# Patient Record
Sex: FEMALE | Race: WHITE | NOT HISPANIC OR LATINO | Employment: OTHER | URBAN - METROPOLITAN AREA
[De-identification: names, ages, dates, MRNs, and addresses within clinical notes are randomized per-mention and may not be internally consistent; named-entity substitution may affect disease eponyms.]

---

## 2017-01-13 ENCOUNTER — HOSPITAL ENCOUNTER (OUTPATIENT)
Dept: PHYSICAL THERAPY | Facility: HOSPITAL | Age: 82
Setting detail: THERAPIES SERIES
Discharge: HOME | End: 2017-01-13

## 2017-01-13 DIAGNOSIS — I87.2 VENOUS STASIS DERMATITIS OF BOTH LOWER EXTREMITIES: Primary | ICD-10-CM

## 2017-01-13 DIAGNOSIS — R60.0 EDEMA OF BOTH LEGS: ICD-10-CM

## 2017-01-13 PROCEDURE — G8991 OTHER PT/OT GOAL STATUS: HCPCS

## 2017-01-13 PROCEDURE — 29581 APPL MULTLAYER CMPRN SYS LEG: CPT

## 2017-01-13 PROCEDURE — 97161 PT EVAL LOW COMPLEX 20 MIN: CPT

## 2017-01-13 PROCEDURE — G8990 OTHER PT/OT CURRENT STATUS: HCPCS

## 2017-01-13 NOTE — MR AVS SNAPSHOT
Lorna Lo   2017  9:30 AM   INITIAL EVALUATION - WOUND CARE    Dept Phone:  [phone]   Encounter #:  [id number]    Provider:  Alis Johnson PT   Department:  [facility] OUTPATIENT PHYSICAL THERAPY                Your Full Care Plan              Your Updated Medication List      ASK your doctor about these medications     amLODIPine 5 MG tablet   Commonly known as:  NORVASC       aspirin 81 MG EC tablet       atenolol 100 MG tablet   Commonly known as:  TENORMIN       CALTRATE 600 PO       cholecalciferol 1000 UNITS tablet   Commonly known as:  VITAMIN D3       folic acid 1 MG tablet   Commonly known as:  FOLVITE       furosemide 20 MG tablet   Commonly known as:  LASIX       levothyroxine 88 MCG tablet   Commonly known as:  SYNTHROID, LEVOTHROID       pantoprazole 40 MG EC tablet   Commonly known as:  PROTONIX       potassium chloride 10 MEQ CR tablet   Commonly known as:  K-DUR       predniSONE 5 MG tablet   Commonly known as:  DELTASONE               Instructions     None    Patient Instructions History      Upcoming Appointments     Visit Type Date Time Department    INITIAL EVAL - WOUND CARE 2017  9:30 AM  [facility]    TREATMENT 2017 10:00 AM  [facility]      MyChart Signup     [facility] [employer] allows you to send messages to your doctor, view your test results, renew your prescriptions, schedule appointments, and more. To sign up, go to [employer] and click on the Sign Up Now link in the New User? box. Enter your [employer] Activation Code exactly as it appears below along with the last four digits of your Social Security Number and your Date of Birth () to complete the sign-up process. If you do not sign up before the expiration date, you must request a new code.    [employer] Activation Code: [email or web address]  Expires: 2017 10:02 AM    If you have questions, you can email [email or web address] or  call [phone] to talk to our MyChart staff. Remember, [employer] is NOT to be used for urgent needs. For medical emergencies, dial 911.               Other Info from Your Visit           Your Appointments     Jan 17, 2017 10:00 AM EST   Therapy Treatment with Alis Johnson, PT   [facility] OUTPATIENT PHYSICAL THERAPY ([place])    [address] [place] [id number] [phone]              Allergies     Amoxicillin      Contrast Dye      INTRAVENOUS CONTRAST DYE.     Fosamax [Alendronate]      Lisinopril      Lortab [Hydrocodone-acetaminophen]      Methotrexate Derivatives      Other      CHLOROTHALIDONE    Simvastatin        Vital Signs     Smoking Status                   Former Smoker

## 2017-01-13 NOTE — PROGRESS NOTES
"    Outpatient Rehabilitation - Wound/Debridement Initial Eval  [facility]     Patient Name: Lorna Lo  : 1934  MRN: [id number]  Today's Date: 2017            RLE:      LLE:    Admit Date: 2017    Visit Dx:    ICD-10-CM ICD-9-CM   1. Venous stasis dermatitis of both lower extremities I83.11 454.1    I83.12    2. Edema of both legs R60.0 782.3       Patient Active Problem List   Diagnosis   • Coronary artery disease   • Hypertension   • Dyslipidemia   • Polymyositis   • Dependent edema   • CKD (chronic kidney disease)   • Chronic anemia   • Osteopenia   • GERD (gastroesophageal reflux disease)        Past Medical History   Diagnosis Date   • Chronic anemia      Chronic anemia, secondary to (CKD).    • CKD (chronic kidney disease)    • Coronary artery disease    • Dependent edema    • DVT (deep venous thrombosis) 2009     History of DVT in .    • Dyslipidemia    • GERD (gastroesophageal reflux disease)    • Hypertension    • Osteopenia    • Polymyositis         No past surgical history on file.          Patient History       17 0930          History    Chief Complaint Ulcer, wound or other skin condition  -      Date Current Problem(s) Began 01/10/17  -      Brief Description of Current Complaint Pt returns to PT wound care for similar complaints as her last episode of care. She has continued to wear the size 4 compressogrip stockings with success. In the past week, she has noticed a slight increase in her swelling and saw that her skin was \"breaking open\" around B ankles.  -      Previous treatment for THIS PROBLEM Other (comment)   Compression wraps from PT wound care, z-guard  -      Patient/Caregiver Goals Know what to do to help the symptoms;Decrease swelling  -      Patient's Rating of General Health Good  -      Occupation/sports/leisure activities Retired.  -      Patient seeing anyone else for problem(s)? Yes   PCP  -      How has patient tried to help " current problem? Pt has continued to use compressogrip and z-guard since her last episode of care.   She wanted to order more, but was concerned with the cost.  -MC      Related/Recent Hospitalizations No  -MC      Are you or can you be pregnant No  -MC      Pain     Pain Location Leg   bilateral  -MC      Pain at Present 2  -MC      Pain at Best 0  -MC      Pain at Worst 5  -MC      Pain Description Burning  -      Difficulties with ADL's? self care  -      Fall Risk Assessment    Any falls in the past year: No  -MC      Does patient have a fear of falling No  -MC      Previous Functional Level --   independent with most activities, son assists with driving  -      Services    Are you currently receiving Home Health services No  -MC      Do you plan to receive Home Health services in the near future No  -MC      Daily Activities    Primary Language English  -MC      Are you able to read Yes  -MC      Are you able to write Yes  -MC      How does patient learn best? Listening;Demonstration  -      Teaching needs identified Management of Condition  -      Patient is concerned about/has problems with Climbing Stairs;Performing home management (household chores, shopping, care of dependents);Walking  -      Does patient have problems with the following? None  -MC      Barriers to learning None  -MC      Pt Participated in POC and Goals Yes  -MC      Safety    Are you being hurt, hit, or frightened by anyone at home or in your life? No  -MC      Are you being neglected by a caregiver No  -MC        User Key  (r) = Recorded By, (t) = Taken By, (c) = Cosigned By    Initials Name Provider Type     Alis Johnson, PT Physical Therapist          EVALUATION            LDA Wound       01/13/17 0930          Wound 01/13/17 0930 Bilateral lower leg excoriation    Wound - Properties Group Date first assessed: 01/13/17  - Time first assessed: 0930  - Side: Bilateral  -MC Orientation: lower  - Location: leg   "-MC Type: excoriation  -MC    Wound WDL ex   periwound redness  -MC      Dressing Appearance dry;intact   compressogrip  -MC      Base moist;reddened  -MC      Periwound Area intact;redness;swelling  -MC      Edges open;irregular  -MC      Length (cm) --   scattered open areas along B ankles  -MC      Drainage Characteristics/Odor serous  -MC      Drainage Amount small  -MC      Picture taken yes  -MC      Wound Cleaning cleansed with;other (see comments)   theraworx  -MC      Dressing Dressing applied;low-adherent;silver impregnated dressing   Mepilex Ag foam secured with 4\" conform and compressogrip  -MC      Periwound Care cleansed with pH balanced cleanser;barrier ointment applied;other (see comments)   eucerin to intact skin, z-guard to excoriation  -MC        User Key  (r) = Recorded By, (t) = Taken By, (c) = Cosigned By    Initials Name Provider Type     Alis Johnson, PT Physical Therapist              Lymphedema       01/13/17 0930          Lymphedema Edema Assessment    Ptting Edema Category By severity  -      Pitting Edema Mild  -MC      Skin Changes/Observations    Location/Assessment Lower Extremity  -      Lower Extremity Conditions bilateral:;clean;dry;hairless;inflamed  -      Lower Extremity Color/Pigment bilateral:;erythema  -MC      Lymphedema Pulses/Capillary Refill    Dorsalis Pedis Pulse right:;left:;+1 diminished  -MC      Posterior Tibialis Pulse right:;left:;+1 diminished  -MC      Capillary Refill lower extremity capillary refill  -      Lower Extremity Capillary Refill right:;left:;less than 3 seconds  -MC      LLE Quick Girth (cm)    Met-heads 21.3 cm  -MC      Mid foot 20.4 cm  -MC      Smallest ankle 19.5 cm  -MC      Largest calf 37.9 cm  -MC      Tib tuberosity 36.5 cm  -MC      RLE Quick Girth (cm)    Met-heads 21.8 cm  -MC      Mid foot 20.5 cm  -MC      Smallest ankle 20.9 cm  -MC      Largest calf 38.8 cm  -MC      Tib tuberosity 39.6 cm  -MC      Compression/Skin " Care    Compression/Skin Care skin care;wrapping location;bandaging  -      Skin Care washed/dried;lotion applied  -      Wrapping Location lower extremity  -      Wrapping Location LE bilateral:;foot to knee  -      Bandage Layers cotton elastic stocking- single layer (comment size);cotton elastic stocking- double layer (comment size)   size 4 MH to calf, doubled over foot/ankle  -        User Key  (r) = Recorded By, (t) = Taken By, (c) = Cosigned By    Initials Name Provider Type     Alis Johnson, PT Physical Therapist          WOUND DEBRIDEMENT                    Recommendation and Plan        PT Assessment/Plan       01/13/17 0930          PT Assessment    Functional Limitations Performance in self-care ADL  -      Impairments Integumentary integrity;Edema  -      Assessment Comments Pt presents to PT wound care with slightly worsened BLE edema and areas of excoriation and periwound erythema to B ankles. Added silver foam and z-guard to the excoriated areas to provide adequate moist wound healing with antibacterial effects. Pt will benefit from PT wound care for management of advanced dressings and light compression systems.  -      Rehab Potential Good  -      Patient/caregiver participated in establishment of treatment plan and goals Yes  -      Patient would benefit from skilled therapy intervention Yes  -      PT Plan    PT Frequency 2x/week  -      Predicted Duration of Therapy Intervention (days/wks) 4 weeks  -      Physical Therapy Interventions (Optional Details) patient/family education;wound care  -      PT Plan Comments Follow up Tuesday.  -        User Key  (r) = Recorded By, (t) = Taken By, (c) = Cosigned By    Initials Name Provider Type    PAWAN Johnson PT Physical Therapist            Goals      First Last   PT Goal Re-Cert Due Date: 02/12/17  PT Goal Re-Cert Due Date: 02/12/17   PT Short Term Goals  STG 1: Patient and/ or caregiver able to verbalize  signs and symptoms of infection.  STG 2: Decrease area of excoriation by 50% as evidence of wound healing. PT Short Term Goals  STG 1: Patient and/ or caregiver able to verbalize signs and symptoms of infection.  STG 2: Decrease area of excoriation by 50% as evidence of wound healing.   Long Term Goals  LTG 1: Patient independent and compliant with use and care of compression wraps or stockings, with assistance of family / caregiver as indicated to promote self-care independence.  LTG 2: Decrease area of excoriation by 75% as evidence of wound healing.  LTG 3: Decrease limb circumferences by 2 cm to promote improved skin integrity and circulation.  Long Term Goals  LTG 1: Patient independent and compliant with use and care of compression wraps or stockings, with assistance of family / caregiver as indicated to promote self-care independence.  LTG 2: Decrease area of excoriation by 75% as evidence of wound healing.  LTG 3: Decrease limb circumferences by 2 cm to promote improved skin integrity and circulation.                   PT OP Goals       01/13/17 0930          PT Short Term Goals    STG 1 Patient and/ or caregiver able to verbalize signs and symptoms of infection.  -      STG 2 Decrease area of excoriation by 50% as evidence of wound healing.  -      Long Term Goals    LTG 1 Patient independent and compliant with use and care of compression wraps or stockings, with assistance of family / caregiver as indicated to promote self-care independence.  -      LTG 2 Decrease area of excoriation by 75% as evidence of wound healing.  -      LTG 3 Decrease limb circumferences by 2 cm to promote improved skin integrity and circulation.  -      Time Calculation    PT Goal Re-Cert Due Date 02/12/17  -        User Key  (r) = Recorded By, (t) = Taken By, (c) = Cosigned By    Initials Name Provider Type    PAWAN Johnson, PT Physical Therapist          Time Calculation: Start Time: 0930    Therapy Charges  for Today     Code Description Service Date Service Provider Modifiers Qty    61994800946 HC PT OTHER PRIME FUNCT CURRENT 1/13/2017 Alis Johnson, PT GP, CK 1    24651136596 HC PT OTHER PRIME FUNCT PROJECTED 1/13/2017 Alis Johnson, PT GP, CJ 1    90373313268 HC PT EVAL LOW COMPLEXITY 4 1/13/2017 Alis Johnson, PT GP 1    36033521684 HC PT MULTI LAYER COMP SYS BELOW KNEE 1/13/2017 Alis Johnson, PT GP 1          PT G-Codes  PT Professional Judgement Used?: Yes  Outcome Measure Options: Lower Extremity Functional Scale (LEFS)  Score: 48  Functional Limitation: Other PT primary  Other PT Primary Current Status (): At least 40 percent but less than 60 percent impaired, limited or restricted  Other PT Primary Goal Status (): At least 20 percent but less than 40 percent impaired, limited or restricted     Alis Johnson, PT  1/13/2017

## 2017-01-16 ENCOUNTER — TRANSCRIBE ORDERS (OUTPATIENT)
Dept: PHYSICAL THERAPY | Facility: HOSPITAL | Age: 82
End: 2017-01-16

## 2017-01-16 DIAGNOSIS — I87.2 VENOUS STASIS DERMATITIS OF BOTH LOWER EXTREMITIES: Primary | ICD-10-CM

## 2017-01-17 ENCOUNTER — HOSPITAL ENCOUNTER (OUTPATIENT)
Dept: PHYSICAL THERAPY | Facility: HOSPITAL | Age: 82
Setting detail: THERAPIES SERIES
Discharge: HOME | End: 2017-01-17

## 2017-01-17 DIAGNOSIS — I87.2 VENOUS STASIS DERMATITIS OF BOTH LOWER EXTREMITIES: ICD-10-CM

## 2017-01-17 PROCEDURE — 29581 APPL MULTLAYER CMPRN SYS LEG: CPT

## 2017-01-17 NOTE — MR AVS SNAPSHOT
[facility] OUTPATIENT PHYSICAL THERAPY  [phone]                    Lorna Lo   2017 10:00 AM   Therapy Treatment    Dept Phone:  [phone]   Encounter #:  [id number]    Provider:  Alis Johnson PT   Department:  [facility] OUTPATIENT PHYSICAL THERAPY                Your Full Care Plan              Your Updated Medication List      ASK your doctor about these medications     amLODIPine 5 MG tablet   Commonly known as:  NORVASC       aspirin 81 MG EC tablet       atenolol 100 MG tablet   Commonly known as:  TENORMIN       CALTRATE 600 PO       cholecalciferol 1000 UNITS tablet   Commonly known as:  VITAMIN D3       folic acid 1 MG tablet   Commonly known as:  FOLVITE       furosemide 20 MG tablet   Commonly known as:  LASIX       levothyroxine 88 MCG tablet   Commonly known as:  SYNTHROID, LEVOTHROID       pantoprazole 40 MG EC tablet   Commonly known as:  PROTONIX       potassium chloride 10 MEQ CR tablet   Commonly known as:  K-DUR       predniSONE 5 MG tablet   Commonly known as:  DELTASONE               Instructions     None    Patient Instructions History      Upcoming Appointments     Visit Type Date Time Department    TREATMENT 2017 10:00 AM  [facility]    TREATMENT 2017  9:15 AM  [facility]      MyChart Signup     [facility] [employer] allows you to send messages to your doctor, view your test results, renew your prescriptions, schedule appointments, and more. To sign up, go to [employer] and click on the Sign Up Now link in the New User? box. Enter your [employer] Activation Code exactly as it appears below along with the last four digits of your Social Security Number and your Date of Birth () to complete the sign-up process. If you do not sign up before the expiration date, you must request a new code.    [employer] Activation Code: [email or web address]  Expires: 2017 10:02 AM    If you have questions, you can  email [email or web address] or call [phone] to talk to our [employer] staff. Remember, [employer] is NOT to be used for urgent needs. For medical emergencies, dial 911.               Other Info from Your Visit           Your Appointments     Jan 23, 2017  9:15 AM EST   Therapy Treatment with Dayami Baca, PT   [facility] OUTPATIENT PHYSICAL THERAPY ([place])    [address] [place] [address] [phone]              Allergies     Amoxicillin      Contrast Dye      INTRAVENOUS CONTRAST DYE.     Fosamax [Alendronate]      Lisinopril      Lortab [Hydrocodone-acetaminophen]      Methotrexate Derivatives      Other      CHLOROTHALIDONE    Simvastatin        Vital Signs     Smoking Status                   Former Smoker

## 2017-01-18 NOTE — PROGRESS NOTES
"    Outpatient Rehabilitation - Wound/Debridement Treatment Note  [facility]     Patient Name: Lorna Lo  : 1934  MRN: [id number]  Today's Date: 2017                Admit Date: 2017    Visit Dx:    ICD-10-CM ICD-9-CM   1. Venous stasis dermatitis of both lower extremities I83.11 454.1    I83.12        Patient Active Problem List   Diagnosis   • Coronary artery disease   • Hypertension   • Dyslipidemia   • Polymyositis   • Dependent edema   • CKD (chronic kidney disease)   • Chronic anemia   • Osteopenia   • GERD (gastroesophageal reflux disease)        Past Medical History   Diagnosis Date   • Chronic anemia      Chronic anemia, secondary to (CKD).    • CKD (chronic kidney disease)    • Coronary artery disease    • Dependent edema    • DVT (deep venous thrombosis)      History of DVT in .    • Dyslipidemia    • GERD (gastroesophageal reflux disease)    • Hypertension    • Osteopenia    • Polymyositis         No past surgical history on file.      EVALUATION        PT Ortho       17 1100    Subjective Comments    Subjective Comments No issues to report. Feels like her legs \"will always be a problem\"  -ES    Subjective Pain    Able to rate subjective pain? yes  -ES    Pre-Treatment Pain Level 0  -ES    Post-Treatment Pain Level 0  -ES      User Key  (r) = Recorded By, (t) = Taken By, (c) = Cosigned By    Initials Name Provider Type    ES Aylin Durand, PT Physical Therapist                    LDA Wound                  Wound 17 0930 Bilateral lower leg excoriation    Wound - Properties Group Date first assessed: 17  - Time first assessed: 930  - Side: Bilateral  - Orientation: lower  - Location: leg  -MC Type: excoriation  -MC      User Key  (r) = Recorded By, (t) = Taken By, (c) = Cosigned By    Initials Name Provider Type    MC Alis Johnson, PT Physical Therapist                          Recommendation and Plan        PT Assessment/Plan       " 01/17/17 1100 01/13/17 0930       PT Assessment    Functional Limitations Performance in self-care ADL  -ES Performance in self-care ADL  -     Impairments Integumentary integrity;Edema  -ES Integumentary integrity;Edema  -     Assessment Comments BLE edema and excoriation improved per patient from initial visit though excoriation still present with drainage present on dressings upon arrival. Will continue to benefit from skilled intervention for dressing management and wound care with anticipation of transistion to permanent compression stockings once wounds are improved.  -ES Pt presents to PT wound care with slightly worsened BLE edema and areas of excoriation and periwound erythema to B ankles. Added silver foam and z-guard to the excoriated areas to provide adequate moist wound healing with antibacterial effects. Pt will benefit from PT wound care for management of advanced dressings and light compression systems.  -     Rehab Potential Good  -ES Good  -     Patient/caregiver participated in establishment of treatment plan and goals Yes  -ES Yes  -MC     Patient would benefit from skilled therapy intervention Yes  -ES Yes  -MC     PT Plan    PT Frequency 1x/week  -ES 2x/week  -     Predicted Duration of Therapy Intervention (days/wks)  4 weeks  -     Physical Therapy Interventions (Optional Details) patient/family education;wound care  -ES patient/family education;wound care  -     PT Plan Comments Follow up 1/23  -ES Follow up Tuesday.  -       User Key  (r) = Recorded By, (t) = Taken By, (c) = Cosigned By    Initials Name Provider Type    ES Aylin Durand, PT Physical Therapist     Alis Johnson, PT Physical Therapist          Goals        PT OP Goals       01/13/17 0930          PT Short Term Goals    STG 1 Patient and/ or caregiver able to verbalize signs and symptoms of infection.  -      STG 2 Decrease area of excoriation by 50% as evidence of wound healing.  -      Long Term  Goals    LTG 1 Patient independent and compliant with use and care of compression wraps or stockings, with assistance of family / caregiver as indicated to promote self-care independence.  -      LTG 2 Decrease area of excoriation by 75% as evidence of wound healing.  -      LTG 3 Decrease limb circumferences by 2 cm to promote improved skin integrity and circulation.  -MC      Time Calculation    PT Goal Re-Cert Due Date 02/12/17  -        User Key  (r) = Recorded By, (t) = Taken By, (c) = Cosigned By    Initials Name Provider Type    PAWAN Johnson, PT Physical Therapist                       Time Calculation: Start Time: 1100    Therapy Charges for Today     Code Description Service Date Service Provider Modifiers Qty    48181262210 HC PT MULTI LAYER COMP SYS BELOW KNEE 1/17/2017 Aylin Durand, PT GP 1                Aylin Durand, PT  1/18/2017

## 2017-01-23 ENCOUNTER — HOSPITAL ENCOUNTER (OUTPATIENT)
Dept: PHYSICAL THERAPY | Facility: HOSPITAL | Age: 82
Setting detail: THERAPIES SERIES
Discharge: HOME | End: 2017-01-23

## 2017-01-23 DIAGNOSIS — R60.0 EDEMA OF BOTH LEGS: ICD-10-CM

## 2017-01-23 DIAGNOSIS — I87.2 VENOUS STASIS DERMATITIS OF BOTH LOWER EXTREMITIES: Primary | ICD-10-CM

## 2017-01-23 PROCEDURE — 97597 DBRDMT OPN WND 1ST 20 CM/<: CPT

## 2017-01-23 PROCEDURE — 29580 STRAPPING UNNA BOOT: CPT

## 2017-01-23 NOTE — PROGRESS NOTES
Outpatient Rehabilitation - Wound/Debridement Treatment Note  [facility]     Patient Name: Lorna Lo  : 1934  MRN: [id number]  Today's Date: 2017                    Admit Date: 2017    Visit Dx:    ICD-10-CM ICD-9-CM   1. Venous stasis dermatitis of both lower extremities I83.11 454.1    I83.12    2. Edema of both legs R60.0 782.3       Patient Active Problem List   Diagnosis   • Coronary artery disease   • Hypertension   • Dyslipidemia   • Polymyositis   • Dependent edema   • CKD (chronic kidney disease)   • Chronic anemia   • Osteopenia   • GERD (gastroesophageal reflux disease)        Past Medical History   Diagnosis Date   • Chronic anemia      Chronic anemia, secondary to (CKD).    • CKD (chronic kidney disease)    • Coronary artery disease    • Dependent edema    • DVT (deep venous thrombosis) 2009     History of DVT in .    • Dyslipidemia    • GERD (gastroesophageal reflux disease)    • Hypertension    • Osteopenia    • Polymyositis         No past surgical history on file.      EVALUATION        PT Ortho       17    Subjective Comments    Subjective Comments Pt states the bandage on the right leg soaked thru so she changed everything except for the silver foam.  -JM    Subjective Pain    Able to rate subjective pain? yes  -    Pre-Treatment Pain Level 0  -    Post-Treatment Pain Level 0  -    Transfers    Transfer, Comment seated in arjo chair for tx  -    Gait Assessment/Treatment    Gait, [place] Level independent  -    Gait, Assistive Device rollator  -      User Key  (r) = Recorded By, (t) = Taken By, (c) = Cosigned By    Initials Name Provider Type    ALENA Baca, PT Physical Therapist                    [facility] Wound       17          Wound 17 Bilateral lower leg excoriation    Wound - Properties Group Date first assessed: 17  - Time first assessed: 930  - Side: Bilateral  - Orientation: lower  -  Location: leg  - Type: excoriation  -    Wound WDL ex  -      Dressing Appearance intact;moist drainage  -      Base moist;reddened  -      Periwound Area intact;redness;swelling;moist  -      Edges open;irregular  -      Drainage Characteristics/Odor serous  -      Drainage Amount small  -      Picture taken yes  -      Wound Cleaning cleansed with;other (see comments)   theraworx wipes  -      Wound Interventions debrided  -      Dressing other (see comments)   unna boots, see lymphedema flowsheet  -        User Key  (r) = Recorded By, (t) = Taken By, (c) = Cosigned By    Initials Name Provider Type     Alis Johnson, PT Physical Therapist     Dayami Baca, PT Physical Therapist              Lymphedema       01/23/17 0929          Lymphedema Edema Assessment    Ptting Edema Category By severity  -      Pitting Edema Mild  -      Skin Changes/Observations    Location/Assessment Lower Extremity  -      Lower Extremity Conditions bilateral:;clean;dry;shiny;hairless;scaly;inflamed;fragile  -      Lower Extremity Color/Pigment bilateral:;erythema;red  -      Lower Extremity Skin Details 8.5cm x 3.5cm erythema on RLE, 4.5cm x 8.0cm on LLE  -      Lymphedema Pulses/Capillary Refill    Dorsalis Pedis Pulse right:;left:;+2 normal  -      Posterior Tibialis Pulse right:;left:;+1 diminished  -      Lower Extremity Capillary Refill right:;left:;less than 3 seconds  -      LLE Quick Girth (cm)    Met-heads 21.3 cm  -      Mid foot 20.5 cm  -JM      Smallest ankle 19.5 cm  -JM      Largest calf 33.4 cm  -JM      Tib tuberosity 34 cm  -JM      RLE Quick Girth (cm)    Met-heads 22 cm  -JM      Mid foot 21 cm  -JM      Smallest ankle 20.5 cm  -JM      Largest calf 36.3 cm  -JM      Tib tuberosity 36.3 cm  -JM      Compression/Skin Care    Compression/Skin Care skin care;wrapping location;bandaging  -      Skin Care washed/dried  -      Wrapping Location lower extremity  " -JM      Wrapping Location LE bilateral:;foot to knee  -      Wrapping Comments unna boot, 4\" coban, size 5 spandage  -        User Key  (r) = Recorded By, (t) = Taken By, (c) = Cosigned By    Initials Name Provider Type    ALENA Baca, PT Physical Therapist          WOUND DEBRIDEMENT  Debridement Site 1  Location- Site 1: Medial ankle wounds BLE  Selective Debridement- Site 1: Wound Surface <20cmsq  Instruments- Site 1: tweezers  Excised Tissue Description- Site 1: moderate, other (comment) (loose crust and skin debris)  Bleeding- Site 1: none             Recommendation and Plan        PT Assessment/Plan       01/23/17 0925 01/17/17 1100       PT Assessment    Functional Limitations Performance in self-care ADL  - Performance in self-care ADL  -ES     Impairments Integumentary integrity;Edema  - Integumentary integrity;Edema  -ES     Assessment Comments Pt still with drainage from BLE, requiring pt to change dressings over the weekend.  LLE edema improved from initial evaluation.  PT applied unna boots this tx in attempt to improve skin integrity and excoriation.  Will assess response next tx.  - BLE edema and excoriation improved per patient from initial visit though excoriation still present with drainage present on dressings upon arrival. Will continue to benefit from skilled intervention for dressing management and wound care with anticipation of transistion to permanent compression stockings once wounds are improved.  -ES     Rehab Potential Good  -JM Good  -ES     Patient/caregiver participated in establishment of treatment plan and goals Yes  -JM Yes  -ES     Patient would benefit from skilled therapy intervention Yes  -JM Yes  -ES     PT Plan    PT Frequency 2x/week  -JM 1x/week  -ES     Physical Therapy Interventions (Optional Details) wound care;patient/family education  - patient/family education;wound care  -ES     PT Plan Comments unna boot vs compressogrips next tx  - Follow up " 1/23  -ES       User Key  (r) = Recorded By, (t) = Taken By, (c) = Cosigned By    Initials Name Provider Type    ES Aylin Durand, PT Physical Therapist    JM Dayami Baca, PT Physical Therapist          Goals        PT OP Goals       01/23/17 0925 01/13/17 0930       PT Short Term Goals    STG 1  Patient and/ or caregiver able to verbalize signs and symptoms of infection.  -     STG 2  Decrease area of excoriation by 50% as evidence of wound healing.  -     Long Term Goals    LTG 1  Patient independent and compliant with use and care of compression wraps or stockings, with assistance of family / caregiver as indicated to promote self-care independence.  -     LTG 2  Decrease area of excoriation by 75% as evidence of wound healing.  -     LTG 3  Decrease limb circumferences by 2 cm to promote improved skin integrity and circulation.  -     Time Calculation    PT Goal Re-Cert Due Date 02/12/17  -ALENA 02/12/17  -       User Key  (r) = Recorded By, (t) = Taken By, (c) = Cosigned By    Initials Name Provider Type    PAWAN Johnson, PT Physical Therapist    ALENA Baca, PT Physical Therapist          PT Goal Re-Cert Due Date: 02/12/17            Time Calculation: Start Time: 0925    Therapy Charges for Today     Code Description Service Date Service Provider Modifiers Qty    22537425998 HC PT STAPPING UNNA BOOT 1/23/2017 Dayami Baca, PT GP 1    52634635275 HC JUAN DEBRIDE OPEN WOUND UP TO 20CM 1/23/2017 Dayami Baca, PT 59, GP 1    23197592990 HC PT THER SUPP EA 15 MIN 1/23/2017 Dayami Baca, PT GP 1                Dayami Baca, PT  1/23/2017

## 2017-01-27 ENCOUNTER — HOSPITAL ENCOUNTER (OUTPATIENT)
Dept: PHYSICAL THERAPY | Facility: HOSPITAL | Age: 82
Setting detail: THERAPIES SERIES
Discharge: HOME | End: 2017-01-27

## 2017-01-27 DIAGNOSIS — I87.2 VENOUS STASIS DERMATITIS OF BOTH LOWER EXTREMITIES: Primary | ICD-10-CM

## 2017-01-27 PROCEDURE — 29581 APPL MULTLAYER CMPRN SYS LEG: CPT

## 2017-01-27 NOTE — PROGRESS NOTES
Outpatient Rehabilitation - Wound/Debridement Treatment Note  [facility]     Patient Name: Lonra Lo  : 1934  MRN: [id number]  Today's Date: 2017                Admit Date: 2017    Visit Dx:    ICD-10-CM ICD-9-CM   1. Venous stasis dermatitis of both lower extremities I83.11 454.1    I83.12        Patient Active Problem List   Diagnosis   • Coronary artery disease   • Hypertension   • Dyslipidemia   • Polymyositis   • Dependent edema   • CKD (chronic kidney disease)   • Chronic anemia   • Osteopenia   • GERD (gastroesophageal reflux disease)        Past Medical History   Diagnosis Date   • Chronic anemia      Chronic anemia, secondary to (CKD).    • CKD (chronic kidney disease)    • Coronary artery disease    • Dependent edema    • DVT (deep venous thrombosis)      History of DVT in .    • Dyslipidemia    • GERD (gastroesophageal reflux disease)    • Hypertension    • Osteopenia    • Polymyositis         No past surgical history on file.      EVALUATION        PT Ortho       17    Subjective Comments    Subjective Comments Pt reports the unna boots are very uncomfortable and she does not like them.  -MC    Subjective Pain    Able to rate subjective pain? yes  -MC    Pre-Treatment Pain Level 0  -MC    Post-Treatment Pain Level 0  -MC    Transfers    Transfer, Comment seated on EOB for tx  -MC    Gait Assessment/Treatment    Gait, [place] Level independent  -MC    Gait, Assistive Device rollator  -      User Key  (r) = Recorded By, (t) = Taken By, (c) = Cosigned By    Initials Name Provider Type    PAWAN Johnson, PT Physical Therapist                    LDA Wound       17 1300 17 0930       Wound 17 Bilateral lower leg excoriation    Wound - Properties Group Date first assessed: 17  - Time first assessed: 930  - Side: Bilateral  -MC Orientation: lower  - Location: leg  -MC Type: excoriation  -MC    Wound WDL --  -MC ex   -     Dressing Appearance --  -MC dry;intact;moist drainage  -MC     Base --  -MC epithelialization;reddened;moist   small open areas remaining BLE  -MC     Periwound Area --  -MC intact;redness;dry  -MC     Edges --  -MC open;irregular  -MC     Drainage Characteristics/Odor --  -MC serous  -MC     Drainage Amount --  -MC small  -MC     Wound Cleaning --  -MC cleansed with;other (see comments)   theraworx  -     Wound Interventions --  -MC debrided  -MC     Dressing --  -MC low-adherent;silver impregnated dressing   Mepilex Ag foam secured with conform and MLW  -     Periwound Care --  -MC cleansed with pH balanced cleanser;dry periwound area maintained  -       User Key  (r) = Recorded By, (t) = Taken By, (c) = Cosigned By    Initials Name Provider Type    PAWAN Johnson, PT Physical Therapist              Lymphedema       01/27/17 0930          Lymphedema Edema Assessment    Ptting Edema Category By severity  -      Pitting Edema Mild  -      Skin Changes/Observations    Lower Extremity Conditions bilateral:;clean;dry;shiny;hairless;fragile  -      Lower Extremity Color/Pigment bilateral:;hyperpigmented;red  -      Lymphedema Pulses/Capillary Refill    Dorsalis Pedis Pulse right:;left:;+2 normal  -      Posterior Tibialis Pulse right:;left:;+1 diminished  -      Capillary Refill lower extremity capillary refill  -      Lower Extremity Capillary Refill right:;left:;less than 3 seconds  -      Compression/Skin Care    Compression/Skin Care skin care;wrapping location;bandaging  -      Skin Care washed/dried  -      Wrapping Location lower extremity  -      Wrapping Location LE bilateral:;foot to knee  -      Bandage Layers cotton elastic stocking- single layer (comment size)   size 4 MH to fib head, dbl over foot and ankle  -        User Key  (r) = Recorded By, (t) = Taken By, (c) = Cosigned By    Initials Name Provider Type    PAWAN Johnson, PT Physical Therapist           WOUND DEBRIDEMENT  Debridement Site 1  Location- Site 1: Medial ankle wounds BLE  Selective Debridement- Site 1: Wound Surface <20cmsq  Instruments- Site 1: tweezers  Excised Tissue Description- Site 1: minimum, other (comment) (loose, nonviable, dry skin)  Bleeding- Site 1: none             Recommendation and Plan        PT Assessment/Plan       01/27/17 0930 01/23/17 0925       PT Assessment    Functional Limitations Performance in self-care ADL  - Performance in self-care ADL  -     Impairments Integumentary integrity;Edema  - Integumentary integrity;Edema  -     Assessment Comments Pt did not tolerate the unna boots d/t c/o discomfort. Pt with small open areas to medial ankles with many areas of re-epithelialization that are still red and inflamed. Pt will continue to benefit from advanced dressings and MLW to continue progress.  - Pt still with drainage from BLE, requiring pt to change dressings over the weekend.  LLE edema improved from initial evaluation.  PT applied unna boots this tx in attempt to improve skin integrity and excoriation.  Will assess response next tx.  -     Rehab Potential Good  - Good  -     Patient/caregiver participated in establishment of treatment plan and goals Yes  - Yes  -     Patient would benefit from skilled therapy intervention Yes  - Yes  -     PT Plan    PT Frequency 1x/week;2x/week  - 2x/week  -     Predicted Duration of Therapy Intervention (days/wks) 4 weeks  -      Physical Therapy Interventions (Optional Details) patient/family education;wound care  - wound care;patient/family education  -     PT Plan Comments MLW/dressings 1-2x/week with patient changing dressings between sessions.  - unna boot vs compressogrips next tx  -       User Key  (r) = Recorded By, (t) = Taken By, (c) = Cosigned By    Initials Name Provider Type    PAWAN Johnson, PT Physical Therapist    ALENA Baca, PT Physical Therapist           Goals        PT OP Goals       01/27/17 0930 01/23/17 0925       Time Calculation    PT Goal Re-Cert Due Date 02/12/17  -PAWAN 02/12/17  -ALENA       User Key  (r) = Recorded By, (t) = Taken By, (c) = Cosigned By    Initials Name Provider Type     Alis Johnson, PT Physical Therapist    ALENA Baca, PT Physical Therapist          PT Goal Re-Cert Due Date: 02/12/17            Time Calculation: Start Time: 0930    Therapy Charges for Today     Code Description Service Date Service Provider Modifiers Qty    65793369446 HC PT MULTI LAYER COMP SYS BELOW KNEE 1/27/2017 Alis Johnson, PT GP 1                Alis Johnson, PT  1/27/2017

## 2017-01-27 NOTE — MR AVS SNAPSHOT
Lorna Lo   2017  9:30 AM   Therapy Treatment    Dept Phone:  [phone]   Encounter #:  [id number]    Provider:  Alis Johnson PT   Department:  [facility] OUTPATIENT PHYSICAL THERAPY                Your Full Care Plan              Your Updated Medication List      ASK your doctor about these medications     amLODIPine 5 MG tablet   Commonly known as:  NORVASC       aspirin 81 MG EC tablet       atenolol 100 MG tablet   Commonly known as:  TENORMIN       CALTRATE 600 PO       cholecalciferol 1000 UNITS tablet   Commonly known as:  VITAMIN D3       folic acid 1 MG tablet   Commonly known as:  FOLVITE       furosemide 20 MG tablet   Commonly known as:  LASIX       levothyroxine 88 MCG tablet   Commonly known as:  SYNTHROID, LEVOTHROID       pantoprazole 40 MG EC tablet   Commonly known as:  PROTONIX       potassium chloride 10 MEQ CR tablet   Commonly known as:  K-DUR       predniSONE 5 MG tablet   Commonly known as:  DELTASONE               Instructions     None    Patient Instructions History      Upcoming Appointments     Visit Type Date Time Department    TREATMENT 2017  9:30 AM  [facility]    TREATMENT 2/3/2017 11:00 AM  [facility]      MyChart Signup     [facility] [employer] allows you to send messages to your doctor, view your test results, renew your prescriptions, schedule appointments, and more. To sign up, go to [email or web address] and click on the Sign Up Now link in the New User? box. Enter your [employer] Activation Code exactly as it appears below along with the last four digits of your Social Security Number and your Date of Birth () to complete the sign-up process. If you do not sign up before the expiration date, you must request a new code.    [employer] Activation Code: [email or web address]  Expires: 2017 10:02 AM    If you have questions, you can email [email or web address] or call [phone] to talk to  our MyChart staff. Remember, MyChart is NOT to be used for urgent needs. For medical emergencies, dial 911.               Other Info from Your Visit           Your Appointments     Feb 03, 2017 11:00 AM EST   Therapy Treatment with Alis Johnson, PT   [facility] OUTPATIENT PHYSICAL THERAPY ([place])    [address] [address] [phone]              Allergies     Amoxicillin      Contrast Dye      INTRAVENOUS CONTRAST DYE.     Fosamax [Alendronate]      Lisinopril      Lortab [Hydrocodone-acetaminophen]      Methotrexate Derivatives      Other      CHLOROTHALIDONE    Simvastatin        Vital Signs     Smoking Status                   Former Smoker

## 2017-02-03 ENCOUNTER — HOSPITAL ENCOUNTER (OUTPATIENT)
Dept: PHYSICAL THERAPY | Facility: HOSPITAL | Age: 82
Setting detail: THERAPIES SERIES
Discharge: HOME | End: 2017-02-03

## 2017-02-03 DIAGNOSIS — I87.2 VENOUS STASIS DERMATITIS OF BOTH LOWER EXTREMITIES: Primary | ICD-10-CM

## 2017-02-03 DIAGNOSIS — R60.0 EDEMA OF BOTH LEGS: ICD-10-CM

## 2017-02-03 PROCEDURE — 29581 APPL MULTLAYER CMPRN SYS LEG: CPT

## 2017-02-03 PROCEDURE — 97597 DBRDMT OPN WND 1ST 20 CM/<: CPT

## 2017-02-09 ENCOUNTER — HOSPITAL ENCOUNTER (OUTPATIENT)
Dept: PHYSICAL THERAPY | Facility: HOSPITAL | Age: 82
Setting detail: THERAPIES SERIES
Discharge: HOME | End: 2017-02-09

## 2017-02-09 DIAGNOSIS — R60.0 EDEMA OF BOTH LEGS: ICD-10-CM

## 2017-02-09 DIAGNOSIS — I87.2 VENOUS STASIS DERMATITIS OF BOTH LOWER EXTREMITIES: Primary | ICD-10-CM

## 2017-02-09 PROCEDURE — G8991 OTHER PT/OT GOAL STATUS: HCPCS

## 2017-02-09 PROCEDURE — 29581 APPL MULTLAYER CMPRN SYS LEG: CPT

## 2017-02-09 PROCEDURE — G8990 OTHER PT/OT CURRENT STATUS: HCPCS

## 2017-02-09 PROCEDURE — 97597 DBRDMT OPN WND 1ST 20 CM/<: CPT

## 2017-02-09 NOTE — PROGRESS NOTES
"    Outpatient Rehabilitation - Wound/Debridement Progress Note  [facility]     Patient Name: Lorna Lo  : 1934  MRN: [id number]  Today's Date: 2017                Admit Date: 2017    Visit Dx:    ICD-10-CM ICD-9-CM   1. Venous stasis dermatitis of both lower extremities I83.11 454.1    I83.12    2. Edema of both legs R60.0 782.3       Patient Active Problem List   Diagnosis   • Coronary artery disease   • Hypertension   • Dyslipidemia   • Polymyositis   • Dependent edema   • CKD (chronic kidney disease)   • Chronic anemia   • Osteopenia   • GERD (gastroesophageal reflux disease)        Past Medical History   Diagnosis Date   • Chronic anemia      Chronic anemia, secondary to (CKD).    • CKD (chronic kidney disease)    • Coronary artery disease    • Dependent edema    • DVT (deep venous thrombosis) 2009     History of DVT in .    • Dyslipidemia    • GERD (gastroesophageal reflux disease)    • Hypertension    • Osteopenia    • Polymyositis         No past surgical history on file.      EVALUATION        PT Ortho       17 1000    Subjective Comments    Subjective Comments Pt reports \"catching herself\" subconsciously rubbing her foot against her legs because of the itching. No issues to report  -ES    Subjective Pain    Able to rate subjective pain? yes  -ES    Pre-Treatment Pain Level 0  -ES    Post-Treatment Pain Level 0  -ES      User Key  (r) = Recorded By, (t) = Taken By, (c) = Cosigned By    Initials Name Provider Type    ES Aylin Durand, PT Physical Therapist                    [facility] Wound       17 1000          Wound 17 0930 Bilateral lower leg excoriation    Wound - Properties Group Date first assessed: 17  [facility] assessed: 930  - Side: Bilateral  - Orientation: lower  - Location: leg  - Type: excoriation  -MC    Wound WDL ex  -ES      Dressing Appearance dry;intact;moist drainage  -ES      Base epithelialization;reddened;dry   continued " excoriation to R ankle, min to LLE d/t scratching  -ES      Periwound Area intact;redness;dry  -ES      Edges open;irregular  -ES      Length (cm) 3.5   LLE medial ankle wound measurements. others scattered/irregular  -ES      Width (cm) 2  -ES      Depth (cm) 0.1  -ES      Drainage Characteristics/Odor serous  -ES      Drainage Amount small  -ES      Picture taken yes  -ES      Wound Cleaning cleansed with;other (see comments)   phase one wound cleanser  -ES      Wound Interventions debrided  -ES      Periwound Care cleansed with pH balanced cleanser;barrier film applied   z-gaurd  -ES        User Key  (r) = Recorded By, (t) = Taken By, (c) = Cosigned By    Initials Name Provider Type    ES Aylin Durand, PT Physical Therapist     Alis Johnson, PT Physical Therapist              Lymphedema       02/09/17 1000          Lymphedema Edema Assessment    Ptting Edema Category By severity  -ES      Pitting Edema Mild  -ES      Skin Changes/Observations    Lower Extremity Conditions bilateral:;clean;inflamed;dry;shiny;hairless  -ES      Lower Extremity Color/Pigment bilateral:;erythema;hyperpigmented  -ES      Compression/Skin Care    Compression/Skin Care skin care;wrapping location;bandaging  -ES      Skin Care washed/dried;lotion applied  -ES      Wrapping Location lower extremity  -ES      Wrapping Location LE bilateral:;foot to knee  -ES      Bandage Layers cotton elastic stocking- single layer (comment size)   sz 4 dbled at foot  -ES        User Key  (r) = Recorded By, (t) = Taken By, (c) = Cosigned By    Initials Name Provider Type    ES Aylin Durand, PT Physical Therapist          WOUND DEBRIDEMENT  Debridement Site 1  Location- Site 1: Medial ankle wounds BLE  Selective Debridement- Site 1: Wound Surface <20cmsq  Instruments- Site 1: tweezers  Excised Tissue Description- Site 1: minimum, other (comment) (dried exudate, non-viable tissue/skin, removed approximately 10sqcm of tissue)  Bleeding- Site 1: none              Recommendation and Plan        PT Assessment/Plan       02/09/17 1000 02/03/17 1100       PT Assessment    Functional Limitations Performance in self-care ADL  - Performance in self-care ADL  -     Impairments Integumentary integrity;Edema  - Integumentary integrity;Edema  -     Assessment Comments zgaurd improved overall excoriation but errythema still present predominately on R meidal LE. Goals remain approrpriate as areas of excoriation improving but still requiring debridment for promotion of healing.  -ES Pt with improved B ankle excoriation, with very small area left on the R ankle, and resolved L medial ankle excoriation. Pt with scattered scabs and scant open areas due to scratching the LLE. PT changed to z-guard to be applied around these areas to decrease inflammation and promote skin integrity. Pt will continue to benefit from skilled PT wound care to continue progress.  -     Rehab Potential Good  -ES Good  -     Patient/caregiver participated in establishment of treatment plan and goals Yes  -ES Yes  -     Patient would benefit from skilled therapy intervention Yes  -ES Yes  -     PT Plan    PT Frequency  1x/week;2x/week  -     Predicted Duration of Therapy Intervention (days/wks)  4 weeks  -     Physical Therapy Interventions (Optional Details) patient/family education;wound care  - patient/family education;wound care  -     PT Plan Comments F/U next week for debridement and dressing management.  -ES MLW 1-2 x/week with pt applying z-guard with MLW changes at home.  -       User Key  (r) = Recorded By, (t) = Taken By, (c) = Cosigned By    Initials Name Provider Type    ES Aylin Durand, PT Physical Therapist     Alis Johnson, PT Physical Therapist          Goals        PT OP Goals       02/09/17 1000 02/03/17 1100 01/27/17 0930    PT Short Term Goals    STG 1 Patient and/ or caregiver able to verbalize signs and symptoms of infection.  -ES      STG 1  Progress Progressing  -ES      STG 2 Decrease area of excoriation by 50% as evidence of wound healing.  -ES      STG 2 Progress Progressing  -ES      Long Term Goals    LTG 1 Patient independent and compliant with use and care of compression wraps or stockings, with assistance of family / caregiver as indicated to promote self-care independence.  -ES      LTG 1 Progress Ongoing;Progressing  -ES      LTG 2 Decrease area of excoriation by 75% as evidence of wound healing.  -ES      LTG 2 Progress Progressing  -ES      LTG 3 Decrease limb circumferences by 2 cm to promote improved skin integrity and circulation.  -ES      LTG 3 Progress Ongoing  -ES      Time Calculation    PT Goal Re-Cert Due Date 03/11/17  - 02/12/17  - 02/12/17  -      User Key  (r) = Recorded By, (t) = Taken By, (c) = Cosigned By    Initials Name Provider Type    ES Aylin Durand, PT Physical Therapist    PAWAN Johnson, PT Physical Therapist          PT Goal Re-Cert Due Date: 03/11/17  PT Short Term Goals  STG 1: Patient and/ or caregiver able to verbalize signs and symptoms of infection.  STG 1 Progress: Progressing  STG 2: Decrease area of excoriation by 50% as evidence of wound healing.  STG 2 Progress: Progressing  Long Term Goals  LTG 1: Patient independent and compliant with use and care of compression wraps or stockings, with assistance of family / caregiver as indicated to promote self-care independence.  LTG 1 Progress: Ongoing, Progressing  LTG 2: Decrease area of excoriation by 75% as evidence of wound healing.  LTG 2 Progress: Progressing  LTG 3: Decrease limb circumferences by 2 cm to promote improved skin integrity and circulation.  LTG 3 Progress: Ongoing      Time Calculation: Start Time: 1000    Therapy Charges for Today     Code Description Service Date Service Provider Modifiers Qty    62096316853 HC PT OTHER PRIME FUNCT CURRENT 2/9/2017 Aylin Durand, PT GP, CK 1    30765232454 HC PT OTHER PRIME FUNCT PROJECTED  2/9/2017 Aylin Durand, PT GP, CJ 1    31250306076 HC JUAN DEBRIDE OPEN WOUND UP TO 20CM 2/9/2017 Aylin Durand, PT GP 1    76182631754 HC PT MULTI LAYER COMP SYS BELOW KNEE 2/9/2017 Aylin Durand, PT GP 1          PT G-Codes  PT Professional Judgement Used?: Yes  Functional Limitation: Other PT primary  Other PT Primary Current Status (): At least 40 percent but less than 60 percent impaired, limited or restricted  Other PT Primary Goal Status (): At least 20 percent but less than 40 percent impaired, limited or restricted     Aylin Durand, PT  2/9/2017

## 2017-02-14 ENCOUNTER — HOSPITAL ENCOUNTER (OUTPATIENT)
Dept: PHYSICAL THERAPY | Facility: HOSPITAL | Age: 82
Setting detail: THERAPIES SERIES
Discharge: HOME | End: 2017-02-14

## 2017-02-14 DIAGNOSIS — R60.0 EDEMA OF BOTH LEGS: ICD-10-CM

## 2017-02-14 DIAGNOSIS — I87.2 VENOUS STASIS DERMATITIS OF BOTH LOWER EXTREMITIES: Primary | ICD-10-CM

## 2017-02-14 PROCEDURE — 29581 APPL MULTLAYER CMPRN SYS LEG: CPT

## 2017-02-14 PROCEDURE — 97597 DBRDMT OPN WND 1ST 20 CM/<: CPT

## 2017-02-14 NOTE — PROGRESS NOTES
Outpatient Rehabilitation - Wound/Debridement Treatment Note  [facility]     Patient Name: Lorna Lo  : 1934  MRN: [id number]  Today's Date: 2017                Admit Date: 2017    Visit Dx:    ICD-10-CM ICD-9-CM   1. Venous stasis dermatitis of both lower extremities I83.11 454.1    I83.12    2. Edema of both legs R60.0 782.3       Patient Active Problem List   Diagnosis   • Coronary artery disease   • Hypertension   • Dyslipidemia   • Polymyositis   • Dependent edema   • CKD (chronic kidney disease)   • Chronic anemia   • Osteopenia   • GERD (gastroesophageal reflux disease)        Past Medical History   Diagnosis Date   • Chronic anemia      Chronic anemia, secondary to (CKD).    • CKD (chronic kidney disease)    • Coronary artery disease    • Dependent edema    • DVT (deep venous thrombosis) 2009     History of DVT in .    • Dyslipidemia    • GERD (gastroesophageal reflux disease)    • Hypertension    • Osteopenia    • Polymyositis         No past surgical history on file.      EVALUATION        PT Ortho       17 09    Subjective Comments    Subjective Comments Legs seem to feel better, but they are reddened  -ES    Subjective Pain    Able to rate subjective pain? yes  -ES    Pre-Treatment Pain Level 0  -ES    Post-Treatment Pain Level 0  -ES    Transfers    Transfer, Comment seated in chair for treatment  -ES    Gait Assessment/Treatment    Gait, [place] Level independent  -ES    Gait, Assistive Device rollator  -ES      User Key  (r) = Recorded By, (t) = Taken By, (c) = Cosigned By    Initials Name Provider Type    MELI Durand, PT Physical Therapist                    [facility] Wound       17 09          Wound 17 0930 Bilateral lower leg excoriation    Wound - Properties Group Date first assessed: 17  - Time first assessed: 930  - Side: Bilateral  - Orientation: lower  - Location: leg  - Type: excoriation  -MC    Wound WDL ex  -ES       Dressing Appearance dry;intact;moist drainage  -ES      Base epithelialization;reddened;dry   worsening excoriation to R ankle, min to LLE d/t scratching  -ES      Periwound Area intact;redness;dry  -ES      Edges open;irregular  -ES      Length (cm) 3.8  -ES      Width (cm) 2.4  -ES      Depth (cm) 0.1  -ES      Drainage Characteristics/Odor serous  -ES      Drainage Amount small  -ES      Picture taken --  -ES      Wound Cleaning cleansed with   phase one wound cleanser  -ES      Wound Interventions debrided  -ES      Dressing Dressing applied;silver impregnated dressing  -ES      Periwound Care cleansed with pH balanced cleanser;moisturizer applied   z-gaurd  -ES        User Key  (r) = Recorded By, (t) = Taken By, (c) = Cosigned By    Initials Name Provider Type    ES Aylin Durand, PT Physical Therapist    PAWAN Johnson, PT Physical Therapist              Lymphedema       02/14/17 0900          Lymphedema Edema Assessment    Ptting Edema Category By severity  -ES      Pitting Edema Moderate  -ES      Skin Changes/Observations    Lower Extremity Conditions hairless;scaly;inflamed;bilateral:;clean  -ES      Lower Extremity Color/Pigment bilateral:;erythema;hyperpigmented  -ES      Lymphedema Pulses/Capillary Refill    Capillary Refill lower extremity capillary refill  -ES      Lower Extremity Capillary Refill right:;left:;less than 3 seconds  -ES      Compression/Skin Care    Compression/Skin Care skin care;wrapping location  -ES      Skin Care washed/dried;moisturizing lotion applied  -ES      Wrapping Location lower extremity  -ES      Wrapping Location LE bilateral:;foot to knee  -ES      Bandage Layers cotton elastic stocking- double layer (comment size)   sz four compressogrip doubled over foot/[place]  -ES        User Key  (r) = Recorded By, (t) = Taken By, (c) = Cosigned By    Initials Name Provider Type    MELI Durand, PT Physical Therapist          WOUND DEBRIDEMENT  Debridement Site  1  Location- Site 1: Medial ankle wounds BLE  Selective Debridement- Site 1: Wound Surface <20cmsq  Instruments- Site 1: tweezers (debridement enitre wound base)  Excised Tissue Description- Site 1: minimum, slough, other (comment) (dride exudate, non-viable biofilm)  Bleeding- Site 1: none             Recommendation and Plan        PT Assessment/Plan       02/14/17 0900 02/09/17 1000       PT Assessment    Functional Limitations Performance in self-care ADL  -ES Performance in self-care ADL  -ES     Impairments Integumentary integrity;Edema  -ES Integumentary integrity;Edema  -ES     Assessment Comments Patient with erythema, increased size of wound bed on R medial ankle due most likely to rubbing. Returned to using foam to protect denuded area and promote healing of tissue. Will continue to monitor redness and refer back to Dr. Houston if protection does not improve overall erythema.  -ES zgaurd improved overall excoriation but errythema still present predominately on R meidal LE. Goals remain approrpriate as areas of excoriation improving but still requiring debridment for promotion of healing.  -ES     Rehab Potential Good  -ES Good  -ES     Patient/caregiver participated in establishment of treatment plan and goals Yes  -ES Yes  -ES     Patient would benefit from skilled therapy intervention Yes  -ES Yes  -ES     PT Plan    PT Frequency 2x/week  -ES      Physical Therapy Interventions (Optional Details) patient/family education;wound care  -ES patient/family education;wound care  -ES     PT Plan Comments debridement and dressing management with assessment of wound size and overall LE erythema  -ES F/U next week for debridement and dressing management.  -ES       User Key  (r) = Recorded By, (t) = Taken By, (c) = Cosigned By    Initials Name Provider Type    MELI Durand, PT Physical Therapist          Goals        PT OP Goals       02/14/17 0900 02/09/17 1000 02/03/17 1100    PT Short Term Goals    STG 1   Patient and/ or caregiver able to verbalize signs and symptoms of infection.  -ES     STG 1 Progress  Progressing  -ES     STG 2  Decrease area of excoriation by 50% as evidence of wound healing.  -ES     STG 2 Progress  Progressing  -ES     Long Term Goals    LTG 1  Patient independent and compliant with use and care of compression wraps or stockings, with assistance of family / caregiver as indicated to promote self-care independence.  -ES     LTG 1 Progress  Ongoing;Progressing  -ES     LTG 2  Decrease area of excoriation by 75% as evidence of wound healing.  -ES     LTG 2 Progress  Progressing  -ES     LTG 3  Decrease limb circumferences by 2 cm to promote improved skin integrity and circulation.  -ES     LTG 3 Progress  Ongoing  -     Time Calculation    PT Goal Re-Cert Due Date 03/11/17  -ES 03/11/17  -ES 02/12/17  -      User Key  (r) = Recorded By, (t) = Taken By, (c) = Cosigned By    Initials Name Provider Type    ES Aylin Durand, PT Physical Therapist     Alis Johnson, PT Physical Therapist          PT Goal Re-Cert Due Date: 03/11/17            Time Calculation: Start Time: 0900    Therapy Charges for Today     Code Description Service Date Service Provider Modifiers Qty    97624807987 HC JUAN DEBRIDE OPEN WOUND UP TO 20CM 2/14/2017 Aylin Durand, PT GP 1    14860464544 HC PT MULTI LAYER COMP SYS BELOW KNEE 2/14/2017 Aylin Durand, PT GP 1                Aylin Durand, PT  2/14/2017

## 2017-02-21 ENCOUNTER — HOSPITAL ENCOUNTER (OUTPATIENT)
Dept: PHYSICAL THERAPY | Facility: HOSPITAL | Age: 82
Setting detail: THERAPIES SERIES
Discharge: HOME | End: 2017-02-21

## 2017-02-21 DIAGNOSIS — R60.0 EDEMA OF BOTH LEGS: ICD-10-CM

## 2017-02-21 DIAGNOSIS — I87.2 VENOUS STASIS DERMATITIS OF BOTH LOWER EXTREMITIES: Primary | ICD-10-CM

## 2017-02-21 PROCEDURE — 97602 WOUND(S) CARE NON-SELECTIVE: CPT

## 2017-02-21 NOTE — PROGRESS NOTES
Outpatient Rehabilitation - Wound/Debridement Treatment Note   [place]     Patient Name: Lorna Lo  : 1934  MRN: [id number]  Today's Date: 2017                Admit Date: 2017    Visit Dx:    ICD-10-CM ICD-9-CM   1. Venous stasis dermatitis of both lower extremities I83.11 454.1    I83.12    2. Edema of both legs R60.0 782.3       Patient Active Problem List   Diagnosis   • Coronary artery disease   • Hypertension   • Dyslipidemia   • Polymyositis   • Dependent edema   • CKD (chronic kidney disease)   • Chronic anemia   • Osteopenia   • GERD (gastroesophageal reflux disease)        Past Medical History   Diagnosis Date   • Chronic anemia      Chronic anemia, secondary to (CKD).    • CKD (chronic kidney disease)    • Coronary artery disease    • Dependent edema    • DVT (deep venous thrombosis) 2009     History of DVT in .    • Dyslipidemia    • GERD (gastroesophageal reflux disease)    • Hypertension    • Osteopenia    • Polymyositis         No past surgical history on file.      EVALUATION        PT Ortho       17 09    Subjective Comments    Subjective Comments Went to MD office and they gave her an antibiotic  -ES    Subjective Pain    Able to rate subjective pain? yes  -ES    Pre-Treatment Pain Level 0  -ES    Post-Treatment Pain Level 0  -ES    Transfers    Transfer, Comment seated in chair for treatment  -ES    Gait Assessment/Treatment    Gait, [place] Level independent  -ES    Gait, Assistive Device rollator  -ES      User Key  (r) = Recorded By, (t) = Taken By, (c) = Cosigned By    Initials Name Provider Type    MELI Durand, PT Physical Therapist                    [facility] Wound       17 09          Wound 17 Bilateral lower leg excoriation    Wound - Properties Group Date first assessed: 17  - Time first assessed: 930  - Side: Bilateral  - Orientation: lower  - Location: leg  - Type: excoriation  -MC    Wound WDL ex  -ES       Dressing Appearance dry;intact;moist drainage  -ES      Base epithelialization;reddened;dry   excoriation to R ankle less reddened, LLE reddened  -ES      Periwound Area intact;redness;dry  -ES      Edges open;irregular  -ES      Drainage Characteristics/Odor serous  -ES      Drainage Amount small  -ES      Picture taken yes  -ES      Wound Cleaning cleansed with;other (see comments)   phase one cleanser  -ES      Wound Interventions debrided  -ES      Dressing Dressing applied;silver impregnated dressing   mepilex Ag to excoriated areas  -ES      Periwound Care cleansed with pH balanced cleanser;moisturizer applied   z-gaurd  -ES        User Key  (r) = Recorded By, (t) = Taken By, (c) = Cosigned By    Initials Name Provider Type    MELI Durand, PT Physical Therapist    PAWAN Johnson, PT Physical Therapist            WOUND DEBRIDEMENT                Recommendation and Plan        PT Assessment/Plan       02/21/17 0900          PT Assessment    Functional Limitations Performance in self-care ADL  -ES      Impairments Integumentary integrity;Edema  -ES      Assessment Comments Patient now on 10 day antibiotic per Dr. Houston Office with noted lessening in erythema. BLE edema at baseline and intervention today was removing dry flaky skin revealing scant irregular denuded areas bilaterally. No select debridement performed.  -ES      Rehab Potential Good  -ES      Patient/caregiver participated in establishment of treatment plan and goals Yes  -ES      Patient would benefit from skilled therapy intervention Yes  -ES      PT Plan    PT Frequency 1x/week  -ES      Physical Therapy Interventions (Optional Details) patient/family education;wound care  -ES      PT Plan Comments reduce 1x/wk with assessment of denuded skin and edema. May be appropriate for D/C next visit.  -ES        User Key  (r) = Recorded By, (t) = Taken By, (c) = Cosigned By    Initials Name Provider Type    MELI Durand, PT Physical  Therapist          Goals        PT OP Goals       02/21/17 0900 02/14/17 0900 02/09/17 1000    PT Short Term Goals    STG 1   Patient and/ or caregiver able to verbalize signs and symptoms of infection.  -ES    STG 1 Progress   Progressing  -ES    STG 2   Decrease area of excoriation by 50% as evidence of wound healing.  -ES    STG 2 Progress   Progressing  -ES    Long Term Goals    LTG 1   Patient independent and compliant with use and care of compression wraps or stockings, with assistance of family / caregiver as indicated to promote self-care independence.  -ES    LTG 1 Progress   Ongoing;Progressing  -ES    LTG 2   Decrease area of excoriation by 75% as evidence of wound healing.  -ES    LTG 2 Progress   Progressing  -ES    LTG 3   Decrease limb circumferences by 2 cm to promote improved skin integrity and circulation.  -ES    LTG 3 Progress   Ongoing  -ES    Time Calculation    PT Goal Re-Cert Due Date 03/11/17  -ES 03/11/17  -ES 03/11/17  -ES      User Key  (r) = Recorded By, (t) = Taken By, (c) = Cosigned By    Initials Name Provider Type    ES Aylin Durand, PT Physical Therapist          PT Goal Re-Cert Due Date: 03/11/17            Time Calculation: Start Time: 0900    Therapy Charges for Today     Code Description Service Date Service Provider Modifiers Qty    51206204235 HC NONSELECTIVE DEBRIDEMENT 2/21/2017 Aylin Durand, PT GP 1                Aylin Durand, PT  2/21/2017

## 2017-02-24 ENCOUNTER — TRANSCRIBE ORDERS (OUTPATIENT)
Dept: ADMINISTRATIVE | Facility: HOSPITAL | Age: 82
End: 2017-02-24

## 2017-02-24 DIAGNOSIS — Z12.31 VISIT FOR SCREENING MAMMOGRAM: Primary | ICD-10-CM

## 2017-03-02 ENCOUNTER — HOSPITAL ENCOUNTER (OUTPATIENT)
Dept: PHYSICAL THERAPY | Facility: HOSPITAL | Age: 82
Setting detail: THERAPIES SERIES
Discharge: HOME | End: 2017-03-02

## 2017-03-02 DIAGNOSIS — I87.2 VENOUS STASIS DERMATITIS OF BOTH LOWER EXTREMITIES: Primary | ICD-10-CM

## 2017-03-02 PROCEDURE — 97602 WOUND(S) CARE NON-SELECTIVE: CPT

## 2017-03-02 NOTE — PROGRESS NOTES
Outpatient Rehabilitation - Wound/Debridement Treatment Note  [facility]     Patient Name: Lorna Lo  : 1934  MRN: [id number]  Today's Date: 3/2/2017            LLE:       RLE:      Admit Date: 3/2/2017    Visit Dx:    ICD-10-CM ICD-9-CM   1. Venous stasis dermatitis of both lower extremities I83.11 454.1    I83.12        Patient Active Problem List   Diagnosis   • Coronary artery disease   • Hypertension   • Dyslipidemia   • Polymyositis   • Dependent edema   • CKD (chronic kidney disease)   • Chronic anemia   • Osteopenia   • GERD (gastroesophageal reflux disease)        Past Medical History   Diagnosis Date   • Chronic anemia      Chronic anemia, secondary to (CKD).    • CKD (chronic kidney disease)    • Coronary artery disease    • Dependent edema    • DVT (deep venous thrombosis)      History of DVT in .    • Dyslipidemia    • GERD (gastroesophageal reflux disease)    • Hypertension    • Osteopenia    • Polymyositis         No past surgical history on file.      EVALUATION        PT Ortho       17 0900    Subjective Comments    Subjective Comments Pt finished her round of antibiotics. No other complaints or changes.  -MC    Subjective Pain    Able to rate subjective pain? yes  -MC    Pre-Treatment Pain Level 0  -MC    Post-Treatment Pain Level 0  -MC    Transfers    Transfer, Comment seated in chair for tx  -MC    Gait Assessment/Treatment    Gait, [place] Level independent  -MC    Gait, Assistive Device rollator  -      User Key  (r) = Recorded By, (t) = Taken By, (c) = Cosigned By    Initials Name Provider Type    PAWAN Johnson, PT Physical Therapist                    LEENA Wound       17 09          Wound 17 09 Bilateral lower leg excoriation    Wound - Properties Group Date first assessed: 17  - Time first assessed: 930  - Side: Bilateral  - Orientation: lower  - Location: leg  - Type: excoriation  -MC    Wound WDL ex  -MC       Dressing Appearance intact;moist drainage   scant moist drainage, in place since Sunday  -      Base epithelialization;reddened;dry   two areas of moist but intact skin to LLE  -      Periwound Area intact;redness;dry  -      Edges --   no discernable open areas  -      Drainage Amount none  -      Picture taken yes  -      Wound Cleaning cleansed with;other (see comments)   theraworx wipes  -      Wound Interventions debrided   nonselective  -      Dressing Dressing applied;silver impregnated dressing   optifoam Ag for antibacterial environment and cushion  -      Periwound Care cleansed with pH balanced cleanser;barrier ointment applied   z guard to inflamed areas  -        User Key  (r) = Recorded By, (t) = Taken By, (c) = Cosigned By    Initials Name Provider Type    PAWAN Johnson, PT Physical Therapist              Lymphedema       03/02/17 0900          Lymphedema Edema Assessment    Ptting Edema Category By severity  -      Pitting Edema Other (comment)   trace in dorsum of feet only  -      Skin Changes/Observations    Lower Extremity Conditions bilateral:;hairless;crust;inflamed;scab(s)  -      Lower Extremity Color/Pigment bilateral:;red;hyperpigmented  -      Lymphedema Pulses/Capillary Refill    Capillary Refill lower extremity capillary refill  -      Lower Extremity Capillary Refill right:;left:;less than 3 seconds  -      Compression/Skin Care    Bandaging Comments replaced size 4 compressogrip to maintain limb girth reduction  -        User Key  (r) = Recorded By, (t) = Taken By, (c) = Cosigned By    Initials Name Provider Type    PAWAN Johnson, PT Physical Therapist          WOUND DEBRIDEMENT  Debridement Site 1  Location- Site 1: Medial ankles  Selective Debridement- Site 1: Wound Surface <20cmsq  Instruments- Site 1: tweezers, other (comment) (4x4s)  Excised Tissue Description- Site 1: moderate, other (comment) (nonviable macerated skin, dried  skin flakes)  Bleeding- Site 1: none             Recommendation and Plan        PT Assessment/Plan       03/02/17 0900       PT Assessment    Functional Limitations Performance in self-care ADL  -     Impairments Integumentary integrity;Edema  -     Assessment Comments Pt with improved BLE ankle epithelium. She continues to have redness typical of venous stasis dermatitis, but has no open areas at this time. PT able to nonselectively remove dry skin flakes and scant nonviable macerated skin. Pt will be tentatively discharged today, but was encouraged to call for follow up in case of new open areas or worsening of her current symptoms.  -     Rehab Potential Good  -     Patient/caregiver participated in establishment of treatment plan and goals Yes  -     Patient would benefit from skilled therapy intervention Yes  -     PT Plan    PT Frequency 1x/week  -     Predicted Duration of Therapy Intervention (days/wks) 4 weeks  -     Physical Therapy Interventions (Optional Details) patient/family education;wound care  -     PT Plan Comments Tentative d/c today. Issued extra supplies.  -       User Key  (r) = Recorded By, (t) = Taken By, (c) = Cosigned By    Initials Name Provider Type     Alis Johnson, PT Physical Therapist          Goals        PT OP Goals       03/02/17 0900       Time Calculation    PT Goal Re-Cert Due Date 03/11/17  -       User Key  (r) = Recorded By, (t) = Taken By, (c) = Cosigned By    Initials Name Provider Type     Alis Johnson, PT Physical Therapist          PT Goal Re-Cert Due Date: 03/11/17            Time Calculation: Start Time: 0900    Therapy Charges for Today     Code Description Service Date Service Provider Modifiers Qty    19890801921 HC NONSELECTIVE DEBRIDEMENT 3/2/2017 Alis Johnson, PT GP 1                Alis Johnson, PT  3/2/2017

## 2017-03-06 ENCOUNTER — HOSPITAL ENCOUNTER (OUTPATIENT)
Dept: GENERAL RADIOLOGY | Facility: HOSPITAL | Age: 82
Discharge: HOME | End: 2017-03-06
Admitting: PHYSICIAN ASSISTANT

## 2017-03-06 ENCOUNTER — TRANSCRIBE ORDERS (OUTPATIENT)
Dept: ADMINISTRATIVE | Facility: HOSPITAL | Age: 82
End: 2017-03-06

## 2017-03-06 DIAGNOSIS — M21.941 HAND DEFORMITY, RIGHT: Primary | ICD-10-CM

## 2017-03-06 PROCEDURE — 73130 X-RAY EXAM OF HAND: CPT

## 2017-03-10 ENCOUNTER — HOSPITAL ENCOUNTER (OUTPATIENT)
Dept: MAMMOGRAPHY | Facility: HOSPITAL | Age: 82
Discharge: HOME | End: 2017-03-10
Attending: INTERNAL MEDICINE | Admitting: INTERNAL MEDICINE

## 2017-03-10 DIAGNOSIS — Z12.31 VISIT FOR SCREENING MAMMOGRAM: ICD-10-CM

## 2017-03-10 PROCEDURE — 77063 BREAST TOMOSYNTHESIS BI: CPT

## 2017-03-10 PROCEDURE — G0202 SCR MAMMO BI INCL CAD: HCPCS | Performed by: RADIOLOGY

## 2017-03-10 PROCEDURE — 77063 BREAST TOMOSYNTHESIS BI: CPT | Performed by: RADIOLOGY

## 2017-03-10 PROCEDURE — G0202 SCR MAMMO BI INCL CAD: HCPCS

## 2017-03-29 ENCOUNTER — DOCUMENTATION (OUTPATIENT)
Dept: PHYSICAL THERAPY | Facility: HOSPITAL | Age: 82
End: 2017-03-29

## 2017-03-29 PROCEDURE — G8992 OTHER PT/OT  D/C STATUS: HCPCS

## 2017-03-29 PROCEDURE — G8991 OTHER PT/OT GOAL STATUS: HCPCS

## 2017-03-29 NOTE — THERAPY DISCHARGE NOTE
Outpatient Rehabilitation - Wound/Debridement Discharge Summary        Patient Name: Lorna Lo  : 1934  MRN: [id number]  Today's Date: 3/29/2017                Admit Date: (Not on file)    Visit Dx:  No diagnosis found.    Patient Active Problem List   Diagnosis   • Coronary artery disease   • Hypertension   • Dyslipidemia   • Polymyositis   • Dependent edema   • CKD (chronic kidney disease)   • Chronic anemia   • Osteopenia   • GERD (gastroesophageal reflux disease)        Past Medical History:   Diagnosis Date   • Chronic anemia     Chronic anemia, secondary to (CKD).    • CKD (chronic kidney disease)    • Coronary artery disease    • Dependent edema    • DVT (deep venous thrombosis) 2009    History of DVT in .    • Dyslipidemia    • GERD (gastroesophageal reflux disease)    • Hypertension    • Osteopenia    • Polymyositis         No past surgical history on file.      EVALUATION                LDA Wound                  Wound 17 0930 Bilateral lower leg excoriation    Wound - Properties Group Date first assessed: 17  - Time first assessed: 930  - Side: Bilateral  - Orientation: lower  - Location: leg  - Type: excoriation  -      User Key  (r) = Recorded By, (t) = Taken By, (c) = Cosigned By    Initials Name Provider Type    PAWAN Johnson, PT Physical Therapist            WOUND DEBRIDEMENT                     Recommendation and Plan      Goals        PT OP Goals       17 1130       PT Short Term Goals    STG 1 Patient and/ or caregiver able to verbalize signs and symptoms of infection.  -     STG 1 Progress Met  -     STG 2 Decrease area of excoriation by 50% as evidence of wound healing.  -     STG 2 Progress Met  -     Long Term Goals    LTG 1 Patient independent and compliant with use and care of compression wraps or stockings, with assistance of family / caregiver as indicated to promote self-care independence.  -     LTG 1 Progress Met   -MC     LTG 2 Decrease area of excoriation by 75% as evidence of wound healing.  -MC     LTG 2 Progress Met  -     LTG 3 Decrease limb circumferences by 2 cm to promote improved skin integrity and circulation.  -MC     LTG 3 Progress Not Met  -       User Key  (r) = Recorded By, (t) = Taken By, (c) = Cosigned By    Initials Name Provider Type    PAWAN Johnson, PT Physical Therapist          Time Calculation:          PT G-Codes  PT Professional Judgement Used?: Yes (Based on last treatment.)  Functional Limitation: Other PT primary  Other PT Primary Goal Status (): At least 20 percent but less than 40 percent impaired, limited or restricted  Other PT Primary Discharge Status (): At least 20 percent but less than 40 percent impaired, limited or restricted           OP Discharge Summary       03/29/17 1131          OP PT Discharge Summary    Date of Discharge 03/03/17  -      Reason for Discharge other (comment)   Pt had progressed past need for skilled PT therapy. Pt encouraged to call within 28-30 days for additional appointments, which pt has not.  -      Outcomes Achieved Patient able to partially acheive established goals  -      Discharge Instructions Pt was encouraged to continue with use of compressogrip and Z-guard to fragile skin prn. Pt to follow up with PCP for any concerns.  -MC        User Key  (r) = Recorded By, (t) = Taken By, (c) = Cosigned By    Initials Name Provider Type    PAWAN Johnson, PT Physical Therapist          Alis Johnson, PT  3/29/2017

## 2017-05-04 ENCOUNTER — TRANSCRIBE ORDERS (OUTPATIENT)
Dept: PHYSICAL THERAPY | Facility: HOSPITAL | Age: 82
End: 2017-05-04

## 2017-05-04 DIAGNOSIS — I87.2 VENOUS STASIS DERMATITIS OF RIGHT LOWER EXTREMITY: Primary | ICD-10-CM

## 2017-05-15 ENCOUNTER — HOSPITAL ENCOUNTER (OUTPATIENT)
Dept: PHYSICAL THERAPY | Facility: HOSPITAL | Age: 82
Setting detail: THERAPIES SERIES
Discharge: HOME | End: 2017-05-15

## 2017-05-15 DIAGNOSIS — S81.809D MULTIPLE OPEN WOUNDS OF LOWER EXTREMITY, UNSPECIFIED LATERALITY, SUBSEQUENT ENCOUNTER: ICD-10-CM

## 2017-05-15 DIAGNOSIS — I87.2 VENOUS STASIS DERMATITIS OF BOTH LOWER EXTREMITIES: Primary | ICD-10-CM

## 2017-05-15 PROCEDURE — G8990 OTHER PT/OT CURRENT STATUS: HCPCS

## 2017-05-15 PROCEDURE — G8991 OTHER PT/OT GOAL STATUS: HCPCS

## 2017-05-15 PROCEDURE — 29581 APPL MULTLAYER CMPRN SYS LEG: CPT

## 2017-05-15 PROCEDURE — 97161 PT EVAL LOW COMPLEX 20 MIN: CPT

## 2017-05-30 ENCOUNTER — HOSPITAL ENCOUNTER (OUTPATIENT)
Dept: PHYSICAL THERAPY | Facility: HOSPITAL | Age: 82
Setting detail: THERAPIES SERIES
Discharge: HOME | End: 2017-05-30

## 2017-05-30 DIAGNOSIS — S81.809D MULTIPLE OPEN WOUNDS OF LOWER EXTREMITY, UNSPECIFIED LATERALITY, SUBSEQUENT ENCOUNTER: ICD-10-CM

## 2017-05-30 DIAGNOSIS — I87.2 VENOUS STASIS DERMATITIS OF BOTH LOWER EXTREMITIES: Primary | ICD-10-CM

## 2017-05-30 PROCEDURE — 29581 APPL MULTLAYER CMPRN SYS LEG: CPT

## 2017-06-13 ENCOUNTER — HOSPITAL ENCOUNTER (OUTPATIENT)
Dept: PHYSICAL THERAPY | Facility: HOSPITAL | Age: 82
Setting detail: THERAPIES SERIES
Discharge: HOME | End: 2017-06-13

## 2017-06-13 DIAGNOSIS — I87.2 VENOUS STASIS DERMATITIS OF BOTH LOWER EXTREMITIES: Primary | ICD-10-CM

## 2017-06-13 DIAGNOSIS — S81.809D MULTIPLE OPEN WOUNDS OF LOWER EXTREMITY, UNSPECIFIED LATERALITY, SUBSEQUENT ENCOUNTER: ICD-10-CM

## 2017-06-13 PROCEDURE — G8991 OTHER PT/OT GOAL STATUS: HCPCS

## 2017-06-13 PROCEDURE — 97597 DBRDMT OPN WND 1ST 20 CM/<: CPT

## 2017-06-13 PROCEDURE — 29581 APPL MULTLAYER CMPRN SYS LEG: CPT

## 2017-06-13 PROCEDURE — G8990 OTHER PT/OT CURRENT STATUS: HCPCS

## 2017-06-13 NOTE — THERAPY WOUND CARE TREATMENT
Outpatient Rehabilitation - Wound/Debridement Progress Note  [facility]     Patient Name: Lorna Lo  : 1934  MRN: [id number]  Today's Date: 2017                   Admit Date: 2017    Visit Dx:    ICD-10-CM ICD-9-CM   1. Venous stasis dermatitis of both lower extremities I83.11 454.1    I83.12    2. Multiple open wounds of lower extremity, unspecified laterality, subsequent encounter S81.809D V58.89     894.0       Patient Active Problem List   Diagnosis   • Coronary artery disease   • Hypertension   • Dyslipidemia   • Polymyositis   • Dependent edema   • CKD (chronic kidney disease)   • Chronic anemia   • Osteopenia   • GERD (gastroesophageal reflux disease)        Past Medical History:   Diagnosis Date   • Chronic anemia     Chronic anemia, secondary to (CKD).    • CKD (chronic kidney disease)    • Coronary artery disease    • Dependent edema    • DVT (deep venous thrombosis) 2009    History of DVT in .    • Dyslipidemia    • GERD (gastroesophageal reflux disease)    • Hypertension    • Osteopenia    • Polymyositis         No past surgical history on file.      EVALUATION        PT Ortho       17 0815    Subjective Comments    Subjective Comments Pt reports she was seen by a hand doctor for nodules and pain in her fingers, and has been told she has psoriatic arthritis.  She has a follow up with her arthritis specialist in a couple weeks.  Pt reports unna boots seem to have helped with her legs, reports less itching and improvement in open areas.  -JM    Subjective Pain    Able to rate subjective pain? yes  -JM    Pre-Treatment Pain Level 0  -JM    Post-Treatment Pain Level 0  -JM    Subjective Pain Comment No wound-related pain  -JM    Transfers    Transfers, Sit-Stand [place] independent  -    Transfers, Stand-Sit [place] independent  -    Transfer, Comment seated for tx  -    Gait Assessment/Treatment    Gait, [place] Level independent  -    Gait,  Assistive Device rollator  -      User Key  (r) = Recorded By, (t) = Taken By, (c) = Cosigned By    Initials Name Provider Type    ALENA Baca, PT Physical Therapist                    LDA Wound       06/13/17 0815          Wound 01/13/17 0930 Bilateral lower leg excoriation    Wound - Properties Group Date first assessed: 01/13/17  - Time first assessed: 0930  - Side: Bilateral  - Orientation: lower  - Location: leg  - Type: excoriation  -MC    Wound WDL ex   min erythema, min drainage  -      Dressing Appearance intact;moist drainage   min drainge saturated at superior wraps  -      Base reddened;moist;pink   scattered open areas on anterior prox legs  -      Periwound Area redness;dry;swelling;warm;blanchable;intact;pink  -      Edges open  -      Drainage Characteristics/Odor serosanguineous  -      Drainage Amount small  -      Picture taken yes  -      Wound Cleaning cleansed with;other (see comments)   theraworx foam/wipes  -      Wound Interventions debrided;wound cleanser (specify)   phase one  -      Dressing Dressing applied;silver impregnated dressing;foam   ag foam to blister medial L ankle, unna boots BLE  -      Periwound Care cleansed with pH balanced cleanser;dry periwound area maintained;moisturizer applied  -        User Key  (r) = Recorded By, (t) = Taken By, (c) = Cosigned By    Initials Name Provider Type    PAWAN Johnson, PT Physical Therapist    ALENA Baca, PT Physical Therapist              Lymphedema       06/13/17 0815          Lymphedema Edema Assessment    Ptting Edema Category By severity  -      Pitting Edema Mild  -      Skin Changes/Observations    Lower Extremity Conditions bilateral:;clean;hairless;scaly;crust;scab(s);inflamed;fragile  -      Lower Extremity Color/Pigment bilateral:;erythema;red;blanchable  -      Lymphedema Pulses/Capillary Refill    Lower Extremity Capillary Refill right:;left:;less than 3  seconds  -JM      LLE Quick Girth (cm)    Met-heads 21.5 cm  -JM      Mid foot 21.4 cm  -JM      Smallest ankle 21 cm  -JM      Largest calf 34.5 cm  -JM      Tib tuberosity 35 cm  -JM      RLE Quick Girth (cm)    Met-heads 22.3 cm  -JM      Mid foot 20.4 cm  -JM      Smallest ankle 21 cm  -JM      Largest calf 36.3 cm  -JM      Tib tuberosity 36 cm  -JM      Compression/Skin Care    Compression/Skin Care skin care;wrapping location;bandaging  -      Skin Care washed/dried;lotion applied  -JM      Wrapping Location lower extremity  -JM      Wrapping Location LE bilateral:;foot to knee  -      Wrapping Comments unna boot to BLE  -      Bandage Layers padding/fluff layer;cotton elastic stocking- single layer (comment size)   size 4 compressogrip  -        User Key  (r) = Recorded By, (t) = Taken By, (c) = Cosigned By    Initials Name Provider Type     Dayami Baca, PT Physical Therapist          WOUND DEBRIDEMENT  Debridement Site 1  Location- Site 1: BLE  Selective Debridement- Site 1: Wound Surface <20cmsq  Instruments- Site 1: tweezers  Excised Tissue Description- Site 1: minimum, slough  Bleeding- Site 1: seeping, held pressure, 1 minute             Recommendation and Plan        PT Assessment/Plan       06/13/17 0815       PT Assessment    Functional Limitations Performance in self-care ADL  -     Impairments Integumentary integrity;Edema  -     Assessment Comments BLE with min improvement in edema and excoriation.  Skin still very fragile with previous open areas resolved but new open lesions.  Pt mildly improved with use of unna boot to excoriation and compressogrips for light compression.  Pt to continue with changing dressings every 4 days or PRN for saturation.  Pt will continue to benefit from skilled PT for leg wraps and dressing management, but frequency limited by transportation needs.  Pt only able to come every 2 weeks for tx.  -     Rehab Potential Good  -     Patient/caregiver  participated in establishment of treatment plan and goals Yes  -     Patient would benefit from skilled therapy intervention Yes  -     PT Plan    PT Frequency Other (comment)   every 2 weeks  -     Predicted Duration of Therapy Intervention (days/wks) 4 weeks  -     Planned CPT's? PT MULTI LAYER COMP SYS LE;PT UNNA BOOT: 10885;PT JUAN DEBRIDE OPEN WOUND UP TO 20 CM: 99494  -     Physical Therapy Interventions (Optional Details) patient/family education;wound care;bandaging  -       User Key  (r) = Recorded By, (t) = Taken By, (c) = Cosigned By    Initials Name Provider Type    ALENA Baca, PT Physical Therapist          Goals        PT OP Goals       17 0815       PT Short Term Goals    STG 1 Decrease wound dimensions by 50% as evidence of wound healing.  -     STG 1 Progress Ongoing  -     STG 2 Decrease area of excoriation by 50% as evidence of wound healing.  -     STG 2 Progress Ongoing  [facility]     Long Term Goals    LTG 1 Patient independent and compliant with use and care of compression wraps or stockings, with assistance of family / caregiver as indicated to promote self-care independence.  -     LTG 1 Progress Ongoing  -     LTG 2 Decrease area of excoriation by 75% as evidence of wound healing.  -     LTG 2 Progress Ongoing  [facility]     LTG 3 Decrease limb circumferences by 2 cm to promote improved skin integrity and circulation.  -     LTG 3 Progress Ongoing  -     Time Calculation    PT Goal Re-Cert Due Date 17  -       User Key  (r) = Recorded By, (t) = Taken By, (c) = Cosigned By    Initials Name Provider Type    ALENA Baca PT Physical Therapist          Time Calculation: Start Time: 0815    Therapy Charges for Today     Code Description Service Date Service Provider Modifiers Qty    32319479189 HC PT OTHER PRIME FUNCT CURRENT 2017 Dayami Baca, PT GP, CK 1    81680924085 HC PT OTHER PRIME FUNCT PROJECTED 2017 Dayami BURGOS  Keven, PT GP, CI 1    91411521991 HC PT MULTI LAYER COMP SYS BELOW KNEE 6/13/2017 Dayami Baca, PT GP 1    29660691901 HC JUAN DEBRIDE OPEN WOUND UP TO 20CM 6/13/2017 Dayami Baca, PT 59, GP 1    44493391828 HC PT THER SUPP EA 15 MIN 6/13/2017 Dayami Baca, PT GP 1          PT G-Codes  PT Professional Judgement Used?: Yes  Outcome Measure Options: BWAT (Cummings-Manzo Wound Assess Tool)  Score: 30  Functional Limitation: Other PT primary  Other PT Primary Current Status (): At least 40 percent but less than 60 percent impaired, limited or restricted  Other PT Primary Goal Status (): At least 1 percent but less than 20 percent impaired, limited or restricted     Dayami Baca, PT  6/13/2017

## 2017-06-27 ENCOUNTER — HOSPITAL ENCOUNTER (OUTPATIENT)
Dept: PHYSICAL THERAPY | Facility: HOSPITAL | Age: 82
Setting detail: THERAPIES SERIES
Discharge: HOME | End: 2017-06-27

## 2017-06-27 DIAGNOSIS — S81.809D MULTIPLE OPEN WOUNDS OF LOWER EXTREMITY, UNSPECIFIED LATERALITY, SUBSEQUENT ENCOUNTER: ICD-10-CM

## 2017-06-27 DIAGNOSIS — I87.2 VENOUS STASIS DERMATITIS OF BOTH LOWER EXTREMITIES: Primary | ICD-10-CM

## 2017-06-27 PROCEDURE — 29581 APPL MULTLAYER CMPRN SYS LEG: CPT

## 2017-06-27 NOTE — THERAPY WOUND CARE TREATMENT
Outpatient Rehabilitation - Wound/Debridement Treatment Note  [facility]     Patient Name: Lorna Lo  : 1934  MRN: [id number]  Today's Date: 2017                Admit Date: 2017    Visit Dx:    ICD-10-CM ICD-9-CM   1. Venous stasis dermatitis of both lower extremities I83.11 454.1    I83.12    2. Multiple open wounds of lower extremity, unspecified laterality, subsequent encounter S81.809D V58.89     894.0       Patient Active Problem List   Diagnosis   • Coronary artery disease   • Hypertension   • Dyslipidemia   • Polymyositis   • Dependent edema   • CKD (chronic kidney disease)   • Chronic anemia   • Osteopenia   • GERD (gastroesophageal reflux disease)        Past Medical History:   Diagnosis Date   • Chronic anemia     Chronic anemia, secondary to (CKD).    • CKD (chronic kidney disease)    • Coronary artery disease    • Dependent edema    • DVT (deep venous thrombosis) 2009    History of DVT in .    • Dyslipidemia    • GERD (gastroesophageal reflux disease)    • Hypertension    • Osteopenia    • Polymyositis         No past surgical history on file.      EVALUATION        PT Ortho       17 0815    Subjective Comments    Subjective Comments Pt reports she changed the unna boots last Thursday with no issues. No new complaints.  -MC    Subjective Pain    Able to rate subjective pain? yes  -MC    Pre-Treatment Pain Level 0  -MC    Post-Treatment Pain Level 0  -MC    RLE Quick Girth (cm)    Met-heads 22 cm  -MC    Mid foot 20.4 cm  -MC    Smallest ankle 20.8 cm  -MC    Largest calf 36.4 cm  -MC    Tib tuberosity 36 cm  -MC    LLE Quick Girth (cm)    Met-heads 21.8 cm  -MC    Mid foot 20.4 cm  -MC    Smallest ankle 20.3 cm  -MC    Largest calf 34.8 cm  -MC    Tib tuberosity 34.2 cm  -MC    Transfers    Transfers, Sit-Stand [place] independent  -    Transfers, Stand-Sit [place] independent  -    Transfer, Comment seated for tx  -    Gait Assessment/Treatment     Gait, [place] Level independent  -    Gait, Assistive Device rollator  -      User Key  (r) = Recorded By, (t) = Taken By, (c) = Cosigned By    Initials Name Provider Type    PAWAN Johnson, VANESA Physical Therapist                    LDA Wound       06/27/17 0815          Wound 01/13/17 0930 Bilateral lower leg excoriation    Wound - Properties Group Date first assessed: 01/13/17  - Time first assessed: 0930  - Side: Bilateral  - Orientation: lower  - Location: leg  - Type: excoriation  -    Wound WDL ex   min erythema, min drainage  -      Dressing Appearance other (see comments)   only unna boot to wound, no foam  -      Base reddened;moist;pink   scant open areas to RLE, 1 small area superior LLE  -      Periwound Area redness;dry;swelling;warm;blanchable;intact;pink  -MC      Edges open  -      Drainage Characteristics/Odor serosanguineous  -      Drainage Amount small  -MC      Picture taken yes  -      Wound Cleaning cleansed with;other (see comments)   theraworx  -      Dressing Dressing applied;foam;silver impregnated dressing   optifoam Ag to superior LLE wound only  -      Periwound Care cleansed with pH balanced cleanser;dry periwound area maintained  -        User Key  (r) = Recorded By, (t) = Taken By, (c) = Cosigned By    Initials Name Provider Type    PAWAN Johnson PT Physical Therapist              Lymphedema       06/27/17 0815          Lymphedema Edema Assessment    Ptting Edema Category By severity  -      Pitting Edema Mild  -      Skin Changes/Observations    Lower Extremity Conditions bilateral:;clean;shiny;hairless;scaly;inflamed;fragile  -      Lower Extremity Color/Pigment bilateral:;red;erythema  -      Lymphedema Pulses/Capillary Refill    Lower Extremity Capillary Refill right:;left:;less than 3 seconds  -      Compression/Skin Care    Compression/Skin Care skin care;wrapping location;bandaging  -      Skin Care  washed/dried  -      Wrapping Location lower extremity  -      Wrapping Location LE bilateral:;foot to knee  -      Wrapping Comments unna boot to BLE  -      Bandage Layers cotton elastic stocking- single layer (comment size)   kerlix, size 4 compressogrip  -        User Key  (r) = Recorded By, (t) = Taken By, (c) = Cosigned By    Initials Name Provider Type     Alis Johnson, PT Physical Therapist          WOUND DEBRIDEMENT                Recommendation and Plan        PT Assessment/Plan       06/27/17 0815       PT Assessment    Functional Limitations Performance in self-care ADL  -     Impairments Integumentary integrity;Edema  -     Assessment Comments Excoriation much improved since last session, with one small open area to the superior LLE and scant small open areas to the RLE. Skin remains fragile and inflamed. Pt will continue to benefit from current POC with PT wound care to continue progress.  -     Rehab Potential Good  -     Patient/caregiver participated in establishment of treatment plan and goals Yes  -     Patient would benefit from skilled therapy intervention Yes  -     PT Plan    PT Frequency Other (comment)   every other week as able with transportation issues  -     Physical Therapy Interventions (Optional Details) patient/family education;wound care  -     PT Plan Comments BLE unna boot, compressogrip  -       User Key  (r) = Recorded By, (t) = Taken By, (c) = Cosigned By    Initials Name Provider Type    PAWAN Johnson, PT Physical Therapist          Goals        PT OP Goals       06/27/17 0815       Time Calculation    PT Goal Re-Cert Due Date 07/13/17  -       User Key  (r) = Recorded By, (t) = Taken By, (c) = Cosigned By    Initials Name Provider Type    PAWAN Johnson, PT Physical Therapist          PT Goal Re-Cert Due Date: 07/13/17            Time Calculation: Start Time: 0815    Therapy Charges for Today     Code Description Service Date  Service Provider Modifiers Qty    79522594373 HC PT MULTI LAYER COMP SYS BELOW KNEE 6/27/2017 Alis Johnson, PT GP 1                Alis Johnson, PT  6/27/2017

## 2017-06-28 ENCOUNTER — APPOINTMENT (OUTPATIENT)
Dept: PHYSICAL THERAPY | Facility: HOSPITAL | Age: 82
End: 2017-06-28

## 2017-07-10 ENCOUNTER — HOSPITAL ENCOUNTER (OUTPATIENT)
Dept: PHYSICAL THERAPY | Facility: HOSPITAL | Age: 82
Setting detail: THERAPIES SERIES
Discharge: HOME | End: 2017-07-10

## 2017-07-10 DIAGNOSIS — I87.2 VENOUS STASIS DERMATITIS OF BOTH LOWER EXTREMITIES: Primary | ICD-10-CM

## 2017-07-10 DIAGNOSIS — S81.809D MULTIPLE OPEN WOUNDS OF LOWER EXTREMITY, UNSPECIFIED LATERALITY, SUBSEQUENT ENCOUNTER: ICD-10-CM

## 2017-07-10 PROCEDURE — G8990 OTHER PT/OT CURRENT STATUS: HCPCS

## 2017-07-10 PROCEDURE — G8991 OTHER PT/OT GOAL STATUS: HCPCS

## 2017-07-10 PROCEDURE — 29580 STRAPPING UNNA BOOT: CPT

## 2017-07-10 NOTE — THERAPY WOUND CARE TREATMENT
Outpatient Rehabilitation - Wound/Debridement Progress Note  [facility]     Patient Name: Lorna Lo  : 1934  MRN: [id number]  Today's Date: 7/10/2017                Admit Date: 7/10/2017    Visit Dx:    ICD-10-CM ICD-9-CM   1. Venous stasis dermatitis of both lower extremities I83.11 454.1    I83.12    2. Multiple open wounds of lower extremity, unspecified laterality, subsequent encounter S81.809D V58.89     894.0       Patient Active Problem List   Diagnosis   • Coronary artery disease   • Hypertension   • Dyslipidemia   • Polymyositis   • Dependent edema   • CKD (chronic kidney disease)   • Chronic anemia   • Osteopenia   • GERD (gastroesophageal reflux disease)        Past Medical History:   Diagnosis Date   • Chronic anemia     Chronic anemia, secondary to (CKD).    • CKD (chronic kidney disease)    • Coronary artery disease    • Dependent edema    • DVT (deep venous thrombosis) 2009    History of DVT in .    • Dyslipidemia    • GERD (gastroesophageal reflux disease)    • Hypertension    • Osteopenia    • Polymyositis         No past surgical history on file.      EVALUATION        PT Ortho       07/10/17 1315    Subjective Comments    Subjective Comments Pt states she took off the unna boots and showered, and scrubbed her legs with a washcloth to get all the dry skin off, but noticed her legs were bleeding, had scrubbed open new areas on BLE.  -    Subjective Pain    Able to rate subjective pain? yes  -    Pre-Treatment Pain Level 0  -    Post-Treatment Pain Level 0  -    Transfers    Transfers, Sit-Stand [place] independent  -    Transfers, Stand-Sit [place] independent  -    Transfer, Comment seated in arjo chair for tx.  -    Gait Assessment/Treatment    Gait, [place] Level independent  -    Gait, Assistive Device rollator  -      User Key  (r) = Recorded By, (t) = Taken By, (c) = Cosigned By    Initials Name Provider Type    ALENA Baca,  PT Physical Therapist                    LDA Wound       07/10/17 1315          Wound 01/13/17 0930 Bilateral lower leg excoriation    Wound - Properties Group Date first assessed: 01/13/17  - Time first assessed: 0930  - Side: Bilateral  - Orientation: lower  - Location: leg  - Type: excoriation  -    Wound WDL ex   min erythema, min drainage  -      Dressing Appearance other (see comments);moist drainage;dried drainage   part of unna boot and compressogrips placed by pt  -      Base reddened;moist;pink   small open areas along length and circumference of BLE  -JM      Periwound Area redness;dry;swelling;warm;blanchable;intact;pink;excoriated  -      Edges open  -      Length (cm) 30   dorsum foot to proximal knee  -      Width (cm) --   circumference of lower leg  -      Drainage Characteristics/Odor serosanguineous;sanguineous  -      Drainage Amount small  -JM      Picture taken yes  -      Wound Cleaning cleansed with;other (see comments)   theraworx foam/wipes  -      Dressing Dressing applied   unna boot, kerlix  -      Periwound Care cleansed with pH balanced cleanser;dry periwound area maintained  -        User Key  (r) = Recorded By, (t) = Taken By, (c) = Cosigned By    Initials Name Provider Type     Alis Johnson, PT Physical Therapist    ALENA Baca, PT Physical Therapist              Lymphedema       07/10/17 1315          Lymphedema Edema Assessment    Ptting Edema Category By severity  -      Pitting Edema Mild  -      Skin Changes/Observations    Lower Extremity Conditions bilateral:;clean;dry;shiny;hairless;scaly;scab(s);inflamed;weeping;fragile  -      Lower Extremity Color/Pigment bilateral:;erythema;red;blanchable  -      Skin Observations Comment small open areas along entire length and circumference of BLE  -      Lymphedema Pulses/Capillary Refill    Dorsalis Pedis Pulse right:;left:;+2 normal  -      Posterior Tibialis Pulse  right:;left:;+1 diminished  -      Lower Extremity Capillary Refill right:;left:;less than 3 seconds  -      Compression/Skin Care    Compression/Skin Care skin care;wrapping location;bandaging  -      Skin Care washed/dried  -      Wrapping Location lower extremity  -      Wrapping Location LE bilateral:;foot to knee  -      Wrapping Comments unna boot, kerlix, size 4 compressogrip  -        User Key  (r) = Recorded By, (t) = Taken By, (c) = Cosigned By    Initials Name Provider Type    ALENA Baca, PT Physical Therapist          Recommendation and Plan        PT Assessment/Plan       07/10/17 1315       PT Assessment    Functional Limitations Performance in self-care ADL  -     Impairments Integumentary integrity;Edema  -     Assessment Comments Pt now with multiple open areas along length and circumference of BLE s/p scrubbing at her legs with washcloth in shower this AM.  Pt had previously made good progress with unna boot/MLW placement.  Pt will continue to require skilled PT for unna boots/MLW and dressing management to promote closure of new open wounds on BLE.  -     Rehab Potential Good  -     Patient/caregiver participated in establishment of treatment plan and goals Yes  -     Patient would benefit from skilled therapy intervention Yes  -     PT Plan    PT Frequency 1x/week  -     Predicted Duration of Therapy Intervention (days/wks) 4-6 weeks  -     Planned CPT's? PT UNNA BOOT: 57847;PT MULTI LAYER COMP SYS LE;PT NONSELECT DEBRIDE 15 MIN: 95078;PT THER PROC EA 15 MIN: 47204  -     Physical Therapy Interventions (Optional Details) patient/family education;wound care  -     PT Plan Comments BLE unna boots  -       User Key  (r) = Recorded By, (t) = Taken By, (c) = Cosigned By    Initials Name Provider Type    ALENA Baca, PT Physical Therapist          Goals        PT OP Goals       07/10/17 1315       PT Short Term Goals    STG 1 Decrease wound  dimensions by 50% as evidence of wound healing.  -     STG 1 Progress Ongoing  -     STG 2 Decrease area of excoriation by 50% as evidence of wound healing.  -     STG 2 Progress Ongoing  -     Long Term Goals    LTG 1 Patient independent and compliant with use and care of compression wraps or stockings, with assistance of family / caregiver as indicated to promote self-care independence.  -     LTG 1 Progress Ongoing  -     LTG 2 Decrease area of excoriation by 75% as evidence of wound healing.  -     LTG 2 Progress Ongoing  -     LTG 3 Decrease limb circumferences by 2 cm to promote improved skin integrity and circulation.  -     LTG 3 Progress Ongoing  -     Time Calculation    PT Goal Re-Cert Due Date 08/10/17  -       User Key  (r) = Recorded By, (t) = Taken By, (c) = Cosigned By    Initials Name Provider Type    ALENA Baca, PT Physical Therapist            Time Calculation: Start Time: 1315    Therapy Charges for Today     Code Description Service Date Service Provider Modifiers Qty    18891743918 HC PT OTHER PRIME FUNCT CURRENT 7/10/2017 Dayami Baca, PT GP, CK 1    51479051994 HC PT OTHER PRIME FUNCT PROJECTED 7/10/2017 Dayami Baca, PT GP, CI 1    18844230822 HC PT STAPPING UNNA BOOT 7/10/2017 Dayami Baca, PT GP 1          PT G-Codes  PT Professional Judgement Used?: Yes  Outcome Measure Options: BWAT (Cummings-Manzo Wound Assess Tool)  Score: 28  Functional Limitation: Other PT primary  Other PT Primary Current Status (): At least 40 percent but less than 60 percent impaired, limited or restricted  Other PT Primary Goal Status (): At least 1 percent but less than 20 percent impaired, limited or restricted     Dayami Baca PT  7/10/2017

## 2017-07-17 ENCOUNTER — HOSPITAL ENCOUNTER (OUTPATIENT)
Dept: PHYSICAL THERAPY | Facility: HOSPITAL | Age: 82
Setting detail: THERAPIES SERIES
Discharge: HOME | End: 2017-07-17

## 2017-07-17 DIAGNOSIS — S81.809D MULTIPLE OPEN WOUNDS OF LOWER EXTREMITY, UNSPECIFIED LATERALITY, SUBSEQUENT ENCOUNTER: ICD-10-CM

## 2017-07-17 DIAGNOSIS — I87.2 VENOUS STASIS DERMATITIS OF BOTH LOWER EXTREMITIES: Primary | ICD-10-CM

## 2017-07-17 PROCEDURE — 29580 STRAPPING UNNA BOOT: CPT

## 2017-07-17 PROCEDURE — 97597 DBRDMT OPN WND 1ST 20 CM/<: CPT

## 2017-07-17 PROCEDURE — 97598 DBRDMT OPN WND ADDL 20CM/<: CPT

## 2017-07-17 NOTE — THERAPY WOUND CARE TREATMENT
"    Outpatient Rehabilitation - Wound/Debridement Treatment Note  [facility]     Patient Name: Lorna Lo  : 1934  MRN: [id number]  Today's Date: 2017                Admit Date: 2017    Visit Dx:    ICD-10-CM ICD-9-CM   1. Venous stasis dermatitis of both lower extremities I83.11 454.1    I83.12    2. Multiple open wounds of lower extremity, unspecified laterality, subsequent encounter S81.809D V58.89     894.0       Patient Active Problem List   Diagnosis   • Coronary artery disease   • Hypertension   • Dyslipidemia   • Polymyositis   • Dependent edema   • CKD (chronic kidney disease)   • Chronic anemia   • Osteopenia   • GERD (gastroesophageal reflux disease)        Past Medical History:   Diagnosis Date   • Chronic anemia     Chronic anemia, secondary to (CKD).    • CKD (chronic kidney disease)    • Coronary artery disease    • Dependent edema    • DVT (deep venous thrombosis) 2009    History of DVT in .    • Dyslipidemia    • GERD (gastroesophageal reflux disease)    • Hypertension    • Osteopenia    • Polymyositis         No past surgical history on file.      EVALUATION        PT Ortho       170    Subjective Comments    Subjective Comments Pt reports she has \"been good\" this week and hasn't scratched or removed her wraps. No other complaints or changes.  -MC    Subjective Pain    Able to rate subjective pain? yes  -MC    Pre-Treatment Pain Level 0  -MC    Post-Treatment Pain Level 0  -MC    Transfers    Transfers, Sit-Stand [place] independent  -    Transfers, Stand-Sit [place] independent  -    Transfer, Comment seated in ARJO for tx  -MC    Gait Assessment/Treatment    Gait, [place] Level independent  -    Gait, Assistive Device rollator  -      User Key  (r) = Recorded By, (t) = Taken By, (c) = Cosigned By    Initials Name Provider Type    PAWAN Johnson, PT Physical Therapist                    LDA Wound       17 1420          " Wound 01/13/17 0930 Bilateral lower leg excoriation    Wound - Properties Group Date first assessed: 01/13/17  - Time first assessed: 0930  - Side: Bilateral  - Orientation: lower  - Location: leg  - Type: excoriation  -    Wound WDL ex   min erythema, dried drainage throughout  -MC      Dressing Appearance dried drainage;intact  -MC      Base reddened;pink;dry;moist   LLE with no open areas, RLE with 3 small open moist areas  -      Periwound Area redness;dry;swelling;warm;blanchable;intact;pink;excoriated   min excoriation today, mostly dry, scaly skin  -      Edges open  -      Drainage Characteristics/Odor serosanguineous;sanguineous  -      Drainage Amount small  -      Picture taken yes  -      Wound Cleaning cleansed with;other (see comments)   theraworx  -      Wound Interventions debrided  -      Dressing Dressing applied;other (see comments)   unna boot  -      Periwound Care cleansed with pH balanced cleanser;barrier ointment applied   z-guard to new skin and some excoriated areas  -        User Key  (r) = Recorded By, (t) = Taken By, (c) = Cosigned By    Initials Name Provider Type     Alis Johnson, PT Physical Therapist              Lymphedema       07/17/17 1420          Lymphedema Edema Assessment    Ptting Edema Category By severity  -      Pitting Edema Mild  -      Skin Changes/Observations    Lower Extremity Conditions bilateral:;clean;dry;shiny;hairless;scaly;crust;scab(s);inflamed;fragile  -      Lower Extremity Color/Pigment bilateral:;erythema;red  -      Skin Observations Comment small open areas R anterior ankle and R lateral/superior leg.  -      Lymphedema Pulses/Capillary Refill    Lower Extremity Capillary Refill right:;left:;less than 3 seconds  -      Compression/Skin Care    Compression/Skin Care skin care;wrapping location;bandaging  -      Skin Care washed/dried  -      Wrapping Location lower extremity  -      Wrapping  Location LE bilateral:;foot to knee  -      Wrapping Comments unna boot, kerlix, size 4 compressogrip  -        User Key  (r) = Recorded By, (t) = Taken By, (c) = Cosigned By    Initials Name Provider Type    PAWAN Johnson, PT Physical Therapist          WOUND DEBRIDEMENT  Debridement Site 1  Location- Site 1: BLE  Selective Debridement- Site 1: Wound Surface >20cmsq (about 70 cm2 total)  Instruments- Site 1: tweezers  Excised Tissue Description- Site 1: maximum, other (comment) (scabs, nonviable/adherent dry skin over new epithelialization)  Bleeding- Site 1: none             Recommendation and Plan        PT Assessment/Plan       07/17/17 1420       PT Assessment    Functional Limitations Performance in self-care ADL  -     Impairments Integumentary integrity;Edema  -     Assessment Comments Pt with three small open areas remaining to the RLE. Remaining open areas have closed, but some excoriation remains to BLE. PT able to debride maximal amount of nonviable skin from BLE, however, much of the leg is still covered in adherent dry skin. Pt will continue to benefit from skilled PT wound care to continue progress.  -     Rehab Potential Good  -     Patient/caregiver participated in establishment of treatment plan and goals Yes  -     Patient would benefit from skilled therapy intervention Yes  -     PT Plan    PT Frequency 1x/week  -     Physical Therapy Interventions (Optional Details) patient/family education;wound care  -     PT Plan Comments BLE unna boots  -       User Key  (r) = Recorded By, (t) = Taken By, (c) = Cosigned By    Initials Name Provider Type    PAWAN Johnson, PT Physical Therapist          Goals        PT OP Goals       07/17/17 1420       Time Calculation    PT Goal Re-Cert Due Date 08/10/17  -       User Key  (r) = Recorded By, (t) = Taken By, (c) = Cosigned By    Initials Name Provider Type    PAWAN Johnson, PT Physical Therapist          PT Goal  Re-Cert Due Date: 08/10/17            Time Calculation: Start Time: 1420    Therapy Charges for Today     Code Description Service Date Service Provider Modifiers Qty    41248239587 HC JUAN DEBRIDE OPEN WOUND UP TO 20CM 7/17/2017 Alis Johnson, PT GP 1    18340272295 HC PT STAPPING UNNA BOOT 7/17/2017 Alis Johnson, PT GP 1    27970076211 HC PT JUAN DEBRIDE OPEN WOUND EA ADD 20CM 7/17/2017 Alis Johnson, PT GP 3                Alis Johnson, PT  7/17/2017

## 2017-07-24 ENCOUNTER — HOSPITAL ENCOUNTER (OUTPATIENT)
Dept: PHYSICAL THERAPY | Facility: HOSPITAL | Age: 82
Setting detail: THERAPIES SERIES
Discharge: HOME | End: 2017-07-24

## 2017-07-24 DIAGNOSIS — I87.2 VENOUS STASIS DERMATITIS OF BOTH LOWER EXTREMITIES: Primary | ICD-10-CM

## 2017-07-24 DIAGNOSIS — S81.809D MULTIPLE OPEN WOUNDS OF LOWER EXTREMITY, UNSPECIFIED LATERALITY, SUBSEQUENT ENCOUNTER: ICD-10-CM

## 2017-07-24 DIAGNOSIS — R60.0 EDEMA OF BOTH LEGS: ICD-10-CM

## 2017-07-24 PROCEDURE — 97597 DBRDMT OPN WND 1ST 20 CM/<: CPT | Performed by: PHYSICAL THERAPIST

## 2017-07-24 PROCEDURE — 29580 STRAPPING UNNA BOOT: CPT | Performed by: PHYSICAL THERAPIST

## 2017-07-24 NOTE — THERAPY TREATMENT NOTE
Outpatient Rehabilitation - Wound/Debridement Treatment Note  [facility]     Patient Name: Lorna Lo  : 1934  MRN: [id number]  Today's Date: 2017                Admit Date: 2017    Visit Dx:    ICD-10-CM ICD-9-CM   1. Venous stasis dermatitis of both lower extremities I83.11 454.1    I83.12    2. Multiple open wounds of lower extremity, unspecified laterality, subsequent encounter S81.809D V58.89     894.0   3. Edema of both legs R60.0 782.3       Patient Active Problem List   Diagnosis   • Coronary artery disease   • Hypertension   • Dyslipidemia   • Polymyositis   • Dependent edema   • CKD (chronic kidney disease)   • Chronic anemia   • Osteopenia   • GERD (gastroesophageal reflux disease)        Past Medical History:   Diagnosis Date   • Chronic anemia     Chronic anemia, secondary to (CKD).    • CKD (chronic kidney disease)    • Coronary artery disease    • Dependent edema    • DVT (deep venous thrombosis)     History of DVT in .    • Dyslipidemia    • GERD (gastroesophageal reflux disease)    • Hypertension    • Osteopenia    • Polymyositis         No past surgical history on file.            PT Ortho       17 0930    Subjective Comments    Subjective Comments Pt states RLE was bleeding through dressing so she changed it; had some extra wraps from another week. States itching is really bad. 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   -MW    Subjective Pain    Able to rate subjective pain? yes  -MW    Pre-Treatment Pain Level 2  -MW    Post-Treatment Pain Level 2  -MW    Subjective Pain Comment legs aching   -MW    Transfers    Transfers, Sit-Stand [place] independent  -MW    Transfers, Stand-Sit [place] independent  -MW    Transfer, Comment seated for tx  -MW    Gait Assessment/Treatment    Gait, [place] Level independent  -MW    Gait, Assistive  Device rollator  -      User Key  (r) = Recorded By, (t) = Taken By, (c) = Cosigned By    Initials Name Provider Type    VJ Silva, PT Physical Therapist          RLE anterior          RLE posterior         LLE anterior          LLE posterior           LDA Wound       07/24/17 0930          Wound 01/13/17 0930 Bilateral lower leg excoriation    Wound - Properties Group Date first assessed: 01/13/17  - Time first assessed: 0930  - Side: Bilateral  - Orientation: lower  - Location: leg  - Type: excoriation  -    Wound WDL ex   drainage and erythema bilat   -MW      Dressing Appearance dried drainage;intact;moist drainage   mixed drainage through layers; moist on skin   -MW      Base reddened;pink;moist   BLE with small open areas including dorsum of feet  -MW      Periwound Area redness;dry;warm;blanchable;intact;pink;excoriated   dry crusts with red/ excoriate tissue beneath   -MW      Edges open  -MW      Drainage Characteristics/Odor serosanguineous;sanguineous  -MW      Drainage Amount small  -MW      Picture taken yes  -MW      Wound Cleaning cleansed with;soap and water  -MW      Wound Interventions debrided  -MW      Dressing Dressing changed;other (see comments)   topical antifungal ointment, Unnaboot   -MW      Periwound Care cleansed with pH balanced cleanser;dry periwound area maintained;topical treatment applied   antifungal ointment to BLE base of toes to top of calf   -MW        User Key  (r) = Recorded By, (t) = Taken By, (c) = Cosigned By    Initials Name Provider Type    VJ Silva, PT Physical Therapist    MC Alis Johnson, VANESA Physical Therapist              Lymphedema       07/24/17 0930          Lymphedema Edema Assessment    Ptting Edema Category By severity  -MW      Pitting Edema Mild  -MW      Skin Changes/Observations    Lower Extremity Conditions bilateral:;clean;dry;shiny;hairless;scaly;crust;scab(s);inflamed;fragile  -MW      Lower Extremity  Color/Pigment bilateral:;erythema;red  -MW      Skin Observations Comment multiple small open areas Bilat   -MW      Lymphedema Pulses/Capillary Refill    Lower Extremity Capillary Refill right:;left:;less than 3 seconds  -MW      Compression/Skin Care    Compression/Skin Care skin care;wrapping location;bandaging  -MW      Skin Care washed/dried;topical anti-fungal applied;other (comment)   Antifungal ointment and Unnaboots   -MW      Wrapping Location lower extremity  -MW      Wrapping Location LE bilateral:;foot to knee  -MW      Wrapping Comments unna boot, kerlix, size 4 compressogrip  -MW        User Key  (r) = Recorded By, (t) = Taken By, (c) = Cosigned By    Initials Name Provider Type    MW Meenakshi Silva, PT Physical Therapist          WOUND DEBRIDEMENT  Debridement Site 1  Location- Site 1: BLE  Selective Debridement- Site 1: Wound Surface <20cmsq (approx 20 total across both legs )  Instruments- Site 1: tweezers  Excised Tissue Description- Site 1: moderate, slough, other (comment) (crusts )  Bleeding- Site 1: seeping, scant             Recommendation and Plan        PT Assessment/Plan       07/24/17 0930       PT Assessment    Functional Limitations Performance in self-care ADL  -MW     Impairments Integumentary integrity;Edema  -MW     Assessment Comments Pt returns with LLE unnaboot in place, but RLE with partial unnaboot due to pt changed it when bleeding yesterday. BLE with open, red areas, with mild weeping; but tissues appear inflammed. Pt c/o itching; added antifungal topical to attempt to decrease inflammation and itching. May benefit from dermatology assessment of BLE as she also has areas of scale/ crust redness on her chest that are being assessed/ treated by derm. Overall, BLE with mild to trace edema but significant erythema / inflammation, pain and skin disruptions.   -MW     Rehab Potential Fair  -MW     Patient/caregiver participated in establishment of treatment plan and goals Yes   -MW     Patient would benefit from skilled therapy intervention Yes  -MW     PT Plan    PT Frequency 1x/week  -MW     Physical Therapy Interventions (Optional Details) wound care;patient/family education  -     PT Plan Comments skin care/ wound care; BLE unnaboots   -       User Key  (r) = Recorded By, (t) = Taken By, (c) = Cosigned By    Initials Name Provider Type    VJ Silva, PT Physical Therapist          Goals        PT OP Goals       07/24/17 1733       Time Calculation    PT Goal Re-Cert Due Date 08/10/17  -       User Key  (r) = Recorded By, (t) = Taken By, (c) = Cosigned By    Initials Name Provider Type    VJ Silva, PT Physical Therapist          PT Goal Re-Cert Due Date: 08/10/17            Time Calculation: Start Time: 0930    Therapy Charges for Today     Code Description Service Date Service Provider Modifiers Qty    69788553949 HC JUAN DEBRIDE OPEN WOUND UP TO 20CM 7/24/2017 Meenakshi Silva, PT GP 1    01230217178 HC PT STAPPING UNNA BOOT 7/24/2017 Meenakshi Silva, PT GP 1                Meenakshi Silva, PT  7/24/2017

## 2017-08-01 ENCOUNTER — HOSPITAL ENCOUNTER (OUTPATIENT)
Dept: PHYSICAL THERAPY | Facility: HOSPITAL | Age: 82
Setting detail: THERAPIES SERIES
Discharge: HOME | End: 2017-08-01

## 2017-08-01 DIAGNOSIS — I87.2 VENOUS STASIS DERMATITIS OF BOTH LOWER EXTREMITIES: Primary | ICD-10-CM

## 2017-08-01 DIAGNOSIS — S81.809D MULTIPLE OPEN WOUNDS OF LOWER EXTREMITY, UNSPECIFIED LATERALITY, SUBSEQUENT ENCOUNTER: ICD-10-CM

## 2017-08-01 PROCEDURE — 29580 STRAPPING UNNA BOOT: CPT

## 2017-08-01 PROCEDURE — 97598 DBRDMT OPN WND ADDL 20CM/<: CPT

## 2017-08-01 PROCEDURE — 97597 DBRDMT OPN WND 1ST 20 CM/<: CPT

## 2017-08-01 NOTE — THERAPY WOUND CARE TREATMENT
Outpatient Rehabilitation - Wound/Debridement Treatment Note  [facility]     Patient Name: Lorna Lo  : 1934  MRN: [id number]  Today's Date: 2017            BLE (L, R, respectively):      Admit Date: 2017    Visit Dx:    ICD-10-CM ICD-9-CM   1. Venous stasis dermatitis of both lower extremities I83.11 454.1    I83.12    2. Multiple open wounds of lower extremity, unspecified laterality, subsequent encounter S81.809D V58.89     894.0       Patient Active Problem List   Diagnosis   • Coronary artery disease   • Hypertension   • Dyslipidemia   • Polymyositis   • Dependent edema   • CKD (chronic kidney disease)   • Chronic anemia   • Osteopenia   • GERD (gastroesophageal reflux disease)        Past Medical History:   Diagnosis Date   • Chronic anemia     Chronic anemia, secondary to (CKD).    • CKD (chronic kidney disease)    • Coronary artery disease    • Dependent edema    • DVT (deep venous thrombosis)     History of DVT in .    • Dyslipidemia    • GERD (gastroesophageal reflux disease)    • Hypertension    • Osteopenia    • Polymyositis         No past surgical history on file.      EVALUATION        PT Ortho       17 0930    Subjective Comments    Subjective Comments Pt reports lots of itching under the wraps, thinks she might scratch some at night without realizing it. She likes the antifungal cream more than z-guard. She sees the dermatologist again tomorrow but he wants to see her legs. She reports the office said they could re-wrap her legs if we send extra supplies.  -MC    Subjective Pain    Able to rate subjective pain? yes  -MC    Pre-Treatment Pain Level 0  -MC    Post-Treatment Pain Level 0  -MC    Transfers    Transfers, Sit-Stand [place] independent  -MC    Transfers, Stand-Sit [place] independent  -MC    Transfer, Comment seated for tx  -MC    Gait Assessment/Treatment    Gait, [place] Level independent  -MC    Gait, Assistive Device rollator   -      User Key  (r) = Recorded By, (t) = Taken By, (c) = Cosigned By    Initials Name Provider Type    PAWAN Johnson, PT Physical Therapist                    LDA Wound       08/01/17 0930          Wound 01/13/17 0930 Bilateral lower leg excoriation    Wound - Properties Group Date first assessed: 01/13/17  - Time first assessed: 0930  - Side: Bilateral  - Orientation: lower  - Location: leg  - Type: excoriation  -MC    Wound WDL ex   min drainage and mod/severe erythema bilat   -      Dressing Appearance dried drainage;intact   dried drainage on wraps, R > L  -MC      Base reddened;pink;dry   No open areas RLE today, several small scratches LLE  -      Periwound Area redness;dry;warm;blanchable;intact;pink;excoriated   dry crusts with red/ excoriated tissue beneath   -      Edges open  -      Drainage Characteristics/Odor serosanguineous;sanguineous  -      Drainage Amount small  -      Picture taken yes  -      Wound Cleaning cleansed with;other (see comments)   theraworx  -      Wound Interventions debrided  -      Dressing Dressing applied;other (see comments)   unna boot  -      Periwound Care cleansed with pH balanced cleanser;topical treatment applied   antifungal cream applied BLE/dorsums of feet  -        User Key  (r) = Recorded By, (t) = Taken By, (c) = Cosigned By    Initials Name Provider Type    PAWAN Johnson PT Physical Therapist              Lymphedema       08/01/17 0930          Lymphedema Edema Assessment    Ptting Edema Category By severity  -      Pitting Edema Mild;Other (comment)   trace  -      Skin Changes/Observations    Lower Extremity Conditions right:;intact;bilateral:;dry;shiny;hairless;scaly;crust;inflamed;fragile  -      Lower Extremity Color/Pigment bilateral:;erythema;red  -      Skin Observations Comment small areas along LLE that appear to be from scratching  -      Lymphedema Pulses/Capillary Refill    Lower Extremity  Capillary Refill right:;left:;less than 3 seconds  -      Compression/Skin Care    Compression/Skin Care skin care;wrapping location;bandaging  -      Skin Care washed/dried;topical anti-fungal applied  -      Wrapping Location lower extremity  -      Wrapping Location LE bilateral:;foot to knee  -      Wrapping Comments unna boot, kerlix, size 4 compressogrip. Issued extra supplies for dermatologist visit tomorrow.  -        User Key  (r) = Recorded By, (t) = Taken By, (c) = Cosigned By    Initials Name Provider Type     Alis Johnson, PT Physical Therapist          WOUND DEBRIDEMENT  Debridement Site 1  Location- Site 1: BLE  Selective Debridement- Site 1: Wound Surface >20cmsq (approximately 45 cm2)  Instruments- Site 1: tweezers  Excised Tissue Description- Site 1: maximum, other (comment) (adherent and loose nonviable skin)  Bleeding- Site 1: none             Recommendation and Plan        PT Assessment/Plan       08/01/17 0930       PT Assessment    Functional Limitations Performance in self-care ADL  -     Impairments Integumentary integrity;Edema  -     Assessment Comments RLE improved since last visit, with no discernable open areas today. LLE with several open areas proximally that appear to be from scratching. BLE with extensive adherent and loose nonviable skin debrided today. No weeping noted to BLE today. Pt appears somewhat improved since her last visit, and will continue to benefit from skilled PT wound care to continue progress.  -     Rehab Potential Fair  -     Patient/caregiver participated in establishment of treatment plan and goals Yes  -     Patient would benefit from skilled therapy intervention Yes  -     PT Plan    PT Frequency 1x/week  -     Physical Therapy Interventions (Optional Details) patient/family education;wound care  -     PT Plan Comments skin/wound care, BLE unna boots  -       User Key  (r) = Recorded By, (t) = Taken By, (c) = Cosigned By     Initials Name Provider Type     Alis Johnson, PT Physical Therapist          Goals        PT OP Goals       08/01/17 0930       Time Calculation    PT Goal Re-Cert Due Date 08/10/17  -       User Key  (r) = Recorded By, (t) = Taken By, (c) = Cosigned By    Initials Name Provider Type     Alis Johnson, PT Physical Therapist          PT Goal Re-Cert Due Date: 08/10/17            Time Calculation: Start Time: 0930    Therapy Charges for Today     Code Description Service Date Service Provider Modifiers Qty    38172990544 HC JUAN DEBRIDE OPEN WOUND UP TO 20CM 8/1/2017 Alis Johnson, PT GP 1    74973139952 HC PT JUAN DEBRIDE OPEN WOUND EA ADD 20CM 8/1/2017 Alis Johnson, PT GP 2    98086229307 HC PT STAPPING UNNA BOOT 8/1/2017 Alis Johnson, PT GP 1                Alis Johnson, PT  8/1/2017

## 2017-08-07 ENCOUNTER — HOSPITAL ENCOUNTER (OUTPATIENT)
Dept: PHYSICAL THERAPY | Facility: HOSPITAL | Age: 82
Setting detail: THERAPIES SERIES
Discharge: HOME | End: 2017-08-07

## 2017-08-07 DIAGNOSIS — S81.809D MULTIPLE OPEN WOUNDS OF LOWER EXTREMITY, UNSPECIFIED LATERALITY, SUBSEQUENT ENCOUNTER: ICD-10-CM

## 2017-08-07 DIAGNOSIS — I87.2 VENOUS STASIS DERMATITIS OF BOTH LOWER EXTREMITIES: Primary | ICD-10-CM

## 2017-08-07 DIAGNOSIS — R60.0 EDEMA OF BOTH LEGS: ICD-10-CM

## 2017-08-07 PROCEDURE — G8990 OTHER PT/OT CURRENT STATUS: HCPCS | Performed by: PHYSICAL THERAPIST

## 2017-08-07 PROCEDURE — 29580 STRAPPING UNNA BOOT: CPT | Performed by: PHYSICAL THERAPIST

## 2017-08-07 PROCEDURE — 97602 WOUND(S) CARE NON-SELECTIVE: CPT | Performed by: PHYSICAL THERAPIST

## 2017-08-07 PROCEDURE — G8991 OTHER PT/OT GOAL STATUS: HCPCS | Performed by: PHYSICAL THERAPIST

## 2017-08-07 NOTE — THERAPY PROGRESS REPORT/RE-CERT
"    Outpatient Rehabilitation - Wound/Debridement Progress Note  [facility]     Patient Name: Lorna Lo  : 1934  MRN: [id number]  Today's Date: 2017                Admit Date: 2017    Visit Dx:    ICD-10-CM ICD-9-CM   1. Venous stasis dermatitis of both lower extremities I83.11 454.1    I83.12    2. Multiple open wounds of lower extremity, unspecified laterality, subsequent encounter S81.809D V58.89     894.0   3. Edema of both legs R60.0 782.3       Patient Active Problem List   Diagnosis   • Coronary artery disease   • Hypertension   • Dyslipidemia   • Polymyositis   • Dependent edema   • CKD (chronic kidney disease)   • Chronic anemia   • Osteopenia   • GERD (gastroesophageal reflux disease)        Past Medical History:   Diagnosis Date   • Chronic anemia     Chronic anemia, secondary to (CKD).    • CKD (chronic kidney disease)    • Coronary artery disease    • Dependent edema    • DVT (deep venous thrombosis)     History of DVT in .    • Dyslipidemia    • GERD (gastroesophageal reflux disease)    • Hypertension    • Osteopenia    • Polymyositis         No past surgical history on file.            PT Ortho       17 0930    Subjective Comments    Subjective Comments Pt reports she isn't sure how she will keep coming as her son won't have as much time to bring her when school starts. Considering driving herself and using .  -MW    Subjective Pain    Able to rate subjective pain? yes  -MW    Pre-Treatment Pain Level 0  -MW    Post-Treatment Pain Level 0  -MW    Subjective Pain Comment reports had \"bad itching spell\" legs were bleeding but then ok.   -MW    RLE Quick Girth (cm)    Met-heads 22 cm  -MW    Mid foot 22 cm  -MW    Smallest ankle 20 cm  -MW    Largest calf 34.5 cm  -MW    Tib tuberosity 35 cm  -MW    LLE Quick Girth (cm)    Met-heads 21.3 cm  -MW    Mid foot 22 cm  -MW    Smallest ankle 19 cm  -MW    Largest calf 31.9 cm  -MW    Tib tuberosity 33 cm  -MW    " Transfers    Transfers, Sit-Stand [place] independent  -MW    Transfers, Stand-Sit [place] independent  -MW    Transfer, Comment seated for tx  -MW    Gait Assessment/Treatment    Gait, [place] Level independent  -MW    Gait, Assistive Device rollator  -MW      User Key  (r) = Recorded By, (t) = Taken By, (c) = Cosigned By    Initials Name Provider Type    VJ Silva, PT Physical Therapist                    [facility] Wound       08/07/17 0930          Wound 01/13/17 0930 Bilateral lower leg excoriation    Wound - Properties Group Date first assessed: 01/13/17  - Time first assessed: 0930  - Side: Bilateral  - Orientation: lower  - Location: leg  - Type: excoriation  -    Wound WDL ex   min drainage and min  to mod erythema bilat   -MW      Dressing Appearance dried drainage;intact;moist drainage   dried drainage on wraps/ kerlix and compressogrips   -MW      Base pink;clean;moist  -MW      Periwound Area redness;dry;warm;intact;pink;excoriated   dry crusts with red/ excoriated tissue beneath   -MW      Edges open  -MW      Length (cm) 3   several excoriated areas LT > Rt see photos  -MW      Width (cm) 3  -MW      Depth (cm) 0.1  -MW      Drainage Characteristics/Odor serosanguineous;sanguineous  -MW      Drainage Amount small  -MW      Picture taken yes  -MW      Wound Cleaning cleansed with;other (see comments)   THereaworx foam & wipes   -MW      Wound Interventions debrided   non selective   -MW      Dressing Dressing applied;other (see comments)   Unnaboot, kerlix, compressogrip   -MW      Periwound Care cleansed with pH balanced cleanser;dry periwound area maintained;topical treatment applied   antifungal cream applied to feet and lower legs   -MW        User Key  (r) = Recorded By, (t) = Taken By, (c) = Cosigned By    Initials Name Provider Type    VJ Silva PT Physical Therapist    PAWAN Johnson, PT Physical Therapist           JOSE HOLLAND  posterior        LLE anterior            LLE posterior             Lymphedema       08/07/17 0930          Lymphedema Edema Assessment    Ptting Edema Category By severity  -MW      Pitting Edema Mild  -MW      Skin Changes/Observations    Lower Extremity Conditions bilateral:;dry;shiny;hairless;scaly;crust;inflamed;fragile  -MW      Lower Extremity Color/Pigment bilateral:;erythema;red  -MW      Skin Observations Comment multiple excoriated areas bilat  -MW      Lymphedema Pulses/Capillary Refill    Lower Extremity Capillary Refill right:;left:;less than 3 seconds  -MW      Compression/Skin Care    Compression/Skin Care skin care;wrapping location;bandaging  -MW      Skin Care washed/dried;topical anti-fungal applied  -MW      Wrapping Location lower extremity  -MW      Wrapping Location LE bilateral:;foot to knee  -MW      Wrapping Comments unna boot, kerlix, size 4 compressogrip. Issued extra supplies for rewrapping prn   -MW        User Key  (r) = Recorded By, (t) = Taken By, (c) = Cosigned By    Initials Name Provider Type    VJ Silva, PT Physical Therapist          WOUND DEBRIDEMENT  Debridement Site 1  Location- Site 1: BLE  Selective Debridement- Site 1: Wound Surface <20cmsq (non selective only )  Instruments- Site 1: other (comment) (gauze / wash clothes )  Excised Tissue Description- Site 1: moderate, other (comment) (dry skin flakes/ crusts )  Bleeding- Site 1: none             Recommendation and Plan        PT Assessment/Plan       08/07/17 0930       PT Assessment    Functional Limitations Performance in self-care ADL  -MW     Impairments Integumentary integrity;Edema  -MW     Assessment Comments BLE continue to improve with less open weeping areas, decrease in erythema and excoriation compared to photos from last week. Edema has also decreased 1-2cm in ankle and calf areas. Per pt the dermatologist recommended pt follow up with rheumatologist as he felt her skin condition is more autoimmune  than dermatological. Pt continues to benefit from skilled PT interventions for skin/ wound / edema care; modify plan if rheumatologist requests changes, otherwise cont Unnaboots, topical antifungal changes weekly. Consider decreasing frequency if legs continue to improve.   -MW     Rehab Potential Fair  -MW     Patient/caregiver participated in establishment of treatment plan and goals Yes  -MW     Patient would benefit from skilled therapy intervention Yes  -MW     PT Plan    PT Frequency 1x/week  -MW     Predicted Duration of Therapy Intervention (days/wks) 4 weeks   -MW     Planned CPT's? PT UNNA BOOT: 67902;PT MULTI LAYER COMP SYS LE;PT JUAN DEBRIDE OPEN WOUND UP TO 20 CM: 68206;PT NONSELECT DEBRIDE 15 MIN: 73957  -MW     Physical Therapy Interventions (Optional Details) wound care;patient/family education  -MW     PT Plan Comments skin/wound care, BLE unna boots  -MW       User Key  (r) = Recorded By, (t) = Taken By, (c) = Cosigned By    Initials Name Provider Type    MW Meenakshi Silva, PT Physical Therapist          Goals        PT OP Goals       17 0930    PT Short Term Goals    STG 1 Decrease wound dimensions by 50% as evidence of wound healing.  -MW    STG 1 Progress Met  -MW    STG 2 Decrease area of excoriation by 50% as evidence of wound healing.  -MW    STG 2 Progress Ongoing;Partially Met  -MW    Long Term Goals    LTG 1 Patient independent and compliant with use and care of compression wraps or stockings, with assistance of family / caregiver as indicated to promote self-care independence.  -MW    LTG 1 Progress Ongoing;Partially Met  -MW    LTG 2 Decrease area of excoriation by 75% as evidence of wound healing.  -MW    LTG 2 Progress Ongoing  -MW    LTG 3 Decrease limb circumferences by 2 cm to promote improved skin integrity and circulation.  -MW    LTG 3 Progress Ongoing;Partially Met  -MW    Time Calculation    PT Goal Re-Cert Due Date 17  -MW      User Key  (r) = Recorded By,  (t) = Taken By, (c) = Cosigned By    Initials Name Provider Type    MW Meenakshi Silva, PT Physical Therapist          Time Calculation: Start Time: 0930    Therapy Charges for Today     Code Description Service Date Service Provider Modifiers Qty    10127012976 HC PT OTHER PRIME FUNCT CURRENT 8/7/2017 Meenakshi Silva, PT GP, CJ 1    52394092432 HC PT OTHER PRIME FUNCT PROJECTED 8/7/2017 Meenakshi Silva, PT GP, CI 1    16208739944 HC PT STAPPING UNNA BOOT 8/7/2017 Meenakshi Silva, PT GP 1    85955475075 HC NONSELECTIVE DEBRIDEMENT 8/7/2017 Meenakshi Silva, PT GP 1          PT G-Codes  PT Professional Judgement Used?: Yes  Outcome Measure Options: BWAT (Cummings-Manzo Wound Assess Tool), Lower Extremity Functional Scale (LEFS)  Functional Limitation: Other PT primary  Other PT Primary Current Status (): At least 20 percent but less than 40 percent impaired, limited or restricted  Other PT Primary Goal Status (): At least 1 percent but less than 20 percent impaired, limited or restricted     Meenakshi Silva PT  8/7/2017

## 2017-08-14 ENCOUNTER — HOSPITAL ENCOUNTER (OUTPATIENT)
Dept: PHYSICAL THERAPY | Facility: HOSPITAL | Age: 82
Setting detail: THERAPIES SERIES
Discharge: HOME | End: 2017-08-14

## 2017-08-14 DIAGNOSIS — S81.809D MULTIPLE OPEN WOUNDS OF LOWER EXTREMITY, UNSPECIFIED LATERALITY, SUBSEQUENT ENCOUNTER: ICD-10-CM

## 2017-08-14 DIAGNOSIS — I87.2 VENOUS STASIS DERMATITIS OF BOTH LOWER EXTREMITIES: Primary | ICD-10-CM

## 2017-08-14 PROCEDURE — 29580 STRAPPING UNNA BOOT: CPT

## 2017-08-14 PROCEDURE — 97597 DBRDMT OPN WND 1ST 20 CM/<: CPT

## 2017-08-14 NOTE — THERAPY WOUND CARE TREATMENT
Outpatient Rehabilitation - Wound/Debridement Treatment Note  [facility]     Patient Name: Lorna Lo  : 1934  MRN: [id number]  Today's Date: 2017                Admit Date: 2017    Visit Dx:    ICD-10-CM ICD-9-CM   1. Venous stasis dermatitis of both lower extremities I83.11 454.1    I83.12    2. Multiple open wounds of lower extremity, unspecified laterality, subsequent encounter S81.809D V58.89     894.0       Patient Active Problem List   Diagnosis   • Coronary artery disease   • Hypertension   • Dyslipidemia   • Polymyositis   • Dependent edema   • CKD (chronic kidney disease)   • Chronic anemia   • Osteopenia   • GERD (gastroesophageal reflux disease)        Past Medical History:   Diagnosis Date   • Chronic anemia     Chronic anemia, secondary to (CKD).    • CKD (chronic kidney disease)    • Coronary artery disease    • Dependent edema    • DVT (deep venous thrombosis) 2009    History of DVT in .    • Dyslipidemia    • GERD (gastroesophageal reflux disease)    • Hypertension    • Osteopenia    • Polymyositis         No past surgical history on file.      EVALUATION        PT Ortho       17 0930    Subjective Comments    Subjective Comments Pt reports her legs are feeling a little better, aren't itching as much.  Pt wanting to try to go 2 weeks between txs and she will re-wrap her legs between txs.  -    Subjective Pain    Able to rate subjective pain? yes  -    Pre-Treatment Pain Level 0  -    Post-Treatment Pain Level 0  -    Transfers    Transfers, Sit-Stand [place] independent  -    Transfers, Stand-Sit [place] independent  -    Transfer, Comment seated for tx.  -    Gait Assessment/Treatment    Gait, [place] Level independent  -    Gait, Assistive Device rollator  -      User Key  (r) = Recorded By, (t) = Taken By, (c) = Cosigned By    Initials Name Provider Type    ALENA Baca, PT Physical Therapist                    LDA  Wound       08/14/17 0930          Wound 01/13/17 0930 Bilateral lower leg excoriation    Wound - Properties Group Date first assessed: 01/13/17  - Time first assessed: 0930  - Side: Bilateral  - Orientation: lower  - Location: leg  - Type: excoriation  -MC    Wound WDL ex   min drainage and min  to mod erythema bilat   -      Dressing Appearance dried drainage;intact;moist drainage   dried drainage on wraps/ kerlix and compressogrips   -      Base pink;clean;moist  -      Periwound Area redness;dry;warm;intact;pink;excoriated   dry crusts with red/ excoriated tissue beneath   -      Edges open  -      Drainage Characteristics/Odor serosanguineous  -      Drainage Amount small  -      Picture taken yes  -      Wound Cleaning cleansed with;other (see comments)   theraworx  -      Wound Interventions debrided  -      Dressing Dressing applied;other (see comments)   unna boot  -      Periwound Care cleansed with pH balanced cleanser;dry periwound area maintained;topical treatment applied   antifungal lotion  -        User Key  (r) = Recorded By, (t) = Taken By, (c) = Cosigned By    Initials Name Provider Type     Alis Johnson, PT Physical Therapist     Dayami Baca, PT Physical Therapist              Lymphedema       08/14/17 0930          Lymphedema Edema Assessment    Ptting Edema Category By severity  -      Pitting Edema Mild  -      Skin Changes/Observations    Lower Extremity Conditions bilateral:;dry;shiny;scaly;crust;inflamed;fragile  -      Lower Extremity Color/Pigment bilateral:;erythema;red  -JM      Lymphedema Pulses/Capillary Refill    Lower Extremity Capillary Refill right:;left:;less than 3 seconds  -JM      LLE Quick Girth (cm)    Met-heads 21.4 cm  -JM      Mid foot 19.9 cm  -JM      Smallest ankle 19.5 cm  -JM      Largest calf 31.5 cm  -JM      Tib tuberosity 31 cm  -JM      RLE Quick Girth (cm)    Met-heads 22 cm  -JM      Mid foot 20 cm  -JM       Smallest ankle 19.5 cm  -      Largest calf 35.8 cm  -      Tib tuberosity 34.5 cm  -      Compression/Skin Care    Compression/Skin Care skin care;wrapping location;bandaging  -      Skin Care washed/dried;topical anti-fungal applied  -      Wrapping Location lower extremity  -      Wrapping Location LE bilateral:;foot to knee  -      Wrapping Comments unnaboot, cast padding, size 4 compressogrip MH to knee  -        User Key  (r) = Recorded By, (t) = Taken By, (c) = Cosigned By    Initials Name Provider Type    ALENA Baca, PT Physical Therapist          WOUND DEBRIDEMENT  Debridement Site 1  Location- Site 1: BLE  Selective Debridement- Site 1: Wound Surface <20cmsq (about 6 cm sq)  Instruments- Site 1: tweezers  Excised Tissue Description- Site 1: moderate, other (comment) (hypertrophic crusts)  Bleeding- Site 1: none             Recommendation and Plan        PT Assessment/Plan       08/14/17 0930       PT Assessment    Functional Limitations Performance in self-care ADL  -     Impairments Integumentary integrity;Edema  -     Assessment Comments BLE with less excoriation, and less drainage.  Pt will continue to benefit from skilled PT for MLW/unna boot and dressing management.  Pt to attempt home dressing change/wraps to assess response to independent home care.  -     Rehab Potential Fair  -     Patient/caregiver participated in establishment of treatment plan and goals Yes  -     Patient would benefit from skilled therapy intervention Yes  -     PT Plan    PT Frequency Other (comment)   every 2 weeks  -     Physical Therapy Interventions (Optional Details) patient/family education;wound care  -     PT Plan Comments BLE unna boot/MLW  -       User Key  (r) = Recorded By, (t) = Taken By, (c) = Cosigned By    Initials Name Provider Type    ALENA Baca, PT Physical Therapist          Goals        PT OP Goals       08/14/17 0930       Time Calculation    PT  Goal Re-Cert Due Date 09/04/17  -ALENA       User Key  (r) = Recorded By, (t) = Taken By, (c) = Cosigned By    Initials Name Provider Type    ALENA Baca, PT Physical Therapist          PT Goal Re-Cert Due Date: 09/04/17            Time Calculation: Start Time: 0930    Therapy Charges for Today     Code Description Service Date Service Provider Modifiers Qty    71644961258  PT STAPPING UNNA BOOT 8/14/2017 Dayami Baca, PT GP 1    [id number]  JUAN DEBRIDE OPEN WOUND UP TO 20CM 8/14/2017 Dayami Baca, PT 59, GP 1                Dayami Baca, PT  8/14/2017

## 2017-08-29 ENCOUNTER — APPOINTMENT (OUTPATIENT)
Dept: PHYSICAL THERAPY | Facility: HOSPITAL | Age: 82
End: 2017-08-29

## 2017-08-30 ENCOUNTER — HOSPITAL ENCOUNTER (OUTPATIENT)
Dept: PHYSICAL THERAPY | Facility: HOSPITAL | Age: 82
Setting detail: THERAPIES SERIES
Discharge: HOME | End: 2017-08-30

## 2017-08-30 DIAGNOSIS — R60.0 EDEMA OF BOTH LEGS: ICD-10-CM

## 2017-08-30 DIAGNOSIS — I87.2 VENOUS STASIS DERMATITIS OF BOTH LOWER EXTREMITIES: Primary | ICD-10-CM

## 2017-08-30 DIAGNOSIS — S81.809D MULTIPLE OPEN WOUNDS OF LOWER EXTREMITY, UNSPECIFIED LATERALITY, SUBSEQUENT ENCOUNTER: ICD-10-CM

## 2017-08-30 PROCEDURE — 97602 WOUND(S) CARE NON-SELECTIVE: CPT

## 2017-08-30 PROCEDURE — 29581 APPL MULTLAYER CMPRN SYS LEG: CPT

## 2017-08-30 PROCEDURE — G8990 OTHER PT/OT CURRENT STATUS: HCPCS

## 2017-08-30 PROCEDURE — G8991 OTHER PT/OT GOAL STATUS: HCPCS

## 2017-09-11 ENCOUNTER — HOSPITAL ENCOUNTER (OUTPATIENT)
Dept: PHYSICAL THERAPY | Facility: HOSPITAL | Age: 82
Setting detail: THERAPIES SERIES
Discharge: HOME | End: 2017-09-11

## 2017-09-11 DIAGNOSIS — S81.809D MULTIPLE OPEN WOUNDS OF LOWER EXTREMITY, UNSPECIFIED LATERALITY, SUBSEQUENT ENCOUNTER: ICD-10-CM

## 2017-09-11 DIAGNOSIS — I87.2 VENOUS STASIS DERMATITIS OF BOTH LOWER EXTREMITIES: Primary | ICD-10-CM

## 2017-09-11 PROCEDURE — 97602 WOUND(S) CARE NON-SELECTIVE: CPT

## 2017-09-11 PROCEDURE — 29581 APPL MULTLAYER CMPRN SYS LEG: CPT

## 2017-09-11 NOTE — THERAPY WOUND CARE TREATMENT
Outpatient Rehabilitation - Wound/Debridement Treatment Note  [facility]     Patient Name: Lorna Lo  : 1934  MRN: [id number]  Today's Date: 2017                LLE (lateral, medial, respectively)      Admit Date: 2017    Visit Dx:    ICD-10-CM ICD-9-CM   1. Venous stasis dermatitis of both lower extremities I83.11 454.1    I83.12    2. Multiple open wounds of lower extremity, unspecified laterality, subsequent encounter S81.809D V58.89     894.0       Patient Active Problem List   Diagnosis   • Coronary artery disease   • Hypertension   • Dyslipidemia   • Polymyositis   • Dependent edema   • CKD (chronic kidney disease)   • Chronic anemia   • Osteopenia   • GERD (gastroesophageal reflux disease)        Past Medical History:   Diagnosis Date   • Chronic anemia     Chronic anemia, secondary to (CKD).    • CKD (chronic kidney disease)    • Coronary artery disease    • Dependent edema    • DVT (deep venous thrombosis)     History of DVT in .    • Dyslipidemia    • GERD (gastroesophageal reflux disease)    • Hypertension    • Osteopenia    • Polymyositis         No past surgical history on file.      EVALUATION        PT Ortho       17 0940    Subjective Comments    Subjective Comments Pt reports she finds herself subconsciously scratching her ankles with her other foot. She stops when she realizes it's happening. Pt is still having trouble finding transportation, and does not want to use the "[employer] service because it takes too long.   -MC    Subjective Pain    Able to rate subjective pain? yes  -MC    Pre-Treatment Pain Level 0  -MC    Post-Treatment Pain Level 0  -MC    RLE Quick Girth (cm)    Met-heads 22 cm  -MC    Mid foot 19.9 cm  -MC    Smallest ankle 19.6 cm  -MC    Largest calf 35.6 cm  -MC    Tib tuberosity 34.8 cm  -MC    LLE Quick Girth (cm)    Met-heads 21.5 cm  -MC    Mid foot 20 cm  -MC    Smallest ankle 19.5 cm  -MC    Largest calf 32 cm  -MC    Tib tuberosity  31.6 cm  -MC    Transfers    Transfers, Sit-Stand [place] independent  -    Transfers, Stand-Sit [place] independent  -MC    Transfer, Comment seated for tx  -MC    Gait Assessment/Treatment    Gait, [place] Level independent  -MC    Gait, Assistive Device rollator  -      User Key  (r) = Recorded By, (t) = Taken By, (c) = Cosigned By    Initials Name Provider Type     Alis Johnson, PT Physical Therapist                    LDA Wound       09/11/17 0940          Wound 01/13/17 0930 Bilateral lower leg excoriation    Wound - Properties Group Date first assessed: 01/13/17  - Time first assessed: 0930  - Side: Bilateral  - Orientation: lower  - Location: leg  - Type: excoriation  -MC    Wound WDL ex   scant drainage, mod erythema  -MC      Dressing Appearance dried drainage;intact  -MC      Base pink;clean;moist   no open areas, but several inflamed, shiny areas  -      Periwound Area redness;dry;warm;intact;pink  -MC      Edges open  -      Drainage Characteristics/Odor serosanguineous  -      Drainage Amount small  -MC      Picture taken yes  -      Wound Cleaning cleansed with;other (see comments)   theraworx  -      Wound Interventions debrided  -      Dressing Dressing applied;other (see comments)   unna boot, MLW  -MC      Periwound Care cleansed with pH balanced cleanser;topical treatment applied   antifungal cream  -        User Key  (r) = Recorded By, (t) = Taken By, (c) = Cosigned By    Initials Name Provider Type    PAWAN Johnson, PT Physical Therapist              Lymphedema       09/11/17 0940          Lymphedema Edema Assessment    Ptting Edema Category By severity  -      Pitting Edema Mild  -MC      Skin Changes/Observations    Lower Extremity Conditions bilateral:;shiny;hairless;scaly;crust;scab(s);fragile  -MC      Lower Extremity Color/Pigment bilateral:;erythema;red  -MC      Lymphedema Pulses/Capillary Refill    Lower Extremity Capillary  Refill right:;left:;less than 3 seconds  -MC      Compression/Skin Care    Compression/Skin Care skin care;wrapping location;bandaging  -MC      Skin Care washed/dried;topical anti-fungal applied  -MC      Wrapping Location lower extremity  -MC      Wrapping Location LE bilateral:;foot to knee  -MC      Wrapping Comments unna boot  -MC      Bandage Layers padding/fluff layer;cotton elastic stocking- double layer (comment size)   size 4, doubled distal half  -MC        User Key  (r) = Recorded By, (t) = Taken By, (c) = Cosigned By    Initials Name Provider Type    PAWAN Johnson, PT Physical Therapist          WOUND DEBRIDEMENT  Debridement Site 1  Location- Site 1: BLE  Selective Debridement- Site 1: Wound Surface >20cmsq (non-selective only)  Instruments- Site 1: other (comment) (4x4s)  Excised Tissue Description- Site 1: moderate, other (comment) (dry, scaly crusts)  Bleeding- Site 1: none                   Therapy Education       09/11/17 0940          Therapy Education    Education Details Pt to re-wrap legs once/week PRN between txs. Pt to return sooner for any worsening or concerns. Reviewed proper application technique for unna boots. Discussed importance of leg elevation.  -MC      Given Symptoms/condition management;Bandaging/dressing change  -MC      Program Reinforced  -MC      How Provided Verbal;Demonstration  -MC      Provided to Patient  -MC      Level of Understanding Verbalized  -        User Key  (r) = Recorded By, (t) = Taken By, (c) = Cosigned By    Initials Name Provider Type    PAWAN Johnson, PT Physical Therapist          Recommendation and Plan        PT Assessment/Plan       09/11/17 1046       PT Assessment    Functional Limitations Performance in self-care ADL  -MC     Impairments Integumentary integrity;Edema  -MC     Assessment Comments Pt with no open areas noted today, but the skin around her ankles is inflamed and fragile after light, non-selective debridement of thick  crusts. Pt still with mild edema, improved since her last visit. Pt still requires cues for unna boot application at home, but is unable to come more frequently d/t transportation issues. Pt will continue to benefit from skilled PT wound care to continue progress.  -     Rehab Potential Fair  -     Patient/caregiver participated in establishment of treatment plan and goals Yes  -     Patient would benefit from skilled therapy intervention Yes  -     PT Plan    PT Frequency Other (comment)   every other week  -     Physical Therapy Interventions (Optional Details) patient/family education;wound care  -     PT Plan Comments BLE unna boot, MLW  -       User Key  (r) = Recorded By, (t) = Taken By, (c) = Cosigned By    Initials Name Provider Type     Alis Johnson, PT Physical Therapist          Goals        PT OP Goals       09/11/17 0940       Time Calculation    PT Goal Re-Cert Due Date 11/29/17  -       User Key  (r) = Recorded By, (t) = Taken By, (c) = Cosigned By    Initials Name Provider Type     Alis Johnson, PT Physical Therapist          PT Goal Re-Cert Due Date: 11/29/17            Time Calculation: Start Time: 0940    Therapy Charges for Today     Code Description Service Date Service Provider Modifiers Qty    91548152432 HC NONSELECTIVE DEBRIDEMENT 9/11/2017 Alis Johnson, PT GP 1    73264941451 HC PT MULTI LAYER COMP SYS BELOW KNEE 9/11/2017 Alis Johnson, PT GP 1                Alis Johnson, PT  9/11/2017

## 2017-09-25 ENCOUNTER — APPOINTMENT (OUTPATIENT)
Dept: PHYSICAL THERAPY | Facility: HOSPITAL | Age: 82
End: 2017-09-25

## 2017-09-26 ENCOUNTER — HOSPITAL ENCOUNTER (OUTPATIENT)
Dept: PHYSICAL THERAPY | Facility: HOSPITAL | Age: 82
Setting detail: THERAPIES SERIES
Discharge: HOME | End: 2017-09-26

## 2017-09-26 DIAGNOSIS — R60.0 EDEMA OF BOTH LEGS: ICD-10-CM

## 2017-09-26 DIAGNOSIS — I87.2 VENOUS STASIS DERMATITIS OF BOTH LOWER EXTREMITIES: Primary | ICD-10-CM

## 2017-09-26 DIAGNOSIS — S81.809D MULTIPLE OPEN WOUNDS OF LOWER EXTREMITY, UNSPECIFIED LATERALITY, SUBSEQUENT ENCOUNTER: ICD-10-CM

## 2017-09-26 PROCEDURE — 29581 APPL MULTLAYER CMPRN SYS LEG: CPT

## 2017-09-26 PROCEDURE — G8990 OTHER PT/OT CURRENT STATUS: HCPCS

## 2017-09-26 PROCEDURE — G8991 OTHER PT/OT GOAL STATUS: HCPCS

## 2017-09-26 PROCEDURE — 97602 WOUND(S) CARE NON-SELECTIVE: CPT

## 2017-09-26 NOTE — THERAPY PROGRESS REPORT/RE-CERT
Outpatient Rehabilitation - Wound/Debridement Progress Note  [facility]     Patient Name: Lorna Lo  : 1934  MRN: [id number]  Today's Date: 2017                Admit Date: 2017    Visit Dx:    ICD-10-CM ICD-9-CM   1. Venous stasis dermatitis of both lower extremities I83.11 454.1    I83.12    2. Multiple open wounds of lower extremity, unspecified laterality, subsequent encounter S81.809D V58.89     894.0   3. Edema of both legs R60.0 782.3       Patient Active Problem List   Diagnosis   • Coronary artery disease   • Hypertension   • Dyslipidemia   • Polymyositis   • Dependent edema   • CKD (chronic kidney disease)   • Chronic anemia   • Osteopenia   • GERD (gastroesophageal reflux disease)        Past Medical History:   Diagnosis Date   • Chronic anemia     Chronic anemia, secondary to (CKD).    • CKD (chronic kidney disease)    • Coronary artery disease    • Dependent edema    • DVT (deep venous thrombosis)     History of DVT in .    • Dyslipidemia    • GERD (gastroesophageal reflux disease)    • Hypertension    • Osteopenia    • Polymyositis         No past surgical history on file.      EVALUATION        PT Ortho       17 0800    Subjective Comments    Subjective Comments Pt states she is getting better at wrapping her legs.  States she thinks they are looking better.  Is only able to come to tx every 2 weeks due to transportation needs.  -    Subjective Pain    Able to rate subjective pain? yes  -    Pre-Treatment Pain Level 0  -    Post-Treatment Pain Level 0  -    Transfers    Transfers, Sit-Stand [place] independent  -    Transfers, Stand-Sit [place] independent  -    Transfer, Comment seated in arjo chair for tx  -    Gait Assessment/Treatment    Gait, [place] Level independent  -    Gait, Assistive Device rollator  -      User Key  (r) = Recorded By, (t) = Taken By, (c) = Cosigned By    Initials Name Provider Type    ALENA Stock  AUBREY Baca, PT Physical Therapist                    LDA Wound       09/26/17 0800          Wound 01/13/17 0930 Bilateral lower leg excoriation    Wound - Properties Group Date first assessed: 01/13/17  - Time first assessed: 0930  - Side: Bilateral  - Orientation: lower  - Location: leg  - Type: excoriation  -MC    Wound WDL ex   scant drainage, min erythema  -JM      Dressing Appearance dried drainage;intact  -JM      Base pink;clean;moist   no open areas, but several inflamed, shiny areas  -      Periwound Area redness;dry;warm;intact;pink  -      Drainage Characteristics/Odor serosanguineous;serous  -JM      Drainage Amount scant  -      Picture taken yes  -      Wound Cleaning cleansed with;other (see comments)   theraworx  -      Wound Interventions debrided  -      Dressing Dressing applied;other (see comments)   MLW  -        User Key  (r) = Recorded By, (t) = Taken By, (c) = Cosigned By    Initials Name Provider Type     Alis Johnson, PT Physical Therapist     Dayami Baca, PT Physical Therapist              Lymphedema       09/26/17 0800          Lymphedema Edema Assessment    Ptting Edema Category By severity  -      Pitting Edema Mild;Other (comment)   trace LLE  -      Skin Changes/Observations    Lower Extremity Conditions bilateral:;clean;dry;shiny;crust;fragile  -      Lower Extremity Color/Pigment bilateral:;erythema;red;blanchable  -      Lymphedema Pulses/Capillary Refill    Lower Extremity Capillary Refill right:;left:;less than 3 seconds  -      LLE Quick Girth (cm)    Met-heads 21.9 cm  -JM      Mid foot 20.3 cm  -JM      Smallest ankle 19.5 cm  -JM      Largest calf 32.3 cm  -JM      Tib tuberosity 30.8 cm  -JM      RLE Quick Girth (cm)    Met-heads 22.1 cm  -JM      Mid foot 20.3 cm  -JM      Smallest ankle 19.1 cm  -JM      Largest calf 35.9 cm  -JM      Tib tuberosity 34.9 cm  -JM      Compression/Skin Care    Compression/Skin Care skin  care;wrapping location;bandaging  -      Skin Care washed/dried;lotion applied   theraworx, antifungal lotion  -      Wrapping Location lower extremity  -      Wrapping Location LE bilateral:;foot to knee  -      Wrapping Comments unna boot  -      Bandage Layers padding/fluff layer;cotton elastic stocking- double layer (comment size)   size 4 doubled distal 1/3  -        User Key  (r) = Recorded By, (t) = Taken By, (c) = Cosigned By    Initials Name Provider Type    ALENA Baca, PT Physical Therapist          WOUND DEBRIDEMENT  Debridement Site 1  Location- Site 1: BLE  Selective Debridement- Site 1: Wound Surface >20cmsq (non-selective only)  Instruments- Site 1: other (comment) (theraworx wipes)  Excised Tissue Description- Site 1: moderate, other (comment) (scaly debris, loose crusts)  Bleeding- Site 1: none                   Therapy Education       09/26/17 0800          Therapy Education    Education Details Pt to continue with wrapping legs between txs at least once/week or PRN  -      Given Symptoms/condition management;Bandaging/dressing change  -      Program Reinforced  -      How Provided Verbal;Demonstration  -      Provided to Patient  -      Level of Understanding Verbalized  -        User Key  (r) = Recorded By, (t) = Taken By, (c) = Cosigned By    Initials Name Provider Type    ALENA Baca, PT Physical Therapist          Recommendation and Plan        PT Assessment/Plan       09/26/17 0800       PT Assessment    Functional Limitations Performance in self-care ADL  -     Impairments Integumentary integrity;Edema  -     Assessment Comments BLE with less excoriation and erythema, now mostly located to med/lat ankles.  Pt improving with self-application of wraps.  Overall skin integrity improved and edema stable.  Pt will continue to benefit from skilled PT every 2 weeks for debridement, skin care, MLW, and ongoing pt education.  -     Rehab Potential Fair   -     Patient/caregiver participated in establishment of treatment plan and goals Yes  -     Patient would benefit from skilled therapy intervention Yes  -     PT Plan    PT Frequency Other (comment)   every 2 weeks  -     Predicted Duration of Therapy Intervention (days/wks) 4 visits  -     Planned CPT's? PT NONSELECT DEBRIDE 15 MIN: 86903;PT UNNA BOOT: 43719;PT MULTI LAYER COMP SYS LE;PT JUAN DEBRIDE OPEN WOUND UP TO 20 CM: 23677  -     Physical Therapy Interventions (Optional Details) patient/family education;wound care  -       User Key  (r) = Recorded By, (t) = Taken By, (c) = Cosigned By    Initials Name Provider Type    ALENA Baca, PT Physical Therapist          Goals        PT OP Goals       17 0800       PT Short Term Goals    STG 1 Decrease wound dimensions by 50% as evidence of wound healing.  -     STG 1 Progress Met  -     STG 2 Decrease area of excoriation by 50% as evidence of wound healing.  -     STG 2 Progress Met  -     Long Term Goals    LTG 1 Patient independent and compliant with use and care of compression wraps or stockings, with assistance of family / caregiver as indicated to promote self-care independence.  -     LTG 1 Progress Ongoing;Partially Met  -     LTG 2 Decrease area of excoriation by 75% as evidence of wound healing.  -     LTG 2 Progress Ongoing  -     LTG 3 Decrease limb circumferences by 2 cm to promote improved skin integrity and circulation.  -     LTG 3 Progress Ongoing;Partially Met  -     Time Calculation    PT Goal Re-Cert Due Date 17  -       User Key  (r) = Recorded By, (t) = Taken By, (c) = Cosigned By    Initials Name Provider Type    ALENA Baca PT Physical Therapist            Time Calculation: Start Time: 0800    Therapy Charges for Today     Code Description Service Date Service Provider Modifiers Qty    85791708463 HC PT OTHER PRIME FUNCT CURRENT 2017 Dayami Baca, PT GP, CJ 1     12717228537 HC PT OTHER PRIME FUNCT PROJECTED 9/26/2017 Dayami Baca, PT GP, CI 1    06332021922 HC NONSELECTIVE DEBRIDEMENT 9/26/2017 Dayami Baca, PT GP 1    34310810059 HC PT MULTI LAYER COMP SYS BELOW KNEE 9/26/2017 Dayami Baca, PT 59, GP 1          PT G-Codes  Outcome Measure Options: BWAT (Cummings-Manzo Wound Assess Tool)  Score: 19, 25% impairment in home wound management/edema management  Functional Limitation: Other PT primary  Other PT Primary Current Status (): At least 20 percent but less than 40 percent impaired, limited or restricted  Other PT Primary Goal Status (): At least 1 percent but less than 20 percent impaired, limited or restricted     Dayami Baca, PT  9/26/2017

## 2017-10-10 ENCOUNTER — HOSPITAL ENCOUNTER (OUTPATIENT)
Dept: PHYSICAL THERAPY | Facility: HOSPITAL | Age: 82
Setting detail: THERAPIES SERIES
Discharge: HOME | End: 2017-10-10

## 2017-10-10 DIAGNOSIS — I87.2 VENOUS STASIS DERMATITIS OF BOTH LOWER EXTREMITIES: Primary | ICD-10-CM

## 2017-10-10 DIAGNOSIS — S81.809D MULTIPLE OPEN WOUNDS OF LOWER EXTREMITY, UNSPECIFIED LATERALITY, SUBSEQUENT ENCOUNTER: ICD-10-CM

## 2017-10-10 PROCEDURE — 97597 DBRDMT OPN WND 1ST 20 CM/<: CPT

## 2017-10-10 PROCEDURE — 29581 APPL MULTLAYER CMPRN SYS LEG: CPT

## 2017-10-10 NOTE — THERAPY WOUND CARE TREATMENT
Outpatient Rehabilitation - Wound/Debridement Treatment Note  [facility]     Patient Name: Lorna Lo  : 1934  MRN: [id number]  Today's Date: 10/10/2017                Admit Date: 10/10/2017    Visit Dx:    ICD-10-CM ICD-9-CM   1. Venous stasis dermatitis of both lower extremities I87.2 454.1   2. Multiple open wounds of lower extremity, unspecified laterality, subsequent encounter S81.809D V58.89     894.0       Patient Active Problem List   Diagnosis   • Coronary artery disease   • Hypertension   • Dyslipidemia   • Polymyositis   • Dependent edema   • CKD (chronic kidney disease)   • Chronic anemia   • Osteopenia   • GERD (gastroesophageal reflux disease)        Past Medical History:   Diagnosis Date   • Chronic anemia     Chronic anemia, secondary to (CKD).    • CKD (chronic kidney disease)    • Coronary artery disease    • Dependent edema    • DVT (deep venous thrombosis) 2009    History of DVT in .    • Dyslipidemia    • GERD (gastroesophageal reflux disease)    • Hypertension    • Osteopenia    • Polymyositis         No past surgical history on file.      EVALUATION        PT Ortho       10/10/17 2169    Subjective Comments    Subjective Comments Pt states she is getting better at wrapping her legs, wants to continue with tx every other week due to difficulty with transportation to/from tx.  Reports wraps feel good but then her legs start to itch after unna boot dries out after 3-4 days.  -JM    Subjective Pain    Able to rate subjective pain? yes  -JM    Pre-Treatment Pain Level 0  -JM    Post-Treatment Pain Level 0  -JM    Transfers    Transfers, Sit-Stand [place] independent  -    Transfers, Stand-Sit [place] independent  -JM    Transfer, Comment seated for tx  -JM    Gait Assessment/Treatment    Gait, [place] Level independent  -    Gait, Assistive Device rollator  -      User Key  (r) = Recorded By, (t) = Taken By, (c) = Cosigned By    Initials Name Provider  Type    JM Dayami Baca, PT Physical Therapist                    LDA Wound       10/10/17 0930          Wound 01/13/17 0930 Bilateral lower leg excoriation    Wound - Properties Group Date first assessed: 01/13/17  - Time first assessed: 0930  - Side: Bilateral  - Orientation: lower  - Location: leg  - Type: excoriation  -    Wound WDL ex   scant drainage, min erythema  -      Dressing Appearance dried drainage;intact   scattered areas of drainage on unna boots  -      Base pink;clean;moist   moist reddened areas on med ankles  -      Periwound Area redness;dry;warm;intact;pink  -      Edges other (see comments)   indistinct  -      Length (cm) 5   area of excoriation R med ankle; [id number] L med ankle  -JM      Width (cm) 5  -      Drainage Characteristics/Odor serous;no odor  -      Drainage Amount scant  -      Picture taken yes  -      Wound Cleaning cleansed with;other (see comments)   theraworx  -      Wound Interventions debrided  -      Dressing Dressing applied;other (see comments)   unna boot/MLW  -        User Key  (r) = Recorded By, (t) = Taken By, (c) = Cosigned By    Initials Name Provider Type     Alis Johnson, PT Physical Therapist    ALENA Baca, PT Physical Therapist              Lymphedema       10/10/17 0930          Lymphedema Edema Assessment    Ptting Edema Category By severity  -      Pitting Edema Other (comment)   trace  -      Skin Changes/Observations    Lower Extremity Conditions bilateral:;clean;dry;shiny;scaly;crust;inflamed;fragile  -      Lower Extremity Color/Pigment bilateral:;erythema;red;blanchable   scant erythema of med ankles only  -      Lymphedema Pulses/Capillary Refill    Lower Extremity Capillary Refill right:;left:;less than 3 seconds  -      Compression/Skin Care    Compression/Skin Care skin care;wrapping location;bandaging  -      Skin Care washed/dried;lotion applied;other (comment)   theraworx,  Z-guard to excoriation, antifungal lotion to BLE  -JM      Wrapping Location lower extremity  -JM      Wrapping Location LE bilateral:;foot to knee  -JM      Wrapping Comments unna boot  -JM      Bandage Layers padding/fluff layer;cotton elastic stocking- single layer (comment size)   size 4 compressogrips  -        User Key  (r) = Recorded By, (t) = Taken By, (c) = Cosigned By    Initials Name Provider Type    ALENA Baca, PT Physical Therapist          WOUND DEBRIDEMENT  Debridement Site 1  Location- Site 1: BLE  Selective Debridement- Site 1: Wound Surface <20cmsq  Instruments- Site 1: tweezers, other (comment) (theraworx wipes)  Excised Tissue Description- Site 1: moderate, maximum, other (comment) (loose crusts med ankles, scaly skin flakes BLE)  Bleeding- Site 1: none                   Therapy Education       10/10/17 0930          Therapy Education    Education Details Continue with re-wrapping of legs between txs, may change more frequently to help prevent drying/itching of BLE.  -      Given Symptoms/condition management;Bandaging/dressing change  -      Program Reinforced  -      How Provided Verbal;Demonstration  -      Provided to Patient  -      Level of Understanding Verbalized  -        User Key  (r) = Recorded By, (t) = Taken By, (c) = Cosigned By    Initials Name Provider Type    ALENA Baca, PT Physical Therapist          Recommendation and Plan        PT Assessment/Plan       10/10/17 0930       PT Assessment    Functional Limitations Performance in self-care ADL  -     Impairments Integumentary integrity;Edema  -     Assessment Comments Slow improvement in skin integrity of BLE with scattered areas of drainage, and excoriation now localized to medial ankles.  Pt able to reapply unna boot/MLW at home between txs, will continue to require skilled PT for debridement and ongoing dressing management.  Will continue with frequency every 2 weeks.   -     Rehab  Potential Fair  -     Patient/caregiver participated in establishment of treatment plan and goals Yes  -     Patient would benefit from skilled therapy intervention Yes  -     PT Plan    PT Frequency Other (comment)   every 2 weeks  -     Physical Therapy Interventions (Optional Details) patient/family education;wound care  -     PT Plan Comments BLE unna boot/MLW  -       User Key  (r) = Recorded By, (t) = Taken By, (c) = Cosigned By    Initials Name Provider Type    ALENA Baca, PT Physical Therapist          Goals        PT OP Goals       10/10/17 0930       Time Calculation    PT Goal Re-Cert Due Date 11/29/17  -       User Key  (r) = Recorded By, (t) = Taken By, (c) = Cosigned By    Initials Name Provider Type    ALENA Baca, PT Physical Therapist          PT Goal Re-Cert Due Date: 11/29/17            Time Calculation: Start Time: 0930    Therapy Charges for Today     Code Description Service Date Service Provider Modifiers Qty    41823667585 HC JUAN DEBRIDE OPEN WOUND UP TO 20CM 10/10/2017 Dayami Baca, PT 59, GP 1    10989143499 HC PT MULTI LAYER COMP SYS BELOW KNEE 10/10/2017 Dayami Baca, PT GP 1                Dayami Baca, PT  10/10/2017

## 2017-10-24 ENCOUNTER — HOSPITAL ENCOUNTER (OUTPATIENT)
Dept: PHYSICAL THERAPY | Facility: HOSPITAL | Age: 82
Setting detail: THERAPIES SERIES
Discharge: HOME | End: 2017-10-24

## 2017-10-24 DIAGNOSIS — I87.2 VENOUS STASIS DERMATITIS OF BOTH LOWER EXTREMITIES: Primary | ICD-10-CM

## 2017-10-24 DIAGNOSIS — S81.809D MULTIPLE OPEN WOUNDS OF LOWER EXTREMITY, UNSPECIFIED LATERALITY, SUBSEQUENT ENCOUNTER: ICD-10-CM

## 2017-10-24 PROCEDURE — 29580 STRAPPING UNNA BOOT: CPT

## 2017-10-24 PROCEDURE — 97597 DBRDMT OPN WND 1ST 20 CM/<: CPT

## 2017-10-24 NOTE — THERAPY WOUND CARE TREATMENT
Outpatient Rehabilitation - Wound/Debridement Treatment Note  [facility]     Patient Name: Lorna Lo  : 1934  MRN: [id number]  Today's Date: 10/24/2017                Admit Date: 10/24/2017    Visit Dx:    ICD-10-CM ICD-9-CM   1. Venous stasis dermatitis of both lower extremities I87.2 454.1   2. Multiple open wounds of lower extremity, unspecified laterality, subsequent encounter S81.809D V58.89     894.0     Medial L ankle:    Medial R ankle:      Patient Active Problem List   Diagnosis   • Coronary artery disease   • Hypertension   • Dyslipidemia   • Polymyositis   • Dependent edema   • CKD (chronic kidney disease)   • Chronic anemia   • Osteopenia   • GERD (gastroesophageal reflux disease)        Past Medical History:   Diagnosis Date   • Chronic anemia     Chronic anemia, secondary to (CKD).    • CKD (chronic kidney disease)    • Coronary artery disease    • Dependent edema    • DVT (deep venous thrombosis)     History of DVT in .    • Dyslipidemia    • GERD (gastroesophageal reflux disease)    • Hypertension    • Osteopenia    • Polymyositis         No past surgical history on file.      EVALUATION        PT Ortho       10/24/17 5181    Subjective Comments    Subjective Comments Pt states she wore a new pair of shoes that rubbed on the left ankle, so she had to re-wrap the left foot and ankle.  -    Subjective Pain    Able to rate subjective pain? yes  -    Pre-Treatment Pain Level 0  -JM    Post-Treatment Pain Level 0  -JM    Transfers    Transfers, Sit-Stand [place] independent  -    Transfers, Stand-Sit [place] independent  -    Transfer, Comment seated in arjo chair for tx  -JM    Gait Assessment/Treatment    Gait, [place] Level independent  -    Gait, Assistive Device rollator  -      User Key  (r) = Recorded By, (t) = Taken By, (c) = Cosigned By    Initials Name Provider Type    ALENA Baca, PT Physical Therapist                    LDA  Wound       10/24/17 0930          Wound 01/13/17 0930 Bilateral lower leg excoriation    Wound - Properties Group Date first assessed: 01/13/17  - Time first assessed: 0930  - Side: Bilateral  - Orientation: lower  - Location: leg  - Type: excoriation  -    Wound WDL ex   scant drainage, min erythema  -      Dressing Appearance dried drainage;intact   scattered areas of drainage on unna boots  -      Base pink;clean;moist   moist reddened areas on med ankles  -      Periwound Area redness;dry;warm;intact;pink   dry/crusted  -      Edges other (see comments)   indistinct  -      Length (cm) 11   medial L ankle; 12cm x 5cm excoriation R med ankle  -      Width (cm) 8  -      Drainage Characteristics/Odor serous;no odor  -      Drainage Amount scant  -      Picture taken yes  -      Wound Cleaning cleansed with;other (see comments)   theraworx foam/wipes  -      Wound Interventions debrided;barrier applied   z-guard to excoriated areas  -      Dressing Dressing applied;other (see comments)   unna boots  -        User Key  (r) = Recorded By, (t) = Taken By, (c) = Cosigned By    Initials Name Provider Type     Alsi Johnson, PT Physical Therapist     Dayami Baca, PT Physical Therapist              Lymphedema       10/24/17 0930          Lymphedema Edema Assessment    Ptting Edema Category By severity  -      Pitting Edema Other (comment)   trace  -      Skin Changes/Observations    Lower Extremity Conditions bilateral:;clean;dry;scaly;crust;scab(s);inflamed;fragile  -      Lower Extremity Color/Pigment bilateral:;erythema;red;blanchable  -      Lymphedema Pulses/Capillary Refill    Lower Extremity Capillary Refill right:;left:;less than 3 seconds  -      Compression/Skin Care    Compression/Skin Care skin care;wrapping location;bandaging  -      Skin Care washed/dried;lotion applied  -      Wrapping Location lower extremity  -      Wrapping Location LE  bilateral:;foot to knee  -      Wrapping Comments unna boot  -      Bandage Layers padding/fluff layer;cotton elastic stocking- single layer (comment size)   size 4 compressogrip  -        User Key  (r) = Recorded By, (t) = Taken By, (c) = Cosigned By    Initials Name Provider Type    ALENA Baca, PT Physical Therapist          WOUND DEBRIDEMENT  Debridement Site 1  Location- Site 1: BLE  Selective Debridement- Site 1: Wound Surface <20cmsq  Instruments- Site 1: tweezers, other (comment) (theraworx wipes)  Excised Tissue Description- Site 1: maximum, other (comment) (hypertrophic crusts)  Bleeding- Site 1: none                   Therapy Education       10/24/17 0930          Therapy Education    Education Details Continue to change unna boots PRN, may benefit from steroid cream to excoriated areas, contact her Dermatologist for recommendations.  -      Given Symptoms/condition management;Bandaging/dressing change  -      Program Reinforced  -      How Provided Verbal;Demonstration  -      Provided to Patient  -      Level of Understanding Verbalized  -        User Key  (r) = Recorded By, (t) = Taken By, (c) = Cosigned By    Initials Name Provider Type    ALENA Baca, PT Physical Therapist          Recommendation and Plan        PT Assessment/Plan       10/24/17 0930       PT Assessment    Functional Limitations Performance in self-care ADL  -     Impairments Integumentary integrity;Edema  -     Assessment Comments Medial L ankle with increase in crusting and excoriation.  RLE improved, but still with excoriation of medial ankle.  Pt to continue with changing wraps, leg elevation, avoid scratching at legs.  Pt will continue to benefit from PT tx every 2 weeks for debridement and dressing management.  -     Rehab Potential Fair  -     Patient/caregiver participated in establishment of treatment plan and goals Yes  -     Patient would benefit from skilled therapy  intervention Yes  -     PT Plan    PT Frequency Other (comment)   every 2 weeks  -     Physical Therapy Interventions (Optional Details) patient/family education;wound care  -     PT Plan Comments BLE unna boot/MLW  -       User Key  (r) = Recorded By, (t) = Taken By, (c) = Cosigned By    Initials Name Provider Type    ALENA Baca, PT Physical Therapist          Goals        PT OP Goals       10/24/17 0930       Time Calculation    PT Goal Re-Cert Due Date 11/29/17  -       User Key  (r) = Recorded By, (t) = Taken By, (c) = Cosigned By    Initials Name Provider Type    ALENA Baca, PT Physical Therapist          PT Goal Re-Cert Due Date: 11/29/17            Time Calculation: Start Time: 0930    Therapy Charges for Today     Code Description Service Date Service Provider Modifiers Qty    82122247667 HC JUAN DEBRIDE OPEN WOUND UP TO 20CM 10/24/2017 Dayami Baca, PT 59, GP 1    15910948519 HC PT STAPPING UNNA BOOT 10/24/2017 Dayami Baca, PT GP 1                Dayami Baca, PT  10/24/2017

## 2017-11-07 ENCOUNTER — HOSPITAL ENCOUNTER (OUTPATIENT)
Dept: PHYSICAL THERAPY | Facility: HOSPITAL | Age: 82
Setting detail: THERAPIES SERIES
Discharge: HOME | End: 2017-11-07

## 2017-11-07 DIAGNOSIS — I87.2 VENOUS STASIS DERMATITIS OF BOTH LOWER EXTREMITIES: Primary | ICD-10-CM

## 2017-11-07 PROCEDURE — 29581 APPL MULTLAYER CMPRN SYS LEG: CPT

## 2017-11-07 PROCEDURE — G8990 OTHER PT/OT CURRENT STATUS: HCPCS

## 2017-11-07 PROCEDURE — 97597 DBRDMT OPN WND 1ST 20 CM/<: CPT

## 2017-11-07 PROCEDURE — G8991 OTHER PT/OT GOAL STATUS: HCPCS

## 2017-11-07 NOTE — THERAPY PROGRESS REPORT/RE-CERT
Outpatient Rehabilitation - Wound/Debridement Progress Note  [facility]     Patient Name: Lorna Lo  : 1934  MRN: [id number]  Today's Date: 2017                 L ankle, medial and lateral, respectively      Admit Date: 2017    Visit Dx:    ICD-10-CM ICD-9-CM   1. Venous stasis dermatitis of both lower extremities I87.2 454.1       Patient Active Problem List   Diagnosis   • Coronary artery disease   • Hypertension   • Dyslipidemia   • Polymyositis   • Dependent edema   • CKD (chronic kidney disease)   • Chronic anemia   • Osteopenia   • GERD (gastroesophageal reflux disease)        Past Medical History:   Diagnosis Date   • Chronic anemia     Chronic anemia, secondary to (CKD).    • CKD (chronic kidney disease)    • Coronary artery disease    • Dependent edema    • DVT (deep venous thrombosis)     History of DVT in .    • Dyslipidemia    • GERD (gastroesophageal reflux disease)    • Hypertension    • Osteopenia    • Polymyositis         No past surgical history on file.      EVALUATION        PT Ortho       17 0940    Subjective Comments    Subjective Comments Pt reports she thinks the legs itch worse when we use the cast padding as opposed to a gauze she gets from the drug store. Overall she says her legs feel better.  -    Subjective Pain    Able to rate subjective pain? yes  -    Pre-Treatment Pain Level 0  -MC    Post-Treatment Pain Level 0  -    Transfers    Transfers, Sit-Stand [place] independent  -    Transfers, Stand-Sit [place] independent  -    Transfer, Comment seated for tx  -    Gait Assessment/Treatment    Gait, [place] Level independent  -    Gait, Assistive Device rollator  -      User Key  (r) = Recorded By, (t) = Taken By, (c) = Cosigned By    Initials Name Provider Type    PAWAN Johnson, PT Physical Therapist                    [facility] Wound       17          Wound 17 0930 Bilateral lower leg  excoriation    Wound - Properties Group Date first assessed: 01/13/17  - Time first assessed: 0930  - Side: Bilateral  - Orientation: lower  - Location: leg  - Type: excoriation  -    Wound WDL ex   scant drainage, min erythema  -      Dressing Appearance dried drainage;intact   scattered areas of drainage on unna boots  -      Base pink;clean;dry   no open areas, no moist areas at this time  -      Periwound Area redness;dry;warm;intact;pink   dry/crusted  -      Edges other (see comments)   indistinct  -      Drainage Characteristics/Odor serous;no odor  -      Drainage Amount scant  -      Picture taken yes  -      Wound Cleaning cleansed with;other (see comments)   theraworx  -      Wound Interventions debrided;barrier applied   z-guard to L medial and lateral ankle  -      Dressing Dressing applied;other (see comments)   unna boots, MLW  -MC        User Key  (r) = Recorded By, (t) = Taken By, (c) = Cosigned By    Initials Name Provider Type     Alis Johnson, PT Physical Therapist              Lymphedema       11/07/17 0940          Lymphedema Edema Assessment    Ptting Edema Category By severity  -      Pitting Edema Other (comment)   trace  -      Skin Changes/Observations    Lower Extremity Conditions bilateral:;clean;dry;fragile;left:;scab(s);inflamed  -      Lower Extremity Color/Pigment bilateral:;erythema;blanchable  -      Lymphedema Pulses/Capillary Refill    Lower Extremity Capillary Refill right:;left:;less than 3 seconds  -      Compression/Skin Care    Compression/Skin Care skin care;wrapping location;bandaging  -      Skin Care washed/dried  -      Wrapping Location lower extremity  -      Wrapping Location LE bilateral:;foot to knee  -      Wrapping Comments unna boot  -      Bandage Layers padding/fluff layer;cotton elastic stocking- single layer (comment size)   kerlix, size 4 compressogrip  -        User Key  (r) = Recorded By, (t) =  Taken By, (c) = Cosigned By    Initials Name Provider Type    PAWAN Johnson, PT Physical Therapist          WOUND DEBRIDEMENT  Debridement Site 1  Location- Site 1: LLE  Selective Debridement- Site 1: Wound Surface <20cmsq  Instruments- Site 1: tweezers  Excised Tissue Description- Site 1: maximum, other (comment) (thick crusts over intact skin)  Bleeding- Site 1: none                   Therapy Education       11/07/17 0940          Therapy Education    Education Details Continue with unna boots change 1x/week or prn.   -      Given Symptoms/condition management;Bandaging/dressing change  -      Program Reinforced  -      How Provided Verbal;Demonstration  -MC      Provided to Patient  -      Level of Understanding Verbalized  -        User Key  (r) = Recorded By, (t) = Taken By, (c) = Cosigned By    Initials Name Provider Type    PAWAN Johnson, PT Physical Therapist          Recommendation and Plan        PT Assessment/Plan       11/07/17 0940       PT Assessment    Functional Limitations Performance in self-care ADL  -     Impairments Integumentary integrity;Edema  -     Assessment Comments Medial L ankle continues to develop thick crusts, but the skin underneath is closed and dry today. No moist or open areas noted to BLE. D/t crusts and minimal erythema remaining, pt will continue to benefit from PT wound care to manage wound/skin care and edema management. If pt continues to progress in this way, may be appropriate to d/c in 1-2 visits.  -     Rehab Potential Fair  -     Patient/caregiver participated in establishment of treatment plan and goals Yes  -     Patient would benefit from skilled therapy intervention Yes  -     PT Plan    PT Frequency Other (comment)   every 2 weeks  -     Predicted Duration of Therapy Intervention (days/wks) 2 visits  -     Planned CPT's? PT NONSELECT DEBRIDE 15 MIN: 21396;PT JUAN DEBRIDE OPEN WOUND UP TO 20 CM: 66558;PT UNNA BOOT: 10828;PT  MULTI LAYER COMP SYS LE  -     Physical Therapy Interventions (Optional Details) patient/family education;wound care  -     PT Plan Comments BLE unna boot, MLW, debridement prn  -       User Key  (r) = Recorded By, (t) = Taken By, (c) = Cosigned By    Initials Name Provider Type     Alis Johnson, PT Physical Therapist          Goals        PT OP Goals       17 0940       PT Short Term Goals    STG 1 Decrease wound dimensions by 50% as evidence of wound healing.  -     STG 1 Progress Met  -     STG 2 Decrease area of excoriation by 50% as evidence of wound healing.  -     STG 2 Progress Met  -     Long Term Goals    LTG 1 Patient independent and compliant with use and care of compression wraps or stockings, with assistance of family / caregiver as indicated to promote self-care independence.  -     LTG 1 Progress Ongoing;Partially Met  -     LTG 2 Decrease area of excoriation by 75% as evidence of wound healing.  -     LTG 2 Progress Met  -     LTG 3 Decrease limb circumferences by 2 cm to promote improved skin integrity and circulation.  -     LTG 3 Progress Ongoing;Partially Met  -     Time Calculation    PT Goal Re-Cert Due Date 17  -       User Key  (r) = Recorded By, (t) = Taken By, (c) = Cosigned By    Initials Name Provider Type    PAWAN Johnson, PT Physical Therapist          PT Goal Re-Cert Due Date: 17  PT Short Term Goals  STG 1: Decrease wound dimensions by 50% as evidence of wound healing.  STG 1 Progress: Met  STG 2: Decrease area of excoriation by 50% as evidence of wound healing.  STG 2 Progress: Met  Long Term Goals  LTG 1: Patient independent and compliant with use and care of compression wraps or stockings, with assistance of family / caregiver as indicated to promote self-care independence.  LTG 1 Progress: Ongoing, Partially Met  LTG 2: Decrease area of excoriation by 75% as evidence of wound healing.  LTG 2 Progress: Met  LTG 3:  Decrease limb circumferences by 2 cm to promote improved skin integrity and circulation.  LTG 3 Progress: Ongoing, Partially Met      Time Calculation: Start Time: 0940    Therapy Charges for Today     Code Description Service Date Service Provider Modifiers Qty    54316613217 HC PT OTHER PRIME FUNCT CURRENT 11/7/2017 Alis Johnson, PT GP, CJ 1    23616874458 HC PT OTHER PRIME FUNCT PROJECTED 11/7/2017 Alis Johnson, PT GP, CI 1    77054474844 HC JUAN DEBRIDE OPEN WOUND UP TO 20CM 11/7/2017 Alis Johnson, PT GP 1    27118343984 HC PT MULTI LAYER COMP SYS BELOW KNEE 11/7/2017 Alis Johnson, PT GP 1          PT G-Codes  PT Professional Judgement Used?: Yes  Outcome Measure Options: BWAT (Cummings-Manzo Wound Assess Tool)  Score: BWAT 14, 20% limit in home wound/edema management  Functional Limitation: Other PT primary  Other PT Primary Current Status (): At least 20 percent but less than 40 percent impaired, limited or restricted  Other PT Primary Goal Status (): At least 1 percent but less than 20 percent impaired, limited or restricted     Alis Johnson, PT  11/7/2017

## 2017-11-21 ENCOUNTER — HOSPITAL ENCOUNTER (OUTPATIENT)
Dept: PHYSICAL THERAPY | Facility: HOSPITAL | Age: 82
Setting detail: THERAPIES SERIES
Discharge: HOME | End: 2017-11-21

## 2017-11-21 DIAGNOSIS — I87.2 VENOUS STASIS DERMATITIS OF BOTH LOWER EXTREMITIES: Primary | ICD-10-CM

## 2017-11-21 DIAGNOSIS — S81.809D MULTIPLE OPEN WOUNDS OF LOWER EXTREMITY, UNSPECIFIED LATERALITY, SUBSEQUENT ENCOUNTER: ICD-10-CM

## 2017-11-21 DIAGNOSIS — R60.0 EDEMA OF BOTH LEGS: ICD-10-CM

## 2017-11-21 PROCEDURE — 29581 APPL MULTLAYER CMPRN SYS LEG: CPT

## 2017-11-21 PROCEDURE — 97597 DBRDMT OPN WND 1ST 20 CM/<: CPT

## 2017-11-21 NOTE — THERAPY WOUND CARE TREATMENT
Outpatient Rehabilitation - Wound/Debridement Treatment Note  [facility]     Patient Name: Lorna Lo  : 1934  MRN: [id number]  Today's Date: 2017                Admit Date: 2017    Visit Dx:    ICD-10-CM ICD-9-CM   1. Venous stasis dermatitis of both lower extremities I87.2 454.1   2. Multiple open wounds of lower extremity, unspecified laterality, subsequent encounter S81.809D V58.89     894.0   3. Edema of both legs R60.0 782.3       Patient Active Problem List   Diagnosis   • Coronary artery disease   • Hypertension   • Dyslipidemia   • Polymyositis   • Dependent edema   • CKD (chronic kidney disease)   • Chronic anemia   • Osteopenia   • GERD (gastroesophageal reflux disease)        Past Medical History:   Diagnosis Date   • Chronic anemia     Chronic anemia, secondary to (CKD).    • CKD (chronic kidney disease)    • Coronary artery disease    • Dependent edema    • DVT (deep venous thrombosis)     History of DVT in .    • Dyslipidemia    • GERD (gastroesophageal reflux disease)    • Hypertension    • Osteopenia    • Polymyositis         No past surgical history on file.      EVALUATION        PT Ortho       17 1439    Subjective Comments    Subjective Comments Pt states the inside of her right foot has been hurting for the last couple days.  States she walked a lot at [employer] and her foot hurt the next day.  PT recommended rest and elevation and to contact her rheumatologist or PCP if needed.  -JM    Subjective Pain    Able to rate subjective pain? yes  -JM    Pre-Treatment Pain Level 2  -JM    Post-Treatment Pain Level 2  -JM    Subjective Pain Comment medial R foot  -JM    Transfers    Transfers, Sit-Stand [place] independent  -    Transfers, Stand-Sit [place] independent  -    Transfer, Comment seated in ar chair for tx  -JM    Gait Assessment/Treatment    Gait, [place] Level independent  -    Gait, Assistive Device rollator  -       User Key  (r) = Recorded By, (t) = Taken By, (c) = Cosigned By    Initials Name Provider Type    ALENA Baca, PT Physical Therapist                    LDA Wound       11/21/17 0930          Wound 01/13/17 0930 Bilateral lower leg excoriation    Wound - Properties Group Date first assessed: 01/13/17  - Time first assessed: 0930  - Side: Bilateral  - Orientation: lower  - Location: leg  - Type: excoriation  -    Wound WDL ex   scant drainage, min erythema  -      Dressing Appearance dried drainage;intact   scattered areas of drainage on unna boots  -      Base pink;clean;dry   no open areas, no moist areas at this time  -      Periwound Area redness;dry;warm;intact;pink   dry/crusted  -      Edges other (see comments)   indistinct  -      Drainage Characteristics/Odor serous;no odor  -      Drainage Amount scant  -      Wound Cleaning cleansed with;other (see comments)   theraworx foam/wipes  -      Wound Interventions debrided;barrier applied   z-guard to med/lat ankles  -      Dressing Dressing applied;other (see comments)   unna boot/MLW  -        User Key  (r) = Recorded By, (t) = Taken By, (c) = Cosigned By    Initials Name Provider Type    PAWAN Johnson, PT Physical Therapist    ALENA Baca, PT Physical Therapist              Lymphedema       11/21/17 0930          Skin Changes/Observations    Lower Extremity Conditions bilateral:;intact;clean;dry;shiny;scaly;crust;fragile  -      Lower Extremity Color/Pigment bilateral:;erythema;red;blanchable  -      Lymphedema Pulses/Capillary Refill    Lower Extremity Capillary Refill right:;left:;less than 3 seconds  -      Compression/Skin Care    Compression/Skin Care skin care;wrapping location;bandaging  -      Skin Care washed/dried;lotion applied  -      Wrapping Location lower extremity  -      Wrapping Location LE bilateral:;foot to knee  -      Wrapping Comments unna boot  -      Bandage  Layers padding/fluff layer;cotton elastic stocking- double layer (comment size)   size 4 compressogrip  -        User Key  (r) = Recorded By, (t) = Taken By, (c) = Cosigned By    Initials Name Provider Type    ALENA Baca, PT Physical Therapist          WOUND DEBRIDEMENT  Debridement Site 1  Location- Site 1: LLE  Selective Debridement- Site 1: Wound Surface <20cmsq  Instruments- Site 1: tweezers  Excised Tissue Description- Site 1: maximum, other (comment) (hypertrophic crusts)  Bleeding- Site 1: none                   Therapy Education       17 09          Therapy Education    Education Details Continuation of home wrap changes, contact MD for R foot pain symptoms.  -      Given Symptoms/condition management;Bandaging/dressing change  -      Program Reinforced  -      How Provided Verbal;Demonstration  -      Provided to Patient  -      Level of Understanding Verbalized  -        User Key  (r) = Recorded By, (t) = Taken By, (c) = Cosigned By    Initials Name Provider Type    ALENA Baca, PT Physical Therapist          Recommendation and Plan        PT Assessment/Plan       17 09       PT Assessment    Functional Limitations Performance in self-care ADL  -     Impairments Integumentary integrity;Edema  -     Assessment Comments L med/lat ankle still with moderate crusting but no distinguishable open areas and only scant drainage.  BLE remain slightly erythematous and fragile.  Pt will continue to benefit from unna boots/MLW.  -     Rehab Potential Fair  -     Patient/caregiver participated in establishment of treatment plan and goals Yes  -     Patient would benefit from skilled therapy intervention Yes  -     PT Plan    PT Frequency Other (comment)   everu 2 weeks  -     Planned CPT's? PT JUAN DEBRIDE OPEN WOUND UP TO 20 CM: 37889;PT NONSELECT DEBRIDE 15 MIN: 29122;PT UNNA BOOT: 80175;PT MULTI LAYER COMP SYS LE  -     Physical Therapy  Interventions (Optional Details) patient/family education;wound care  -     PT Plan Comments BLW unna boot/MLW  -ALENA       User Key  (r) = Recorded By, (t) = Taken By, (c) = Cosigned By    Initials Name Provider Type    ALENA Baca, PT Physical Therapist          Goals        PT OP Goals       11/21/17 0930       Time Calculation    PT Goal Re-Cert Due Date 11/29/17  -ALENA       User Key  (r) = Recorded By, (t) = Taken By, (c) = Cosigned By    Initials Name Provider Type    ALENA Baca, PT Physical Therapist          PT Goal Re-Cert Due Date: 11/29/17            Time Calculation: Start Time: 0930    Therapy Charges for Today     Code Description Service Date Service Provider Modifiers Qty    74855580747 HC PT MULTI LAYER COMP SYS BELOW KNEE 11/21/2017 Dayami Baca, PT GP 1    12705419429 HC JUAN DEBRIDE OPEN WOUND UP TO 20CM 11/21/2017 Dayami Baca, PT 59, GP 1                Dayami Baca, PT  11/21/2017

## 2017-12-05 ENCOUNTER — HOSPITAL ENCOUNTER (OUTPATIENT)
Dept: PHYSICAL THERAPY | Facility: HOSPITAL | Age: 82
Setting detail: THERAPIES SERIES
Discharge: HOME | End: 2017-12-05

## 2017-12-05 DIAGNOSIS — S81.809D MULTIPLE OPEN WOUNDS OF LOWER EXTREMITY, UNSPECIFIED LATERALITY, SUBSEQUENT ENCOUNTER: ICD-10-CM

## 2017-12-05 DIAGNOSIS — R60.0 EDEMA OF BOTH LEGS: ICD-10-CM

## 2017-12-05 DIAGNOSIS — I87.2 VENOUS STASIS DERMATITIS OF BOTH LOWER EXTREMITIES: Primary | ICD-10-CM

## 2017-12-05 PROCEDURE — G8991 OTHER PT/OT GOAL STATUS: HCPCS

## 2017-12-05 PROCEDURE — 29581 APPL MULTLAYER CMPRN SYS LEG: CPT

## 2017-12-05 PROCEDURE — G8990 OTHER PT/OT CURRENT STATUS: HCPCS

## 2017-12-05 PROCEDURE — 97602 WOUND(S) CARE NON-SELECTIVE: CPT

## 2017-12-05 NOTE — THERAPY PROGRESS REPORT/RE-CERT
Outpatient Rehabilitation - Wound/Debridement Progress Note  [facility]     Patient Name: Lorna Lo  : 1934  MRN: [id number]  Today's Date: 2017            AMALIA GARCIA      Admit Date: 2017    Visit Dx:    ICD-10-CM ICD-9-CM   1. Venous stasis dermatitis of both lower extremities I87.2 454.1   2. Multiple open wounds of lower extremity, unspecified laterality, subsequent encounter S81.809D V58.89     894.0   3. Edema of both legs R60.0 782.3       Patient Active Problem List   Diagnosis   • Coronary artery disease   • Hypertension   • Dyslipidemia   • Polymyositis   • Dependent edema   • CKD (chronic kidney disease)   • Chronic anemia   • Osteopenia   • GERD (gastroesophageal reflux disease)        Past Medical History:   Diagnosis Date   • Chronic anemia     Chronic anemia, secondary to (CKD).    • CKD (chronic kidney disease)    • Coronary artery disease    • Dependent edema    • DVT (deep venous thrombosis)     History of DVT in .    • Dyslipidemia    • GERD (gastroesophageal reflux disease)    • Hypertension    • Osteopenia    • Polymyositis         No past surgical history on file.      EVALUATION        PT Ortho       17 0930    Subjective Comments    Subjective Comments Pt reports the pain in her R foot has resolved. She feels her legs are doing better. She is worried about transportation for the rest of the month d/t the holidays.  -MC    Subjective Pain    Able to rate subjective pain? yes  -MC    Pre-Treatment Pain Level 0  -MC    Post-Treatment Pain Level 0  -MC    Transfers    Transfers, Sit-Stand [place] independent  -    Transfers, Stand-Sit [place] independent  -    Transfer, Comment seated on EOB for tx  -    Gait Assessment/Treatment    Gait, [place] Level independent  -    Gait, Assistive Device rollator  -      User Key  (r) = Recorded By, (t) = Taken By, (c) = Cosigned By    Initials Name Provider Type    PAWAN BURGOS  Alex, PT Physical Therapist                    LDA Wound       12/05/17 0930          Wound 01/13/17 0930 Bilateral lower leg excoriation    Wound - Properties Group Date first assessed: 01/13/17  - Time first assessed: 0930  - Side: Bilateral  - Orientation: lower  -MC Location: leg  - Type: excoriation  -MC    Wound WDL ex   scant drainage, min erythema LLE only  -MC      Dressing Appearance dried drainage;intact   scattered areas of drainage on LLE unna boot  -MC      Base pink;clean;dry   no open areas, no moist areas at this time  -      Periwound Area redness;dry;warm;intact;pink   dry/crusted L ankle only  -MC      Edges other (see comments)   indistinct  -MC      Drainage Characteristics/Odor serous;no odor  -MC      Drainage Amount scant  -MC      Picture taken yes  -MC      Wound Cleaning cleansed with;other (see comments)   theraworx  -      Wound Interventions debrided;barrier applied   nonselective debridement, z-guard to ankles  -MC      Dressing Dressing applied;other (see comments)   unna boot/MLW  -MC        User Key  (r) = Recorded By, (t) = Taken By, (c) = Cosigned By    Initials Name Provider Type    PAWAN Johnson, VANESA Physical Therapist              Lymphedema       12/05/17 0930          Lymphedema Edema Assessment    Ptting Edema Category By severity  -      Pitting Edema Other (comment)   trace  -MC      Skin Changes/Observations    Lower Extremity Conditions bilateral:;intact;clean;dry;left:;scaly;crust;fragile  -      Lower Extremity Color/Pigment bilateral:;hyperpigmented;red  -      Lymphedema Pulses/Capillary Refill    Lower Extremity Capillary Refill right:;left:;less than 3 seconds  -MC      Compression/Skin Care    Compression/Skin Care skin care;wrapping location;bandaging  -      Skin Care washed/dried;lotion applied   z-guard to B ankles  -MC      Wrapping Location lower extremity  -      Wrapping Location LE bilateral:;foot to knee  -      Wrapping  Comments unna boot  -      Bandage Layers padding/fluff layer;cotton elastic stocking- single layer (comment size)   size 4  -MC        User Key  (r) = Recorded By, (t) = Taken By, (c) = Cosigned By    Initials Name Provider Type    PAWAN Johnson, PT Physical Therapist          WOUND DEBRIDEMENT  Debridement Site 1  Location- Site 1: LLE  Instruments- Site 1: other (comment) (theraworx wipes)  Excised Tissue Description- Site 1: moderate, other (comment) (crust over intact, dry skin)  Bleeding- Site 1: none                   Therapy Education       12/05/17 0930          Therapy Education    Education Details Continue with home wrap changes about once/week, return within 30 days for next appt.  -      Given Symptoms/condition management;Bandaging/dressing change  -      Program Reinforced  -MC      How Provided Verbal;Demonstration  -      Provided to Patient  -      Level of Understanding Verbalized  -        User Key  (r) = Recorded By, (t) = Taken By, (c) = Cosigned By    Initials Name Provider Type    PAWAN Johnson, PT Physical Therapist          Recommendation and Plan        PT Assessment/Plan       12/05/17 0930       PT Assessment    Functional Limitations Performance in self-care ADL  -     Impairments Integumentary integrity;Edema  -     Assessment Comments Pt continues to improve. RLE with no open areas, only trace edema, and minimal hyperpigmentation. LLE with minimal erythema, trace edema, and moderate crusting to medial/lateral ankle with no open areas at this time. Pt has met or partially met all her PT wound care goals, so PT updated with a new goal for decreased exudate, as that continues to be of concern for ongoing pt progress. Pt has progressed very well to this point and will continue to benefit from skilled PT wound care to continue progress.  -     Rehab Potential Fair  -     Patient/caregiver participated in establishment of treatment plan and goals Yes  -      Patient would benefit from skilled therapy intervention Yes  -     PT Plan    PT Frequency Other (comment)   in approximately 30 days  -     Predicted Duration of Therapy Intervention (days/wks) 1-2 additional visits  -     Planned CPT's? PT NONSELECT DEBRIDE 15 MIN: 28708;PT JUAN DEBRIDE OPEN WOUND UP TO 20 CM: 78288;PT UNNA BOOT: 36210;PT MULTI LAYER COMP SYS LE  -     Physical Therapy Interventions (Optional Details) patient/family education;wound care  -     PT Plan Comments BLE unna boot, MLW  -       User Key  (r) = Recorded By, (t) = Taken By, (c) = Cosigned By    Initials Name Provider Type     Alis Johnson, PT Physical Therapist          Goals        PT OP Goals       17 0930       PT Short Term Goals    STG 1 Decrease wound dimensions by 50% as evidence of wound healing.  -     STG 1 Progress Met  -     STG 2 Decrease area of excoriation by 50% as evidence of wound healing.  -     STG 2 Progress Met  -     Long Term Goals    LTG 1 Patient independent and compliant with use and care of compression wraps or stockings, with assistance of family / caregiver as indicated to promote self-care independence.  -     LTG 1 Progress Partially Met  -     LTG 2 Decrease area of excoriation by 75% as evidence of wound healing.  -     LTG 2 Progress Met  -     LTG 3 Decrease limb circumferences by 2 cm to promote improved skin integrity and circulation.  -     LTG 3 Progress Met  -     LTG 4 Demonstrate no drainage from BLE to indicate healing progress.  -     LTG 4 Progress New  -     Time Calculation    PT Goal Re-Cert Due Date 18  -       User Key  (r) = Recorded By, (t) = Taken By, (c) = Cosigned By    Initials Name Provider Type     Alis Johnson, PT Physical Therapist          PT Goal Re-Cert Due Date: 18  PT Short Term Goals  STG 1: Decrease wound dimensions by 50% as evidence of wound healing.  STG 1 Progress: Met  STG 2: Decrease area  of excoriation by 50% as evidence of wound healing.  STG 2 Progress: Met  Long Term Goals  LTG 1: Patient independent and compliant with use and care of compression wraps or stockings, with assistance of family / caregiver as indicated to promote self-care independence.  LTG 1 Progress: Partially Met  LTG 2: Decrease area of excoriation by 75% as evidence of wound healing.  LTG 2 Progress: Met  LTG 3: Decrease limb circumferences by 2 cm to promote improved skin integrity and circulation.  LTG 3 Progress: Met  LTG 4: Demonstrate no drainage from BLE to indicate healing progress.  LTG 4 Progress: New      Time Calculation: Start Time: 0930    Therapy Charges for Today     Code Description Service Date Service Provider Modifiers Qty    53139607445 HC PT OTHER PRIME FUNCT CURRENT 12/5/2017 Alis Johnson, PT GP, CI 1    77008934069 HC PT OTHER PRIME FUNCT PROJECTED 12/5/2017 Alis Johnson, PT GP, CI 1    84293299555 HC NONSELECTIVE DEBRIDEMENT 12/5/2017 Alis Johnson, PT GP 1    05516984748 HC PT MULTI LAYER COMP SYS BELOW KNEE 12/5/2017 Alis Johnson, PT GP 1          PT G-Codes  PT Professional Judgement Used?: Yes  Outcome Measure Options: BWAT (Cummings-Manzo Wound Assess Tool)  Score: BWAT 14, 20% limited in home wound/edema management.  Other PT Primary Current Status (): At least 1 percent but less than 20 percent impaired, limited or restricted  Other PT Primary Goal Status (): At least 1 percent but less than 20 percent impaired, limited or restricted     Alis Johnson, PT  12/5/2017

## 2017-12-18 ENCOUNTER — TRANSCRIBE ORDERS (OUTPATIENT)
Dept: ADMINISTRATIVE | Facility: HOSPITAL | Age: 82
End: 2017-12-18

## 2017-12-18 DIAGNOSIS — M33.90 DERMATOMYOSITIS (HCC): Primary | ICD-10-CM

## 2017-12-20 ENCOUNTER — TELEPHONE (OUTPATIENT)
Dept: CARDIOLOGY | Facility: CLINIC | Age: 82
End: 2017-12-20

## 2017-12-20 NOTE — TELEPHONE ENCOUNTER
"Received a call from patient's daughter stating she was diagnosed with AFib 2 days ago at Dr. Houston' office.  States she was there for a different reason & this was an incidental finding.  She was started on Eliquis, and says her HR is controlled in the 90s.  She is asymptomatic other than she \"gets tired\" with shopping, etc, but is fine after sitting to rest for a few minutes.  Dr. Houston' office sent over a referral.  She is being scheduled with Dr. Cade 1/30/18 at 1045.    "

## 2017-12-28 ENCOUNTER — HOSPITAL ENCOUNTER (OUTPATIENT)
Dept: CT IMAGING | Facility: HOSPITAL | Age: 82
Discharge: HOME | End: 2017-12-28

## 2017-12-29 ENCOUNTER — HOSPITAL ENCOUNTER (OUTPATIENT)
Dept: CT IMAGING | Facility: HOSPITAL | Age: 82
Discharge: HOME | End: 2017-12-29
Admitting: NURSE PRACTITIONER

## 2017-12-29 DIAGNOSIS — M33.90 DERMATOMYOSITIS (HCC): ICD-10-CM

## 2017-12-29 PROCEDURE — 71250 CT THORAX DX C-: CPT

## 2018-01-02 ENCOUNTER — APPOINTMENT (OUTPATIENT)
Dept: PHYSICAL THERAPY | Facility: HOSPITAL | Age: 83
End: 2018-01-02

## 2018-01-09 ENCOUNTER — HOSPITAL ENCOUNTER (OUTPATIENT)
Dept: PHYSICAL THERAPY | Facility: HOSPITAL | Age: 83
Setting detail: THERAPIES SERIES
Discharge: HOME | End: 2018-01-09

## 2018-01-09 DIAGNOSIS — I87.2 VENOUS STASIS DERMATITIS OF BOTH LOWER EXTREMITIES: Primary | ICD-10-CM

## 2018-01-09 DIAGNOSIS — R60.0 EDEMA OF BOTH LEGS: ICD-10-CM

## 2018-01-09 DIAGNOSIS — S81.809D MULTIPLE OPEN WOUNDS OF LOWER EXTREMITY, UNSPECIFIED LATERALITY, SUBSEQUENT ENCOUNTER: ICD-10-CM

## 2018-01-09 PROCEDURE — G8990 OTHER PT/OT CURRENT STATUS: HCPCS

## 2018-01-09 PROCEDURE — 97602 WOUND(S) CARE NON-SELECTIVE: CPT

## 2018-01-09 PROCEDURE — G8991 OTHER PT/OT GOAL STATUS: HCPCS

## 2018-01-09 PROCEDURE — 29581 APPL MULTLAYER CMPRN SYS LEG: CPT

## 2018-01-09 NOTE — THERAPY PROGRESS REPORT/RE-CERT
Outpatient Rehabilitation - Wound/Debridement Progress Note  [facility]     Patient Name: Lorna Lo  : 1934  MRN: [id number]  Today's Date: 2018                L medial ankle      L lateral ankle      Admit Date: 2018    Visit Dx:    ICD-10-CM ICD-9-CM   1. Venous stasis dermatitis of both lower extremities I87.2 454.1   2. Multiple open wounds of lower extremity, unspecified laterality, subsequent encounter S81.809D V58.89     894.0   3. Edema of both legs R60.0 782.3       Patient Active Problem List   Diagnosis   • Coronary artery disease   • Hypertension   • Dyslipidemia   • Polymyositis   • Dependent edema   • CKD (chronic kidney disease)   • Chronic anemia   • Osteopenia   • GERD (gastroesophageal reflux disease)        Past Medical History:   Diagnosis Date   • Chronic anemia     Chronic anemia, secondary to (CKD).    • CKD (chronic kidney disease)    • Coronary artery disease    • Dependent edema    • DVT (deep venous thrombosis)     History of DVT in .    • Dyslipidemia    • GERD (gastroesophageal reflux disease)    • Hypertension    • Osteopenia    • Polymyositis         No past surgical history on file.      EVALUATION        PT Ortho       18 0950    Subjective Comments    Subjective Comments Pt reports her legs have been doing really good except for some scaly areas on the L ankle  -MC    Subjective Pain    Able to rate subjective pain? yes  -MC    Pre-Treatment Pain Level 0  -MC    Post-Treatment Pain Level 0  -MC    RLE Quick Girth (cm)    Met-heads 21.7 cm  -MC    Mid foot 20.5 cm  -MC    Smallest ankle 19.8 cm  -MC    Largest calf 36.8 cm  -MC    LLE Quick Girth (cm)    Met-heads 21.8 cm  -MC    Mid foot 20.3 cm  -MC    Smallest ankle 19.5 cm  -MC    Largest calf 32.8 cm  -MC    Transfers    Transfers, Sit-Stand [place] independent  -MC    Transfers, Stand-Sit [place] independent  -MC    Transfer, Comment seated in chair for tx  -MC    Gait  Assessment/Treatment    Gait, [place] Level independent  -    Gait, Assistive Device rollator  -      User Key  (r) = Recorded By, (t) = Taken By, (c) = Cosigned By    Initials Name Provider Type    PAWAN Johnson, VANESA Physical Therapist                    LDA Wound       01/09/18 0950          Wound 01/13/17 0930 Bilateral lower leg excoriation    Wound - Properties Group Date first assessed: 01/13/17  - Time first assessed: 0930  - Side: Bilateral  - Orientation: lower  -MC Location: leg  -MC Type: excoriation  -MC    Wound WDL ex   periwound redness LLE  -      Dressing Appearance dried drainage;intact  -MC      Base reddened;dry;pink;clean   LLE only  -MC      Periwound Area redness;intact;dry   LLE only  -MC      Edges --   no clear edges  -MC      Drainage Characteristics/Odor no odor  -      Drainage Amount none  -      Picture taken yes  -      Wound Cleaning cleansed with;other (see comments)   theraworx  -      Wound Interventions debrided;barrier applied   z-guard L ankle only  -      Dressing Dressing applied;other (see comments)   MLW/unna boot  -        User Key  (r) = Recorded By, (t) = Taken By, (c) = Cosigned By    Initials Name Provider Type    PAWAN Johnson PT Physical Therapist              Lymphedema       01/09/18 0950          Lymphedema Edema Assessment    Ptting Edema Category By severity  -      Pitting Edema Other (comment)   trace/mild  -      Skin Changes/Observations    Lower Extremity Conditions bilateral:;intact;clean;dry;left:;scaly;fragile  -      Lower Extremity Color/Pigment left:;red;hyperpigmented  -      Lymphedema Pulses/Capillary Refill    Lower Extremity Capillary Refill right:;left:;less than 3 seconds  -      Compression/Skin Care    Compression/Skin Care skin care;wrapping location;bandaging  -      Skin Care washed/dried;lotion applied   eucerin to intact skin, z-guard L ankle  -      Wrapping Location lower  extremity  -      Wrapping Location LE bilateral:;foot to knee  -      Wrapping Comments unna boot and cast padding to LLE only  -      Bandage Layers cotton elastic stocking- single layer (comment size)   size 4 BLE  -        User Key  (r) = Recorded By, (t) = Taken By, (c) = Cosigned By    Initials Name Provider Type    PAWAN Johnson, PT Physical Therapist          WOUND DEBRIDEMENT  Debridement Site 1  Location- Site 1: LLE  Instruments- Site 1: other (comment) (gauze)  Excised Tissue Description- Site 1: moderate, other (comment) (scaly skin overlying intact skin)  Bleeding- Site 1: none                   Therapy Education       01/09/18 0950          Therapy Education    Education Details Continue with home wrap changes about once/week, with unna boot/cast padding applied to LLE only. Keep using compressogrip bilaterally.  -      Given Symptoms/condition management;Bandaging/dressing change  -      Program Reinforced  -      How Provided Verbal;Demonstration  -MC      Provided to Patient  -      Level of Understanding Verbalized  -        User Key  (r) = Recorded By, (t) = Taken By, (c) = Cosigned By    Initials Name Provider Type    PAWAN Johnson, PT Physical Therapist          Recommendation and Plan        PT Assessment/Plan       01/09/18 0950       PT Assessment    Functional Limitations Performance in self-care ADL  -     Impairments Integumentary integrity;Edema  -     Assessment Comments Pt improved since her last visit. Pt with only trace edema to the RLE and no erythema or integrity concerns around the ankle. The LLE still has trace/mild edema and scaly red patches around the medial and lateral L ankle that will still benefit from use of unna boot within the multilayer wrapping system. Pt will continue to benefit from skilled PT wound care to continue progress.  -     Rehab Potential Fair  -     Patient/caregiver participated in establishment of treatment plan  and goals Yes  -     Patient would benefit from skilled therapy intervention Yes  -     PT Plan    PT Frequency Other (comment)   every 2-3 weeks as pt's schedule/transportation allows  -     Predicted Duration of Therapy Intervention (days/wks) 3-4 visits  -     Planned CPT's? PT THER PROC EA 15 MIN: 83841;PT NONSELECT DEBRIDE 15 MIN: 24580;PT JUAN DEBRIDE OPEN WOUND UP TO 20 CM: 28554;PT JUAN DEBRIDE OPEN WOUND EA ADD 20 CM: 63397;PT UNNA BOOT: 51608;PT MULTI LAYER COMP SYS LE  -     Physical Therapy Interventions (Optional Details) patient/family education;wound care  -     PT Plan Comments MLW with unna boot to LLE  -       User Key  (r) = Recorded By, (t) = Taken By, (c) = Cosigned By    Initials Name Provider Type     Alis Johnson, PT Physical Therapist          Goals        PT OP Goals       18 0950       PT Short Term Goals    STG 1 Decrease wound dimensions by 50% as evidence of wound healing.  -     STG 1 Progress Met  -     STG 2 Decrease area of excoriation by 50% as evidence of wound healing.  -     STG 2 Progress Met  -     Long Term Goals    LTG 1 Patient independent and compliant with use and care of compression wraps or stockings, with assistance of family / caregiver as indicated to promote self-care independence.  -     LTG 1 Progress Partially Met  -     LTG 2 Decrease area of excoriation by 75% as evidence of wound healing.  -     LTG 2 Progress Met  -     LTG 3 Decrease limb circumferences by 2 cm to promote improved skin integrity and circulation.  -     LTG 3 Progress Met  -     LTG 4 Demonstrate no drainage from BLE to indicate healing progress.  -     LTG 4 Progress Ongoing  -     Time Calculation    PT Goal Re-Cert Due Date 18  -       User Key  (r) = Recorded By, (t) = Taken By, (c) = Cosigned By    Initials Name Provider Type    PAWAN Johnson, PT Physical Therapist          PT Goal Re-Cert Due Date: 18  PT  Short Term Goals  STG 1: Decrease wound dimensions by 50% as evidence of wound healing.  STG 1 Progress: Met  STG 2: Decrease area of excoriation by 50% as evidence of wound healing.  STG 2 Progress: Met  Long Term Goals  LTG 1: Patient independent and compliant with use and care of compression wraps or stockings, with assistance of family / caregiver as indicated to promote self-care independence.  LTG 1 Progress: Partially Met  LTG 2: Decrease area of excoriation by 75% as evidence of wound healing.  LTG 2 Progress: Met  LTG 3: Decrease limb circumferences by 2 cm to promote improved skin integrity and circulation.  LTG 3 Progress: Met  LTG 4: Demonstrate no drainage from BLE to indicate healing progress.  LTG 4 Progress: Ongoing      Time Calculation: Start Time: 0950    Therapy Charges for Today     Code Description Service Date Service Provider Modifiers Qty    68908288818 HC PT OTHER PRIME FUNCT CURRENT 1/9/2018 Alis Johnson, PT GP, CJ 1    16511758745 HC PT OTHER PRIME FUNCT PROJECTED 1/9/2018 Alis Johnson, PT GP, CI 1    17899746347 HC NONSELECTIVE DEBRIDEMENT 1/9/2018 Alis Johnson, PT GP 1    16349467492 HC PT MULTI LAYER COMP SYS BELOW KNEE 1/9/2018 Alis Johnson, PT GP 1          PT G-Codes  PT Professional Judgement Used?: Yes  Outcome Measure Options: BWAT (Cummings-Manzo Wound Assess Tool)  Score: BWAT 14, 20% limited in home wound/edema management  Functional Limitation: Other PT primary  Other PT Primary Current Status (): At least 20 percent but less than 40 percent impaired, limited or restricted  Other PT Primary Goal Status (): At least 1 percent but less than 20 percent impaired, limited or restricted     Alis Johnson, PT  1/9/2018

## 2018-01-29 ENCOUNTER — HOSPITAL ENCOUNTER (OUTPATIENT)
Dept: PHYSICAL THERAPY | Facility: HOSPITAL | Age: 83
Setting detail: THERAPIES SERIES
Discharge: HOME | End: 2018-01-29

## 2018-01-29 DIAGNOSIS — I87.2 VENOUS STASIS DERMATITIS OF BOTH LOWER EXTREMITIES: Primary | ICD-10-CM

## 2018-01-29 DIAGNOSIS — S81.809D MULTIPLE OPEN WOUNDS OF LOWER EXTREMITY, UNSPECIFIED LATERALITY, SUBSEQUENT ENCOUNTER: ICD-10-CM

## 2018-01-29 PROCEDURE — G8991 OTHER PT/OT GOAL STATUS: HCPCS

## 2018-01-29 PROCEDURE — G8990 OTHER PT/OT CURRENT STATUS: HCPCS

## 2018-01-29 PROCEDURE — 97602 WOUND(S) CARE NON-SELECTIVE: CPT

## 2018-01-29 PROCEDURE — 29581 APPL MULTLAYER CMPRN SYS LEG: CPT

## 2018-01-29 NOTE — THERAPY WOUND CARE TREATMENT
Outpatient Rehabilitation - Wound/Debridement Progress Note  [facility]     Patient Name: Lorna Lo  : 1934  MRN: [id number]  Today's Date: 2018                  Admit Date: 2018    Visit Dx:    ICD-10-CM ICD-9-CM   1. Venous stasis dermatitis of both lower extremities I87.2 454.1   2. Multiple open wounds of lower extremity, unspecified laterality, subsequent encounter S81.809D V58.89     894.0       Patient Active Problem List   Diagnosis   • Coronary artery disease   • Hypertension   • Dyslipidemia   • Polymyositis   • Dependent edema   • CKD (chronic kidney disease)   • Chronic anemia   • Osteopenia   • GERD (gastroesophageal reflux disease)        Past Medical History:   Diagnosis Date   • Chronic anemia     Chronic anemia, secondary to (CKD).    • CKD (chronic kidney disease)    • Coronary artery disease    • Dependent edema    • DVT (deep venous thrombosis) 2009    History of DVT in .    • Dyslipidemia    • GERD (gastroesophageal reflux disease)    • Hypertension    • Osteopenia    • Polymyositis         No past surgical history on file.      EVALUATION        PT Ortho       18 [id number]    Subjective Comments    Subjective Comments Pt reports she put the unna boot back on both sides last time because of a little irritation around both ankles  -MC    Subjective Pain    Able to rate subjective pain? yes  -MC    Pre-Treatment Pain Level 0  -MC    Post-Treatment Pain Level 0  -MC    RLE Quick Girth (cm)    Met-heads 21.7 cm  -MC    Mid foot 20.4 cm  -MC    Smallest ankle 19.9 cm  -MC    Largest calf 36.6 cm  -MC    LLE Quick Girth (cm)    Met-heads 21.8 cm  -MC    Mid foot 20.1 cm  -MC    Smallest ankle 19.6 cm  -MC    Largest calf 33 cm  -MC    Transfers    Transfers, Sit-Stand [place] independent  -MC    Transfers, Stand-Sit [place] independent  -MC    Transfer, Comment seated in ARJO for tx  -MC    Gait Assessment/Treatment    Gait, [place] Level independent   -MC    Gait, Assistive Device rollator  -      User Key  (r) = Recorded By, (t) = Taken By, (c) = Cosigned By    Initials Name Provider Type    PAWAN Johnson PT Physical Therapist                    LDA Wound       01/29/18 0930          Wound 01/13/17 0930 Bilateral lower leg excoriation    Wound - Properties Group Date first assessed: 01/13/17  - Time first assessed: 0930  - Side: Bilateral  - Orientation: lower  -MC Location: leg  -MC Type: excoriation  -MC    Wound WDL ex   periwound redness LLE  -      Dressing Appearance dried drainage;intact  -MC      Base reddened;dry;pink;clean   LLE medial ankle  -MC      Periwound Area redness;intact;dry   scant redness to R medial ankle as well as L medial ankle  -MC      Edges --   no clear edges  -MC      Length (cm) 7.5  -MC      Width (cm) 5.3  -MC      Depth (cm) 0   excoriation/redness, does not appear to have depth  -      Drainage Characteristics/Odor no odor  -      Drainage Amount none  -      Picture taken yes  -      Wound Cleaning cleansed with;other (see comments)   theraworx  -      Wound Interventions debrided;barrier applied   z-guard  -      Dressing antimicrobial textile   cutimed sorbact  -      Periwound Care cleansed with pH balanced cleanser;dry periwound area maintained  -        User Key  (r) = Recorded By, (t) = Taken By, (c) = Cosigned By    Initials Name Provider Type    PAWAN Johnson PT Physical Therapist              Lymphedema       01/29/18 0930          Lymphedema Edema Assessment    Ptting Edema Category By severity  -      Pitting Edema Other (comment)   trace  -      Skin Changes/Observations    Lower Extremity Conditions right:;intact;bilateral:;clean;dry;shiny;left:;inflamed;fragile  -      Lower Extremity Color/Pigment left:;red;bilateral:;hyperpigmented  -      Lymphedema Pulses/Capillary Refill    Lower Extremity Capillary Refill right:;left:;less than 3 seconds  -       Compression/Skin Care    Compression/Skin Care skin care;wrapping location;bandaging  -MC      Skin Care washed/dried;lotion applied  -MC      Wrapping Location lower extremity  -MC      Wrapping Location LE bilateral:;foot to knee  -MC      Wrapping Comments unna boot, cast padding to LLE to address inflammation/excoriation  -MC      Bandage Layers cotton elastic stocking- double layer (comment size)   size 4 MH to calf, doubled over foot and ankle  -      Bandaging Technique light compression  -MC      Compression/Skin Care Comments Doubled distally to create an appropriate pressure gradient  -MC        User Key  (r) = Recorded By, (t) = Taken By, (c) = Cosigned By    Initials Name Provider Type    PAWAN Johnson, PT Physical Therapist          WOUND DEBRIDEMENT  Debridement Site 1  Location- Site 1: LLE  Selective Debridement- Site 1: Wound Surface <20cmsq  Instruments- Site 1: other (comment) (gauze pads, theraworx wipes)  Excised Tissue Description- Site 1: moderate, other (comment) (scaly, hypertrophic skin over inflamed, fragile area)  Bleeding- Site 1: none                   Therapy Education       01/29/18 0930          Therapy Education    Education Details Continue with home dressing and wraps changes about once/week, with unna boot/cast padding to LLE only. Pt to maintain compressogrip bilaterally with compressogrip doubled distally to maintain a gradient. Pt to call with any issues.  -MC      Given Symptoms/condition management;Bandaging/dressing change  -MC      Program Reinforced  -MC      How Provided Verbal;Demonstration  -MC      Provided to Patient  -MC      Level of Understanding Verbalized  -MC        User Key  (r) = Recorded By, (t) = Taken By, (c) = Cosigned By    Initials Name Provider Type    PAWAN Johnson, PT Physical Therapist          Recommendation and Plan        PT Assessment/Plan       01/29/18 0930       PT Assessment    Functional Limitations Performance in self-care  ADL  -     Impairments Integumentary integrity;Edema  -     Assessment Comments Pt with improved area of excoriation/inflammation to the L medial ankle vs. last photos and measurements. The area continues to develop hypertrophic crust that requires debridement. Pt with trace BLE edema that appears to be stable at this time. Pt continues to be appropriate for MLW with a light compression system doubled at the foot and ankle to create an appropriate gradient system to maintain limb girth reduction and promote venous return. Pt may soon be appropriate for alternate compression system, as PT does not expect pt will be independent with traditional compression stockings.  -     Rehab Potential Fair  -     Patient/caregiver participated in establishment of treatment plan and goals Yes  -     Patient would benefit from skilled therapy intervention Yes  -     PT Plan    PT Frequency Other (comment)   every 2-3 weeks as transportation allows  -     Predicted Duration of Therapy Intervention (days/wks) 2-3 visits  -     Physical Therapy Interventions (Optional Details) patient/family education;wound care  -     PT Plan Comments MLW with unna boot to LLE  -       User Key  (r) = Recorded By, (t) = Taken By, (c) = Cosigned By    Initials Name Provider Type     Alis Johnson, PT Physical Therapist          Goals        PT OP Goals       01/29/18 0930       PT Short Term Goals    STG 1 Decrease wound dimensions by 50% as evidence of wound healing.  -     STG 1 Progress Met  -     STG 2 Decrease area of excoriation by 50% as evidence of wound healing.  -     STG 2 Progress Met  [facility]     Long Term Goals    LTG 1 Patient independent and compliant with use and care of compression wraps or stockings, with assistance of family / caregiver as indicated to promote self-care independence.  -     LTG 1 Progress Partially Met  -     LTG 2 Decrease area of excoriation by 75% as evidence of wound healing.   -     LTG 2 Progress Met  -     LTG 3 Decrease limb circumferences by 2 cm to promote improved skin integrity and circulation.  -     LTG 3 Progress Met  -     LTG 4 Demonstrate no drainage from BLE to indicate healing progress.  -     LTG 4 Progress Ongoing  -     Time Calculation    PT Goal Re-Cert Due Date 03/05/18  -       User Key  (r) = Recorded By, (t) = Taken By, (c) = Cosigned By    Initials Name Provider Type     Alis Johnson, PT Physical Therapist          PT Goal Re-Cert Due Date: 03/05/18  PT Short Term Goals  STG 1: Decrease wound dimensions by 50% as evidence of wound healing.  STG 1 Progress: Met  STG 2: Decrease area of excoriation by 50% as evidence of wound healing.  STG 2 Progress: Met  Long Term Goals  LTG 1: Patient independent and compliant with use and care of compression wraps or stockings, with assistance of family / caregiver as indicated to promote self-care independence.  LTG 1 Progress: Partially Met  LTG 2: Decrease area of excoriation by 75% as evidence of wound healing.  LTG 2 Progress: Met  LTG 3: Decrease limb circumferences by 2 cm to promote improved skin integrity and circulation.  LTG 3 Progress: Met  LTG 4: Demonstrate no drainage from BLE to indicate healing progress.  LTG 4 Progress: Ongoing      Time Calculation: Start Time: 0930    Therapy Charges for Today     Code Description Service Date Service Provider Modifiers Qty    16596446677 HC PT OTHER PRIME FUNCT CURRENT 1/29/2018 Alis Johnson, PT GP, CJ 1    30151750950 HC PT OTHER PRIME FUNCT PROJECTED 1/29/2018 Alis Johnson, PT GP, CI 1    03763023935 HC NONSELECTIVE DEBRIDEMENT 1/29/2018 Alis Johnson, PT GP 1    92187742478 HC PT MULTI LAYER COMP SYS BELOW KNEE 1/29/2018 Alis Johnson, PT GP 1          PT G-Codes  PT Professional Judgement Used?: Yes  Outcome Measure Options: BWAT (Cummings-Manzo Wound Assess Tool)  Score: BWAT 14, 20% limited in home wound/edema management  Functional  Limitation: Other PT primary  Other PT Primary Current Status (): At least 20 percent but less than 40 percent impaired, limited or restricted  Other PT Primary Goal Status (): At least 1 percent but less than 20 percent impaired, limited or restricted     Alis Johnson, PT  1/29/2018

## 2018-02-20 ENCOUNTER — HOSPITAL ENCOUNTER (OUTPATIENT)
Dept: PHYSICAL THERAPY | Facility: HOSPITAL | Age: 83
Setting detail: THERAPIES SERIES
Discharge: HOME | End: 2018-02-20

## 2018-02-20 DIAGNOSIS — I87.2 VENOUS STASIS DERMATITIS OF BOTH LOWER EXTREMITIES: Primary | ICD-10-CM

## 2018-02-20 PROCEDURE — 97602 WOUND(S) CARE NON-SELECTIVE: CPT

## 2018-02-20 PROCEDURE — G8991 OTHER PT/OT GOAL STATUS: HCPCS

## 2018-02-20 PROCEDURE — G8990 OTHER PT/OT CURRENT STATUS: HCPCS

## 2018-02-20 NOTE — THERAPY PROGRESS REPORT/RE-CERT
Outpatient Rehabilitation - Wound/Debridement Progress Note  [facility]     Patient Name: Lorna Lo  : 1934  MRN: [id number]  Today's Date: 2018                  Admit Date: 2018    Visit Dx:    ICD-10-CM ICD-9-CM   1. Venous stasis dermatitis of both lower extremities I87.2 454.1       Patient Active Problem List   Diagnosis   • Coronary artery disease   • Hypertension   • Dyslipidemia   • Polymyositis   • Dependent edema   • CKD (chronic kidney disease)   • Chronic anemia   • Osteopenia   • GERD (gastroesophageal reflux disease)        Past Medical History:   Diagnosis Date   • Chronic anemia     Chronic anemia, secondary to (CKD).    • CKD (chronic kidney disease)    • Coronary artery disease    • Dependent edema    • DVT (deep venous thrombosis)     History of DVT in .    • Dyslipidemia    • GERD (gastroesophageal reflux disease)    • Hypertension    • Osteopenia    • Polymyositis         No past surgical history on file.      EVALUATION        PT Ortho       18 0930    Subjective Comments    Subjective Comments Pt reports more itching along the L ankle, both sides, and finds herself scratching the areas with her R heel without realizing it.  -MC    Subjective Pain    Able to rate subjective pain? yes  -MC    Pre-Treatment Pain Level 0  -MC    Post-Treatment Pain Level 0  -MC    RLE Quick Girth (cm)    Met-heads 21.6 cm  -MC    Mid foot 20.5 cm  -MC    Smallest ankle 19.8 cm  -MC    Largest calf 36.6 cm  -MC    LLE Quick Girth (cm)    Met-heads 21.7 cm  -MC    Mid foot 20.3 cm  -MC    Smallest ankle 19.7 cm  -MC    Largest calf 33.2 cm  -MC    Transfers    Transfers, Sit-Stand [place] independent  -    Transfers, Stand-Sit [place] independent  -MC    Transfer, Comment seated in ARJO for tx  -MC    Gait Assessment/Treatment    Gait, [place] Level independent  -MC    Gait, Assistive Device rollator  -MC      User Key  (r) = Recorded By, (t) = Taken By,  (c) = Cosigned By    Initials Name Provider Type     Alis Johnson, PT Physical Therapist                    LDA Wound       02/20/18 0930          Wound 01/13/17 0930 Bilateral lower leg excoriation    Wound - Properties Group Date first assessed: 01/13/17  - Time first assessed: 0930  - Side: Bilateral  - Orientation: lower  - Location: leg  - Type: excoriation  -    Wound WDL ex   periwound redness LLE  -      Dressing Appearance dried drainage;intact  -MC      Base reddened;dry;pink;clean   LLE medial and lateral ankle  -      Periwound Area redness;intact;dry   scant redness to R medial ankle  -MC      Edges --   no clear edges  -      Drainage Characteristics/Odor no odor;serous  -      Drainage Amount scant   based on drainage on L wrap  -      Picture taken yes  -      Wound Cleaning cleansed with;other (see comments)   theraworx  -      Wound Interventions debrided;antifungal applied;other (see comments)   antifungal ointment, eucerin skin calming lotion  -      Periwound Care cleansed with pH balanced cleanser;moisturizer applied  -        User Key  (r) = Recorded By, (t) = Taken By, (c) = Cosigned By    Initials Name Provider Type     Alis Johnson PT Physical Therapist              Lymphedema       02/20/18 0930          Lymphedema Edema Assessment    Ptting Edema Category By severity  -      Pitting Edema Other (comment)   none  -      Skin Changes/Observations    Lower Extremity Conditions right:;intact;bilateral:;dry;shiny;hairless;scaly;crust;left:;inflamed;fragile  -      Lower Extremity Color/Pigment left:;red;bilateral:;blanchable  -      Lymphedema Pulses/Capillary Refill    Lower Extremity Capillary Refill right:;left:;less than 3 seconds  -      Compression/Skin Care    Bandaging Comments PT held compression today to allow for more consistent washing of BLE and application of topical treatments to attempt to alleviate dermatitis symptoms.   -        User Key  (r) = Recorded By, (t) = Taken By, (c) = Cosigned By    Initials Name Provider Type    PAWAN Johnson, PT Physical Therapist          WOUND DEBRIDEMENT  Debridement Site 1  Location- Site 1: LLE  Selective Debridement- Site 1: Wound Surface <20cmsq  Instruments- Site 1: other (comment) (gauze pads, theraworx wipes)  Excised Tissue Description- Site 1: moderate, other (comment) (nonviable skin crusts L medial and lateral ankle)  Bleeding- Site 1: none                   Therapy Education       02/20/18 0930          Therapy Education    Education Details Pt to wash her legs every day (gently, if using washcloths), then apply antifungal cream. Once antifungal cream is dry, can apply regular lotion to remainder of legs. Keep elevating legs whenever possible. Issued extra compressogrip for use if any edema appears between now and the next appt.  -MC      Given Symptoms/condition management;Bandaging/dressing change  -MC      Program Reinforced  -MC      How Provided Verbal;Demonstration  -MC      Provided to Patient  -MC      Level of Understanding Verbalized  -        User Key  (r) = Recorded By, (t) = Taken By, (c) = Cosigned By    Initials Name Provider Type    PAWAN Johnson PT Physical Therapist          Recommendation and Plan        PT Assessment/Plan       02/20/18 0930       PT Assessment    Functional Limitations Performance in self-care ADL  -     Impairments Integumentary integrity;Edema  -MC     Assessment Comments Pt with no BLE edema, but continues to have dermatitis-like symptoms to the medial and lateral L ankle, with some scant drainage to the previous unna boot and scaly hypertrophic crust that required debridement today. PT held compression today in order to allow pt to wash the BLE daily and apply antifungal cream and thick lotion to attempt to promote increased skin integrity to the area. Pt understands to replace compressogrip between now and her next treatment  if any s/sx of edema appear. Pt will continue to benefit from skilled PT wound care for skin treatment and edema management as appropriate. If pt independent with compression and skin care by next treatment, will be appropriate for d/c.  -     Rehab Potential Fair  -     Patient/caregiver participated in establishment of treatment plan and goals Yes  -     Patient would benefit from skilled therapy intervention Yes  -     PT Plan    PT Frequency Other (comment)   every 2-3 weeks based on transportation  -     Predicted Duration of Therapy Intervention (days/wks) 2 visits  -     Physical Therapy Interventions (Optional Details) patient/family education;wound care  -     PT Plan Comments MLW?  -       User Key  (r) = Recorded By, (t) = Taken By, (c) = Cosigned By    Initials Name Provider Type     Alis Johnson, PT Physical Therapist          Goals        PT OP Goals       02/20/18 0930       Time Calculation    PT Goal Re-Cert Due Date 03/05/18  -       User Key  (r) = Recorded By, (t) = Taken By, (c) = Cosigned By    Initials Name Provider Type     Alis Johnson PT Physical Therapist          PT Goal Re-Cert Due Date: 03/05/18            Time Calculation: Start Time: 0930    Therapy Charges for Today     Code Description Service Date Service Provider Modifiers Qty    75410226274 HC PT OTHER PRIME FUNCT CURRENT 2/20/2018 Alis Johnson, PT GP, CJ 1    16452650707 HC PT OTHER PRIME FUNCT PROJECTED 2/20/2018 Alis Johnson, PT GP, CI 1    53473398999 HC NONSELECTIVE DEBRIDEMENT 2/20/2018 Alis Johnson, PT GP 1          PT G-Codes  PT Professional Judgement Used?: Yes  Outcome Measure Options: BWAT (Cummings-Manzo Wound Assess Tool)  Score: BWAT 14, 20% limited in home wound/edema management  Functional Limitation: Other PT primary  Other PT Primary Current Status (): At least 20 percent but less than 40 percent impaired, limited or restricted  Other PT Primary Goal Status  (): At least 1 percent but less than 20 percent impaired, limited or restricted     Alis Johnson, PT  2/20/2018

## 2018-02-27 ENCOUNTER — OFFICE VISIT (OUTPATIENT)
Dept: CARDIOLOGY | Facility: CLINIC | Age: 83
End: 2018-02-27

## 2018-02-27 VITALS
BODY MASS INDEX: 33.8 KG/M2 | HEART RATE: 59 BPM | HEIGHT: 64 IN | WEIGHT: 198 LBS | DIASTOLIC BLOOD PRESSURE: 79 MMHG | SYSTOLIC BLOOD PRESSURE: 189 MMHG

## 2018-02-27 DIAGNOSIS — I25.810 CORONARY ARTERY DISEASE INVOLVING CORONARY BYPASS GRAFT OF NATIVE HEART WITHOUT ANGINA PECTORIS: Primary | ICD-10-CM

## 2018-02-27 DIAGNOSIS — E78.5 DYSLIPIDEMIA: ICD-10-CM

## 2018-02-27 DIAGNOSIS — I48.0 PAF (PAROXYSMAL ATRIAL FIBRILLATION) (HCC): ICD-10-CM

## 2018-02-27 DIAGNOSIS — I10 ESSENTIAL HYPERTENSION: ICD-10-CM

## 2018-02-27 PROCEDURE — 99213 OFFICE O/P EST LOW 20 MIN: CPT | Performed by: INTERNAL MEDICINE

## 2018-02-27 RX ORDER — MULTIVITAMIN WITH IRON
250 TABLET ORAL DAILY
COMMUNITY
End: 2018-07-24

## 2018-02-27 RX ORDER — RANITIDINE 150 MG/1
150 TABLET ORAL 2 TIMES DAILY
COMMUNITY
End: 2018-08-20 | Stop reason: SDUPTHER

## 2018-02-27 NOTE — PROGRESS NOTES
OFFICE FOLLOW UP     Date of Encounter:2018     Name: Lorna Lo  : 1934  Address: [address] [facility] [address]  Phone: [phone]    PCP: Leonel Houston MD  [address] [facility] [address] [address]  [place], [place] [facility] [address]    Lorna Lo is a 83 y.o. female.    Chief Complaint: Follow up per Dr. Houston for new onset AFIB    Problem List:   1.  Coronary artery disease:   a.  Coronary artery bypass grafting, .  b. Left heart catheterization , at [facility], IDB.  Nonobstructive.    c. Increased  shortness of air, summer 2015.   d. LHC 2015- patent LIMA to LAD and SVG to RCA. No myocardium in jeopardy. Normal LVEF   2. Hypertension.   3. Paroxysmal atrial fibrillation, Winter 2017.  a. Chads-vasc=5, NOAC instituted by PCP.  4. Dyslipidemia.   5. Polymyositis.   6. Dependent edema.    7. CKD.   8. Chronic anemia, secondary to (CKD).   9. Osteopenia.    10. GERD.   11. History of DVT in .     Allergies   Allergen Reactions   • Lortab [Hydrocodone-Acetaminophen] Shortness Of Breath   • Methotrexate Derivatives      Multisystem toxicity, reported per PCP office notes   • Amoxicillin      Pt does not recall reaction, reported by daughter   • Azathioprine      Pt does not recall reaction, allergy reported per PCP office notes   • Cellcept [Mycophenolate]      Pt does not recall reaction, allergy reported per PCP office notes   • Contrast Dye Hives     INTRAVENOUS CONTRAST DYE.    • Fosamax [Alendronate]      Pt does not recall reaction, allergy reported per PCP office notes   • Hydrochlorothiazide      Unknown reaction, allergy reported per PCP office notes   • Lisinopril Cough   • Simvastatin Other (See Comments)     Sleep disturbance, reported per PCP office notes   • Chlorthalidone Rash       Current Medications:  •  amlodipine 5 mg by mouth daily  •  aspirin 81 MG EC by mouth daily.  •  atenolol 100 mg by mouth daily.  •  cholecalciferol 1,000 Units by mouth 2 (two)  "times a day  •  folic acid 1 MG by mouth daily.  •  levothyroxine 88 mcg by mouth daily.  •  Magnesium 250 mg by mouth Daily  •  prednisone 5 mg by mouth 2 (two) times a day.  •  ranitidine 150 mg by mouth 2 (Two) Times a Day., Disp: , Rfl:   •  rivaroxaban 20 mg by mouth Daily    History of Present Illness:   The patient comes in for the evaluation of recent asymptomatic atrial fibrillation noted in the offices of Leonel Houston M.D.  I do not have records or details of the patient's visit were AF was demonstrated.  At the time of dictation we are working on gathering this data.  She has had no angina or symptoms of heart failure.  She has no history of stroke or previously recognized embolic phenomenon.  The patient insists that at home and at other physician's offices her systolic blood pressures rarely over 140 but tells me that she has \"nervous\" about seeing me today.           The following portions of the patient's history were reviewed and updated as appropriate: allergies, current medications and problem list.    HPI: Pertinent positives as listed in the HPI.  All other systems reviewed and negative.    Objective:    Vitals:    02/27/18 1610 02/27/18 1614 02/27/18 1617   BP: (!) 193/80 (!) 185/83 (!) 189/79   BP Location: Right arm Right arm Right arm   Patient Position: Sitting Standing Sitting   Pulse: 62 67 59   Weight: 89.8 kg (198 lb)     Height: 162.6 cm (64\")     SBP Reportedly 140's at home, 144/76 in Dr. Scruggs's office 12/2017.    Physical Exam:  GENERAL: Alert, cooperative, in no acute distress.   HEENT: Fundoscopic deferred, otherwise unremarkable.  NECK: No Jugular venous distention, adenopathy, or thyromegaly noted.   HEART: Regular rhythm, normal rate, and no murmurs, gallops, or rubs.   LUNGS: Clear to auscultation bilaterally. No wheezing, rales or ronchi.  ABDOMEN: Flat without evidence of organomegaly, masses, or tenderness.  NEUROLOGIC: No focal abnormalities involving strength or " sensation are noted.   EXTREMITIES: No clubbing, cyanosis, or edema noted.     Diagnostic Data:    1/22/2018  CBC: Normal  CMP: Glu 134, BUN 16, Cr 1.07, Na 141, K 4.7, AST 57, ALT 52  TSH: 0.505  CRP: 4.9    Procedures    Assessment and Plan: #1: We will obtain data [facility] office in reference to her atrial fibrillation.  We agree with the institution of Xarelto.  The patient is having trouble affording this drug; therefore, we are giving her samples today and will help her apply for  an industry sponsored savings plan for people with lower incomes.  Should this be unsuccessful, warfarin will be the only viable alternative.  I discussed this with the patient in detail.                                         #2: Patient has some random glucoses that have been elevated; however, records demonstrated a normal hemoglobin A1c.  This is being followed by Dr. Houston.                                         #3: Because of a recent blood pressure of 144/76 in [facility] office and home blood pressure readings that are no higher than this, I will not change her antihypertensive regimen at the current time.  I would have a low threshold for the use                                             of ambulatory blood pressure monitoring should further very high pressures being noted in physicians' offices.    I, aPblito Cade MD, personally performed the services described as documented by the above named individual. I have made any necessary edits and it is both accurate and complete 2/27/2018  6:29 PM          Scribed for Pablito Cade MD by Mary Benosn RN. 02/27/2018 4:08 PM.        EMR Dragon/Transcription Disclaimer:  Much of this encounter note is an electronic transcription/translation of spoken language to printed text.  The electronic translation of spoken language may permit erroneous, or at times, nonsensical words or phrases to be inadvertently transcribed.  Although I have reviewed the note  for such errors, some may still exist.

## 2018-03-06 ENCOUNTER — TRANSCRIBE ORDERS (OUTPATIENT)
Dept: ADMINISTRATIVE | Facility: HOSPITAL | Age: 83
End: 2018-03-06

## 2018-03-06 DIAGNOSIS — N28.89 RENAL MASS: Primary | ICD-10-CM

## 2018-03-13 ENCOUNTER — APPOINTMENT (OUTPATIENT)
Dept: PHYSICAL THERAPY | Facility: HOSPITAL | Age: 83
End: 2018-03-13

## 2018-03-19 ENCOUNTER — HOSPITAL ENCOUNTER (OUTPATIENT)
Dept: PHYSICAL THERAPY | Facility: HOSPITAL | Age: 83
Setting detail: THERAPIES SERIES
Discharge: HOME | End: 2018-03-19

## 2018-03-19 DIAGNOSIS — S81.809D MULTIPLE OPEN WOUNDS OF LOWER EXTREMITY, UNSPECIFIED LATERALITY, SUBSEQUENT ENCOUNTER: ICD-10-CM

## 2018-03-19 DIAGNOSIS — I87.2 VENOUS STASIS DERMATITIS OF BOTH LOWER EXTREMITIES: Primary | ICD-10-CM

## 2018-03-19 DIAGNOSIS — R60.0 EDEMA OF BOTH LEGS: ICD-10-CM

## 2018-03-19 PROCEDURE — G8990 OTHER PT/OT CURRENT STATUS: HCPCS

## 2018-03-19 PROCEDURE — G8991 OTHER PT/OT GOAL STATUS: HCPCS

## 2018-03-19 PROCEDURE — 97597 DBRDMT OPN WND 1ST 20 CM/<: CPT

## 2018-03-19 PROCEDURE — 29580 STRAPPING UNNA BOOT: CPT

## 2018-03-19 NOTE — THERAPY PROGRESS REPORT/RE-CERT
Outpatient Rehabilitation - Wound/Debridement Progress Note  [facility]     Patient Name: Lorna Lo  : 1934  MRN: [id number]  Today's Date: 3/19/2018                 Admit Date: 3/19/2018    Visit Dx:    ICD-10-CM ICD-9-CM   1. Venous stasis dermatitis of both lower extremities I87.2 454.1   2. Multiple open wounds of lower extremity, unspecified laterality, subsequent encounter S81.809D V58.89     894.0   3. Edema of both legs R60.0 782.3        Patient Active Problem List   Diagnosis   • Coronary artery disease   • Hypertension   • Dyslipidemia   • Polymyositis   • Dependent edema   • CKD (chronic kidney disease)   • Chronic anemia   • Osteopenia   • GERD (gastroesophageal reflux disease)   • PAF (paroxysmal atrial fibrillation)        Past Medical History:   Diagnosis Date   • Chronic anemia     Chronic anemia, secondary to (CKD).    • CKD (chronic kidney disease)    • Coronary artery disease    • Dependent edema    • DVT (deep venous thrombosis)     History of DVT in .    • Dyslipidemia    • GERD (gastroesophageal reflux disease)    • Hypertension    • Osteopenia    • Polymyositis         No past surgical history on file.      EVALUATION        PT Ortho     Row Name 18 7883       Subjective Comments    Subjective Comments Pt states she has been using the unna boots on reddened areas, then cast padding and compressogrips, changing once/week or if her legs start to feel uncomfortable.  States she scrubbed at her L ankle with a washcloth and had more skin irritation.  -JM       Subjective Pain    Able to rate subjective pain? yes  -JM    Pre-Treatment Pain Level 0  -JM    Post-Treatment Pain Level 0  -JM       RLE Quick Girth (cm)    Met-heads 21.5 cm  -JM    Mid foot 21 cm  -JM    Smallest ankle 19.2 cm  -JM    Largest calf 32.5 cm  -JM    Tib tuberosity 33.7 cm  -JM       LLE Quick Girth (cm)    Met-heads 22 cm  -JM    Mid foot 20.3 cm  -JM    Smallest ankle 19.7 cm  -JM     Largest calf 37.5 cm  -JM    Tib tuberosity 35.5 cm  -JM       Transfers    Sit-Stand [place] (Transfers) independent  -JM    Stand-Sit [place] (Transfers) independent  -JM    Comment (Transfers) seated in arjo chair for tx  -JM       Gait/Stairs Assessment/Training    Assistive Device (Gait) walker, 4-wheeled  -JM      User Key  (r) = Recorded By, (t) = Taken By, (c) = Cosigned By    Initials Name Provider Type    ALENA Baca, PT Physical Therapist                    LDA Wound     Row Name 03/19/18 1345             Wound 01/13/17 0930 Bilateral lower leg excoriation    Wound - Properties Group Date first assessed: 01/13/17  - Time first assessed: 0930  - Side: Bilateral  - Orientation: lower  -MC Location: leg  -MC Type: excoriation  -MC    Wound WDL ex   redness, drainage  -JM      Dressing Appearance intact;moist drainage  -JM      Base reddened;moist   medial RLE and medial L ankle  -JM      Periwound Area redness;intact;dry;warm   hypertrophic crusting of LLE  -JM      Edges other (see comments)   no clear edges  -JM      Length (cm) 13   LLE;  R medial 3cm x 2cm  -JM      Width (cm) 15  -JM      Drainage Characteristics/Odor serous;no odor  -JM      Drainage Amount small  -JM      Picture taken yes  -JM      Wound Cleaning cleansed with;other (see comments)   phase one, theraworx  -JM      Wound Interventions debrided  -JM      Dressing other (see comments)   unna boot  -JM      Periwound Care cleansed with pH balanced cleanser;dry periwound area maintained;barrier ointment applied  -        User Key  (r) = Recorded By, (t) = Taken By, (c) = Cosigned By    Initials Name Provider Type     Alis Johnson, PT Physical Therapist     Dayami Baca, PT Physical Therapist              Lymphedema     Row Name 03/19/18 1345             Lymphedema Edema Assessment    Ptting Edema Category By severity  -      Pitting Edema Other (comment)   trace  -         Skin  Changes/Observations    Lower Extremity Conditions bilateral:;shiny;hairless;scaly;crust;inflamed;fragile  -JM      Lower Extremity Color/Pigment bilateral:;red  -JM         Lymphedema Pulses/Capillary Refill    Lower Extremity Capillary Refill right:;left:;less than 3 seconds  -JM         Compression/Skin Care    Compression/Skin Care skin care;wrapping location;bandaging  -JM      Skin Care washed/dried;lotion applied   theraworx, eucerin/z-guard mix  -JM      Wrapping Location lower extremity  -JM      Wrapping Location LE bilateral:;foot to knee  -JM      Wrapping Comments unna boot, cast padding, compressogrips  -JM      Bandage Layers cotton elastic stocking- double layer (comment size)   size 3 foot/ankle, size 4 on calf  -        User Key  (r) = Recorded By, (t) = Taken By, (c) = Cosigned By    Initials Name Provider Type    ALENA Baca, PT Physical Therapist          WOUND DEBRIDEMENT  Debridement Site 1  Location- Site 1: LLE  Selective Debridement- Site 1: Wound Surface <20cmsq  Instruments- Site 1: tweezers  Excised Tissue Description- Site 1: moderate, other (comment) (hypertrophic crusting)  Bleeding- Site 1: none                   Therapy Education     Row Name 03/19/18 5106             Therapy Education    Education Details Pt to avoid any scrubbing of BLE when washing legs.  Continue with use of z-guard and unna boot to red inflamed areas.  -JM      Given Symptoms/condition management;Bandaging/dressing change  -JM      Program Reinforced  -ALENA      How Provided Verbal;Demonstration  -JM      Provided to Patient  -JM      Level of Understanding Verbalized  -ALENA        User Key  (r) = Recorded By, (t) = Taken By, (c) = Cosigned By    Initials Name Provider Type    ALENA Baca, PT Physical Therapist          Recommendation and Plan        PT Assessment/Plan     Row Name 03/19/18 8387          PT Assessment    Functional Limitations Performance in self-care ADL  -JM     Impairments  Integumentary integrity;Edema  -     Assessment Comments Pt with increase in excoriation to bilateral medial ankles, likely from pt scrubbing at skin when washing legs.  LLE with signs/symptoms of dermatitis, may benefit from use of steriod cream to reduce inflammation short-term.  PT will contact MD office for recommendations.  Pt will continue to require skilled PT for debridement and unna boot or multilayer wrapping to promote improvement in skin integrity.  -     Rehab Potential Fair  -     Patient/caregiver participated in establishment of treatment plan and goals Yes  -     Patient would benefit from skilled therapy intervention Yes  -        PT Plan    PT Frequency Other (comment)   every 2-3 weeks, limited by transportation needs  -     Predicted Duration of Therapy Intervention (OT Eval) 4 visits  -     Planned CPT's? PT UNNA BOOT: 85275;PT MULTI LAYER COMP SYS LE;PT JUAN DEBRIDE OPEN WOUND UP TO 20 CM: 67266;PT JUAN DEBRIDE OPEN WOUND EA ADD 20 CM: 77977;PT NONSELECT DEBRIDE 15 MIN: 50944  -     Physical Therapy Interventions (Optional Details) patient/family education;wound care  -     PT Plan Comments unna boot/MLW  -        Clinical Impression    Predicted Duration of Therapy Intervention (days/wks) 4 visits  -       User Key  (r) = Recorded By, (t) = Taken By, (c) = Cosigned By    Initials Name Provider Type    ALENA Baca, PT Physical Therapist          Goals        PT OP Goals     Row Name 18 1345          PT Short Term Goals    STG 1 Decrease wound dimensions by 50% as evidence of wound healing.  -     STG 1 Progress Met  -     STG 2 Decrease area of excoriation by 50% as evidence of wound healing.  -     STG 2 Progress Ongoing  -        Long Term Goals    LTG 1 Patient independent and compliant with use and care of compression wraps or stockings, with assistance of family / caregiver as indicated to promote self-care independence.  -     LTG 1  Progress Partially Met  -     LTG 2 Decrease area of excoriation by 75% as evidence of wound healing.  -     LTG 2 Progress Ongoing  -     LTG 3 Decrease limb circumferences by 2 cm to promote improved skin integrity and circulation.  -     LTG 3 Progress Met  -     LTG 4 Demonstrate no drainage from BLE to indicate healing progress.  -     LTG 4 Progress Ongoing  -        Time Calculation    PT Goal Re-Cert Due Date 06/17/18  -       User Key  (r) = Recorded By, (t) = Taken By, (c) = Cosigned By    Initials Name Provider Type    ALENA Baca, PT Physical Therapist          Time Calculation: Start Time: 1345    Therapy Charges for Today     Code Description Service Date Service Provider Modifiers Qty    90804890884 HC PT OTHER PRIME FUNCT CURRENT 3/19/2018 Dayami Baca, PT GP, CJ 1    38043135506 HC PT OTHER PRIME FUNCT PROJECTED 3/19/2018 Dayami Baca, PT GP, CI 1    29640087098 HC JUAN DEBRIDE OPEN WOUND UP TO 20CM 3/19/2018 Dayami Baca, PT 59, GP 1    36751244248 HC PT STAPPING UNNA BOOT 3/19/2018 Dayami Baca, PT GP 1          PT G-Codes  PT Professional Judgement Used?: Yes  Outcome Measure Options: BWAT (Cummings-Manzo Wound Assess Tool)  Score: 25% impairment in wound/edema management  Functional Limitation: Other PT primary  Other PT Primary Current Status (): At least 20 percent but less than 40 percent impaired, limited or restricted  Other PT Primary Goal Status (): At least 1 percent but less than 20 percent impaired, limited or restricted     Dayami Baca, PT  3/19/2018

## 2018-03-23 ENCOUNTER — HOSPITAL ENCOUNTER (OUTPATIENT)
Dept: CT IMAGING | Facility: HOSPITAL | Age: 83
End: 2018-03-23
Attending: INTERNAL MEDICINE

## 2018-04-10 ENCOUNTER — APPOINTMENT (OUTPATIENT)
Dept: PHYSICAL THERAPY | Facility: HOSPITAL | Age: 83
End: 2018-04-10

## 2018-04-11 ENCOUNTER — HOSPITAL ENCOUNTER (OUTPATIENT)
Dept: PHYSICAL THERAPY | Facility: HOSPITAL | Age: 83
Setting detail: THERAPIES SERIES
Discharge: HOME | End: 2018-04-11

## 2018-04-11 DIAGNOSIS — R60.0 EDEMA OF BOTH LEGS: ICD-10-CM

## 2018-04-11 DIAGNOSIS — I87.2 VENOUS STASIS DERMATITIS OF BOTH LOWER EXTREMITIES: Primary | ICD-10-CM

## 2018-04-11 PROCEDURE — G8990 OTHER PT/OT CURRENT STATUS: HCPCS | Performed by: PHYSICAL THERAPIST

## 2018-04-11 PROCEDURE — 29580 STRAPPING UNNA BOOT: CPT | Performed by: PHYSICAL THERAPIST

## 2018-04-11 PROCEDURE — G8991 OTHER PT/OT GOAL STATUS: HCPCS | Performed by: PHYSICAL THERAPIST

## 2018-04-11 PROCEDURE — 97597 DBRDMT OPN WND 1ST 20 CM/<: CPT | Performed by: PHYSICAL THERAPIST

## 2018-04-11 NOTE — THERAPY TREATMENT NOTE
Outpatient Rehabilitation - Wound/Debridement Treatment Note   [facility]     Patient Name: Lorna Lo  : 1934  MRN: [id number]  Today's Date: 2018                 Admit Date: 2018    Visit Dx:    ICD-10-CM ICD-9-CM   1. Venous stasis dermatitis of both lower extremities I87.2 454.1   2. Edema of both legs R60.0 782.3       Patient Active Problem List   Diagnosis   • Coronary artery disease   • Hypertension   • Dyslipidemia   • Polymyositis   • Dependent edema   • CKD (chronic kidney disease)   • Chronic anemia   • Osteopenia   • GERD (gastroesophageal reflux disease)   • PAF (paroxysmal atrial fibrillation)        Past Medical History:   Diagnosis Date   • Chronic anemia     Chronic anemia, secondary to (CKD).    • CKD (chronic kidney disease)    • Coronary artery disease    • Dependent edema    • DVT (deep venous thrombosis) 2009    History of DVT in .    • Dyslipidemia    • GERD (gastroesophageal reflux disease)    • Hypertension    • Osteopenia    • Polymyositis         No past surgical history on file.                LDA Wound     Row Name 18 1455             Wound 17 0930 Bilateral lower leg excoriation    Wound - Properties Group Date first assessed: 17  - Time first assessed: 930  - Side: Bilateral  - Orientation: lower  - Location: leg  - Type: excoriation  -MC    Wound WDL ex   redness, drainage  -MW      Dressing Appearance intact;dried drainage  -MW      Base reddened;moist   scant areas with open wounds   -MW      Periwound Area redness;intact;dry   hypertrophic skin crusts L>R  -MW      Edges open   several very small areas (0.2cm)   -MW      Length (cm) 3   Lt lateral area of several open wounds; wounds 0.2 x0.2;  -MW      Width (cm) 3  -MW      Depth (cm) 0.1  -MW      Drainage Characteristics/Odor serous;no odor  -MW      Drainage Amount small  -MW      Picture taken yes  -MW      Wound Cleaning cleansed with;other (see comments)  "  Theraworx foam & wipes   -MW      Wound Interventions debrided   topical: antifungal mixed with z guard to red areas   -MW      Dressing Dressing applied;other (see comments);gauze   Unnaboot, 4\" conform roll; compressogrip MLW   -MW      Periwound Care cleansed with pH balanced cleanser;dry periwound area maintained;topical treatment applied   antifungal mixed with z guard   -MW        User Key  (r) = Recorded By, (t) = Taken By, (c) = Cosigned By    Initials Name Provider Type    MW Meenakshi Silva, PT Physical Therapist     Alis Johnson, PT Physical Therapist              Lymphedema     Row Name 04/11/18 3367          Able to rate subjective pain? yes  -MW    Pre-Treatment Pain Level 0  -MW    Post-Treatment Pain Level 0  -MW    Subjective Pain Comment itches  -MW          Subjective Comments Reports continues to rewrap legs about weekly unless feels like it needs to be changed sooner. Wraps are wrinkled from itching during the night   -MW          Ptting Edema Category By severity  -MW    Pitting Edema Other (comment)   trace  -MW    Edema Assessment Comment Rt> Lt   -MW          Lower Extremity Conditions bilateral:;hairless;scaly;crust;inflamed;fragile  -MW    Lower Extremity Color/Pigment bilateral:;red;blanchable;hyperpigmented  -MW          Lower Extremity Capillary Refill right:;left:;less than 3 seconds  -MW          Measurement Type(s) Quick Girth  -MW    Quick Girth Areas Lower extremities  -MW          Met-heads 22.2 cm  -MW    Mid foot 22 cm  -MW    Smallest ankle 19.6 cm  -MW    Largest calf 33.3 cm  -MW    Tib tuberosity 34.4 cm  -MW          Met-heads 21.5 cm  -MW    Mid foot 21.5 cm  -MW    Smallest ankle 19.5 cm  -MW    Largest calf 37 cm  -MW    Tib tuberosity 35.5 cm  -MW    RLE Quick Girth Total 135  -MW          Compression/Skin Care skin care;wrapping location;bandaging  -MW    Skin Care washed/dried;lotion applied   theraworx: z-guard & antifungal to red; Eucerin to prox calf  -MW " "   Wrapping Location lower extremity  -MW    Wrapping Location LE bilateral:;foot to knee  -MW    Wrapping Comments unnaboot; 4\" conform roll; size 4 compressogrip   -MW    Bandage Layers cotton elastic stocking- single layer (comment size);cotton elastic stocking- double layer (comment size)   sz 4 doubled on foot/ ankle   -MW      User Key  (r) = Recorded By, (t) = Taken By, (c) = Cosigned By    Initials Name Provider Type    VJ Silva PT Physical Therapist          WOUND DEBRIDEMENT  Debridement Site 1  Location- Site 1: LLE  Selective Debridement- Site 1: Wound Surface <20cmsq  Instruments- Site 1: tweezers  Excised Tissue Description- Site 1: moderate, other (comment) (dry skin crusts/ hypertrophic skin debris )  Bleeding- Site 1: none                   Therapy Education     Row Name 04/11/18 1452             Therapy Education    Education Details Cont gentle skin care; change weekly or prn drainage   -MW      Given Symptoms/condition management;Bandaging/dressing change  -MW      Program Reinforced  -MW      How Provided Verbal;Demonstration  -MW      Provided to Patient  -MW      Level of Understanding Verbalized  -MW        User Key  (r) = Recorded By, (t) = Taken By, (c) = Cosigned By    Initials Name Provider Type    VJ Silva PT Physical Therapist          Recommendation and Plan        PT Assessment/Plan     Row Name 04/11/18 8668          PT Assessment    Functional Limitations Performance in self-care ADL  -MW     Impairments Integumentary integrity;Edema  -MW     Assessment Comments BLE display improved skin color and quality; less erythema and s/s of dermatitis. LLE with several scattered open areas but each area only 0.2 x 0.2 cm over approx 3 cm2 area. Pt reports intermittent itching; unsure of what brings on itching, but feels good right after new unnaboots in place. Pt has met or partially met several goals with decrease in open areas.   -MW     Rehab Potential Fair  -MW     " Patient/caregiver participated in establishment of treatment plan and goals Yes  -MW     Patient would benefit from skilled therapy intervention Yes  -MW        PT Plan    PT Frequency Other (comment)   q 2-3 weeks; 1-2 x month   -MW     Predicted Duration of Therapy Intervention (OT Eval) 4 visits   -MW     Planned CPT's? PT UNNA BOOT: 65505;PT MULTI LAYER COMP SYS LE;PT JUAN DEBRIDE OPEN WOUND UP TO 20 CM: 72005;PT JUAN DEBRIDE OPEN WOUND EA ADD 20 CM: 53574;PT NONSELECT DEBRIDE 15 MIN: 73466  -MW     Physical Therapy Interventions (Optional Details) wound care;patient/family education  -MW     PT Plan Comments select debridement prn; unna boot/MLW  -MW       User Key  (r) = Recorded By, (t) = Taken By, (c) = Cosigned By    Initials Name Provider Type    MW Meenakshi Silva, PT Physical Therapist          Goals        PT OP Goals     Row Name 18 1455       PT Short Term Goals    STG 1 Decrease wound dimensions by 50% as evidence of wound healing.  -MW    STG 1 Progress Met  -MW    STG 2 Decrease area of excoriation by 50% as evidence of wound healing.  -MW    STG 2 Progress Met  -MW       Long Term Goals    LTG 1 Patient independent and compliant with use and care of compression wraps or stockings, with assistance of family / caregiver as indicated to promote self-care independence.  -MW    LTG 1 Progress Partially Met  -MW    LTG 2 Decrease area of excoriation by 75% as evidence of wound healing.  -MW    LTG 2 Progress Partially Met;Progressing  -MW    LTG 3 Decrease limb circumferences by 2 cm to promote improved skin integrity and circulation.  -MW    LTG 3 Progress Met  -MW    LTG 4 Demonstrate no drainage from BLE to indicate healing progress.  -MW    LTG 4 Progress Ongoing;Partially Met  -MW    LTG 4 Progress Comments RLE without drainage; LLE with min drainage   -MW       Time Calculation    PT Goal Re-Cert Due Date  --      User Key  (r) = Recorded By, (t) = Taken By, (c) = Cosigned By     Initials Name Provider Type    MW Meenakshi Silva PT Physical Therapist            Time Calculation: Start Time: 1455    Therapy Charges for Today     Code Description Service Date Service Provider Modifiers Qty    88577841627 HC PT OTHER PRIME FUNCT CURRENT 4/11/2018 Meenakshi Silva, PT GP, CJ 1    84817497029 HC PT OTHER PRIME FUNCT PROJECTED 4/11/2018 Meenakshi Silva, PT GP, CI 1    79797893277 HC JUAN DEBRIDE OPEN WOUND UP TO 20CM 4/11/2018 Meenakshi Silva, PT GP 1    25746186352 HC PT STAPPING UNNA BOOT 4/11/2018 Meenakshi Silva, PT GP 1          PT G-Codes  PT Professional Judgement Used?: Yes  Outcome Measure Options: BWAT (Cummings-Manzo Wound Assess Tool)  Score: 20% impairement in wound/ edema management   Functional Limitation: Other PT primary  Other PT Primary Current Status (): At least 20 percent but less than 40 percent impaired, limited or restricted  Other PT Primary Goal Status (): At least 1 percent but less than 20 percent impaired, limited or restricted     Meenakshi Silva PT  4/11/2018

## 2018-04-12 ENCOUNTER — HOSPITAL ENCOUNTER (OUTPATIENT)
Dept: CT IMAGING | Facility: HOSPITAL | Age: 83
Discharge: HOME | End: 2018-04-12
Attending: INTERNAL MEDICINE | Admitting: INTERNAL MEDICINE

## 2018-04-12 DIAGNOSIS — N28.89 RENAL MASS: ICD-10-CM

## 2018-04-12 PROCEDURE — 74176 CT ABD & PELVIS W/O CONTRAST: CPT

## 2018-04-12 PROCEDURE — 82565 ASSAY OF CREATININE: CPT

## 2018-04-13 LAB — CREAT BLDA-MCNC: 1.4 MG/DL (ref 0.6–1.3)

## 2018-04-20 ENCOUNTER — OFFICE VISIT (OUTPATIENT)
Dept: INTERNAL MEDICINE | Facility: CLINIC | Age: 83
End: 2018-04-20

## 2018-04-20 VITALS
DIASTOLIC BLOOD PRESSURE: 72 MMHG | BODY MASS INDEX: 33.12 KG/M2 | HEART RATE: 61 BPM | SYSTOLIC BLOOD PRESSURE: 158 MMHG | HEIGHT: 64 IN | WEIGHT: 194 LBS | OXYGEN SATURATION: 99 %

## 2018-04-20 DIAGNOSIS — I48.0 PAF (PAROXYSMAL ATRIAL FIBRILLATION) (HCC): ICD-10-CM

## 2018-04-20 DIAGNOSIS — R60.9 DEPENDENT EDEMA: ICD-10-CM

## 2018-04-20 DIAGNOSIS — I10 ESSENTIAL HYPERTENSION: Primary | ICD-10-CM

## 2018-04-20 DIAGNOSIS — N18.9 CHRONIC KIDNEY DISEASE, UNSPECIFIED CKD STAGE: ICD-10-CM

## 2018-04-20 DIAGNOSIS — M33.20 POLYMYOSITIS (HCC): ICD-10-CM

## 2018-04-20 PROCEDURE — 99214 OFFICE O/P EST MOD 30 MIN: CPT | Performed by: PHYSICIAN ASSISTANT

## 2018-04-20 RX ORDER — AMLODIPINE BESYLATE 10 MG/1
10 TABLET ORAL DAILY
Start: 2018-04-20 | End: 2018-07-27 | Stop reason: SDUPTHER

## 2018-04-20 NOTE — PROGRESS NOTES
Chief Complaint   Patient presents with   • Establish Care     Dr. Scruggs's pt, immunization and DEXA report requested       Subjective   Lorna Lo is a 83 y.o. female.       History of Present Illness     Pt is here to establish care. She had previously been a patient of Dr Houston. Most recently had eval of kidney cyst that was determined to be stable and unchanged from 2016.    In December she was diagnosed with paroxysmal atrial fibrillation. She is currently on xarelto and takes it daily.    She is currently seeing wound care for management of her LE edema and venous stasis dermatitis. She is changing the bandages herself now and going to PT every 3 weeks.    Her blood pressure has been 140s/70s everyday at home, never has any elevated readings except when she is in a MD office.     On long term prednisone and has history of osteoporosis. Never had treatment.     Sees Dr Galindo for polymyositis. Sees [facility] for monitoring of her kidneys.      Current Outpatient Prescriptions:   •  amLODIPine (NORVASC) 10 MG tablet, Take 1 tablet by mouth Daily., Disp: , Rfl:   •  aspirin 81 MG EC tablet, Take 81 mg by mouth daily., Disp: , Rfl:   •  atenolol (TENORMIN) 100 MG tablet, Take 100 mg by mouth daily., Disp: , Rfl:   •  cholecalciferol (VITAMIN D3) 1000 UNITS tablet, Take 1,000 Units by mouth 2 (two) times a day., Disp: , Rfl:   •  folic acid (FOLVITE) 1 MG tablet, Take 1 mg by mouth daily., Disp: , Rfl:   •  levothyroxine (SYNTHROID, LEVOTHROID) 88 MCG tablet, Take 88 mcg by mouth daily., Disp: , Rfl:   •  Magnesium 250 MG tablet, Take 250 mg by mouth Daily., Disp: , Rfl:   •  predniSONE (DELTASONE) 5 MG tablet, Take 5 mg by mouth 2 (two) times a day., Disp: , Rfl:   •  raNITIdine (ZANTAC) 150 MG tablet, Take 150 mg by mouth 2 (Two) Times a Day., Disp: , Rfl:   •  rivaroxaban (XARELTO) 20 MG tablet, Take 1 tablet by mouth Daily., Disp: 30 tablet, Rfl: 1     PMFSH  The following portions of the  "patient's history were reviewed and updated as appropriate: allergies, current medications, past family history, past medical history, past social history, past surgical history and problem list.    Review of Systems   Constitutional: Negative for activity change, appetite change and fatigue.   HENT: Negative for congestion and rhinorrhea.    Respiratory: Negative for chest tightness and shortness of breath.    Cardiovascular: Negative for chest pain and palpitations.   Gastrointestinal: Negative for abdominal pain.   Genitourinary: Negative for dysuria.   Musculoskeletal: Negative for arthralgias and myalgias.   Neurological: Negative for dizziness, weakness, light-headedness and headaches.   Psychiatric/Behavioral: Negative for dysphoric mood. The patient is not nervous/anxious.        Objective   /72   Pulse 61   Ht 162.6 cm (64\")   Wt 88 kg (194 lb)   LMP  (LMP Unknown)   SpO2 99%   BMI 33.30 kg/m²     Physical Exam   Constitutional: She appears well-developed and well-nourished.   HENT:   Head: Normocephalic.   Right Ear: Hearing, tympanic membrane, external ear and ear canal normal.   Left Ear: Hearing, tympanic membrane, external ear and ear canal normal.   Nose: Nose normal.   Mouth/Throat: Oropharynx is clear and moist.   Eyes: Conjunctivae are normal. Pupils are equal, round, and reactive to light.   Neck: Normal range of motion.   Cardiovascular: Normal rate, regular rhythm and normal heart sounds.    Pulmonary/Chest: Effort normal and breath sounds normal. She has no decreased breath sounds. She has no wheezes. She has no rhonchi. She has no rales.   Musculoskeletal: Normal range of motion.   Neurological: She is alert.   Skin: Skin is warm and dry.   Psychiatric: She has a normal mood and affect. Her behavior is normal.   Nursing note and vitals reviewed.      Results for orders placed or performed during the hospital encounter of 04/12/18   POC Creatinine   Result Value Ref Range    " Creatinine 1.40 (H) 0.60 - 1.30 mg/dL        ASSESSMENT/PLAN    Problem List Items Addressed This Visit        Cardiovascular and Mediastinum    Hypertension - Primary     Hypertension is unchanged.  Continue current treatment regimen.  Dietary sodium restriction.  Weight loss.  Regular aerobic exercise.  Blood pressure will be reassessed at the next regular appointment.         Relevant Medications    amLODIPine (NORVASC) 10 MG tablet    PAF (paroxysmal atrial fibrillation)     Rate controlled. Stable on daily xarelto.         Relevant Medications    amLODIPine (NORVASC) 10 MG tablet       Musculoskeletal and Integument    Polymyositis     Stable with daily dose of prednisone per Dr Galindo.            Other    Dependent edema     Stable with wound care management.         CKD (chronic kidney disease)     Followed by [facility].           Other Visit Diagnoses    None.              Return in about 3 months (around 7/20/2018) for Medicare Wellness with fasting labs.

## 2018-04-24 NOTE — ASSESSMENT & PLAN NOTE
Hypertension is unchanged.  Continue current treatment regimen.  Dietary sodium restriction.  Weight loss.  Regular aerobic exercise.  Blood pressure will be reassessed at the next regular appointment.

## 2018-04-30 ENCOUNTER — HOSPITAL ENCOUNTER (OUTPATIENT)
Dept: PHYSICAL THERAPY | Facility: HOSPITAL | Age: 83
Setting detail: THERAPIES SERIES
Discharge: HOME | End: 2018-04-30

## 2018-04-30 DIAGNOSIS — R60.0 EDEMA OF BOTH LEGS: ICD-10-CM

## 2018-04-30 DIAGNOSIS — I87.2 VENOUS STASIS DERMATITIS OF BOTH LOWER EXTREMITIES: Primary | ICD-10-CM

## 2018-04-30 DIAGNOSIS — S81.809D MULTIPLE OPEN WOUNDS OF LOWER EXTREMITY, UNSPECIFIED LATERALITY, SUBSEQUENT ENCOUNTER: ICD-10-CM

## 2018-04-30 PROCEDURE — G8991 OTHER PT/OT GOAL STATUS: HCPCS

## 2018-04-30 PROCEDURE — 29581 APPL MULTLAYER CMPRN SYS LEG: CPT

## 2018-04-30 PROCEDURE — 97597 DBRDMT OPN WND 1ST 20 CM/<: CPT

## 2018-04-30 PROCEDURE — G8990 OTHER PT/OT CURRENT STATUS: HCPCS

## 2018-05-20 ENCOUNTER — HOSPITAL ENCOUNTER (EMERGENCY)
Facility: HOSPITAL | Age: 83
Discharge: HOME | End: 2018-05-20
Attending: EMERGENCY MEDICINE | Admitting: EMERGENCY MEDICINE

## 2018-05-20 ENCOUNTER — APPOINTMENT (OUTPATIENT)
Dept: CT IMAGING | Facility: HOSPITAL | Age: 83
End: 2018-05-20

## 2018-05-20 ENCOUNTER — APPOINTMENT (OUTPATIENT)
Dept: GENERAL RADIOLOGY | Facility: HOSPITAL | Age: 83
End: 2018-05-20

## 2018-05-20 VITALS
RESPIRATION RATE: 20 BRPM | OXYGEN SATURATION: 95 % | HEART RATE: 50 BPM | TEMPERATURE: 97.6 F | WEIGHT: 196 LBS | DIASTOLIC BLOOD PRESSURE: 83 MMHG | BODY MASS INDEX: 32.65 KG/M2 | SYSTOLIC BLOOD PRESSURE: 186 MMHG | HEIGHT: 65 IN

## 2018-05-20 DIAGNOSIS — I10 ESSENTIAL HYPERTENSION: ICD-10-CM

## 2018-05-20 DIAGNOSIS — R06.01 ORTHOPNEA: Primary | ICD-10-CM

## 2018-05-20 LAB
ALBUMIN SERPL-MCNC: 4.3 G/DL (ref 3.2–4.8)
ALBUMIN/GLOB SERPL: 1.3 G/DL (ref 1.5–2.5)
ALP SERPL-CCNC: 68 U/L (ref 25–100)
ALT SERPL W P-5'-P-CCNC: 42 U/L (ref 7–40)
ANION GAP SERPL CALCULATED.3IONS-SCNC: 9 MMOL/L (ref 3–11)
AST SERPL-CCNC: 49 U/L (ref 0–33)
BASOPHILS # BLD AUTO: 0.05 10*3/MM3 (ref 0–0.2)
BASOPHILS NFR BLD AUTO: 0.6 % (ref 0–1)
BILIRUB SERPL-MCNC: 0.5 MG/DL (ref 0.3–1.2)
BNP SERPL-MCNC: 183 PG/ML (ref 0–100)
BUN BLD-MCNC: 15 MG/DL (ref 9–23)
BUN/CREAT SERPL: 15 (ref 7–25)
CALCIUM SPEC-SCNC: 9.5 MG/DL (ref 8.7–10.4)
CHLORIDE SERPL-SCNC: 109 MMOL/L (ref 99–109)
CO2 SERPL-SCNC: 25 MMOL/L (ref 20–31)
CREAT BLD-MCNC: 1 MG/DL (ref 0.6–1.3)
DEPRECATED RDW RBC AUTO: 46.2 FL (ref 37–54)
EOSINOPHIL # BLD AUTO: 0.23 10*3/MM3 (ref 0–0.3)
EOSINOPHIL NFR BLD AUTO: 2.9 % (ref 0–3)
ERYTHROCYTE [DISTWIDTH] IN BLOOD BY AUTOMATED COUNT: 13.4 % (ref 11.3–14.5)
ERYTHROCYTE [SEDIMENTATION RATE] IN BLOOD: 32 MM/HR (ref 0–30)
GFR SERPL CREATININE-BSD FRML MDRD: 53 ML/MIN/1.73
GLOBULIN UR ELPH-MCNC: 3.2 GM/DL
GLUCOSE BLD-MCNC: 96 MG/DL (ref 70–100)
HCT VFR BLD AUTO: 41.3 % (ref 34.5–44)
HGB BLD-MCNC: 13.4 G/DL (ref 11.5–15.5)
HOLD SPECIMEN: NORMAL
HOLD SPECIMEN: NORMAL
IMM GRANULOCYTES # BLD: 0.02 10*3/MM3 (ref 0–0.03)
IMM GRANULOCYTES NFR BLD: 0.3 % (ref 0–0.6)
LYMPHOCYTES # BLD AUTO: 1.91 10*3/MM3 (ref 0.6–4.8)
LYMPHOCYTES NFR BLD AUTO: 24 % (ref 24–44)
MCH RBC QN AUTO: 30.5 PG (ref 27–31)
MCHC RBC AUTO-ENTMCNC: 32.4 G/DL (ref 32–36)
MCV RBC AUTO: 94.1 FL (ref 80–99)
MONOCYTES # BLD AUTO: 1.09 10*3/MM3 (ref 0–1)
MONOCYTES NFR BLD AUTO: 13.7 % (ref 0–12)
NEUTROPHILS # BLD AUTO: 4.69 10*3/MM3 (ref 1.5–8.3)
NEUTROPHILS NFR BLD AUTO: 58.8 % (ref 41–71)
PLATELET # BLD AUTO: 161 10*3/MM3 (ref 150–450)
PMV BLD AUTO: 10.8 FL (ref 6–12)
POTASSIUM BLD-SCNC: 4 MMOL/L (ref 3.5–5.5)
PROT SERPL-MCNC: 7.5 G/DL (ref 5.7–8.2)
RBC # BLD AUTO: 4.39 10*6/MM3 (ref 3.89–5.14)
SODIUM BLD-SCNC: 143 MMOL/L (ref 132–146)
TROPONIN I SERPL-MCNC: 0.02 NG/ML (ref 0–0.07)
TROPONIN I SERPL-MCNC: 0.02 NG/ML (ref 0–0.07)
WBC NRBC COR # BLD: 7.97 10*3/MM3 (ref 3.5–10.8)
WHOLE BLOOD HOLD SPECIMEN: NORMAL
WHOLE BLOOD HOLD SPECIMEN: NORMAL

## 2018-05-20 PROCEDURE — 71045 X-RAY EXAM CHEST 1 VIEW: CPT

## 2018-05-20 PROCEDURE — 99284 EMERGENCY DEPT VISIT MOD MDM: CPT

## 2018-05-20 PROCEDURE — 85025 COMPLETE CBC W/AUTO DIFF WBC: CPT | Performed by: EMERGENCY MEDICINE

## 2018-05-20 PROCEDURE — 80053 COMPREHEN METABOLIC PANEL: CPT | Performed by: EMERGENCY MEDICINE

## 2018-05-20 PROCEDURE — 93005 ELECTROCARDIOGRAM TRACING: CPT

## 2018-05-20 PROCEDURE — 83880 ASSAY OF NATRIURETIC PEPTIDE: CPT | Performed by: EMERGENCY MEDICINE

## 2018-05-20 PROCEDURE — 85652 RBC SED RATE AUTOMATED: CPT | Performed by: EMERGENCY MEDICINE

## 2018-05-20 PROCEDURE — 93005 ELECTROCARDIOGRAM TRACING: CPT | Performed by: EMERGENCY MEDICINE

## 2018-05-20 PROCEDURE — 84484 ASSAY OF TROPONIN QUANT: CPT

## 2018-05-20 PROCEDURE — 70450 CT HEAD/BRAIN W/O DYE: CPT

## 2018-05-20 RX ORDER — CLONIDINE HYDROCHLORIDE 0.1 MG/1
0.1 TABLET ORAL ONCE
Status: COMPLETED | OUTPATIENT
Start: 2018-05-20 | End: 2018-05-20

## 2018-05-20 RX ORDER — SODIUM CHLORIDE 0.9 % (FLUSH) 0.9 %
10 SYRINGE (ML) INJECTION AS NEEDED
Status: DISCONTINUED | OUTPATIENT
Start: 2018-05-20 | End: 2018-05-20 | Stop reason: HOSPADM

## 2018-05-20 RX ADMIN — CLONIDINE HYDROCHLORIDE 0.1 MG: 0.1 TABLET ORAL at 04:49

## 2018-05-20 NOTE — DISCHARGE INSTRUCTIONS
Return with any problems.  Keep a log of your blood pressures with a persistently elevated you may require adjustment of her medical regimen and you should follow up with your physician regarding this.

## 2018-05-20 NOTE — ED PROVIDER NOTES
Subjective   History of Present Illness  This 83-year-old female presents the emergency department with complaints of it being difficult to breathe whenever she lies flat.  This sensation started just this evening and is also been associated with a bit of a headache area patient checked her blood pressure on several occasions noted to be quite elevated and although he presents the emergency department.  Patient denies fever or chills she denies cough cold or runny nose she states she is actually feeling better by the time of arrival to the emergency department.  The patient denies chest pain.  Patient denies abdominal pain nausea vomiting or diarrhea.    Past medical history is significant for history of polymyositis, hypertension, hyperlipidemia, hypothyroidism, gastroesophageal reflux disease and coronary vascular disease.  Additionally there is a history of chronic kidney disease.  There is also a history of chronic lymphedema of lower extremities    Current medications are as noted on the chart    Social history she does not smoke drink or utilize drugs she lives independently  Review of Systems   Constitutional: Negative for chills and fever.   HENT: Negative for congestion.    Respiratory: Positive for shortness of breath. Negative for cough.    Cardiovascular: Negative for chest pain and palpitations.   Gastrointestinal: Negative for abdominal pain, diarrhea, nausea and vomiting.   Genitourinary: Negative for dysuria, frequency and urgency.   Neurological: Positive for headaches.   All other systems reviewed and are negative.      Past Medical History:   Diagnosis Date   • Chronic anemia     Chronic anemia, secondary to (CKD).    • CKD (chronic kidney disease)    • Coronary artery disease    • Dependent edema    • DVT (deep venous thrombosis) 2009    History of DVT in 2009.    • Dyslipidemia    • GERD (gastroesophageal reflux disease)    • Hyperlipidemia    • Hypertension    • Osteopenia    • Polymyositis     • Renal disorder    • Thyroid disease        Allergies   Allergen Reactions   • Lortab [Hydrocodone-Acetaminophen] Shortness Of Breath   • Methotrexate Derivatives      Multisystem toxicity, reported per PCP office notes   • Amoxicillin      Pt does not recall reaction, reported by daughter   • Azathioprine      Pt does not recall reaction, allergy reported per PCP office notes   • Cellcept [Mycophenolate]      Pt does not recall reaction, allergy reported per PCP office notes   • Contrast Dye Hives     INTRAVENOUS CONTRAST DYE.    • Fosamax [Alendronate]      Pt does not recall reaction, allergy reported per PCP office notes   • Hydrochlorothiazide      Unknown reaction, allergy reported per PCP office notes   • Lisinopril Cough   • Simvastatin Other (See Comments)     Sleep disturbance, reported per PCP office notes   • Chlorthalidone Rash       Past Surgical History:   Procedure Laterality Date   • CARDIAC SURGERY      Cardiac Stents    • GASTRIC BYPASS         Family History   Problem Relation Age of Onset   • Heart attack Father        Social History     Social History   • Marital status:      Social History Main Topics   • Smoking status: Former Smoker     Types: Cigarettes     Quit date: 1960   • Smokeless tobacco: Never Used   • Alcohol use No   • Drug use: No   • Sexual activity: Defer     Other Topics Concern   • Not on file           Objective   Physical Exam   Constitutional: She is oriented to person, place, and time. She appears well-developed and well-nourished. No distress.   HENT:   Head: Normocephalic and atraumatic.   Mouth/Throat: Oropharynx is clear and moist.   Eyes: Pupils are equal, round, and reactive to light. No scleral icterus.   Neck: Normal range of motion. Neck supple. No JVD present.   Cardiovascular: Normal rate, regular rhythm and normal heart sounds.    Pulmonary/Chest: Effort normal and breath sounds normal. No respiratory distress. She has no wheezes. She has no rales.    Abdominal: Soft. Bowel sounds are normal. She exhibits no distension. There is no tenderness. There is no rebound and no guarding.   Musculoskeletal: She exhibits no tenderness.   Lymphadenopathy:     She has no cervical adenopathy.   Neurological: She is alert and oriented to person, place, and time. No cranial nerve deficit. Coordination normal.   Skin: Skin is warm and dry. She is not diaphoretic.   Psychiatric: She has a normal mood and affect. Her behavior is normal.   Nursing note and vitals reviewed.    The patient's BNP is only 183.  A chest film is read as showing no acute intrathoracic findings.  White count and H&H are normal.  Conference and metabolic panel shows mild elevation of a LT and AST of 42 and 49.  A troponin is 0.02.  A sedimentation rate was only mildly elevated at 32.  CT of the brain shows no acute intracranial changes.    Spoken with the patient about her findings she tells me she is feeling much better and is ready to go home.  We will give her a clonidine as her blood pressures still moderately elevated.  Her most recent pressure is 184/79.  Procedures           ED Course                  MDM      Final diagnoses:   Orthopnea   Essential hypertension            Shahid Rao MD  05/20/18 6078

## 2018-05-21 ENCOUNTER — HOSPITAL ENCOUNTER (OUTPATIENT)
Dept: PHYSICAL THERAPY | Facility: HOSPITAL | Age: 83
Setting detail: THERAPIES SERIES
Discharge: HOME | End: 2018-05-21

## 2018-05-21 DIAGNOSIS — R60.0 EDEMA OF BOTH LEGS: ICD-10-CM

## 2018-05-21 DIAGNOSIS — S81.809D MULTIPLE OPEN WOUNDS OF LOWER EXTREMITY, UNSPECIFIED LATERALITY, SUBSEQUENT ENCOUNTER: ICD-10-CM

## 2018-05-21 DIAGNOSIS — I87.2 VENOUS STASIS DERMATITIS OF BOTH LOWER EXTREMITIES: Primary | ICD-10-CM

## 2018-05-21 PROCEDURE — 29581 APPL MULTLAYER CMPRN SYS LEG: CPT

## 2018-05-21 PROCEDURE — 97597 DBRDMT OPN WND 1ST 20 CM/<: CPT

## 2018-05-21 PROCEDURE — G8991 OTHER PT/OT GOAL STATUS: HCPCS

## 2018-05-21 PROCEDURE — G8990 OTHER PT/OT CURRENT STATUS: HCPCS

## 2018-05-21 NOTE — THERAPY PROGRESS REPORT/RE-CERT
Outpatient Rehabilitation - Wound/Debridement Progress Note  [facility]     Patient Name: Lorna Lo  : 1934  MRN: [id number]  Today's Date: 2018                 Admit Date: 2018    Visit Dx:    ICD-10-CM ICD-9-CM   1. Venous stasis dermatitis of both lower extremities I87.2 454.1   2. Edema of both legs R60.0 782.3   3. Multiple open wounds of lower extremity, unspecified laterality, subsequent encounter S81.809D V58.89     894.0       Patient Active Problem List   Diagnosis   • Coronary artery disease   • Hypertension   • Dyslipidemia   • Polymyositis   • Dependent edema   • CKD (chronic kidney disease)   • Chronic anemia   • Osteopenia   • GERD (gastroesophageal reflux disease)   • PAF (paroxysmal atrial fibrillation)        Past Medical History:   Diagnosis Date   • Chronic anemia     Chronic anemia, secondary to (CKD).    • CKD (chronic kidney disease)    • Coronary artery disease    • Dependent edema    • DVT (deep venous thrombosis)     History of DVT in .    • Dyslipidemia    • GERD (gastroesophageal reflux disease)    • Hyperlipidemia    • Hypertension    • Osteopenia    • Polymyositis    • Renal disorder    • Thyroid disease         Past Surgical History:   Procedure Laterality Date   • CARDIAC SURGERY      Cardiac Stents    • GASTRIC BYPASS           EVALUATION        PT Ortho     Row Name 18 7230       Subjective Comments    Subjective Comments Pt reports she had to go to the ER this weekend due to high BP, SOA, and headache.  Reports all tests in ED were negative, and she is supposed to follow up with her cardiologist.  Reports her legs are a little better, sometimes still forgets to use topical steroid, and occasionally scratches at her legs.  No issues with home dressing changes.  -JM       Subjective Pain    Able to rate subjective pain? yes  -JM    Pre-Treatment Pain Level 0  -JM    Post-Treatment Pain Level 0  -JM       RLE Quick Girth (cm)    Met-heads  21.8 cm  -JM    Mid foot 20.3 cm  -JM    Smallest ankle 20.2 cm  -JM    Largest calf 36.6 cm  -JM    Tib tuberosity 35.7 cm  -JM       LLE Quick Girth (cm)    Met-heads 21.5 cm  -JM    Mid foot 21.7 cm  -JM    Smallest ankle 19.7 cm  -JM    Largest calf 32.3 cm  -JM    Tib tuberosity 32.3 cm  -JM       Transfers    Sit-Stand [place] (Transfers) independent  -JM    Stand-Sit [place] (Transfers) independent  -JM    Comment (Transfers) seated in ar chair for tx  -JM       Gait/Stairs Assessment/Training    [place] Level (Gait) independent  -JM    Assistive Device (Gait) walker, 4-wheeled  -      User Key  (r) = Recorded By, (t) = Taken By, (c) = Cosigned By    Initials Name Provider Type     Dayami Baca, PT Physical Therapist                      Lymphedema     Row Name 05/21/18 3190             Lymphedema Edema Assessment    Ptting Edema Category By severity  -      Pitting Edema Other (comment)   trace, R>L  -         Skin Changes/Observations    Lower Extremity Conditions bilateral:;clean;dry;shiny;hairless;scaly;crust;scab(s);inflamed;fragile  -      Lower Extremity Color/Pigment bilateral:;red;blanchable;hyperpigmented  -         Lymphedema Pulses/Capillary Refill    Lower Extremity Capillary Refill right:;left:;less than 3 seconds  -         Compression/Skin Care    Compression/Skin Care skin care;wrapping location;bandaging  -      Skin Care washed/dried;lotion applied   z-guard, antifungal ointment mixed  -      Wrapping Location lower extremity  -      Wrapping Location LE bilateral:;foot to knee  -      Wrapping Comments unna boots, cast padding, compressogrips  -      Bandage Layers padding/fluff layer;cotton elastic stocking- double layer (comment size)   size 3 foot/ankle, size 4 calf  -      Bandaging Technique light compression   gradient compression using multiple layers/sizes  -        User Key  (r) = Recorded By, (t) = Taken By, (c) = Cosigned By     Initials Name Provider Type    ALENA Baca, PT Physical Therapist          WOUND DEBRIDEMENT  Debridement Site 1  Location- Site 1: LLE  Selective Debridement- Site 1: Wound Surface <20cmsq  Instruments- Site 1: tweezers  Excised Tissue Description- Site 1: moderate, other (comment) (hypertrophic crusts, loose scabbed debris)  Bleeding- Site 1: none                   Therapy Education     Row Name 05/21/18 3159             Therapy Education    Education Details Continue with gentle skin cleaning, avoid scrubbing or scratching, continue with home dressing changes.  -      Given Symptoms/condition management;Bandaging/dressing change  -      Program Reinforced  -      How Provided Verbal;Demonstration  -      Provided to Patient  -      Level of Understanding Verbalized  -        User Key  (r) = Recorded By, (t) = Taken By, (c) = Cosigned By    Initials Name Provider Type    ALENA Baca, PT Physical Therapist          Recommendation and Plan        PT Assessment/Plan     Row Name 05/21/18 8273          PT Assessment    Functional Limitations Performance in self-care ADL  -     Impairments Integumentary integrity;Edema  -     Assessment Comments BLE improving, pt still with hypertrophic crusting and scant drainage from LLE.  RLE intact without open areas or drainage.  Pt will continue to benefit from skilled PT once/month for ongoing dressing management and MLW, with tx PRN for flare-ups due to chronic nature of dermatitis and skin breakdown.  -     Rehab Potential Fair  -     Patient/caregiver participated in establishment of treatment plan and goals Yes  -     Patient would benefit from skilled therapy intervention Yes  -        PT Plan    PT Frequency Other (comment)   once/month  -     Planned CPT's? PT JUAN DEBRIDE OPEN WOUND UP TO 20 CM: 65916;PT JUAN DEBRIDE OPEN WOUND EA ADD 20 CM: 85066;PT NONSELECT DEBRIDE 15 MIN: 29902;PT UNNA BOOT: 97434;PT MULTI LAYER COMP SYS LEG:  77437  -     Physical Therapy Interventions (Optional Details) wound care;patient/family education  -     PT Plan Comments unna boot/MLW, debridement  -       User Key  (r) = Recorded By, (t) = Taken By, (c) = Cosigned By    Initials Name Provider Type    ALENA Baca PT Physical Therapist          Goals        PT OP Goals     Row Name 05/21/18 0930          PT Short Term Goals    STG 1 Decrease wound dimensions by 50% as evidence of wound healing.  -     STG 1 Progress Met  -     STG 2 Decrease area of excoriation by 50% as evidence of wound healing.  -     STG 2 Progress Met  -        Long Term Goals    LTG 1 Patient independent and compliant with use and care of compression wraps or stockings, with assistance of family / caregiver as indicated to promote self-care independence.  -     LTG 1 Progress Partially Met  -     LTG 2 Decrease area of excoriation by 75% as evidence of wound healing.  -     LTG 2 Progress Partially Met;Progressing  -     LTG 3 Decrease limb circumferences by 2 cm to promote improved skin integrity and circulation.  -     LTG 3 Progress Met  -     LTG 4 Demonstrate no drainage from BLE to indicate healing progress.  -     LTG 4 Progress Ongoing;Partially Met  -        Time Calculation    PT Goal Re-Cert Due Date 06/17/18  -       User Key  (r) = Recorded By, (t) = Taken By, (c) = Cosigned By    Initials Name Provider Type    ALENA Baca PT Physical Therapist              Time Calculation: Start Time: 0930    Therapy Charges for Today     Code Description Service Date Service Provider Modifiers Qty    29558931212 HC PT OTHER PRIME FUNCT CURRENT 5/21/2018 Dayami Baca, PT GP, CJ 1    99899470686 HC PT OTHER PRIME FUNCT PROJECTED 5/21/2018 Dayami Baca, PT GP, CI 1    50480901792 HC JUAN DEBRIDE OPEN WOUND UP TO 20CM 5/21/2018 Dayami Baca, PT 59, GP 1    34656205019 HC PT MULTI LAYER COMP SYS BELOW KNEE 5/21/2018 Dayami BURGOS  Keven, PT GP 1          PT G-Codes  PT Professional Judgement Used?: Yes  Outcome Measure Options: BWAT (Cummings-Manzo Wound Assess Tool)  Score: 20% impairment in wound/edema management  Functional Limitation: Other PT primary  Other PT Primary Current Status (): At least 20 percent but less than 40 percent impaired, limited or restricted  Other PT Primary Goal Status (): At least 1 percent but less than 20 percent impaired, limited or restricted     Dayami Baca, PT  5/21/2018

## 2018-05-23 ENCOUNTER — OFFICE VISIT (OUTPATIENT)
Dept: INTERNAL MEDICINE | Facility: CLINIC | Age: 83
End: 2018-05-23

## 2018-05-23 VITALS
OXYGEN SATURATION: 98 % | DIASTOLIC BLOOD PRESSURE: 80 MMHG | WEIGHT: 197.2 LBS | SYSTOLIC BLOOD PRESSURE: 150 MMHG | BODY MASS INDEX: 32.82 KG/M2 | HEART RATE: 64 BPM

## 2018-05-23 DIAGNOSIS — R51.9 ACUTE NONINTRACTABLE HEADACHE, UNSPECIFIED HEADACHE TYPE: Primary | ICD-10-CM

## 2018-05-23 DIAGNOSIS — I10 ESSENTIAL HYPERTENSION: ICD-10-CM

## 2018-05-23 PROCEDURE — 99214 OFFICE O/P EST MOD 30 MIN: CPT | Performed by: INTERNAL MEDICINE

## 2018-05-23 NOTE — PROGRESS NOTES
Subjective   Lorna Lo is a 83 y.o. female.   Chief Complaint   Patient presents with   • ER f/u       History of Present Illness   New patient to me but has been seen by a PA in our practice x 1. Went for headache and face feeling hot. CXR negative. CT head no acute abnormalities. Troponin and cbc okiay. Mild increase in liver functions and decrease in kidney function. Previous PCP was DR Jose Houston. Sees rheum for dx dermatomyositis, Sees Rheum. Had CT chest ordered by Dr. Houston.  Saw upper margin of a possible left renal mass. CT abdomen was done for f/u and showed lobular change in right upper kideny but no discrete mass.   The following portions of the patient's history were reviewed and updated as appropriate: allergies, current medications, past family history, past medical history, past social history, past surgical history and problem list.    Review of Systems   Constitutional: Negative for activity change, appetite change, chills, diaphoresis, fatigue, fever and unexpected weight change.   HENT: Negative for congestion, ear discharge, ear pain, mouth sores, nosebleeds, sinus pressure, sneezing and sore throat.    Eyes: Negative for pain, discharge and itching.   Respiratory: Negative for cough, chest tightness, shortness of breath and wheezing.    Cardiovascular: Negative for chest pain, palpitations and leg swelling.   Gastrointestinal: Negative for abdominal pain, constipation, diarrhea, nausea and vomiting.   Endocrine: Negative for cold intolerance, heat intolerance, polydipsia and polyphagia.   Genitourinary: Negative for dysuria, flank pain, frequency, hematuria and urgency.   Musculoskeletal: Negative for arthralgias, back pain, gait problem, myalgias, neck pain and neck stiffness.   Skin: Negative for color change, pallor and rash.   Neurological: Negative for seizures, speech difficulty, numbness and headaches.   Psychiatric/Behavioral: Negative for agitation, confusion, decreased  concentration and sleep disturbance. The patient is not nervous/anxious.    /80   Pulse 64   Wt 89.4 kg (197 lb 3.2 oz)   LMP  (LMP Unknown)   SpO2 98%   BMI 32.82 kg/m²       Objective   Physical Exam   Constitutional: She is oriented to person, place, and time. She appears well-developed.   HENT:   Head: Normocephalic.   Right Ear: External ear normal.   Left Ear: External ear normal.   Nose: Nose normal.   Mouth/Throat: Oropharynx is clear and moist.   Eyes: Conjunctivae are normal. Pupils are equal, round, and reactive to light.   Neck: No JVD present. No thyromegaly present.   Cardiovascular: Normal rate, regular rhythm and normal heart sounds.  Exam reveals no friction rub.    No murmur heard.  Pulmonary/Chest: Effort normal and breath sounds normal. No respiratory distress. She has no wheezes. She has no rales.   Abdominal: Soft. Bowel sounds are normal. She exhibits no distension. There is no tenderness. There is no guarding.   Musculoskeletal: She exhibits no edema or tenderness.   Lymphadenopathy:     She has no cervical adenopathy.   Neurological: She is oriented to person, place, and time. She displays normal reflexes. No cranial nerve deficit.   Skin: No rash noted.   Psychiatric: Her behavior is normal.   Nursing note and vitals reviewed.      Assessment/Plan   Lorna was seen today for er f/u.    Diagnoses and all orders for this visit:    Acute nonintractable headache, unspecified headache type  Resolved.   Essential hypertension    Needs to take her norvasc. 3 week re check.  50% of visit spent counseling

## 2018-06-12 ENCOUNTER — HOSPITAL ENCOUNTER (OUTPATIENT)
Dept: PHYSICAL THERAPY | Facility: HOSPITAL | Age: 83
Setting detail: THERAPIES SERIES
Discharge: HOME | End: 2018-06-12

## 2018-06-12 DIAGNOSIS — R60.0 EDEMA OF BOTH LEGS: ICD-10-CM

## 2018-06-12 DIAGNOSIS — S81.809D MULTIPLE OPEN WOUNDS OF LOWER EXTREMITY, UNSPECIFIED LATERALITY, SUBSEQUENT ENCOUNTER: ICD-10-CM

## 2018-06-12 DIAGNOSIS — I87.2 VENOUS STASIS DERMATITIS OF BOTH LOWER EXTREMITIES: Primary | ICD-10-CM

## 2018-06-12 PROCEDURE — G8991 OTHER PT/OT GOAL STATUS: HCPCS

## 2018-06-12 PROCEDURE — 29581 APPL MULTLAYER CMPRN SYS LEG: CPT

## 2018-06-12 PROCEDURE — G8990 OTHER PT/OT CURRENT STATUS: HCPCS

## 2018-06-12 PROCEDURE — 97597 DBRDMT OPN WND 1ST 20 CM/<: CPT

## 2018-06-12 NOTE — THERAPY PROGRESS REPORT/RE-CERT
Outpatient Rehabilitation - Wound/Debridement Progress Note   [facility]     Patient Name: Lorna Lo  : 1934  MRN: [id number]  Today's Date: 2018                 Admit Date: 2018    Visit Dx:    ICD-10-CM ICD-9-CM   1. Venous stasis dermatitis of both lower extremities I87.2 454.1   2. Edema of both legs R60.0 782.3   3. Multiple open wounds of lower extremity, unspecified laterality, subsequent encounter S81.809D V58.89     894.0       Patient Active Problem List   Diagnosis   • Coronary artery disease   • Essential hypertension   • Dyslipidemia   • Polymyositis   • Dependent edema   • CKD (chronic kidney disease)   • Chronic anemia   • Osteopenia   • GERD (gastroesophageal reflux disease)   • PAF (paroxysmal atrial fibrillation)        Past Medical History:   Diagnosis Date   • Chronic anemia     Chronic anemia, secondary to (CKD).    • CKD (chronic kidney disease)    • Coronary artery disease    • Dependent edema    • DVT (deep venous thrombosis) 2009    History of DVT in .    • Dyslipidemia    • GERD (gastroesophageal reflux disease)    • Hyperlipidemia    • Hypertension    • Osteopenia    • Polymyositis    • Renal disorder    • Thyroid disease         Past Surgical History:   Procedure Laterality Date   • CARDIAC SURGERY      Cardiac Stents    • GASTRIC BYPASS           EVALUATION        PT Ortho     Row Name 18 2594       Subjective Comments    Subjective Comments Pt states no change in her legs, during tx reports she forgets to use her steroid cream on her legs.  -ALENA       Subjective Pain    Able to rate subjective pain? yes  -ALENA    Pre-Treatment Pain Level 0  -    Post-Treatment Pain Level 0  -       Transfers    Sit-Stand [place] (Transfers) independent  -ALENA    Stand-Sit [place] (Transfers) independent  -ALENA    Comment (Transfers) seated in arjo chair for tx.  -ALENA       Gait/Stairs Assessment/Training    [place] Level (Gait) independent  -ALENA     Assistive Device (Gait) walker, 4-wheeled  -      User Key  (r) = Recorded By, (t) = Taken By, (c) = Cosigned By    Initials Name Provider Type    ALENA Baca, PT Physical Therapist                    LDA Wound     Row Name 06/12/18 0930             Wound 01/13/17 0930 Bilateral lower leg other (see comments)    Wound - Properties Group Date first assessed: 01/13/17  - Time first assessed: 0930  - Present On Admission : yes;picture taken  - Side: Bilateral  - Orientation: lower  -JM Location: leg  -JM Type: other (see comments)  - Additional Comments: BLE excoriation, dermatitis  -    Wound Image Images linked: 2  -JM      Dressing Appearance intact;moist drainage;dried drainage  -JM      Base moist;pink;red/granulating  -JM      Periwound excoriated;moist;pink;redness  -JM      Periwound Temperature warm  -      Edges other (see comments)   indistinct, circumferential at ankles  -      Wound Length (cm) 22 cm   22cm LLE, 14cm RLE  -      Wound Width (cm) --   circumferential BLE  -      Drainage Characteristics/Odor serous  -JM      Drainage Amount small  -      Care, Wound cleansed with;wound cleanser;debrided;ren boot  -        User Key  (r) = Recorded By, (t) = Taken By, (c) = Cosigned By    Initials Name Provider Type    ALENA Baca, PT Physical Therapist              Lymphedema     Row Name 06/12/18 0930             Lymphedema Edema Assessment    Ptting Edema Category By severity  -      Pitting Edema Mild  -         Skin Changes/Observations    Lower Extremity Conditions bilateral:;shiny;hairless;scaly;crust;scab(s);inflamed;fragile  -      Lower Extremity Color/Pigment bilateral:;red;blanchable  -         Lymphedema Pulses/Capillary Refill    Lower Extremity Capillary Refill right:;left:;less than 3 seconds  -         Lymphedema Measurements    Measurement Type(s) Quick Girth  -      Quick Girth Areas Lower extremities  -         LLE Quick Girth (cm)     Met-heads 21.2 cm  -JM      Mid foot 20.2 cm  -JM      Smallest ankle 20 cm  -JM      Largest calf 32 cm  -JM      Tib tuberosity 32.6 cm  -JM         RLE Quick Girth (cm)    Met-heads 22.5 cm  -JM      Mid foot 20 cm  -JM      Smallest ankle 20 cm  -JM      Largest calf 35.7 cm  -JM      Tib tuberosity 35.2 cm  -JM      RLE Quick Girth Total 133.4  -JM         Compression/Skin Care    Compression/Skin Care skin care;wrapping location;bandaging  -JM      Skin Care washed/dried;lotion applied;topical anti-inflammatory applied  -JM      Wrapping Location lower extremity  -JM      Wrapping Location LE bilateral:;foot to knee  -JM      Wrapping Comments unna boot  -      Bandage Layers padding/fluff layer;cotton elastic stocking- double layer (comment size)   size 3 foot/ankle, size 4 calf  -      Bandaging Technique light compression   gradient compression using multiple layers/sizes  -        User Key  (r) = Recorded By, (t) = Taken By, (c) = Cosigned By    Initials Name Provider Type    ALENA Baca, PT Physical Therapist          WOUND DEBRIDEMENT  Debridement Site 1  Location- Site 1: BLE  Selective Debridement- Site 1: Wound Surface <20cmsq  Instruments- Site 1: tweezers  Excised Tissue Description- Site 1: moderate, other (comment) (hypertrophic crusts, scaly debris)  Bleeding- Site 1: none                   Therapy Education     Row Name 06/12/18 0456             Therapy Education    Education Details Continue with gentle skin care and use of topical steroid as instructed by MD for inflamed areas.  -JM      Given Symptoms/condition management;Bandaging/dressing change  -      Program Reinforced  -ALENA      How Provided Verbal;Demonstration  -ALENA      Provided to Patient  -JM      Level of Understanding Verbalized  -        User Key  (r) = Recorded By, (t) = Taken By, (c) = Cosigned By    Initials Name Provider Type    ALENA Baca, PT Physical Therapist          Recommendation and  Plan        PT Assessment/Plan     Row Name 18 0930          PT Assessment    Functional Limitations Performance in self-care ADL  -     Impairments Integumentary integrity;Edema  -     Assessment Comments BLE edema stable, but excoriation and redness slightly increased in BLE.  May be due to pt forgetting to use topical steroid cream for inflammation.  Pt will continue to benefit from skilled PT for debridement, skin care, and multilayer wraps.  Treatment frequency only at every 4 weeks due to transportation issues.  -     Rehab Potential Fair  -     Patient/caregiver participated in establishment of treatment plan and goals Yes  -     Patient would benefit from skilled therapy intervention Yes  -        PT Plan    PT Frequency Other (comment)   once/month  -     Planned CPT's? PT JUAN DEBRIDE OPEN WOUND UP TO 20 CM: 18721;PT JUAN DEBRIDE OPEN WOUND EA ADD 20 CM: 87569;PT NONSELECT DEBRIDE 15 MIN: 38587;PT UNNA BOOT: 23744;PT MULTI LAYER COMP SYS LE  -     Physical Therapy Interventions (Optional Details) wound care;patient/family education  -     PT Plan Comments unnaboot/MLW, debridement  -       User Key  (r) = Recorded By, (t) = Taken By, (c) = Cosigned By    Initials Name Provider Type    ALENA Baca, PT Physical Therapist          Goals        PT OP Goals     Row Name 18 09          PT Short Term Goals    STG 1 Decrease wound dimensions by 50% as evidence of wound healing.  -     STG 1 Progress Met  -     STG 2 Decrease area of excoriation by 50% as evidence of wound healing.  -     STG 2 Progress Met  -        Long Term Goals    LTG 1 Patient independent and compliant with use and care of compression wraps or stockings, with assistance of family / caregiver as indicated to promote self-care independence.  -     LTG 1 Progress Partially Met  -     LTG 2 Decrease area of excoriation by 75% as evidence of wound healing.  -     LTG 2 Progress  Partially Met;Progressing  -     LTG 3 Decrease limb circumferences by 2 cm to promote improved skin integrity and circulation.  -     LTG 3 Progress Met  -     LTG 4 Demonstrate no drainage from BLE to indicate healing progress.  -     LTG 4 Progress Ongoing;Partially Met  -        Time Calculation    PT Goal Re-Cert Due Date 09/10/18  -       User Key  (r) = Recorded By, (t) = Taken By, (c) = Cosigned By    Initials Name Provider Type    ALENA Baca, PT Physical Therapist          PT Goal Re-Cert Due Date: 09/10/18  PT Short Term Goals  STG 1: Decrease wound dimensions by 50% as evidence of wound healing.  STG 1 Progress: Met  STG 2: Decrease area of excoriation by 50% as evidence of wound healing.  STG 2 Progress: Met  Long Term Goals  LTG 1: Patient independent and compliant with use and care of compression wraps or stockings, with assistance of family / caregiver as indicated to promote self-care independence.  LTG 1 Progress: Partially Met  LTG 2: Decrease area of excoriation by 75% as evidence of wound healing.  LTG 2 Progress: Partially Met, Progressing  LTG 3: Decrease limb circumferences by 2 cm to promote improved skin integrity and circulation.  LTG 3 Progress: Met  LTG 4: Demonstrate no drainage from BLE to indicate healing progress.  LTG 4 Progress: Ongoing, Partially Met      Time Calculation: Start Time: 0930    Therapy Charges for Today     Code Description Service Date Service Provider Modifiers Qty    48952952370 HC PT OTHER PRIME FUNCT CURRENT 6/12/2018 Dayami Baca, PT GP, CJ 1    29101803837 HC PT OTHER PRIME FUNCT PROJECTED 6/12/2018 Dayami Baca, PT GP, CI 1    85710349277 HC PT MULTI LAYER COMP SYS BELOW KNEE 6/12/2018 Dayami Baca, PT GP 1    44208691471 HC JUAN DEBRIDE OPEN WOUND UP TO 20CM 6/12/2018 Dayami Baca, PT 59, GP 1        Therapy Suggested Charges     Code   Minutes Charges    None             PT G-Codes  PT Professional Judgement Used?:  Yes  Outcome Measure Options: BWAT (Cummings-Manzo Wound Assess Tool)  Score: 20% impairment in wound/edema management  Functional Limitation: Other PT primary  Other PT Primary Current Status (): At least 20 percent but less than 40 percent impaired, limited or restricted  Other PT Primary Goal Status (): At least 1 percent but less than 20 percent impaired, limited or restricted     Dayami Baca, PT  6/12/2018

## 2018-06-13 ENCOUNTER — OFFICE VISIT (OUTPATIENT)
Dept: INTERNAL MEDICINE | Facility: CLINIC | Age: 83
End: 2018-06-13

## 2018-06-13 VITALS
WEIGHT: 196 LBS | DIASTOLIC BLOOD PRESSURE: 60 MMHG | OXYGEN SATURATION: 98 % | SYSTOLIC BLOOD PRESSURE: 152 MMHG | HEART RATE: 64 BPM | BODY MASS INDEX: 32.62 KG/M2

## 2018-06-13 DIAGNOSIS — I10 ESSENTIAL HYPERTENSION: ICD-10-CM

## 2018-06-13 DIAGNOSIS — K21.9 GASTROESOPHAGEAL REFLUX DISEASE, ESOPHAGITIS PRESENCE NOT SPECIFIED: ICD-10-CM

## 2018-06-13 DIAGNOSIS — E78.5 DYSLIPIDEMIA: ICD-10-CM

## 2018-06-13 DIAGNOSIS — I25.10 CORONARY ARTERY DISEASE INVOLVING NATIVE CORONARY ARTERY OF NATIVE HEART WITHOUT ANGINA PECTORIS: Primary | ICD-10-CM

## 2018-06-13 PROCEDURE — 99214 OFFICE O/P EST MOD 30 MIN: CPT | Performed by: INTERNAL MEDICINE

## 2018-06-13 NOTE — PROGRESS NOTES
Subjective   Lorna Lo is a 83 y.o. female.     History of Present Illness   Here for blood pressure recheck and f/u on hlp, gerd, and CAD. HLP and gerd have been stable x last year.   The following portions of the patient's history were reviewed and updated as appropriate: allergies, current medications, past family history, past medical history, past social history, past surgical history and problem list.    Review of Systems   Constitutional: Negative for activity change, appetite change, chills, diaphoresis, fatigue, fever and unexpected weight change.   HENT: Negative for congestion, ear discharge, ear pain, mouth sores, nosebleeds, sinus pressure, sneezing and sore throat.    Eyes: Negative for pain, discharge and itching.   Respiratory: Negative for cough, chest tightness, shortness of breath and wheezing.    Cardiovascular: Negative for chest pain, palpitations and leg swelling.   Gastrointestinal: Negative for abdominal pain, constipation, diarrhea, nausea and vomiting.   Endocrine: Negative for cold intolerance, heat intolerance, polydipsia and polyphagia.   Genitourinary: Negative for dysuria, flank pain, frequency, hematuria and urgency.   Musculoskeletal: Negative for arthralgias, back pain, gait problem, myalgias, neck pain and neck stiffness.   Skin: Negative for color change, pallor and rash.   Neurological: Negative for seizures, speech difficulty, numbness and headaches.   Psychiatric/Behavioral: Negative for agitation, confusion, decreased concentration and sleep disturbance. The patient is not nervous/anxious.        Objective   Physical Exam   Constitutional: She is oriented to person, place, and time. She appears well-developed.   HENT:   Head: Normocephalic.   Right Ear: External ear normal.   Left Ear: External ear normal.   Nose: Nose normal.   Mouth/Throat: Oropharynx is clear and moist.   Eyes: Conjunctivae are normal. Pupils are equal, round, and reactive to light.   Neck: No JVD  present. No thyromegaly present.   Cardiovascular: Normal rate, regular rhythm and normal heart sounds.  Exam reveals no friction rub.    No murmur heard.  Pulmonary/Chest: Effort normal and breath sounds normal. No respiratory distress. She has no wheezes. She has no rales.   Abdominal: Soft. Bowel sounds are normal. She exhibits no distension. There is no tenderness. There is no guarding.   Musculoskeletal: She exhibits no edema or tenderness.   Lymphadenopathy:     She has no cervical adenopathy.   Neurological: She is oriented to person, place, and time. She displays normal reflexes. No cranial nerve deficit.   Skin: No rash noted.   Psychiatric: Her behavior is normal.   Nursing note and vitals reviewed.      Assessment/Plan   Lorna was seen today for hypertension.    Diagnoses and all orders for this visit:    Coronary artery disease involving native coronary artery of native heart without angina pectoris  stable  Essential hypertension  Home readings are 130's/60-71  Gastroesophageal reflux disease, esophagitis presence not specified  stable  Dyslipidemia  stable

## 2018-06-19 RX ORDER — FOLIC ACID 1 MG/1
1 TABLET ORAL DAILY
Qty: 30 TABLET | Refills: 2 | Status: SHIPPED | OUTPATIENT
Start: 2018-06-19 | End: 2018-07-23 | Stop reason: SDUPTHER

## 2018-06-19 NOTE — TELEPHONE ENCOUNTER
----- Message from April ANNA Gil sent at 6/19/2018  9:48 AM EDT -----  Contact: PT   CALL FROM PT REQUESTING A REFILL ON folic acid (FOLVITE) 1 MG tablet   USES [address] - [phone]  - [phone] (Phone)  [phone] (Fax)    CALL BACK NUMBER FOR [phone].

## 2018-06-25 ENCOUNTER — TRANSCRIBE ORDERS (OUTPATIENT)
Dept: PHYSICAL THERAPY | Facility: HOSPITAL | Age: 83
End: 2018-06-25

## 2018-06-25 DIAGNOSIS — S81.801A OPEN WOUND OF BOTH LOWER EXTREMITIES WITH COMPLICATION: Primary | ICD-10-CM

## 2018-06-25 DIAGNOSIS — S81.802A OPEN WOUND OF BOTH LOWER EXTREMITIES WITH COMPLICATION: Primary | ICD-10-CM

## 2018-07-09 ENCOUNTER — HOSPITAL ENCOUNTER (OUTPATIENT)
Dept: PHYSICAL THERAPY | Facility: HOSPITAL | Age: 83
Setting detail: THERAPIES SERIES
Discharge: HOME | End: 2018-07-09

## 2018-07-09 DIAGNOSIS — R60.0 EDEMA OF BOTH LEGS: ICD-10-CM

## 2018-07-09 DIAGNOSIS — I87.2 VENOUS STASIS DERMATITIS OF BOTH LOWER EXTREMITIES: Primary | ICD-10-CM

## 2018-07-09 DIAGNOSIS — S81.809D MULTIPLE OPEN WOUNDS OF LOWER EXTREMITY, UNSPECIFIED LATERALITY, SUBSEQUENT ENCOUNTER: ICD-10-CM

## 2018-07-09 PROCEDURE — G8991 OTHER PT/OT GOAL STATUS: HCPCS

## 2018-07-09 PROCEDURE — 29581 APPL MULTLAYER CMPRN SYS LEG: CPT

## 2018-07-09 PROCEDURE — 97602 WOUND(S) CARE NON-SELECTIVE: CPT

## 2018-07-09 PROCEDURE — G8990 OTHER PT/OT CURRENT STATUS: HCPCS

## 2018-07-09 NOTE — THERAPY PROGRESS REPORT/RE-CERT
Outpatient Rehabilitation - Wound/Debridement Progress Note   [place]     Patient Name: Delilah Lo  : 1934  MRN: [id number]  Today's Date: 2018                 Admit Date: 2018    Visit Dx:    ICD-10-CM ICD-9-CM   1. Venous stasis dermatitis of both lower extremities I87.2 454.1   2. Edema of both legs R60.0 782.3   3. Multiple open wounds of lower extremity, unspecified laterality, subsequent encounter S81.809D V58.89     894.0       Patient Active Problem List   Diagnosis   • Coronary artery disease   • Essential hypertension   • Dyslipidemia   • Polymyositis (CMS/HCC)   • Dependent edema   • CKD (chronic kidney disease)   • Chronic anemia   • Osteopenia   • GERD (gastroesophageal reflux disease)   • PAF (paroxysmal atrial fibrillation) (CMS/HCC)        Past Medical History:   Diagnosis Date   • Chronic anemia     Chronic anemia, secondary to (CKD).    • CKD (chronic kidney disease)    • Coronary artery disease    • Dependent edema    • DVT (deep venous thrombosis) (CMS/[facility])     History of DVT in .    • Dyslipidemia    • GERD (gastroesophageal reflux disease)    • Hyperlipidemia    • Hypertension    • Osteopenia    • Polymyositis (CMS/HCC)    • Renal disorder    • Thyroid disease         Past Surgical History:   Procedure Laterality Date   • CARDIAC SURGERY      Cardiac Stents    • GASTRIC BYPASS           EVALUATION        PT Ortho     Row Name 18 9964       Subjective Comments    Subjective Comments Pt reports she has been good since last tx, has been using her steroid cream and not scratching/scrubbing at her legs.  -JM       Subjective Pain    Able to rate subjective pain? yes  -JM    Pre-Treatment Pain Level 0  -JM    Post-Treatment Pain Level 0  -JM       Transfers    Sit-Stand [place] (Transfers) independent  -JM    Stand-Sit [place] (Transfers) independent  -JM    Comment (Transfers) seated for tx  -       Gait/Stairs Assessment/Training    [place]  Level (Gait) independent  -    Assistive Device (Gait) walker, 4-wheeled  -      User Key  (r) = Recorded By, (t) = Taken By, (c) = Cosigned By    Initials Name Provider Type    ALENA Baca, PT Physical Therapist                    LDA Wound     Row Name 07/09/18 0930             Wound 01/13/17 0930 Bilateral lower leg other (see comments)    Wound - Properties Group Date first assessed: 01/13/17  - Time first assessed: 0930  - Present On Admission : yes;picture taken  -JM Side: Bilateral  - Orientation: lower  -JM Location: leg  -JM Type: other (see comments)  -JM Additional Comments: BLE excoriation, dermatitis  -    Wound Image Images linked: 3  -JM      Dressing Appearance intact;dry;no drainage  -JM      Base moist;pink;red/granulating  -JM      Periwound excoriated;moist;pink;redness   minimal redness/excoriation, much improved  -JM      Periwound Temperature warm  -JM      Edges other (see comments)   indistinct  -JM      Wound Length (cm) 10 cm   medial L ankle  -JM      Wound Width (cm) 7 cm  -JM      Drainage Characteristics/Odor serous  -JM      Drainage Amount scant  -JM      Care, Wound cleansed with;neutral pH skin disinfectant applied;ren boot  -JM        User Key  (r) = Recorded By, (t) = Taken By, (c) = Cosigned By    Initials Name Provider Type    ALENA Baca, VANESA Physical Therapist              Lymphedema     Row Name 07/09/18 0930             Lymphedema Edema Assessment    Ptting Edema Category By severity  -      Pitting Edema Other (comment)   trace  -JM         Skin Changes/Observations    Lower Extremity Conditions bilateral:;clean;dry;shiny;scaly;crust;inflamed;fragile  -      Lower Extremity Color/Pigment bilateral:;red;blanchable  -         Lymphedema Pulses/Capillary Refill    Lower Extremity Capillary Refill right:;left:;less than 3 seconds  -         LLE Quick Girth (cm)    Met-heads 21.8 cm  -JM      Mid foot 19.7 cm  -JM      Smallest ankle 20.4 cm   -JM      Largest calf 32.4 cm  -JM      Tib tuberosity 31.7 cm  -JM         RLE Quick Girth (cm)    Met-heads 22.2 cm  -JM      Mid foot 19.8 cm  -JM      Smallest ankle 20 cm  -JM      Largest calf 36.4 cm  -JM      Tib tuberosity 35 cm  -JM         Compression/Skin Care    Compression/Skin Care skin care;wrapping location;bandaging  -JM      Skin Care washed/dried;lotion applied;topical anti-inflammatory applied  -JM      Wrapping Location lower extremity  -JM      Wrapping Location LE bilateral:;foot to knee  -JM      Wrapping Comments unna boot contact layer  -      Bandage Layers padding/fluff layer;cotton elastic stocking- double layer (comment size)   size 3 double foot/ankle, size 4 calf  -      Bandaging Technique light compression   gradient compression using multiple layers/sizes  -        User Key  (r) = Recorded By, (t) = Taken By, (c) = Cosigned By    Initials Name Provider Type    ALENA Baca, PT Physical Therapist          WOUND DEBRIDEMENT  Debridement Site 1  Location- Site 1: BLE  Selective Debridement- Site 1: Wound Surface <20cmsq  Instruments- Site 1: other (comment) (ready cleanse wipes)  Excised Tissue Description- Site 1: minimum, other (comment) (scaly crusts, skin debris)  Bleeding- Site 1: none                   Therapy Education     Row Name 07/09/18 9909             Therapy Education    Education Details Continue with gentle skin care and use of steroid cream as instructed by MD, unna boot to red fragile skin, compressogrips to promote venous return.  -ALENA      Given Symptoms/condition management;Bandaging/dressing change  -ALENA      Program Reinforced  -ALENA      How Provided Verbal;Demonstration  -ALENA      Provided to Patient  -ALENA      Level of Understanding Verbalized  -        User Key  (r) = Recorded By, (t) = Taken By, (c) = Cosigned By    Initials Name Provider Type    ALENA Baca, PT Physical Therapist          Recommendation and Plan        PT  Assessment/Plan     Row Name 18 09          PT Assessment    Functional Limitations Performance in self-care ADL  -     Impairments Integumentary integrity;Edema  -     Assessment Comments Pt with improved skin integrity since last tx, no visible drainage from RLE, scant dried drainage LLE.  Pt with mild excoriation at medial and lateral ankles but no longer circumferential.  Pt will continue to benefit from multilayer wrapping to promote venous return and unna boot for improving skin integrity.  -     Rehab Potential Fair  -     Patient/caregiver participated in establishment of treatment plan and goals Yes  -     Patient would benefit from skilled therapy intervention Yes  -        PT Plan    PT Frequency Other (comment)   once/month due to transportation issues  -     Planned CPT's? PT JUAN DEBRIDE OPEN WOUND UP TO 20 CM: 67672;PT NONSELECT DEBRIDE 15 MIN: 59293;PT UNNA BOOT: 77049;PT MULTI LAYER COMP SYS LE;PT SELF CARE/MGMT/TRAIN 15 MIN: 47064  -     Physical Therapy Interventions (Optional Details) wound care;patient/family education  -     PT Plan Comments unna boot/MLW, PRN debridement  -       User Key  (r) = Recorded By, (t) = Taken By, (c) = Cosigned By    Initials Name Provider Type    ALENA Baca, PT Physical Therapist          Goals        PT OP Goals     Row Name 18          PT Short Term Goals    STG 1 Decrease wound dimensions by 50% as evidence of wound healing.  -     STG 1 Progress Met  -     STG 2 Decrease area of excoriation by 50% as evidence of wound healing.  -     STG 2 Progress Met  -        Long Term Goals    LTG 1 Patient independent and compliant with use and care of compression wraps or stockings, with assistance of family / caregiver as indicated to promote self-care independence.  -     LTG 1 Progress Partially Met  -     LTG 2 Decrease area of excoriation by 75% as evidence of wound healing.  -     LTG 2 Progress  Partially Met;Progressing  -     LTG 3 Decrease limb circumferences by 2 cm to promote improved skin integrity and circulation.  -     LTG 3 Progress Met  -     LTG 4 Demonstrate no drainage from BLE to indicate healing progress.  -     LTG 4 Progress Ongoing;Partially Met  -        Time Calculation    PT Goal Re-Cert Due Date 09/10/18  -       User Key  (r) = Recorded By, (t) = Taken By, (c) = Cosigned By    Initials Name Provider Type    ALENA Baca, PT Physical Therapist              Time Calculation: Start Time: 0930    Therapy Charges for Today     Code Description Service Date Service Provider Modifiers Qty    75324823468 HC PT OTHER PRIME FUNCT CURRENT 7/9/2018 Dayami Baca, PT GP, CJ 1    77872202062 HC PT OTHER PRIME FUNCT PROJECTED 7/9/2018 Dayami Baca, PT GP, CI 1    12128603788 HC NONSELECTIVE DEBRIDEMENT 7/9/2018 Dayami Baca, PT GP 1    15692169775 HC PT MULTI LAYER COMP SYS BELOW KNEE 7/9/2018 Dayami Baca, PT 59, GP 1        Therapy Suggested Charges     Code   Minutes Charges    None             PT G-Codes  PT Professional Judgement Used?: Yes  Outcome Measure Options: BWAT (Cummings-Manzo Wound Assess Tool)  Score: 20% impairment in wound/edema management  Functional Limitation: Other PT primary  Other PT Primary Current Status (): At least 20 percent but less than 40 percent impaired, limited or restricted  Other PT Primary Goal Status (): At least 1 percent but less than 20 percent impaired, limited or restricted     Dayami Baca, PT  7/9/2018

## 2018-07-12 RX ORDER — LEVOTHYROXINE SODIUM 88 UG/1
88 TABLET ORAL DAILY
Qty: 30 TABLET | Refills: 2 | Status: SHIPPED | OUTPATIENT
Start: 2018-07-12 | End: 2018-10-01 | Stop reason: SDUPTHER

## 2018-07-12 RX ORDER — ATENOLOL 100 MG/1
100 TABLET ORAL DAILY
Qty: 30 TABLET | Refills: 2 | Status: SHIPPED | OUTPATIENT
Start: 2018-07-12 | End: 2018-12-28 | Stop reason: SDUPTHER

## 2018-07-23 RX ORDER — FOLIC ACID 1 MG/1
1 TABLET ORAL DAILY
Qty: 30 TABLET | Refills: 2 | Status: SHIPPED | OUTPATIENT
Start: 2018-07-23 | End: 2018-10-25 | Stop reason: SDUPTHER

## 2018-07-24 ENCOUNTER — OFFICE VISIT (OUTPATIENT)
Dept: INTERNAL MEDICINE | Facility: CLINIC | Age: 83
End: 2018-07-24

## 2018-07-24 DIAGNOSIS — E78.5 DYSLIPIDEMIA: ICD-10-CM

## 2018-07-24 DIAGNOSIS — I10 ESSENTIAL HYPERTENSION: ICD-10-CM

## 2018-07-24 DIAGNOSIS — Z00.00 MEDICARE ANNUAL WELLNESS VISIT, INITIAL: Primary | ICD-10-CM

## 2018-07-24 LAB
ALBUMIN SERPL-MCNC: 4.09 G/DL (ref 3.2–4.8)
ALBUMIN/GLOB SERPL: 1.6 G/DL (ref 1.5–2.5)
ALP SERPL-CCNC: 66 U/L (ref 25–100)
ALT SERPL W P-5'-P-CCNC: 32 U/L (ref 7–40)
ANION GAP SERPL CALCULATED.3IONS-SCNC: 7 MMOL/L (ref 3–11)
ARTICHOKE IGE QN: 150 MG/DL (ref 0–130)
AST SERPL-CCNC: 34 U/L (ref 0–33)
BASOPHILS # BLD AUTO: 0.04 10*3/MM3 (ref 0–0.2)
BASOPHILS NFR BLD AUTO: 0.5 % (ref 0–1)
BILIRUB SERPL-MCNC: 0.8 MG/DL (ref 0.3–1.2)
BUN BLD-MCNC: 16 MG/DL (ref 9–23)
BUN/CREAT SERPL: 13.6 (ref 7–25)
CALCIUM SPEC-SCNC: 9.4 MG/DL (ref 8.7–10.4)
CHLORIDE SERPL-SCNC: 107 MMOL/L (ref 99–109)
CHOLEST SERPL-MCNC: 216 MG/DL (ref 0–200)
CO2 SERPL-SCNC: 26 MMOL/L (ref 20–31)
CREAT BLD-MCNC: 1.18 MG/DL (ref 0.6–1.3)
DEPRECATED RDW RBC AUTO: 46.3 FL (ref 37–54)
EOSINOPHIL # BLD AUTO: 0.23 10*3/MM3 (ref 0–0.3)
EOSINOPHIL NFR BLD AUTO: 3.1 % (ref 0–3)
ERYTHROCYTE [DISTWIDTH] IN BLOOD BY AUTOMATED COUNT: 13.2 % (ref 11.3–14.5)
GFR SERPL CREATININE-BSD FRML MDRD: 44 ML/MIN/1.73
GLOBULIN UR ELPH-MCNC: 2.5 GM/DL
GLUCOSE BLD-MCNC: 104 MG/DL (ref 70–100)
HCT VFR BLD AUTO: 41.5 % (ref 34.5–44)
HDLC SERPL-MCNC: 71 MG/DL (ref 40–60)
HGB BLD-MCNC: 13 G/DL (ref 11.5–15.5)
IMM GRANULOCYTES # BLD: 0.03 10*3/MM3 (ref 0–0.03)
IMM GRANULOCYTES NFR BLD: 0.4 % (ref 0–0.6)
LYMPHOCYTES # BLD AUTO: 1.6 10*3/MM3 (ref 0.6–4.8)
LYMPHOCYTES NFR BLD AUTO: 21.3 % (ref 24–44)
MCH RBC QN AUTO: 29.7 PG (ref 27–31)
MCHC RBC AUTO-ENTMCNC: 31.3 G/DL (ref 32–36)
MCV RBC AUTO: 95 FL (ref 80–99)
MONOCYTES # BLD AUTO: 1.16 10*3/MM3 (ref 0–1)
MONOCYTES NFR BLD AUTO: 15.4 % (ref 0–12)
NEUTROPHILS # BLD AUTO: 4.46 10*3/MM3 (ref 1.5–8.3)
NEUTROPHILS NFR BLD AUTO: 59.3 % (ref 41–71)
PLATELET # BLD AUTO: 161 10*3/MM3 (ref 150–450)
PMV BLD AUTO: 10.6 FL (ref 6–12)
POTASSIUM BLD-SCNC: 3.6 MMOL/L (ref 3.5–5.5)
PROT SERPL-MCNC: 6.6 G/DL (ref 5.7–8.2)
RBC # BLD AUTO: 4.37 10*6/MM3 (ref 3.89–5.14)
SODIUM BLD-SCNC: 140 MMOL/L (ref 132–146)
TRIGL SERPL-MCNC: 104 MG/DL (ref 0–150)
WBC NRBC COR # BLD: 7.52 10*3/MM3 (ref 3.5–10.8)

## 2018-07-24 PROCEDURE — 85025 COMPLETE CBC W/AUTO DIFF WBC: CPT | Performed by: INTERNAL MEDICINE

## 2018-07-24 PROCEDURE — G0439 PPPS, SUBSEQ VISIT: HCPCS | Performed by: INTERNAL MEDICINE

## 2018-07-24 PROCEDURE — 80061 LIPID PANEL: CPT | Performed by: INTERNAL MEDICINE

## 2018-07-24 PROCEDURE — 80053 COMPREHEN METABOLIC PANEL: CPT | Performed by: INTERNAL MEDICINE

## 2018-07-24 RX ORDER — FOLIC ACID 1 MG/1
TABLET ORAL
COMMUNITY
End: 2018-09-04

## 2018-07-24 NOTE — PROGRESS NOTES
QUICK REFERENCE INFORMATION:  The ABCs of the Annual Wellness Visit    Initial Medicare Wellness Visit    HEALTH RISK ASSESSMENT    1934    Recent Hospitalizations:  No hospitalization(s) within the last year..        Current Medical Providers:  Patient Care Team:  Vanesa Correa DO as PCP - General (Internal Medicine)        Smoking Status:  History   Smoking Status   • Former Smoker   • Types: Cigarettes   • Quit date: 1960   Smokeless Tobacco   • Never Used       Alcohol Consumption:  History   Alcohol Use No       Depression Screen:   PHQ-2/PHQ-9 Depression Screening 7/24/2018   Little interest or pleasure in doing things 0   Feeling down, depressed, or hopeless 0   Total Score 0       Health Habits and Functional and Cognitive Screening:  Functional & Cognitive Status 7/24/2018   Do you have difficulty preparing food and eating? No   Do you have difficulty bathing yourself, getting dressed or grooming yourself? No   Do you have difficulty using the toilet? No   Do you have difficulty moving around from place to place? No   Do you have trouble with steps or getting out of a bed or a chair? Yes   In the past year have you fallen or experienced a near fall? No   Current Diet Well Balanced Diet   Dental Exam Up to date   Eye Exam Up to date   Exercise (times per week) 1 times per week   Current Exercise Activities Include Walking   Do you need help using the phone?  No   Are you deaf or do you have serious difficulty hearing?  Yes   Do you need help with transportation? No   Do you need help shopping? No   Do you need help preparing meals?  No   Do you need help with housework?  No   Do you need help with laundry? No   Do you need help taking your medications? No   Do you need help managing money? No   Do you ever drive or ride in a car without wearing a seat belt? No   Have you felt unusual stress, anger or loneliness in the last month? Yes   Who do you live with? Alone   If you need help, do you have  trouble finding someone available to you? No   Have you been bothered in the last four weeks by sexual problems? No   Do you have difficulty concentrating, remembering or making decisions? Yes           Does the patient have evidence of cognitive impairment? No    Asiprin use counseling: Taking ASA appropriately as indicated      Recent Lab Results:    Visual Acuity:  No exam data present    Age-appropriate Screening Schedule:  Refer to the list below for future screening recommendations based on patient's age, sex and/or medical conditions. Orders for these recommended tests are listed in the plan section. The patient has been provided with a written plan.    Health Maintenance   Topic Date Due   • LIPID PANEL  09/03/2018 (Originally 4/20/2018)   • ZOSTER VACCINE (1 of 2) 09/03/2018 (Originally 10/7/1984)   • TDAP/TD VACCINES (1 - Tdap) 02/03/2020 (Originally 2/25/2011)   • INFLUENZA VACCINE  08/01/2018   • DXA SCAN  10/01/2018   • PNEUMOCOCCAL VACCINES (65+ LOW/MEDIUM RISK)  Completed        Subjective   History of Present Illness    Delilah Lo is a 83 y.o. female who presents for an Annual Wellness Visit.    The following portions of the patient's history were reviewed and updated as appropriate: allergies, current medications, past family history, past medical history, past social history, past surgical history and problem list.    Outpatient Medications Prior to Visit   Medication Sig Dispense Refill   • amLODIPine (NORVASC) 10 MG tablet Take 1 tablet by mouth Daily.     • aspirin 81 MG EC tablet Take 81 mg by mouth daily.     • atenolol (TENORMIN) 100 MG tablet Take 1 tablet by mouth Daily. 30 tablet 2   • cholecalciferol (VITAMIN D3) 1000 UNITS tablet Take 1,000 Units by mouth Daily.     • folic acid (FOLVITE) 1 MG tablet Take 1 tablet by mouth Daily. 30 tablet 2   • levothyroxine (SYNTHROID, LEVOTHROID) 88 MCG tablet Take 1 tablet by mouth Daily. 30 tablet 2   • predniSONE (DELTASONE) 5 MG tablet Take 5 mg  by mouth 2 (two) times a day.     • raNITIdine (ZANTAC) 150 MG tablet Take 150 mg by mouth 2 (Two) Times a Day.     • rivaroxaban (XARELTO) 20 MG tablet Take 1 tablet by mouth Daily. 30 tablet 1   • Magnesium 250 MG tablet Take 250 mg by mouth Daily.       No facility-administered medications prior to visit.        Patient Active Problem List   Diagnosis   • Coronary artery disease   • Essential hypertension   • Dyslipidemia   • Polymyositis (CMS/[facility])   • Dependent edema   • CKD (chronic kidney disease)   • Chronic anemia   • Osteopenia   • GERD (gastroesophageal reflux disease)   • PAF (paroxysmal atrial fibrillation) (CMS/[facility])       Advance Care Planning:  has an advance directive - a copy HAS NOT been provided. Have asked the patient to send this to us to add to record.    Identification of Risk Factors:  Risk factors include: weight , cardiovascular risk and inactivity.    Review of Systems   Constitutional: Negative for activity change, appetite change, chills, diaphoresis, fatigue, fever and unexpected weight change.   HENT: Negative for congestion, ear discharge, ear pain, mouth sores, nosebleeds, sinus pressure, sneezing and sore throat.    Eyes: Negative for pain, discharge and itching.   Respiratory: Negative for cough, chest tightness, shortness of breath and wheezing.    Cardiovascular: Negative for chest pain, palpitations and leg swelling.   Gastrointestinal: Negative for abdominal pain, constipation, diarrhea, nausea and vomiting.   Endocrine: Negative for cold intolerance, heat intolerance, polydipsia and polyphagia.   Genitourinary: Negative for dysuria, flank pain, frequency, hematuria and urgency.   Musculoskeletal: Negative for arthralgias, back pain, gait problem, myalgias, neck pain and neck stiffness.   Skin: Negative for color change, pallor and rash.   Neurological: Positive for weakness (lower extremities ). Negative for seizures, speech difficulty, numbness and headaches.    Psychiatric/Behavioral: Negative for agitation, confusion, decreased concentration and sleep disturbance. The patient is not nervous/anxious.        Compared to one year ago, the patient feels her physical health is the same.  Compared to one year ago, the patient feels her mental health is the same.    Objective     Physical Exam   Constitutional: She appears well-developed.   HENT:   Head: Normocephalic.   Right Ear: External ear normal.   Left Ear: External ear normal.   Nose: Nose normal.   Mouth/Throat: Oropharynx is clear and moist.   Eyes: Pupils are equal, round, and reactive to light. Conjunctivae are normal.   Neck: No JVD present. No thyromegaly present.   Cardiovascular: Normal rate, regular rhythm and normal heart sounds.  Exam reveals no friction rub.    No murmur heard.  Pulmonary/Chest: Effort normal and breath sounds normal. No respiratory distress. She has no wheezes. She has no rales.   Abdominal: Soft. Bowel sounds are normal. She exhibits no distension. There is no tenderness. There is no guarding.   Musculoskeletal: She exhibits no edema or tenderness.   Lymphadenopathy:     She has no cervical adenopathy.   Neurological: She displays normal reflexes. No cranial nerve deficit. She exhibits abnormal muscle tone (4/5 LE).   Skin: No rash noted.   Psychiatric: Her behavior is normal.   Nursing note and vitals reviewed.      Vitals:    07/24/18 0803   BP: 150/90   Pulse: 61   SpO2: 97%   Weight: 90 kg (198 lb 8 oz)       Patient's Body mass index is 33.03 kg/m². BMI is above normal parameters. Recommendations include: educational material and exercise counseling.      Assessment/Plan   Patient Self-Management and Personalized Health Advice  The patient has been provided with information about: diet, exercise, weight management and fall prevention and preventive services including:   · Exercise counseling provided, Fall Risk assessment done, Fall Risk plan of care done.    Visit Diagnoses:  No  diagnosis found.    No orders of the defined types were placed in this encounter.      Outpatient Encounter Prescriptions as of 7/24/2018   Medication Sig Dispense Refill   • amLODIPine (NORVASC) 10 MG tablet Take 1 tablet by mouth Daily.     • aspirin 81 MG EC tablet Take 81 mg by mouth daily.     • atenolol (TENORMIN) 100 MG tablet Take 1 tablet by mouth Daily. 30 tablet 2   • cholecalciferol (VITAMIN D3) 1000 UNITS tablet Take 1,000 Units by mouth Daily.     • folic acid (FOLVITE) 1 MG tablet Take 1 tablet by mouth Daily. 30 tablet 2   • levothyroxine (SYNTHROID, LEVOTHROID) 88 MCG tablet Take 1 tablet by mouth Daily. 30 tablet 2   • predniSONE (DELTASONE) 5 MG tablet Take 5 mg by mouth 2 (two) times a day.     • raNITIdine (ZANTAC) 150 MG tablet Take 150 mg by mouth 2 (Two) Times a Day.     • rivaroxaban (XARELTO) 20 MG tablet Take 1 tablet by mouth Daily. 30 tablet 1   • [DISCONTINUED] Magnesium 250 MG tablet Take 250 mg by mouth Daily.       No facility-administered encounter medications on file as of 7/24/2018.        Reviewed use of high risk medication in the elderly: yes  Reviewed for potential of harmful drug interactions in the elderly: yes    Follow Up:  No Follow-up on file.     An After Visit Summary and PPPS with all of these plans were given to the patient.

## 2018-07-27 RX ORDER — AMLODIPINE BESYLATE 10 MG/1
10 TABLET ORAL DAILY
Qty: 30 TABLET | Refills: 2 | Status: SHIPPED | OUTPATIENT
Start: 2018-07-27 | End: 2018-10-25 | Stop reason: SDUPTHER

## 2018-07-31 VITALS
SYSTOLIC BLOOD PRESSURE: 150 MMHG | OXYGEN SATURATION: 97 % | HEART RATE: 61 BPM | WEIGHT: 198.5 LBS | DIASTOLIC BLOOD PRESSURE: 90 MMHG | BODY MASS INDEX: 33.03 KG/M2

## 2018-08-06 ENCOUNTER — HOSPITAL ENCOUNTER (OUTPATIENT)
Dept: PHYSICAL THERAPY | Facility: HOSPITAL | Age: 83
Setting detail: THERAPIES SERIES
Discharge: HOME | End: 2018-08-06

## 2018-08-06 DIAGNOSIS — I87.2 VENOUS STASIS DERMATITIS OF BOTH LOWER EXTREMITIES: Primary | ICD-10-CM

## 2018-08-06 PROCEDURE — G8991 OTHER PT/OT GOAL STATUS: HCPCS

## 2018-08-06 PROCEDURE — 29581 APPL MULTLAYER CMPRN SYS LEG: CPT

## 2018-08-06 PROCEDURE — G8990 OTHER PT/OT CURRENT STATUS: HCPCS

## 2018-08-06 NOTE — THERAPY PROGRESS REPORT/RE-CERT
Outpatient Rehabilitation - Wound/Debridement Progress Note   [facility]     Patient Name: Delilah Lo  : 1934  MRN: [id number]  Today's Date: 2018                 Admit Date: 2018    Visit Dx:    ICD-10-CM ICD-9-CM   1. Venous stasis dermatitis of both lower extremities I87.2 454.1        Patient Active Problem List   Diagnosis   • Coronary artery disease   • Essential hypertension   • Dyslipidemia   • Polymyositis (CMS/HCC)   • Dependent edema   • CKD (chronic kidney disease)   • Chronic anemia   • Osteopenia   • GERD (gastroesophageal reflux disease)   • PAF (paroxysmal atrial fibrillation) (CMS/[facility])        Past Medical History:   Diagnosis Date   • Chronic anemia     Chronic anemia, secondary to (CKD).    • CKD (chronic kidney disease)    • Coronary artery disease    • Dependent edema    • DVT (deep venous thrombosis) (CMS/[facility]) 2009    History of DVT in .    • Dyslipidemia    • GERD (gastroesophageal reflux disease)    • Hyperlipidemia    • Hypertension    • Osteopenia    • Polymyositis (CMS/HCC)    • Renal disorder    • Thyroid disease         Past Surgical History:   Procedure Laterality Date   • CARDIAC SURGERY      Cardiac Stents    • GASTRIC BYPASS           EVALUATION        PT Ortho     Row Name 18 [id number]       Subjective Comments    Subjective Comments Pt reports she is continuing to use steroid cream and wrap legs.  Has not been scrubbing at legs, and has less itching.  Her main concern right now is visual problems for which she is seeing a retinal specialist.  -JM       Subjective Pain    Able to rate subjective pain? yes  -JM    Pre-Treatment Pain Level 0  -JM    Post-Treatment Pain Level 0  -JM       Transfers    Sit-Stand [place] (Transfers) independent  -JM    Stand-Sit [place] (Transfers) independent  -JM    Comment (Transfers) seated for tx  -       Gait/Stairs Assessment/Training    [place] Level (Gait) independent  -    Assistive Device (Gait)  walker, 4-wheeled  -      User Key  (r) = Recorded By, (t) = Taken By, (c) = Cosigned By    Initials Name Provider Type    Dayami Gallo, PT Physical Therapist                    LDA Wound     Row Name 08/06/18 0930             Wound 01/13/17 0930 Bilateral lower leg other (see comments)    Wound - Properties Group Date first assessed: 01/13/17  - Time first assessed: 0930  - Present On Admission : yes;picture taken  -JM Side: Bilateral  - Orientation: lower  -JM Location: leg  -JM Type: other (see comments)  -JM Additional Comments: BLE excoriation, dermatitis  -JM    Dressing Appearance intact;dry;no drainage  -JM      Base moist;pink;red/granulating  -JM      Periwound excoriated;moist;pink;redness   minimal redness/excoriation, much improved  -JM      Periwound Temperature warm  -JM      Edges other (see comments)   indistinct  -JM      Wound Length (cm) 10 cm   medial LLE; 4cm x 1.5cm medial RLE  -JM      Wound Width (cm) 4 cm  -JM      Drainage Amount none  -JM      Care, Wound cleansed with;neutral pH skin disinfectant applied;ren boot  -JM        User Key  (r) = Recorded By, (t) = Taken By, (c) = Cosigned By    Initials Name Provider Type    Dayami Gallo, PT Physical Therapist              Lymphedema     Row Name 08/06/18 0930             Lymphedema Edema Assessment    Ptting Edema Category By severity  -      Pitting Edema Other (comment)   trace  -JM         Skin Changes/Observations    Lower Extremity Conditions bilateral:;intact;clean;dry;shiny;scaly;crust;scab(s);inflamed;fragile  -      Lower Extremity Color/Pigment bilateral:;red;blanchable  -         Lymphedema Pulses/Capillary Refill    Lower Extremity Capillary Refill right:;left:;less than 3 seconds  -JM         LLE Quick Girth (cm)    Met-heads 21.4 cm  -JM      Mid foot 20 cm  -JM      Smallest ankle 20 cm  -JM      Largest calf 32.2 cm  -JM      Tib tuberosity 32.6 cm  -JM         RLE Quick Girth (cm)    Met-heads  22.2 cm  -JM      Mid foot 20.4 cm  -JM      Smallest ankle 20 cm  -JM      Largest calf 35.5 cm  -JM      Tib tuberosity 35.5 cm  -JM         Compression/Skin Care    Compression/Skin Care skin care;wrapping location;bandaging  -JM      Skin Care washed/dried;lotion applied   z-guard/eucerin mix  -JM      Wrapping Location lower extremity  -JM      Wrapping Location LE bilateral:;foot to knee  -JM      Wrapping Comments unna boot contact layer, kerlix, compressogrips  -JM      Bandage Layers padding/fluff layer;cotton elastic stocking- double layer (comment size)   size 3 foot/ankle, size 4 to calf  -JM      Bandaging Technique light compression   gradient compression using multiple layers/sizes  -        User Key  (r) = Recorded By, (t) = Taken By, (c) = Cosigned By    Initials Name Provider Type    Dayami Gallo, PT Physical Therapist          WOUND DEBRIDEMENT                      Therapy Education     Row Name 08/06/18 6409             Therapy Education    Education Details Continue with gentle skin care, use of steriod cream as instructed by MD, and multilayer wraps.  Pt may use compressogrips only if skin intact without crusts or weeping.  -JM      Given Symptoms/condition management;Bandaging/dressing change  -      Program Reinforced  -      How Provided Verbal;Demonstration  -      Provided to Patient  -      Level of Understanding Verbalized  -        User Key  (r) = Recorded By, (t) = Taken By, (c) = Cosigned By    Initials Name Provider Type    Dayami Gallo, PT Physical Therapist          Recommendation and Plan        PT Assessment/Plan     Row Name 08/06/18 9055          PT Assessment    Functional Limitations Performance in self-care ADL  -     Impairments Integumentary integrity;Edema  -     Assessment Comments Pt with continued improvement in skin integrity to BLE.  Pt without weeping or open areas today, area of excoriation improving, still with redness and  scabs/crust to medial ankles, but improving.  Pt now independent with home skin care and multilayer wrapping.  PT recommends continued follow-up monthly for ongoing dressing mangement.  May d/c after next tx if continuing to improve with home care.  -     Rehab Potential Good  -     Patient/caregiver participated in establishment of treatment plan and goals Yes  -     Patient would benefit from skilled therapy intervention Yes  -        PT Plan    PT Frequency Other (comment)   once/month  -     Predicted Duration of Therapy Intervention (Therapy Eval) 1-2 visits  -     Planned CPT's? PT NONSELECT DEBRIDE 15 MIN: 28967;PT JUAN DEBRIDE OPEN WOUND UP TO 20 CM: 50385;PT UNNA BOOT: 26877;PT MULTI LAYER COMP SYS LE  -     Physical Therapy Interventions (Optional Details) wound care;patient/family education  -     PT Plan Comments unna boot/MLW  -       User Key  (r) = Recorded By, (t) = Taken By, (c) = Cosigned By    Initials Name Provider Type    Dayami Gallo, PT Physical Therapist          Goals        PT OP Goals     Row Name 18 0930          PT Short Term Goals    STG 1 Decrease wound dimensions by 50% as evidence of wound healing.  -     STG 1 Progress Met  -     STG 2 Decrease area of excoriation by 50% as evidence of wound healing.  -     STG 2 Progress Met  -        Long Term Goals    LTG 1 Patient independent and compliant with use and care of compression wraps or stockings, with assistance of family / caregiver as indicated to promote self-care independence.  -     LTG 1 Progress Met  -     LTG 2 Decrease area of excoriation by 75% as evidence of wound healing.  -     LTG 2 Progress Partially Met;Progressing  -     LTG 3 Decrease limb circumferences by 2 cm to promote improved skin integrity and circulation.  -     LTG 3 Progress Met  -     LTG 4 Demonstrate no drainage from BLE to indicate healing progress.  -     LTG 4 Progress Met  -        Time  Calculation    PT Goal Re-Cert Due Date 09/10/18  -ALENA       User Key  (r) = Recorded By, (t) = Taken By, (c) = Cosigned By    Initials Name Provider Type    Dayami Gallo, PT Physical Therapist          PT Goal Re-Cert Due Date: 09/10/18  PT Short Term Goals  STG 1: Decrease wound dimensions by 50% as evidence of wound healing.  STG 1 Progress: Met  STG 2: Decrease area of excoriation by 50% as evidence of wound healing.  STG 2 Progress: Met  Long Term Goals  LTG 1: Patient independent and compliant with use and care of compression wraps or stockings, with assistance of family / caregiver as indicated to promote self-care independence.  LTG 1 Progress: Met  LTG 2: Decrease area of excoriation by 75% as evidence of wound healing.  LTG 2 Progress: Partially Met, Progressing  LTG 3: Decrease limb circumferences by 2 cm to promote improved skin integrity and circulation.  LTG 3 Progress: Met  LTG 4: Demonstrate no drainage from BLE to indicate healing progress.  LTG 4 Progress: Met      Time Calculation: Start Time: 0930    Therapy Charges for Today     Code Description Service Date Service Provider Modifiers Qty    41503389287 HC PT OTHER PRIME FUNCT CURRENT 8/6/2018 Dayami Baca, PT GP, CI 1    13270450572 HC PT OTHER PRIME FUNCT PROJECTED 8/6/2018 Dayami Baca, PT GP, CI 1    47362194802 HC PT MULTI LAYER COMP SYS BELOW KNEE 8/6/2018 Dayami Baca, PT GP 1        Therapy Suggested Charges     Code   Minutes Charges    None             PT G-Codes  PT Professional Judgement Used?: Yes  Outcome Measure Options: BWAT ([employer]ings-Manzo Wound Assess Tool)  Score: 10% impairment  Functional Limitation: Other PT primary  Other PT Primary Current Status (): At least 1 percent but less than 20 percent impaired, limited or restricted  Other PT Primary Goal Status (): At least 1 percent but less than 20 percent impaired, limited or restricted     Dayami Baca, PT  8/6/2018

## 2018-08-20 ENCOUNTER — TELEPHONE (OUTPATIENT)
Dept: INTERNAL MEDICINE | Facility: CLINIC | Age: 83
End: 2018-08-20

## 2018-08-20 RX ORDER — RANITIDINE 150 MG/1
150 TABLET ORAL 2 TIMES DAILY
Qty: 60 TABLET | Refills: 3 | Status: SHIPPED | OUTPATIENT
Start: 2018-08-20 | End: 2018-11-12 | Stop reason: SDUPTHER

## 2018-08-27 ENCOUNTER — HOSPITAL ENCOUNTER (OUTPATIENT)
Dept: PHYSICAL THERAPY | Facility: HOSPITAL | Age: 83
Setting detail: THERAPIES SERIES
Discharge: HOME | End: 2018-08-27

## 2018-08-27 DIAGNOSIS — R60.0 EDEMA OF BOTH LEGS: ICD-10-CM

## 2018-08-27 DIAGNOSIS — I87.2 VENOUS STASIS DERMATITIS OF BOTH LOWER EXTREMITIES: Primary | ICD-10-CM

## 2018-08-27 PROCEDURE — 29581 APPL MULTLAYER CMPRN SYS LEG: CPT

## 2018-08-27 PROCEDURE — G8991 OTHER PT/OT GOAL STATUS: HCPCS

## 2018-08-27 PROCEDURE — G8990 OTHER PT/OT CURRENT STATUS: HCPCS

## 2018-08-27 NOTE — THERAPY PROGRESS REPORT/RE-CERT
"    Outpatient Rehabilitation - Wound/Debridement Progress Note   [facility]     Patient Name: Delilah Lo  : 1934  MRN: [id number]  Today's Date: 2018                 Admit Date: 2018    Visit Dx:    ICD-10-CM ICD-9-CM   1. Venous stasis dermatitis of both lower extremities I87.2 454.1   2. Edema of both legs R60.0 782.3       Patient Active Problem List   Diagnosis   • Coronary artery disease   • Essential hypertension   • Dyslipidemia   • Polymyositis (CMS/HCC)   • Dependent edema   • CKD (chronic kidney disease)   • Chronic anemia   • Osteopenia   • GERD (gastroesophageal reflux disease)   • PAF (paroxysmal atrial fibrillation) (CMS/[facility])        Past Medical History:   Diagnosis Date   • Chronic anemia     Chronic anemia, secondary to (CKD).    • CKD (chronic kidney disease)    • Coronary artery disease    • Dependent edema    • DVT (deep venous thrombosis) (CMS/[facility]) 2009    History of DVT in .    • Dyslipidemia    • GERD (gastroesophageal reflux disease)    • Hyperlipidemia    • Hypertension    • Osteopenia    • Polymyositis (CMS/HCC)    • Renal disorder    • Thyroid disease         Past Surgical History:   Procedure Laterality Date   • CARDIAC SURGERY      Cardiac Stents    • GASTRIC BYPASS           EVALUATION        PT Ortho     Row Name 18 1050       Subjective Comments    Subjective Comments Pt reports being pleased with her progress, but is hopeful she can stop using the \"pink wrap\" (unna boot) because it itches after several days.  -MC       Subjective Pain    Able to rate subjective pain? yes  -MC    Pre-Treatment Pain Level 0  -MC    Post-Treatment Pain Level 0  -MC       RLE Quick Girth (cm)    Met-heads 22.2 cm  -MC    Mid foot 20 cm  -MC    Smallest ankle 19.8 cm  -MC    Largest calf 35.7 cm  -MC    Tib tuberosity 35.5 cm  -MC       LLE Quick Girth (cm)    Met-heads 21.6 cm  -MC    Mid foot 19.8 cm  -MC    Smallest ankle 19.5 cm  -MC    Largest calf 32.5 cm  -MC    Tib " tuberosity 32.4 cm  -MC       Transfers    Sit-Stand [facility] (Transfers) independent  -    Stand-Sit [facility] (Transfers) independent  -    Comment (Transfers) seated for tx  -       Gait/Stairs Assessment/Training    [facility] Level (Gait) independent  -    Assistive Device (Gait) walker, 4-wheeled  -      User Key  (r) = Recorded By, (t) = Taken By, (c) = Cosigned By    Initials Name Provider Type    Alis Skelton, PT Physical Therapist                    LDA Wound     Row Name 08/27/18 1050             Wound 01/13/17 0930 Bilateral lower leg other (see comments)    Wound - Properties Group Date first assessed: 01/13/17  - Time first assessed: 0930  - Present On Admission : yes;picture taken  - Side: Bilateral  - Orientation: lower  - Location: leg  - Type: other (see comments)  - Additional Comments: BLE excoriation, dermatitis  -    Dressing Appearance intact;dry;no drainage  -      Base moist;pink;red/granulating   minimal excoriation R anterior shin  -      Periwound excoriated;moist;pink;redness   minimal redness/excoriation, much improved  -      Periwound Temperature warm  -      Edges other (see comments)   indistinct  -      Wound Length (cm) 6 cm  -      Wound Width (cm) 2 cm  -      Wound Depth (cm) 0.1 cm  -      Drainage Amount none   no drainage noted today  -      Care, Wound cleansed with;wound cleanser  -      Dressing Care, Wound multi-layer wrap  -        User Key  (r) = Recorded By, (t) = Taken By, (c) = Cosigned By    Initials Name Provider Type    Alis Skelton, PT Physical Therapist    Dayami Gallo, PT Physical Therapist              Lymphedema     Row Name 08/27/18 1050             Lymphedema Edema Assessment    Ptting Edema Category By severity  -      Pitting Edema Other (comment)   trace  -         Skin Changes/Observations    Lower Extremity Conditions  bilateral:;intact;clean;dry;shiny;hairless;fragile  -      Lower Extremity Color/Pigment bilateral:;red;blanchable  -         Lymphedema Pulses/Capillary Refill    Lower Extremity Capillary Refill right:;left:;less than 3 seconds  -         Compression/Skin Care    Compression/Skin Care skin care;wrapping location;bandaging  -      Skin Care washed/dried  -      Wrapping Location lower extremity  -      Wrapping Location LE bilateral:;foot to knee  -      Bandage Layers padding/fluff layer;cotton elastic stocking- double layer (comment size)   size 3.5 MH to ankle dbl over foot, size 4 dbl over calf  -      Bandaging Technique light compression   multiple layers/sizes to create gradient compression  -        User Key  (r) = Recorded By, (t) = Taken By, (c) = Cosigned By    Initials Name Provider Type    Alis Skelton PT Physical Therapist          WOUND DEBRIDEMENT                      Therapy Education     Row Name 08/27/18 1055             Therapy Education    Education Details Continue with gentle skin care, use of steroid cream as instructed by MD, and compressogrip MLW. Pt to add unna boot if skin condition worsens. If notable s/sx of infection, pt to call her primary MD.  -MC      Given Symptoms/condition management;Bandaging/dressing change  -      Program Progressed  -MC      How Provided Verbal;Demonstration  -MC      Provided to Patient  -MC      Level of Understanding Verbalized  -        User Key  (r) = Recorded By, (t) = Taken By, (c) = Cosigned By    Initials Name Provider Type    Alis Skelton PT Physical Therapist          Recommendation and Plan        PT Assessment/Plan     Row Name 08/27/18 1050          PT Assessment    Functional Limitations Performance in self-care ADL  -     Impairments Integumentary integrity;Edema  -     Assessment Comments Pt with notable improvement to the BLE. Pt with small area of excoriation remaining, but no active drainage  today. Pt with stable BLE limb girth, with trace BLE edema. Pt will be appropriate to transition to a compression system such as the juxtalite compression system, to allow for independent edema management once areas are completely closed.  -     Rehab Potential Good  -     Patient/caregiver participated in establishment of treatment plan and goals Yes  -     Patient would benefit from skilled therapy intervention Yes  -        PT Plan    PT Frequency Other (comment)   every 2-3 weeks  -     Predicted Duration of Therapy Intervention (Therapy Eval) 1-2 visits  -     Planned CPT's? PT SELF CARE/MGMT/TRAIN 15 MIN: 49911;PT NONSELECT DEBRIDE 15 MIN: 17770;PT JUAN DEBRIDE OPEN WOUND UP TO 20 CM: 98904;PT UNNA BOOT: 17788;PT MULTI LAYER COMP SYS LE  -     Physical Therapy Interventions (Optional Details) patient/family education;wound care  -     PT Plan Comments MLW, ?compression stockings. Placing HALO juxtalite order.  -       User Key  (r) = Recorded By, (t) = Taken By, (c) = Cosigned By    Initials Name Provider Type    Alis Skelton, PT Physical Therapist          Goals        PT OP Goals     Row Name 18 1050          PT Short Term Goals    STG 1 Decrease wound dimensions by 50% as evidence of wound healing.  -     STG 1 Progress Met  -     STG 2 Decrease area of excoriation by 50% as evidence of wound healing.  -     STG 2 Progress Met  -        Long Term Goals    LTG 1 Patient independent and compliant with use and care of compression wraps or stockings, with assistance of family / caregiver as indicated to promote self-care independence.  -     LTG 1 Progress Met  -     LTG 2 Decrease area of excoriation by 75% as evidence of wound healing.  -     LTG 2 Progress Partially Met  -     LTG 3 Decrease limb circumferences by 2 cm to promote improved skin integrity and circulation.  -     LTG 3 Progress Met  -     LTG 4 Demonstrate no drainage from BLE to  indicate healing progress.  -     LTG 4 Progress Met  -        Time Calculation    PT Goal Re-Cert Due Date 09/10/18  -       User Key  (r) = Recorded By, (t) = Taken By, (c) = Cosigned By    Initials Name Provider Type    Alis Skelton, PT Physical Therapist          PT Goal Re-Cert Due Date: 09/10/18  PT Short Term Goals  STG 1: Decrease wound dimensions by 50% as evidence of wound healing.  STG 1 Progress: Met  STG 2: Decrease area of excoriation by 50% as evidence of wound healing.  STG 2 Progress: Met  Long Term Goals  LTG 1: Patient independent and compliant with use and care of compression wraps or stockings, with assistance of family / caregiver as indicated to promote self-care independence.  LTG 1 Progress: Met  LTG 2: Decrease area of excoriation by 75% as evidence of wound healing.  LTG 2 Progress: Partially Met  LTG 3: Decrease limb circumferences by 2 cm to promote improved skin integrity and circulation.  LTG 3 Progress: Met  LTG 4: Demonstrate no drainage from BLE to indicate healing progress.  LTG 4 Progress: Met      Time Calculation: Start Time: 1050    Therapy Charges for Today     Code Description Service Date Service Provider Modifiers Qty    58654025022 HC PT OTHER PRIME FUNCT CURRENT 8/27/2018 Alis Johnson, PT GP, CI 1    70310973069 HC PT OTHER PRIME FUNCT PROJECTED 8/27/2018 Alis Johnson, PT GP, CI 1    60726408810 HC PT MULTI LAYER COMP SYS BELOW KNEE 8/27/2018 Alis Johnson, PT GP 1        Therapy Suggested Charges     Code   Minutes Charges    None             PT G-Codes  PT Professional Judgement Used?: Yes  Outcome Measure Options: BWAT (Cummings-Manzo Wound Assess Tool)  Score: 10% impairment  Functional Limitation: Other PT primary  Other PT Primary Current Status (): At least 1 percent but less than 20 percent impaired, limited or restricted  Other PT Primary Goal Status (): At least 1 percent but less than 20 percent impaired, limited or  restricted     Alis Johnson, PT  8/27/2018

## 2018-09-04 ENCOUNTER — OFFICE VISIT (OUTPATIENT)
Dept: CARDIOLOGY | Facility: CLINIC | Age: 83
End: 2018-09-04

## 2018-09-04 VITALS
DIASTOLIC BLOOD PRESSURE: 78 MMHG | BODY MASS INDEX: 33.63 KG/M2 | WEIGHT: 197 LBS | SYSTOLIC BLOOD PRESSURE: 154 MMHG | HEIGHT: 64 IN | HEART RATE: 63 BPM

## 2018-09-04 DIAGNOSIS — I48.0 PAF (PAROXYSMAL ATRIAL FIBRILLATION) (HCC): ICD-10-CM

## 2018-09-04 DIAGNOSIS — I10 ESSENTIAL HYPERTENSION: ICD-10-CM

## 2018-09-04 DIAGNOSIS — I25.10 CORONARY ARTERY DISEASE INVOLVING NATIVE CORONARY ARTERY OF NATIVE HEART WITHOUT ANGINA PECTORIS: Primary | ICD-10-CM

## 2018-09-04 DIAGNOSIS — E78.5 DYSLIPIDEMIA: ICD-10-CM

## 2018-09-04 PROCEDURE — 99214 OFFICE O/P EST MOD 30 MIN: CPT | Performed by: INTERNAL MEDICINE

## 2018-09-04 NOTE — PROGRESS NOTES
"  OFFICE FOLLOW UP     Date of Encounter:2018     Name: Delilah Lo  : 1934  Address: [address] [place], [place] [address] [facility] [address]  Home Phone:[phone]    PCP: Vanesa Correa DO  [address] [facility] [address]  [facility] [address]    Delilah Lo is a 83 y.o. female.      Chief Complaint: Follow up of CAD, HTN, PAF    Problem List:   1.  Coronary artery disease:   a.  Coronary artery bypass grafting, .  b. Left heart catheterization , at [facility], [facility].  Nonobstructive.    c. Increased  shortness of air, summer 2015.   d. LHC 2015- patent LIMA to LAD and SVG to RCA. No myocardium in jeopardy. Normal LVEF   e. History of statin \"intolerances\".  2. Hypertension.   3. Paroxysmal atrial fibrillation, Winter 2017.  a. Chads-vasc=5, NOAC instituted by PCP.  4. Dyslipidemia.   5. Polymyositis.   6. Dependent edema.    7. CKD.   8. Chronic anemia, secondary to (CKD).   9. Osteopenia.    10. GERD.   11. History of DVT in .     Allergies   Allergen Reactions   • Lortab [Hydrocodone-Acetaminophen] Shortness Of Breath   • Methotrexate Derivatives      Multisystem toxicity, reported per PCP office notes   • Amoxicillin      Pt does not recall reaction, reported by daughter   • Azathioprine      Pt does not recall reaction, allergy reported per PCP office notes   • Cellcept [Mycophenolate]      Pt does not recall reaction, allergy reported per PCP office notes   • Contrast Dye Hives     INTRAVENOUS CONTRAST DYE.    • Fosamax [Alendronate]      Pt does not recall reaction, allergy reported per PCP office notes   • Hydrochlorothiazide      Unknown reaction, allergy reported per PCP office notes   • Lisinopril Cough   • Methotrexate Unknown (See Comments)   • Simvastatin Other (See Comments)     Sleep disturbance, reported per PCP office notes   • Chlorthalidone Rash       Current Medications:  •  amlodipine 10 MG by mouth Daily.  •  aspirin 81 MG EC by mouth daily  •  atenolol 100 MG by mouth Daily  •  " "cholecalciferol 1,000 Units by mouth Daily  •  folic acid 1 MG by mouth Daily  •  levothyroxine 88 MCG by mouth Daily.  •  predniSONE 5 mg by mouth 2 (two) times a day.  •  ranitidine 150 MG by mouth 2 (Two) Times a Day.  •  rivaroxaban 20 MG by mouth Daily.    History of Present Illness:   The patient returns for annual follow-up today.  She visited the emergency room in May of this year with \"shortness of air\".  Her evaluation was unremarkable and the patient states that she, afterwards, saw a dermatologist who made a diagnosis (\"it was a long word\") and that she has had no recurrent symptoms.  She denies angina.  She denies symptoms suggesting heart failure.  Her long history of intolerance to multiple statins is noted.  She has not tried Livalo.  She has rare atrial fibrillation that is, historically, brief in nature and not associated with significant symptoms.             The following portions of the patient's history were reviewed and updated as appropriate: allergies, current medications and problem list.    ROS: Pertinent positives as listed in the HPI.  All other systems reviewed and negative.    Objective:    Vitals:    09/04/18 1119 09/04/18 1120   BP: 178/78 154/78   BP Location: Left arm Left arm   Patient Position: Sitting Standing   Pulse: 61 63   Weight: 89.4 kg (197 lb) 89.4 kg (197 lb)   Height: 162.6 cm (64\") 162.6 cm (64\")       Physical Exam:  GENERAL: Alert, cooperative, in no acute distress.   HEENT: Normocephalic, no adenopathy, no jugular venous distention  HEART: No discrete PMI is noted. Regular rhythm, normal rate, and no murmurs, gallops, or rubs.   LUNGS: Clear to auscultation bilaterally. No wheezing, rales or ronchi.  ABDOMEN: Soft, bowel sounds present, non-tender   NEUROLOGIC: No focal abnormalities involving strength or sensation are noted.   EXTREMITIES: No clubbing, cyanosis, or edema noted. Peripheral pulses are present.    Diagnostic Data:    Lab Results   Component Value " Date    GLUCOSE 104 (H) 07/24/2018    BUN 16 07/24/2018    CREATININE 1.18 07/24/2018    EGFRIFNONA 44 (L) 07/24/2018    BCR 13.6 07/24/2018    K 3.6 07/24/2018    CO2 26.0 07/24/2018    CALCIUM 9.4 07/24/2018    ALBUMIN 4.09 07/24/2018    AST 34 (H) 07/24/2018    ALT 32 07/24/2018     Lab Results   Component Value Date    CHOL 216 (H) 07/24/2018    TRIG 104 07/24/2018    HDL 71 (H) 07/24/2018     (H) 07/24/2018     Lab Results   Component Value Date    WBC 7.52 07/24/2018    HGB 13.0 07/24/2018    HCT 41.5 07/24/2018    MCV 95.0 07/24/2018     07/24/2018       Procedures      Assessment and Plan:   1.  CAD: She is asymptomatic.  She had no myocardium in jeopardy in November 2015 and LV function was normal at that time.  I think that her main issue now is need of a statin and we will try Livalo.  She cannot take Livalo we'll consider her eligibility for a PCS K-9 inhibitor.  2.  HTN: Blood pressures are elevated today; however, patient states that systolic blood pressures are normal at home.  I will not intervene at this time but careful follow-up with her primary care physician for a target systolic pressure of no higher than 130 is noted.  3.  PAF:  Anticoagulation (NOAC) is continuing.  4.  HLP: Is noted under #1: Her LDL cholesterol needs to be targeted to less than 70 and is now 150.  We will try Livalo.  If she can't tolerate this evaluation for the use of a PCS K-9 inhibitor will be considered.    I, Pablito Cade MD, [facility], [facility], personally performed the services described in this documentation as scribed by the above named individual in my presence, and it is both accurate and complete. At 11:56 AM on 09/04/2018    I will see Delilah Lo back in one year or sooner on an as needed basis.        Scribed for Pablito Cade MD by Mary Benson RN. 09/04/2018 11:25 AM.        EMR Dragon/Transcription Disclaimer:  Much of this encounter note is an electronic transcription/translation  of spoken language to printed text.  The electronic translation of spoken language may permit erroneous, or at times, nonsensical words or phrases to be inadvertently transcribed.  Although I have reviewed the note for such errors, some may still exist.

## 2018-09-25 ENCOUNTER — HOSPITAL ENCOUNTER (OUTPATIENT)
Dept: PHYSICAL THERAPY | Facility: HOSPITAL | Age: 83
Setting detail: THERAPIES SERIES
Discharge: HOME | End: 2018-09-25

## 2018-09-25 DIAGNOSIS — R60.0 EDEMA OF BOTH LEGS: ICD-10-CM

## 2018-09-25 DIAGNOSIS — I87.2 VENOUS STASIS DERMATITIS OF BOTH LOWER EXTREMITIES: Primary | ICD-10-CM

## 2018-09-25 PROCEDURE — 97535 SELF CARE MNGMENT TRAINING: CPT

## 2018-09-25 PROCEDURE — G8991 OTHER PT/OT GOAL STATUS: HCPCS

## 2018-09-25 PROCEDURE — G8990 OTHER PT/OT CURRENT STATUS: HCPCS

## 2018-09-25 NOTE — THERAPY PROGRESS REPORT/RE-CERT
Outpatient Rehabilitation - Wound/Debridement Progress Note   [place]     Patient Name: Delilah Lo  : 1934  MRN: [id number]  Today's Date: 2018                 Admit Date: 2018    Visit Dx:    ICD-10-CM ICD-9-CM   1. Venous stasis dermatitis of both lower extremities I87.2 454.1   2. Edema of both legs R60.0 782.3       Patient Active Problem List   Diagnosis   • Coronary artery disease   • Essential hypertension   • Dyslipidemia   • Polymyositis (CMS/HCC)   • Dependent edema   • CKD (chronic kidney disease)   • Chronic anemia   • Osteopenia   • GERD (gastroesophageal reflux disease)   • PAF (paroxysmal atrial fibrillation) (CMS/[facility])        Past Medical History:   Diagnosis Date   • Chronic anemia     Chronic anemia, secondary to (CKD).    • CKD (chronic kidney disease)    • Coronary artery disease    • Dependent edema    • Dermatitis    • DVT (deep venous thrombosis) (CMS/[facility]) 2009    History of DVT in .    • Dyslipidemia    • GERD (gastroesophageal reflux disease)    • Hyperlipidemia    • Hypertension    • Osteopenia    • Polymyositis (CMS/HCC)    • Renal disorder    • Thyroid disease         Past Surgical History:   Procedure Laterality Date   • CARDIAC SURGERY      Cardiac Stents    • GASTRIC BYPASS           EVALUATION        PT Ortho     Row Name 18 0945       Subjective Comments    Subjective Comments Pt states her skin is looking good, she does not have to return to the Dermatologist for a year.  States she has been consistently using the Triamcinolone cream as instructed, and has had no drainage or open area on her legs.  Pt brought Juxta-lite stockings she received thru [employer].  -JM       Subjective Pain    Able to rate subjective pain? yes  -JM    Pre-Treatment Pain Level 0  -JM    Post-Treatment Pain Level 0  -JM       Transfers    Sit-Stand [place] (Transfers) independent  -JM    Stand-Sit [place] (Transfers) independent  -JM    Comment  (Transfers) seated for tx  -JM       Gait/Stairs Assessment/Training    [place] Level (Gait) independent  -JM    Assistive Device (Gait) walker, 4-wheeled  -JM      User Key  (r) = Recorded By, (t) = Taken By, (c) = Cosigned By    Initials Name Provider Type    Dayami Gallo, PT Physical Therapist                    LDA Wound     Row Name               [REMOVED] Wound 01/13/17 0930 Bilateral lower leg other (see comments)    Wound - Properties Group Date first assessed: 01/13/17  - Time first assessed: 0930  -JM Present On Admission : yes;picture taken  -JM Side: Bilateral  -JM Orientation: lower  -JM Location: leg  -JM Type: other (see comments)  - Additional Comments: BLE excoriation, dermatitis  - Resolution Date: 09/25/18  - Resolution Time: 0845 -JM Wound Outcome: Healed  -JM      User Key  (r) = Recorded By, (t) = Taken By, (c) = Cosigned By    Initials Name Provider Type    Dayami Gallo PT Physical Therapist              Lymphedema     Row Name 09/25/18 0945             Lymphedema Edema Assessment    Ptting Edema Category By severity  -      Pitting Edema Other (comment)   trace  -         Skin Changes/Observations    Lower Extremity Conditions bilateral:;intact;clean;dry;shiny;hairless;fragile  -      Lower Extremity Color/Pigment bilateral:;blanchable  -JM         Lymphedema Pulses/Capillary Refill    Lower Extremity Capillary Refill right:;left:;less than 3 seconds  -JM         LLE Quick Girth (cm)    Met-heads 21.3 cm  -JM      Mid foot 20.3 cm  -JM      Smallest ankle 20 cm  -JM      Largest calf 31.7 cm  -JM      Tib tuberosity 33.5 cm  -JM         RLE Quick Girth (cm)    Met-heads 22.2 cm  -JM      Mid foot 20.2 cm  -JM      Smallest ankle 19.8 cm  -JM      Largest calf 36.2 cm  -JM      Tib tuberosity 35.8 cm  -JM         Compression/Skin Care    Compression/Skin Care compression garment  -      Compression Garment Comments assisted pt with applying small short  juxta-lite velcro-closure knee high stockings to BLE  -        User Key  (r) = Recorded By, (t) = Taken By, (c) = Cosigned By    Initials Name Provider Type    Dayami Gallo, PT Physical Therapist                  Therapy Education     Row Name 09/25/18 1229             Therapy Education    Education Details Instructed in donning and use of juxta-lite stockings.   Assisted with RLE with min-modA, and pt applied LLE stocking with VCs.  -      Given Symptoms/condition management;Bandaging/dressing change  -      How Provided Verbal;Demonstration  -      Provided to Patient  -      Level of Understanding Verbalized  -      Program Progressed  -        User Key  (r) = Recorded By, (t) = Taken By, (c) = Cosigned By    Initials Name Provider Type    Dayami Gallo PT Physical Therapist          Recommendation and Plan        PT Assessment/Plan     Row Name 09/25/18 5332          PT Assessment    Functional Limitations Performance in self-care ADL  -     Impairments Integumentary integrity;Edema  -     Assessment Comments Pt with much improved skin integrity to BLE, no open wounds, no excoriation or drainage.  Pt appropriate to transition to velcro-closure knee-high compression stockings for daily use to manage chronic edema and prevent furture ulcerations.  PT instructed pt in application and use of stockings today and pt donned stockings with VCs.  Plan to tentatively d/c from therapy today, but will leave account open for 30 days for any recurrence of issues or pt concerns.  -     Rehab Potential Good  -     Patient/caregiver participated in establishment of treatment plan and goals Yes  -     Patient would benefit from skilled therapy intervention Yes  -        PT Plan    PT Frequency Other (comment)   tentative d/c today  -       User Key  (r) = Recorded By, (t) = Taken By, (c) = Cosigned By    Initials Name Provider Type    Dayami Gallo, PT Physical Therapist           Goals        PT OP Goals     Row Name 09/25/18 0945          PT Short Term Goals    STG 1 Decrease wound dimensions by 50% as evidence of wound healing.  -     STG 1 Progress Met  -     STG 2 Decrease area of excoriation by 50% as evidence of wound healing.  -     STG 2 Progress Met  -        Long Term Goals    LTG 1 Patient independent and compliant with use and care of compression wraps or stockings, with assistance of family / caregiver as indicated to promote self-care independence.  -     LTG 1 Progress Met  -     LTG 2 Decrease area of excoriation by 75% as evidence of wound healing.  -     LTG 2 Progress Met  -     LTG 3 Decrease limb circumferences by 2 cm to promote improved skin integrity and circulation.  -     LTG 3 Progress Met  -     LTG 4 Demonstrate no drainage from BLE to indicate healing progress.  -     LTG 4 Progress Met  -        Time Calculation    PT Goal Re-Cert Due Date 12/24/18  -       User Key  (r) = Recorded By, (t) = Taken By, (c) = Cosigned By    Initials Name Provider Type    Dayami Gallo, PT Physical Therapist          PT Goal Re-Cert Due Date: 12/24/18  PT Short Term Goals  STG 1: Decrease wound dimensions by 50% as evidence of wound healing.  STG 1 Progress: Met  STG 2: Decrease area of excoriation by 50% as evidence of wound healing.  STG 2 Progress: Met  Long Term Goals  LTG 1: Patient independent and compliant with use and care of compression wraps or stockings, with assistance of family / caregiver as indicated to promote self-care independence.  LTG 1 Progress: Met  LTG 2: Decrease area of excoriation by 75% as evidence of wound healing.  LTG 2 Progress: Met  LTG 3: Decrease limb circumferences by 2 cm to promote improved skin integrity and circulation.  LTG 3 Progress: Met  LTG 4: Demonstrate no drainage from BLE to indicate healing progress.  LTG 4 Progress: Met      Time Calculation: Start Time: 0945  Total Timed Code Minutes- PT: 25  minute(s)    Therapy Charges for Today     Code Description Service Date Service Provider Modifiers Qty    82533425665 HC PT OTHER PRIME FUNCT CURRENT 9/25/2018 Dayami Baca, PT GP,  1    73171421587 HC PT OTHER PRIME FUNCT PROJECTED 9/25/2018 Dayami Baca, PT GP, CI 1    [id number] HC PT SELF CARE/MGMT/TRAIN EA 15 MIN 9/25/2018 Dayami Baca, PT GP 2        Therapy Suggested Charges     Code   Minutes Charges    None             PT G-Codes  PT Professional Judgement Used?: Yes  Outcome Measure Options: BWAT (Cummings-Manzo Wound Assess Tool)  Functional Limitation: Other PT primary  Other PT Primary Current Status (): 0 percent impaired, limited or restricted  Other PT Primary Goal Status (): At least 1 percent but less than 20 percent impaired, limited or restricted     Dayami Baca, PT  9/25/2018

## 2018-10-01 RX ORDER — LEVOTHYROXINE SODIUM 88 UG/1
TABLET ORAL
Qty: 30 TABLET | Refills: 2 | Status: SHIPPED | OUTPATIENT
Start: 2018-10-01 | End: 2018-11-12 | Stop reason: SDUPTHER

## 2018-10-24 NOTE — TELEPHONE ENCOUNTER
PATIENT WOULD LIKE HER AMLODIPINE AND FOLIC ACID FOR 90 DAYS DUE TO NOT BEING ABLE TO DRIVE OFTEN.

## 2018-10-25 RX ORDER — AMLODIPINE BESYLATE 10 MG/1
TABLET ORAL
Qty: 30 TABLET | Refills: 2 | Status: SHIPPED | OUTPATIENT
Start: 2018-10-25 | End: 2018-11-30 | Stop reason: SDUPTHER

## 2018-10-25 RX ORDER — FOLIC ACID 1 MG/1
TABLET ORAL
Qty: 30 TABLET | Refills: 2 | Status: SHIPPED | OUTPATIENT
Start: 2018-10-25 | End: 2018-11-30 | Stop reason: SDUPTHER

## 2018-10-26 RX ORDER — FOLIC ACID 1 MG/1
1 TABLET ORAL DAILY
Qty: 30 TABLET | Refills: 2 | Status: SHIPPED | OUTPATIENT
Start: 2018-10-26 | End: 2019-01-09 | Stop reason: SDUPTHER

## 2018-10-26 RX ORDER — AMLODIPINE BESYLATE 10 MG/1
10 TABLET ORAL DAILY
Qty: 30 TABLET | Refills: 2 | Status: SHIPPED | OUTPATIENT
Start: 2018-10-26 | End: 2019-01-09 | Stop reason: SDUPTHER

## 2018-11-01 ENCOUNTER — DOCUMENTATION (OUTPATIENT)
Dept: PHYSICAL THERAPY | Facility: HOSPITAL | Age: 83
End: 2018-11-01

## 2018-11-01 NOTE — THERAPY DISCHARGE NOTE
Outpatient Rehabilitation - Wound/Debridement D/C Summary        Patient Name: Delilah Lo  : 1934  MRN: [id number]  Today's Date: 2018                  Admit Date: (Not on file)    Visit Dx:  No diagnosis found.    Patient Active Problem List   Diagnosis   • Coronary artery disease   • Essential hypertension   • Dyslipidemia   • Polymyositis (CMS/HCC)   • Dependent edema   • CKD (chronic kidney disease)   • Chronic anemia   • Osteopenia   • GERD (gastroesophageal reflux disease)   • PAF (paroxysmal atrial fibrillation) (CMS/HCC)        Past Medical History:   Diagnosis Date   • Chronic anemia     Chronic anemia, secondary to (CKD).    • CKD (chronic kidney disease)    • Coronary artery disease    • Dependent edema    • Dermatitis    • DVT (deep venous thrombosis) (CMS/HCC) 2009    History of DVT in .    • Dyslipidemia    • GERD (gastroesophageal reflux disease)    • Hyperlipidemia    • Hypertension    • Osteopenia    • Polymyositis (CMS/HCC)    • Renal disorder    • Thyroid disease         Past Surgical History:   Procedure Laterality Date   • CARDIAC SURGERY      Cardiac Stents    • GASTRIC BYPASS           EVALUATION                WOUND DEBRIDEMENT                            Recommendation and Plan      Goals        PT OP Goals     Row Name 18 1134          PT Short Term Goals    STG 1 Decrease wound dimensions by 50% as evidence of wound healing.  -MC     STG 1 Progress Met  -     STG 2 Decrease area of excoriation by 50% as evidence of wound healing.  -MC     STG 2 Progress Met  -        Long Term Goals    LTG 1 Patient independent and compliant with use and care of compression wraps or stockings, with assistance of family / caregiver as indicated to promote self-care independence.  -MC     LTG 1 Progress Met  -     LTG 2 Decrease area of excoriation by 75% as evidence of wound healing.  -MC     LTG 2 Progress Met  -     LTG 3 Decrease limb circumferences by 2 cm to promote  improved skin integrity and circulation.  -     LTG 3 Progress Met  -     LTG 4 Demonstrate no drainage from BLE to indicate healing progress.  -     LTG 4 Progress Met  -       User Key  (r) = Recorded By, (t) = Taken By, (c) = Cosigned By    Initials Name Provider Type    Alis Skelton, PT Physical Therapist          Time Calculation:        Therapy Suggested Charges     Code   Minutes Charges    None             PT G-Codes  Other PT Primary Goal Status (): At least 1 percent but less than 20 percent impaired, limited or restricted  Other PT Primary Discharge Status (): 0 percent impaired, limited or restricted           OP Discharge Summary     Row Name 11/01/18 1134             OP PT Discharge Summary    Date of Discharge 11/01/18  -      Reason for Discharge other (comment);All goals achieved   Pt had met all PT goals, has not called for follow up within 30 days.  -      Outcomes Achieved Able to achieve all goals within established timeline  -      Discharge Destination Home with home program  -      Discharge Instructions/Additional Comments Pt encouraged to continue with compressogrip, z-guard as needed to irritation, and seek MD Rx for compression stockings if amenable.  -        User Key  (r) = Recorded By, (t) = Taken By, (c) = Cosigned By    Initials Name Provider Type    Alis Skelton, PT Physical Therapist          Alis Johnson, PT  11/1/2018

## 2018-11-05 ENCOUNTER — HOSPITAL ENCOUNTER (OUTPATIENT)
Dept: GENERAL RADIOLOGY | Facility: HOSPITAL | Age: 83
Discharge: HOME | End: 2018-11-05
Attending: INTERNAL MEDICINE | Admitting: INTERNAL MEDICINE

## 2018-11-05 ENCOUNTER — OFFICE VISIT (OUTPATIENT)
Dept: INTERNAL MEDICINE | Facility: CLINIC | Age: 83
End: 2018-11-05

## 2018-11-05 VITALS
OXYGEN SATURATION: 98 % | HEART RATE: 68 BPM | BODY MASS INDEX: 34.55 KG/M2 | WEIGHT: 201.3 LBS | DIASTOLIC BLOOD PRESSURE: 78 MMHG | SYSTOLIC BLOOD PRESSURE: 142 MMHG

## 2018-11-05 DIAGNOSIS — M79.604 RIGHT LEG PAIN: Primary | ICD-10-CM

## 2018-11-05 DIAGNOSIS — M79.604 RIGHT LEG PAIN: ICD-10-CM

## 2018-11-05 PROCEDURE — 99213 OFFICE O/P EST LOW 20 MIN: CPT | Performed by: INTERNAL MEDICINE

## 2018-11-05 PROCEDURE — 90662 IIV NO PRSV INCREASED AG IM: CPT | Performed by: INTERNAL MEDICINE

## 2018-11-05 PROCEDURE — G0008 ADMIN INFLUENZA VIRUS VAC: HCPCS | Performed by: INTERNAL MEDICINE

## 2018-11-05 PROCEDURE — 73590 X-RAY EXAM OF LOWER LEG: CPT

## 2018-11-05 NOTE — PROGRESS NOTES
Subjective   Delilah Lo is a 84 y.o. female.   Chief Complaint   Patient presents with   • Fall, Knee pain     Same Day       History of Present Illness   Fell at home last Friday and landed on carpet. Bruising was immediate and continue to bruise. Small blisters on right knee.  Right leg pain now.  Is on xarelto and Aspirin.  The following portions of the patient's history were reviewed and updated as appropriate: allergies, current medications, past family history, past medical history, past social history, past surgical history and problem list.    Review of Systems   Constitutional: Negative for activity change, appetite change, chills, diaphoresis, fatigue, fever and unexpected weight change.   HENT: Negative for congestion, ear discharge, ear pain, mouth sores, nosebleeds, sinus pressure, sneezing and sore throat.    Eyes: Negative for pain, discharge and itching.   Respiratory: Negative for cough, chest tightness, shortness of breath and wheezing.    Cardiovascular: Negative for chest pain, palpitations and leg swelling.   Gastrointestinal: Negative for abdominal pain, constipation, diarrhea, nausea and vomiting.   Endocrine: Negative for cold intolerance, heat intolerance, polydipsia and polyphagia.   Genitourinary: Negative for dysuria, flank pain, frequency, hematuria and urgency.   Musculoskeletal: Negative for arthralgias, back pain, gait problem, myalgias, neck pain and neck stiffness.   Skin: Negative for color change, pallor and rash.   Neurological: Negative for seizures, speech difficulty, numbness and headaches.   Psychiatric/Behavioral: Negative for agitation, confusion, decreased concentration and sleep disturbance. The patient is not nervous/anxious.    /78   Pulse 68   Wt 91.3 kg (201 lb 4.8 oz)   LMP  (LMP Unknown)   SpO2 98%   Breastfeeding? No   BMI 34.55 kg/m²       Objective   Physical Exam   Constitutional: She appears well-developed.   HENT:   Head: Normocephalic.   Right  Ear: External ear normal.   Left Ear: External ear normal.   Nose: Nose normal.   Mouth/Throat: Oropharynx is clear and moist.   Eyes: Pupils are equal, round, and reactive to light. Conjunctivae are normal.   Neck: No JVD present. No thyromegaly present.   Cardiovascular: Normal rate, regular rhythm and normal heart sounds.  Exam reveals no friction rub.    No murmur heard.  Pulmonary/Chest: Effort normal and breath sounds normal. No respiratory distress. She has no wheezes. She has no rales.   Abdominal: She exhibits no distension. There is no tenderness. There is no guarding.   Musculoskeletal: She exhibits no edema or tenderness.   Lymphadenopathy:     She has no cervical adenopathy.   Neurological: She displays normal reflexes. No cranial nerve deficit.   Skin: No rash noted.        Psychiatric: Her behavior is normal.   Nursing note and vitals reviewed.      Assessment/Plan   Delilah was seen today for fall, knee pain.    Diagnoses and all orders for this visit:    Right leg pain  -     XR tibia fibula 2 vw right; Future    Other orders  -     Fluzone High Dose =>65Years

## 2018-11-12 RX ORDER — LEVOTHYROXINE SODIUM 88 UG/1
88 TABLET ORAL DAILY
Qty: 90 TABLET | Refills: 1 | Status: SHIPPED | OUTPATIENT
Start: 2018-11-12 | End: 2018-12-11 | Stop reason: SDUPTHER

## 2018-11-12 RX ORDER — RANITIDINE 150 MG/1
150 TABLET ORAL 2 TIMES DAILY
Qty: 90 TABLET | Refills: 1 | Status: SHIPPED | OUTPATIENT
Start: 2018-11-12 | End: 2019-01-09 | Stop reason: SDUPTHER

## 2018-11-12 NOTE — TELEPHONE ENCOUNTER
PATIENT IS REQUESTING A REFILL, SHE WOULD LIKE TO HAVE THIS FILLED FOR 90 DAYS PER INSURANCE. SHE CURRENTLY HAS THREE DAYS SUPPLY LEFT AND WILL NEED TO HAVE SOMEONE ELSE  FOR HER AS SHE HAS HAD RECENT EYE SURGERY AND IS UNABLE TO DRIVE TO PHARMACY. NEXT OV SCHEDULED FOR 11/30/2018.    ANNA JAY

## 2018-11-30 ENCOUNTER — OFFICE VISIT (OUTPATIENT)
Dept: INTERNAL MEDICINE | Facility: CLINIC | Age: 83
End: 2018-11-30

## 2018-11-30 VITALS
WEIGHT: 204.4 LBS | DIASTOLIC BLOOD PRESSURE: 70 MMHG | SYSTOLIC BLOOD PRESSURE: 140 MMHG | HEART RATE: 61 BPM | BODY MASS INDEX: 35.09 KG/M2 | OXYGEN SATURATION: 98 %

## 2018-11-30 DIAGNOSIS — N18.30 STAGE 3 CHRONIC KIDNEY DISEASE (HCC): ICD-10-CM

## 2018-11-30 DIAGNOSIS — L97.919 LEG ULCER, RIGHT, WITH UNSPECIFIED SEVERITY (HCC): ICD-10-CM

## 2018-11-30 DIAGNOSIS — E78.5 DYSLIPIDEMIA: ICD-10-CM

## 2018-11-30 DIAGNOSIS — K21.9 GASTROESOPHAGEAL REFLUX DISEASE, ESOPHAGITIS PRESENCE NOT SPECIFIED: ICD-10-CM

## 2018-11-30 DIAGNOSIS — I10 ESSENTIAL HYPERTENSION: Primary | ICD-10-CM

## 2018-11-30 LAB
BASOPHILS # BLD AUTO: 0.1 10*3/MM3 (ref 0–0.2)
BASOPHILS NFR BLD AUTO: 1 % (ref 0–1)
DEPRECATED RDW RBC AUTO: 47.9 FL (ref 37–54)
EOSINOPHIL # BLD AUTO: 1.12 10*3/MM3 (ref 0–0.3)
EOSINOPHIL NFR BLD AUTO: 10.7 % (ref 0–3)
ERYTHROCYTE [DISTWIDTH] IN BLOOD BY AUTOMATED COUNT: 13.9 % (ref 11.3–14.5)
HCT VFR BLD AUTO: 36.7 % (ref 34.5–44)
HGB BLD-MCNC: 11.2 G/DL (ref 11.5–15.5)
IMM GRANULOCYTES # BLD: 0.06 10*3/MM3 (ref 0–0.03)
IMM GRANULOCYTES NFR BLD: 0.6 % (ref 0–0.6)
LYMPHOCYTES # BLD AUTO: 1.76 10*3/MM3 (ref 0.6–4.8)
LYMPHOCYTES NFR BLD AUTO: 16.8 % (ref 24–44)
MCH RBC QN AUTO: 28.7 PG (ref 27–31)
MCHC RBC AUTO-ENTMCNC: 30.5 G/DL (ref 32–36)
MCV RBC AUTO: 94.1 FL (ref 80–99)
MONOCYTES # BLD AUTO: 1.12 10*3/MM3 (ref 0–1)
MONOCYTES NFR BLD AUTO: 10.7 % (ref 0–12)
NEUTROPHILS # BLD AUTO: 6.31 10*3/MM3 (ref 1.5–8.3)
NEUTROPHILS NFR BLD AUTO: 60.2 % (ref 41–71)
PLATELET # BLD AUTO: 236 10*3/MM3 (ref 150–450)
PMV BLD AUTO: 10.1 FL (ref 6–12)
RBC # BLD AUTO: 3.9 10*6/MM3 (ref 3.89–5.14)
WBC NRBC COR # BLD: 10.47 10*3/MM3 (ref 3.5–10.8)

## 2018-11-30 PROCEDURE — 85025 COMPLETE CBC W/AUTO DIFF WBC: CPT | Performed by: INTERNAL MEDICINE

## 2018-11-30 PROCEDURE — 99214 OFFICE O/P EST MOD 30 MIN: CPT | Performed by: INTERNAL MEDICINE

## 2018-11-30 RX ORDER — CLINDAMYCIN HYDROCHLORIDE 300 MG/1
300 CAPSULE ORAL 3 TIMES DAILY
Qty: 30 CAPSULE | Refills: 0 | Status: SHIPPED | OUTPATIENT
Start: 2018-11-30 | End: 2019-01-09 | Stop reason: SINTOL

## 2018-11-30 NOTE — PROGRESS NOTES
Subjective   Lorna Lo is a 84 y.o. female.     Heartburn   She reports no abdominal pain, no chest pain, no coughing, no nausea, no sore throat or no wheezing. Pertinent negatives include no fatigue.   Hypertension   Pertinent negatives include no chest pain, headaches, neck pain, palpitations or shortness of breath.   Chronic Kidney Disease   Pertinent negatives include no abdominal pain, arthralgias, chest pain, chills, congestion, coughing, diaphoresis, fatigue, fever, headaches, myalgias, nausea, neck pain, numbness, rash, sore throat or vomiting.      Here for blood pressure recheck and f/u on hlp, gerd, and CAD. HLP and gerd have been stable x last year.  Also, right leg ulcer after fall few weeks ago. Draining. No fever or chills.   The following portions of the patient's history were reviewed and updated as appropriate: allergies, current medications, past family history, past medical history, past social history, past surgical history and problem list.    Review of Systems   Constitutional: Negative for activity change, appetite change, chills, diaphoresis, fatigue, fever and unexpected weight change.   HENT: Negative for congestion, ear discharge, ear pain, mouth sores, nosebleeds, sinus pressure, sneezing and sore throat.    Eyes: Negative for pain, discharge and itching.   Respiratory: Negative for cough, chest tightness, shortness of breath and wheezing.    Cardiovascular: Negative for chest pain, palpitations and leg swelling.   Gastrointestinal: Negative for abdominal pain, constipation, diarrhea, nausea and vomiting.   Endocrine: Negative for cold intolerance, heat intolerance, polydipsia and polyphagia.   Genitourinary: Negative for dysuria, flank pain, frequency, hematuria and urgency.   Musculoskeletal: Negative for arthralgias, back pain, gait problem, myalgias, neck pain and neck stiffness.   Skin: Negative for color change, pallor and rash.   Neurological: Negative for seizures, speech  difficulty, numbness and headaches.   Psychiatric/Behavioral: Negative for agitation, confusion, decreased concentration and sleep disturbance. The patient is not nervous/anxious.      /70   Pulse 61   Wt 92.7 kg (204 lb 6.4 oz)   LMP  (LMP Unknown)   SpO2 98%   BMI 35.09 kg/m²     Objective   Physical Exam   Constitutional: She is oriented to person, place, and time. She appears well-developed.   HENT:   Head: Normocephalic.   Right Ear: External ear normal.   Left Ear: External ear normal.   Nose: Nose normal.   Mouth/Throat: Oropharynx is clear and moist.   Eyes: Conjunctivae are normal. Pupils are equal, round, and reactive to light.   Neck: No JVD present. No thyromegaly present.   Cardiovascular: Normal rate, regular rhythm and normal heart sounds. Exam reveals no friction rub.   No murmur heard.  Pulmonary/Chest: Effort normal and breath sounds normal. No respiratory distress. She has no wheezes. She has no rales.   Abdominal: Soft. Bowel sounds are normal. She exhibits no distension. There is no tenderness. There is no guarding.   Musculoskeletal: She exhibits no edema or tenderness.   Lymphadenopathy:     She has no cervical adenopathy.   Neurological: She is oriented to person, place, and time. She displays normal reflexes. No cranial nerve deficit.   Skin: No rash noted.        Psychiatric: Her behavior is normal.   Nursing note and vitals reviewed.      Assessment/Plan   Lorna was seen today for hypertension.    Diagnoses and all orders for this visit:    Coronary artery disease involving native coronary artery of native heart without angina pectoris  stable  Essential hypertension  Home readings are 130's/60-71  Gastroesophageal reflux disease, esophagitis presence not specified  stable  Dyslipidemia  stable    Leg ulcer on the right  Clindamycin 300 mg po tid x 10 days. CBC. Wound Culture. Wound clinic eval.

## 2018-12-03 ENCOUNTER — HOSPITAL ENCOUNTER (OUTPATIENT)
Dept: PHYSICAL THERAPY | Facility: HOSPITAL | Age: 83
Setting detail: THERAPIES SERIES
Discharge: HOME | End: 2018-12-03

## 2018-12-03 ENCOUNTER — TELEPHONE (OUTPATIENT)
Dept: PHYSICAL THERAPY | Facility: HOSPITAL | Age: 83
End: 2018-12-03

## 2018-12-03 DIAGNOSIS — S81.801D OPEN WOUND OF RIGHT LOWER EXTREMITY WITH COMPLICATION, SUBSEQUENT ENCOUNTER: Primary | ICD-10-CM

## 2018-12-03 DIAGNOSIS — L02.415 ABSCESS OF RIGHT LOWER EXTREMITY: Primary | ICD-10-CM

## 2018-12-03 PROCEDURE — 97162 PT EVAL MOD COMPLEX 30 MIN: CPT

## 2018-12-03 PROCEDURE — G8991 OTHER PT/OT GOAL STATUS: HCPCS

## 2018-12-03 PROCEDURE — G8990 OTHER PT/OT CURRENT STATUS: HCPCS

## 2018-12-03 PROCEDURE — 97597 DBRDMT OPN WND 1ST 20 CM/<: CPT

## 2018-12-03 NOTE — TELEPHONE ENCOUNTER
PT wound care evaluated Ms. Lo's RLE wound today.  PT debrided large black eschar and performed pulselavage.  Patient with large abscess/hematoma, with up to 12cm depth lateral aspect of knee.  PT was able to probe entire length of cotton swab into wound.  PT recommends consult with general surgeon or further imaging to determine soft tissue involvement and potential need for a surgical debridement.  Also, noted wound cultures are in Epic but were they collected in your office or do we need to perform wound cultures at patient's next appointment?  Thank you.  Dayami Baca, PT 12/3/2018 12:42 PM

## 2018-12-03 NOTE — THERAPY EVALUATION
Outpatient Rehabilitation - Wound/Debridement Initial Eval   Aleena     Patient Name: Lorna Lo  : 1934  MRN: [id number]  Today's Date: 12/3/2018                  Admit Date: 12/3/2018    Visit Dx:    ICD-10-CM ICD-9-CM   1. Open wound of right lower extremity with complication, subsequent encounter S81.801D V58.89     891.1   RLE: (pre-debridement):    RLE (post-debridement):       Patient Active Problem List   Diagnosis   • Coronary artery disease   • Essential hypertension   • Dyslipidemia   • Polymyositis (CMS/HCC)   • Dependent edema   • Stage 3 chronic kidney disease (CMS/HCC)   • Chronic anemia   • Osteopenia   • GERD (gastroesophageal reflux disease)   • PAF (paroxysmal atrial fibrillation) (CMS/HCC)        Past Medical History:   Diagnosis Date   • Chronic anemia     Chronic anemia, secondary to (CKD).    • CKD (chronic kidney disease)    • Coronary artery disease    • Dependent edema    • Dermatitis    • DVT (deep venous thrombosis) (CMS/HCC)     History of DVT in .    • Dyslipidemia    • GERD (gastroesophageal reflux disease)    • Hyperlipidemia    • Hypertension    • Osteopenia    • Polymyositis (CMS/HCC)    • Renal disorder    • Thyroid disease         Past Surgical History:   Procedure Laterality Date   • CARDIAC SURGERY      Cardiac Stents    • GASTRIC BYPASS         Patient History     Row Name 18 1100             History    Chief Complaint  Ulcer, wound or other skin conditions;Swelling  -ALENA      Brief Description of Current Complaint  Pt fell in her home, landing on R knee about 3 weeks ago.  Patient reports open wound and swelling of right leg with spontanous drainage about a week ago.  Patient was seen by PCP Vanesa Correa DO and referred for wound care.  -ALENA      Previous treatment for THIS PROBLEM  Medication current oral abx  -ALENA      Patient/Caregiver Goals  Heal wound  -ALENA      Patient's Rating of General Health  Good  -ALENA       Occupation/sports/leisure activities  retired, lives alone, relies on son and daughter for transportation  -      Patient seeing anyone else for problem(s)?  Vanesa Correa, DO  -ALENA      How has patient tried to help current problem?  Pt states she put neosporin and non-stick pads with gauze wrapped around knee, changed daily  -      What clinical tests have you had for this problem?  X-ray;Blood Work;Other 1 (comment) wound cultures ordered  -         Fall Risk Assessment    Any falls in the past year:  Yes  -JM      Number of falls reported in the last 12 months  1  -JM      Factors that contributed to the fall:  Lost balance  -         Services    Are you currently receiving Home Health services  No  -JM      Do you plan to receive Home Health services in the near future  No  -         Daily Activities    Recommended Referrals  Physician general surgeon for potential debridement  -      Pt Participated in POC and Goals  Yes  -         Safety    Are you being hurt, hit, or frightened by anyone at home or in your life?  No  -JM      Are you being neglected by a caregiver  No  -        User Key  (r) = Recorded By, (t) = Taken By, (c) = Cosigned By    Initials Name Provider Type    Dayami Gallo, PT Physical Therapist          EVALUATION  PT Ortho     Row Name 12/03/18 1100       Subjective Comments    Subjective Comments  Pt states she is currently on oral abx.  States she is changing the dressing daily using gauze and absorptive dressings.  -       Precautions and Contraindications    Precautions  pt on blood thinners  (Significant)   -       Subjective Pain    Able to rate subjective pain?  yes  -    Pre-Treatment Pain Level  0  -    Post-Treatment Pain Level  0  -    Subjective Pain Comment  Min pain during debridement  -       Transfers    Sit-Stand [place] (Transfers)  independent  -    Stand-Sit [place] (Transfers)  independent  -    Comment (Transfers)  long  "sitting on stretcher for tx  -JM       Gait/Stairs Assessment/Training    [place] Level (Gait)  independent  -    Assistive Device (Gait)  walker, 4-wheeled  -      User Key  (r) = Recorded By, (t) = Taken By, (c) = Cosigned By    Initials Name Provider Type    Dayami Gallo, PT Physical Therapist          LDA Wound     Row Name 12/03/18 1100             Wound 12/03/18 1100 Right anterior leg abscess;other (see comments)    Wound - Properties Group Date first assessed: 12/03/18  - Time first assessed: 1100  -JM Present On Admission : yes;picture taken  - Side: Right  - Orientation: anterior  - Location: leg  -JM Type: abscess;other (see comments)  - Additional Comments: fall on knee with hematoma  -    Wound Image  Images linked: 2  -JM      Dressing Appearance  intact;moist drainage  -JM      Base  black eschar;gray;necrotic;pink;red/granulating;slough;subcutaneous;yellow  -JM      Red (%), Wound Tissue Color  20  -JM      Yellow (%), Wound Tissue Color  80  -      Periwound  intact;dry;edematous;indurated;redness;swelling  -      Periwound Temperature  warm  -      Periwound Skin Turgor  firm  -      Edges  open  -      Wound Length (cm)  3 cm  -      Wound Width (cm)  4 cm  -      Wound Depth (cm)  -- obscured by necrotic tissue  -      Undermining [Depth (cm)/Location]  12cm@9-10:00, 4.5cm@2:00  -      Drainage Characteristics/Odor  bleeding controlled;brown;clots;serosanguineous  -      Drainage Amount  moderate  -      Care, Wound  irrigated with;sterile normal saline;pulsatile high pressure lavage;debrided pulselavage 800ml N-saline with fan and tunnel tip  -      Dressing Care, Wound  dressing applied;antimicrobial agent applied;gauze dry HFBc to pack, 4x4s, ABD, kerlix, 4\" ace wrap  -      Periwound Care, Wound  cleansed with pH balanced cleanser;dry periwound area maintained  -        User Key  (r) = Recorded By, (t) = Taken By, (c) = Cosigned By    " Initials Name Provider Type    Dayami Gallo, PT Physical Therapist            WOUND DEBRIDEMENT  Total area of Debridement: 8 cmsq  Debridement Site 1  Location- Site 1: RLE wound  Selective Debridement- Site 1: Wound Surface <20cmsq  Instruments- Site 1: #10, scapel, tweezers  Excised Tissue Description- Site 1: maximum, eschar, slough, necrotic, adipose  Bleeding- Site 1: minimum, held pressure, 3-5 minutes, AgNitrate sticks             Therapy Education     Row Name 12/03/18 1100             Therapy Education    Education Details  Pt to change ABD pad and kerlix PRN for drainage, leave packing in place until next tx.  Elevate legs to reduce swelling.  PT will contact PCP for possible surgery consult.  -ALENA      Given  Bandaging/dressing change;Edema management  -ALENA      How Provided  Verbal;Demonstration  -ALEAN      Provided to  Patient  -ALENA      Level of Understanding  Verbalized  -      Program  New  -        User Key  (r) = Recorded By, (t) = Taken By, (c) = Cosigned By    Initials Name Provider Type    Dayami Gallo, PT Physical Therapist          Recommendation and Plan  PT Assessment/Plan     Row Name 12/03/18 1100          PT Assessment    Functional Limitations  Other (comment);Performance in self-care ADL wound management  -     Impairments  Edema;Integumentary integrity  -     Assessment Comments  Pt presents with complex open wound to anterior R tibial area s/p fall onto knee with hematoma formation.  Wound bed obscured by necrotic tissue with signifcant undermining 12cm laterally.  Patient will require skilled PT for pulselavage and wound debridement, with advanced dressings management.  Patient may require surgical debridement for best healing potential.   -ALENA     Please refer to paper survey for additional self-reported information  Yes  -ALENA     Rehab Potential  Good  -ALENA     Patient/caregiver participated in establishment of treatment plan and goals  Yes  -ALENA     Patient would  benefit from skilled therapy intervention  Yes  -        PT Plan    PT Frequency  3x/week  -     Predicted Duration of Therapy Intervention (Therapy Eval)  24 visits  -     Planned CPT's?  PT EVAL MOD COMPLELITY: 05993;PT JUAN DEBRIDE OPEN WOUND UP TO 20 CM: 93629;PT JUAN DEBRIDE OPEN WOUND EA ADD 20 CM: 72395;PT NONSELECT DEBRIDE 15 MIN: 12990;PT MULTI LAYER COMP SYS LE  -     Physical Therapy Interventions (Optional Details)  wound care;patient/family education  -     PT Plan Comments  pulselavage, debridement, ? MLW  -       User Key  (r) = Recorded By, (t) = Taken By, (c) = Cosigned By    Initials Name Provider Type    Dayami Gallo, PT Physical Therapist            Goals    PT OP Goals     Row Name 18 1200          PT Short Term Goals    STG 1  Patient and/ or caregiver able to verbalize signs and symptoms of infection.  -     STG 1 Progress  New  [facility]     STG 2  Decrease non-viable / necrotic tissue by 50% to facilitate clean wound bed for healing.  -     STG 2 Progress  New  [facility]     STG 3  Decrease wound dimensions by 25% as evidence of wound closure.  -     STG 3 Progress  New  [facility]        Long Term Goals    LTG 1  Patient and/ or caregiver independent with clean dressing changes.  -     LTG 1 Progress  New  [facility]     LTG 2  Decrease non-viable / necrotic tissue by 75% to facilitate clean wound bed for healing.  -     LTG 2 Progress  New  [facility]     LTG 3  Decrease wound dimensions by 75% as evidence of wound closure.  -     LTG 3 Progress  New  [facility]        Time Calculation    PT Goal Re-Cert Due Date  19  -       User Key  (r) = Recorded By, (t) = Taken By, (c) = Cosigned By    Initials Name Provider Type    Dayami Gallo, PT Physical Therapist          Time Calculation: Start Time: 1100  Therapy Suggested Charges     Code   Minutes Charges    None           Therapy Charges for Today     Code Description Service Date Service Provider Modifiers Qty     87594625127 HC PT OTHER PRIME FUNCT CURRENT 12/3/2018 Dayami Baca, PT GP, CM 1    80199066983 HC PT OTHER PRIME FUNCT PROJECTED 12/3/2018 Dayami Baca, PT GP, CJ 1    43179070062 HC JUAN DEBRIDE OPEN WOUND UP TO 20CM 12/3/2018 Dayami Baca, PT GP 1    13439118072 HC PT EVAL MOD COMPLEXITY 4 12/3/2018 Dayami Baca, PT GP 1          PT G-Codes  PT Professional Judgement Used?: Yes  Outcome Measure Options: BWAT (Cummings-Manzo Wound Assess Tool)  Other PT Primary Current Status (): At least 80 percent but less than 100 percent impaired, limited or restricted  Other PT Primary Goal Status (): At least 20 percent but less than 40 percent impaired, limited or restricted     Dayami Baca, PT  12/3/2018

## 2018-12-05 DIAGNOSIS — L02.419 LEG ABSCESS: Primary | ICD-10-CM

## 2018-12-06 ENCOUNTER — LAB (OUTPATIENT)
Dept: LAB | Facility: HOSPITAL | Age: 83
End: 2018-12-06

## 2018-12-06 ENCOUNTER — HOSPITAL ENCOUNTER (OUTPATIENT)
Dept: PHYSICAL THERAPY | Facility: HOSPITAL | Age: 83
Setting detail: THERAPIES SERIES
Discharge: HOME | End: 2018-12-06

## 2018-12-06 DIAGNOSIS — L02.419 LEG ABSCESS: ICD-10-CM

## 2018-12-06 DIAGNOSIS — S81.801D OPEN WOUND OF RIGHT LOWER EXTREMITY WITH COMPLICATION, SUBSEQUENT ENCOUNTER: Primary | ICD-10-CM

## 2018-12-06 LAB
CRP SERPL-MCNC: 3.09 MG/DL (ref 0–1)
ERYTHROCYTE [SEDIMENTATION RATE] IN BLOOD: 25 MM/HR (ref 0–30)

## 2018-12-06 PROCEDURE — 36415 COLL VENOUS BLD VENIPUNCTURE: CPT

## 2018-12-06 PROCEDURE — 86140 C-REACTIVE PROTEIN: CPT

## 2018-12-06 PROCEDURE — 97597 DBRDMT OPN WND 1ST 20 CM/<: CPT

## 2018-12-06 NOTE — THERAPY WOUND CARE TREATMENT
Outpatient Rehabilitation - Wound/Debridement Treatment Note   Aleena     Patient Name: Lorna Lo  : 1934  MRN: [id number]  Today's Date: 2018                 Admit Date: 2018    Visit Dx:    ICD-10-CM ICD-9-CM   1. Open wound of right lower extremity with complication, subsequent encounter S81.801D V58.89     891.1       Patient Active Problem List   Diagnosis   • Coronary artery disease   • Essential hypertension   • Dyslipidemia   • Polymyositis (CMS/HCC)   • Dependent edema   • Stage 3 chronic kidney disease (CMS/HCC)   • Chronic anemia   • Osteopenia   • GERD (gastroesophageal reflux disease)   • PAF (paroxysmal atrial fibrillation) (CMS/HCC)        Past Medical History:   Diagnosis Date   • Chronic anemia     Chronic anemia, secondary to (CKD).    • CKD (chronic kidney disease)    • Coronary artery disease    • Dependent edema    • Dermatitis    • DVT (deep venous thrombosis) (CMS/HCC) 2009    History of DVT in .    • Dyslipidemia    • GERD (gastroesophageal reflux disease)    • Hyperlipidemia    • Hypertension    • Osteopenia    • Polymyositis (CMS/HCC)    • Renal disorder    • Thyroid disease         Past Surgical History:   Procedure Laterality Date   • CARDIAC SURGERY      Cardiac Stents    • GASTRIC BYPASS           EVALUATION  PT Ortho     Row Name 18 1015       Subjective Comments    Subjective Comments  Pt reports she saw Dr. Jain yesterday, who thought the wound looked good.  He wants her to return in 2 weeks for follow-up.  Pt to continue with wound care and finish her oral Abx.  -       Precautions and Contraindications    Precautions  pt on blood thinners  -       Subjective Pain    Able to rate subjective pain?  yes  -    Pre-Treatment Pain Level  0  -    Post-Treatment Pain Level  0  -    Subjective Pain Comment  mild pain during debridement and pulselavage  -       Transfers    Sit-Stand [place] (Transfers)  independent  -     Stand-Sit [place] (Transfers)  independent  -    Comment (Transfers)  long sitting on stretcher for tx  -JM       Gait/Stairs Assessment/Training    [place] Level (Gait)  independent  -    Assistive Device (Gait)  walker, 4-wheeled  -      User Key  (r) = Recorded By, (t) = Taken By, (c) = Cosigned By    Initials Name Provider Type    Dayami Gallo, PT Physical Therapist              LDA Wound     Row Name 12/06/18 1015             Wound 12/03/18 1100 Right anterior leg abscess;other (see comments)    Wound - Properties Group Date first assessed: 12/03/18  - Time first assessed: 1100  - Present On Admission : yes;picture taken  - Side: Right  - Orientation: anterior  - Location: leg  - Type: abscess;other (see comments)  - Additional Comments: fall on knee with hematoma  -    Dressing Appearance  intact;moist drainage;dressing loose no packing laterally, saline-moist gazue packed medially  -      Base  necrotic;pink;red/granulating;slough;subcutaneous;yellow;gray  -      Periwound  intact;dry;edematous;indurated;redness;swelling  -      Periwound Temperature  warm  -      Periwound Skin Turgor  firm  -      Edges  open  -      Drainage Characteristics/Odor  brown;clots;serosanguineous;serous  -      Drainage Amount  moderate  -      Care, Wound  irrigated with;pulsatile high pressure lavage;cleansed with;wound cleanser;debrided pulselavage 800ml N-saline, fan and tunnel tip  -      Dressing Care, Wound  dressing applied;antimicrobial agent applied;foam;gauze dry HFBc to pack x3, HFBc to cover, gauze, abd, kerlix, ace  -      Periwound Care, Wound  cleansed with pH balanced cleanser;dry periwound area maintained  -        User Key  (r) = Recorded By, (t) = Taken By, (c) = Cosigned By    Initials Name Provider Type    Dayami Gallo, PT Physical Therapist            WOUND DEBRIDEMENT  Total area of Debridement: 6 cmsq  Debridement Site 1  Location-  Site 1: RLE wound  Selective Debridement- Site 1: Wound Surface <20cmsq  Instruments- Site 1: tweezers  Excised Tissue Description- Site 1: moderate, slough  Bleeding- Site 1: minimum, held pressure, 1 minute             Therapy Education     Row Name 12/06/18 1015             Therapy Education    Education Details  Leave packing intact until next tx, but change cover dressings PRN for drainage  -      Given  Bandaging/dressing change;Edema management  -      How Provided  Verbal;Demonstration  -      Provided to  Patient  -      Level of Understanding  Verbalized  -      Program  Reinforced  -        User Key  (r) = Recorded By, (t) = Taken By, (c) = Cosigned By    Initials Name Provider Type    Dayami Gallo, PT Physical Therapist          Recommendation and Plan  PT Assessment/Plan     Row Name 12/06/18 1015          PT Assessment    Functional Limitations  Other (comment);Performance in self-care ADL wound management  -     Impairments  Edema;Integumentary integrity  -     Assessment Comments  RLE wound with less residual hematoma/necrotic tissue, granulation buds forming in central open aspect.  PT still able to debride additional old hematoma and necrotic tissue with pulselavage this tx.  Pt will continue to benefit from tx 2-3x/week for debridement.  -        PT Plan    PT Frequency  2x/week;3x/week  -     Physical Therapy Interventions (Optional Details)  wound care;patient/family education  -     PT Plan Comments  pulselavage, debridement  -       User Key  (r) = Recorded By, (t) = Taken By, (c) = Cosigned By    Initials Name Provider Type    Dayami Gallo, PT Physical Therapist          Goals  PT OP Goals     Row Name 12/06/18 1015          Time Calculation    PT Goal Re-Cert Due Date  03/03/19  -       User Key  (r) = Recorded By, (t) = Taken By, (c) = Cosigned By    Initials Name Provider Type    Dayami Gallo, PT Physical Therapist          PT Goal  Re-Cert Due Date: 03/03/19            Time Calculation: Start Time: 1015    Therapy Charges for Today     Code Description Service Date Service Provider Modifiers Qty    40062058362 HC JUAN DEBRIDE OPEN WOUND UP TO 20CM 12/6/2018 Dayami Baca, PT GP 1            Dayami Baca, PT  12/6/2018

## 2018-12-10 ENCOUNTER — HOSPITAL ENCOUNTER (OUTPATIENT)
Dept: PHYSICAL THERAPY | Facility: HOSPITAL | Age: 83
Setting detail: THERAPIES SERIES
Discharge: HOME | End: 2018-12-10

## 2018-12-10 DIAGNOSIS — S81.801D OPEN WOUND OF RIGHT LOWER EXTREMITY WITH COMPLICATION, SUBSEQUENT ENCOUNTER: Primary | ICD-10-CM

## 2018-12-10 PROCEDURE — 97597 DBRDMT OPN WND 1ST 20 CM/<: CPT

## 2018-12-10 NOTE — THERAPY WOUND CARE TREATMENT
Outpatient Rehabilitation - Wound/Debridement Treatment Note   Aleena     Patient Name: Lorna Lo  : 1934  MRN: [id number]  Today's Date: 12/10/2018                 Admit Date: 12/10/2018    Visit Dx:    ICD-10-CM ICD-9-CM   1. Open wound of right lower extremity with complication, subsequent encounter S81.801D V58.89     891.1       Patient Active Problem List   Diagnosis   • Coronary artery disease   • Essential hypertension   • Dyslipidemia   • Polymyositis (CMS/HCC)   • Dependent edema   • Stage 3 chronic kidney disease (CMS/HCC)   • Chronic anemia   • Osteopenia   • GERD (gastroesophageal reflux disease)   • PAF (paroxysmal atrial fibrillation) (CMS/HCC)        Past Medical History:   Diagnosis Date   • Chronic anemia     Chronic anemia, secondary to (CKD).    • CKD (chronic kidney disease)    • Coronary artery disease    • Dependent edema    • Dermatitis    • DVT (deep venous thrombosis) (CMS/HCC) 2009    History of DVT in .    • Dyslipidemia    • GERD (gastroesophageal reflux disease)    • Hyperlipidemia    • Hypertension    • Osteopenia    • Polymyositis (CMS/HCC)    • Renal disorder    • Thyroid disease         Past Surgical History:   Procedure Laterality Date   • CARDIAC SURGERY      Cardiac Stents    • GASTRIC BYPASS           EVALUATION  PT Ortho     Row Name 12/10/18 1015       Subjective Comments    Subjective Comments  Pt states her son changed the dressing every day, reports he pulled out the blue packing, and covered with wound with saline moist gauze and gauze roll/tape.  Pt c/o increased pain in R knee.  -JM       Precautions and Contraindications    Precautions  pt on blood thinners  -JM       Subjective Pain    Able to rate subjective pain?  yes  -JM    Pre-Treatment Pain Level  3  -JM    Post-Treatment Pain Level  3  -JM    Subjective Pain Comment  indicated pain in areas where tunneling present R knee  -JM       Transfers    Sit-Stand [place] (Transfers)   independent  -JM    Stand-Sit [place] (Transfers)  independent  -    Comment (Transfers)  long sitting on stretcher for tx  -JM       Gait/Stairs Assessment/Training    [place] Level (Gait)  independent  -    Assistive Device (Gait)  walker, 4-wheeled  -      User Key  (r) = Recorded By, (t) = Taken By, (c) = Cosigned By    Initials Name Provider Type    Dayami Gallo PT Physical Therapist              LDA Wound     Row Name 12/10/18 1015             Wound 12/03/18 1100 Right anterior leg abscess;other (see comments)    Wound - Properties Group Date first assessed: 12/03/18  - Time first assessed: 1100  - Present On Admission : yes;picture taken  - Side: Right  - Orientation: anterior  - Location: leg  - Type: abscess;other (see comments)  - Additional Comments: fall on knee with hematoma  -    Wound Image  Images linked: 1  -      Dressing Appearance  intact;moist drainage;dressing loose no packing, only gauze, kerlix, and tape  -      Base  pink;red/granulating;slough;subcutaneous;yellow  -      Periwound  intact;dry;edematous;indurated;redness;swelling increased erythema and edema  -      Periwound Temperature  warm  -      Periwound Skin Turgor  firm  -      Edges  open  -      Wound Length (cm)  3.5 cm  -      Wound Width (cm)  4.5 cm  -      Tunneling [Depth (cm)/Location]  4cm @9:00, 5cm @11:00, 4cm @2:00  -      Drainage Characteristics/Odor  serosanguineous;serous  -      Drainage Amount  moderate  -      Care, Wound  irrigated with;sterile normal saline;pulsatile high pressure lavage;debrided 500ml N-saline, pulselavage with fan and tunnel tip  -      Dressing Care, Wound  dressing applied;antimicrobial agent applied;foam;gauze HFBc to tunnels, gauze, abd, kerlix, ace wrap  -      Periwound Care, Wound  cleansed with pH balanced cleanser;dry periwound area maintained  -        User Key  (r) = Recorded By, (t) = Taken By, (c) = Cosigned  By    Initials Name Provider Type    Dayami Gallo, PT Physical Therapist            WOUND DEBRIDEMENT  Total area of Debridement: 8 cmsq  Debridement Site 1  Location- Site 1: RLE wound  Selective Debridement- Site 1: Wound Surface <20cmsq  Instruments- Site 1: tweezers  Excised Tissue Description- Site 1: minimum, slough  Bleeding- Site 1: scant, held pressure             Therapy Education     Row Name 12/10/18 1015             Therapy Education    Education Details  Reinforced to leave HFB packing  in place until next tx, only change cover dressings if saturated, keep dressing dry/intact, elevate RLE to reduce edema.  Return to MD for any worsening of redness, swelling, pain, or development of fever/chills  -ALENA      Given  Bandaging/dressing change;Edema management  -ALENA      How Provided  Verbal;Demonstration  -      Provided to  Patient  -      Level of Understanding  Verbalized  -      Program  Reinforced  -        User Key  (r) = Recorded By, (t) = Taken By, (c) = Cosigned By    Initials Name Provider Type    Dayami Gallo, PT Physical Therapist          Recommendation and Plan  PT Assessment/Plan     Row Name 12/10/18 1015          PT Assessment    Functional Limitations  Other (comment);Performance in self-care ADL wound management  -     Impairments  Edema;Integumentary integrity  -     Assessment Comments  Tunnels medial and laterally with decreased depth and no residual hematoma present during pulselavage today.  Central open area with increase in granulation tissue.  Pt with increase in edema and erythema today, will monitor.  Pt has follow up with MD next week.  -        PT Plan    PT Frequency  2x/week;3x/week  -     Physical Therapy Interventions (Optional Details)  wound care;patient/family education  -     PT Plan Comments  wound irrigation, debridement, dressing management  -       User Key  (r) = Recorded By, (t) = Taken By, (c) = Cosigned By    Initials Name  Provider Type    Dayami Gallo, PT Physical Therapist          Goals  PT OP Goals     Row Name 12/10/18 1015          Time Calculation    PT Goal Re-Cert Due Date  03/03/19  -ALENA       User Key  (r) = Recorded By, (t) = Taken By, (c) = Cosigned By    Initials Name Provider Type    Dayami Gallo, PT Physical Therapist          PT Goal Re-Cert Due Date: 03/03/19            Time Calculation: Start Time: 1015    Therapy Charges for Today     Code Description Service Date Service Provider Modifiers Qty    48604504832 HC JUAN DEBRIDE OPEN WOUND UP TO 20CM 12/10/2018 Dayami Baca, PT GP 1                Dayami Baca, PT  12/10/2018

## 2018-12-11 RX ORDER — LEVOTHYROXINE SODIUM 88 UG/1
88 TABLET ORAL DAILY
Qty: 90 TABLET | Refills: 1 | Status: SHIPPED | OUTPATIENT
Start: 2018-12-11 | End: 2019-12-18

## 2018-12-11 NOTE — TELEPHONE ENCOUNTER
PATIENT WOULD LIKE TO KNOW IF DR NEELY WOULD WRITE HER A NEW SCRIPT FOR HER LEVOTHYROXINE FOR A 90 DAY SUPPLY, SHE STATES SHE HAS FIVE DAYS WORTH OF HER MEDS LEFT AND WOULD LIKE THIS MEDICATION TO BE SENT TO HER LOCAL PHARM THAT IS ON FILE. PT MAY BE [phone]

## 2018-12-14 ENCOUNTER — HOSPITAL ENCOUNTER (OUTPATIENT)
Dept: PHYSICAL THERAPY | Facility: HOSPITAL | Age: 83
Setting detail: THERAPIES SERIES
Discharge: HOME | End: 2018-12-14

## 2018-12-14 DIAGNOSIS — S81.801D OPEN WOUND OF RIGHT LOWER EXTREMITY WITH COMPLICATION, SUBSEQUENT ENCOUNTER: Primary | ICD-10-CM

## 2018-12-14 PROCEDURE — 97597 DBRDMT OPN WND 1ST 20 CM/<: CPT

## 2018-12-14 NOTE — THERAPY WOUND CARE TREATMENT
Outpatient Rehabilitation - Wound/Debridement Treatment Note   Ale[facility]     Patient Name: Lorna Lo  : 1934  MRN: [id number]  Today's Date: 2018                 Admit Date: 2018    Visit Dx:    ICD-10-CM ICD-9-CM   1. Open wound of right lower extremity with complication, subsequent encounter S81.801D V58.89     891.1        Patient Active Problem List   Diagnosis   • Coronary artery disease   • Essential hypertension   • Dyslipidemia   • Polymyositis (CMS/HCC)   • Dependent edema   • Stage 3 chronic kidney disease (CMS/HCC)   • Chronic anemia   • Osteopenia   • GERD (gastroesophageal reflux disease)   • PAF (paroxysmal atrial fibrillation) (CMS/HCC)        Past Medical History:   Diagnosis Date   • Chronic anemia     Chronic anemia, secondary to (CKD).    • CKD (chronic kidney disease)    • Coronary artery disease    • Dependent edema    • Dermatitis    • DVT (deep venous thrombosis) (CMS/HCC) 2009    History of DVT in .    • Dyslipidemia    • GERD (gastroesophageal reflux disease)    • Hyperlipidemia    • Hypertension    • Osteopenia    • Polymyositis (CMS/HCC)    • Renal disorder    • Thyroid disease         Past Surgical History:   Procedure Laterality Date   • CARDIAC SURGERY      Cardiac Stents    • GASTRIC BYPASS           EVALUATION  PT Ortho     Row Name 18 [id number]       Subjective Comments    Subjective Comments  Pt states she did not have to change the dressing, but reinforced it with tape to keeping it from sliding down.  States her swelling and pain are better since staying off her feet per PT instructions.  -       Precautions and Contraindications    Precautions  blood thinners  -       Subjective Pain    Able to rate subjective pain?  yes  -JM    Pre-Treatment Pain Level  0  -JM    Post-Treatment Pain Level  0  -JM       Transfers    Sit-Stand [place] (Transfers)  independent  -JM    Stand-Sit [place] (Transfers)  independent  -    Comment  "(Transfers)  long sitting on stretcher for tx  -       Gait/Stairs Assessment/Training    [place] Level (Gait)  independent  -    Assistive Device (Gait)  walker, 4-wheeled  -      User Key  (r) = Recorded By, (t) = Taken By, (c) = Cosigned By    Initials Name Provider Type    Dayami Gallo, PT Physical Therapist              LDA Wound     Row Name 12/14/18 0930             Wound 12/03/18 1100 Right anterior leg abscess;other (see comments)    Wound - Properties Group Date first assessed: 12/03/18  - Time first assessed: 1100  - Present On Admission : yes;picture taken  - Side: Right  - Orientation: anterior  - Location: leg  - Type: abscess;other (see comments)  - Additional Comments: fall on knee with hematoma  -    Wound Image  Images linked: 2  -JM      Dressing Appearance  intact;moist drainage;dressing loose  -      Base  pink;red/granulating;slough;subcutaneous;yellow  -      Periwound  intact;dry;edematous;indurated;redness;swelling only faint redness and min edema, improved  -      Periwound Temperature  warm  -      Periwound Skin Turgor  firm  -      Edges  open  -      Drainage Characteristics/Odor  serosanguineous;serous  -      Drainage Amount  moderate  -      Care, Wound  irrigated with;sterile normal saline;cleansed with;wound cleanser;debrided saline flushes to tunnels  -      Dressing Care, Wound  dressing applied;antimicrobial agent applied;foam;low-adherent HFBc to pack and cover, 6\" optifoam gentle, tape  -      Periwound Care, Wound  cleansed with pH balanced cleanser;dry periwound area maintained size 5 compressogrip to calf/knee, size 3 on foot/ankle  -        User Key  (r) = Recorded By, (t) = Taken By, (c) = Cosigned By    Initials Name Provider Type    Dayami Gallo, PT Physical Therapist            WOUND DEBRIDEMENT  Total area of Debridement: 8 cmsq  Debridement Site 1  Location- Site 1: RLE wound  Selective Debridement- " Site 1: Wound Surface <20cmsq  Instruments- Site 1: tweezers  Excised Tissue Description- Site 1: moderate, slough  Bleeding- Site 1: scant, held pressure             Therapy Education     Row Name 12/14/18 0930             Therapy Education    Education Details  Leave dressing dry/intact until next tx, but change if soiled/disrupted.  Continue leg elevation.  -      Given  Bandaging/dressing change;Edema management  -      How Provided  Verbal;Demonstration  -      Provided to  Patient  -      Level of Understanding  Verbalized  -      Program  Reinforced  -        User Key  (r) = Recorded By, (t) = Taken By, (c) = Cosigned By    Initials Name Provider Type    Dayami Gallo, PT Physical Therapist          Recommendation and Plan  PT Assessment/Plan     Row Name 12/14/18 0930          PT Assessment    Functional Limitations  Other (comment);Performance in self-care ADL wound management  -     Impairments  Edema;Integumentary integrity  -     Assessment Comments  Pt with improvement in R knee edema and redness since last tx, less drainage from wound, wound bed with increasing granulation.  PT changed to HFBc to cover wound with optifoam gentle to secure to keep wound clean and covered until next tx.  -        PT Plan    PT Frequency  2x/week  -     Physical Therapy Interventions (Optional Details)  wound care;patient/family education  -     PT Plan Comments  debridement, dressing management, light compression  -       User Key  (r) = Recorded By, (t) = Taken By, (c) = Cosigned By    Initials Name Provider Type    Dayami Gallo, PT Physical Therapist          Goals  PT OP Goals     Row Name 12/14/18 0930          Time Calculation    PT Goal Re-Cert Due Date  03/03/19  -       User Key  (r) = Recorded By, (t) = Taken By, (c) = Cosigned By    Initials Name Provider Type    Dayami Gallo, PT Physical Therapist          PT Goal Re-Cert Due Date: 03/03/19            Time  Calculation: Start Time: 0930    Therapy Charges for Today     Code Description Service Date Service Provider Modifiers Qty    23424501208 HC JUAN DEBRIDE OPEN WOUND UP TO 20CM 12/14/2018 Dayami Baca, PT GP 1            Dayami Baca, PT  12/14/2018

## 2018-12-18 ENCOUNTER — TELEPHONE (OUTPATIENT)
Dept: PHYSICAL THERAPY | Facility: HOSPITAL | Age: 83
End: 2018-12-18

## 2018-12-18 ENCOUNTER — HOSPITAL ENCOUNTER (OUTPATIENT)
Dept: PHYSICAL THERAPY | Facility: HOSPITAL | Age: 83
Setting detail: THERAPIES SERIES
Discharge: HOME | End: 2018-12-18

## 2018-12-18 DIAGNOSIS — S81.801D OPEN WOUND OF RIGHT LOWER EXTREMITY WITH COMPLICATION, SUBSEQUENT ENCOUNTER: Primary | ICD-10-CM

## 2018-12-18 PROCEDURE — 97597 DBRDMT OPN WND 1ST 20 CM/<: CPT

## 2018-12-18 NOTE — THERAPY WOUND CARE TREATMENT
Outpatient Rehabilitation - Wound/Debridement Treatment Note  KAREN Flood     Patient Name: Lorna Lo  : 1934  MRN: [id number]  Today's Date: 2018                 Admit Date: 2018    Visit Dx:    ICD-10-CM ICD-9-CM   1. Open wound of right lower extremity with complication, subsequent encounter S81.801D V58.89     891.1       Patient Active Problem List   Diagnosis   • Coronary artery disease   • Essential hypertension   • Dyslipidemia   • Polymyositis (CMS/HCC)   • Dependent edema   • Stage 3 chronic kidney disease (CMS/HCC)   • Chronic anemia   • Osteopenia   • GERD (gastroesophageal reflux disease)   • PAF (paroxysmal atrial fibrillation) (CMS/HCC)        Past Medical History:   Diagnosis Date   • Chronic anemia     Chronic anemia, secondary to (CKD).    • CKD (chronic kidney disease)    • Coronary artery disease    • Dependent edema    • Dermatitis    • DVT (deep venous thrombosis) (CMS/HCC) 2009    History of DVT in .    • Dyslipidemia    • GERD (gastroesophageal reflux disease)    • Hyperlipidemia    • Hypertension    • Osteopenia    • Polymyositis (CMS/HCC)    • Renal disorder    • Thyroid disease         Past Surgical History:   Procedure Laterality Date   • CARDIAC SURGERY      Cardiac Stents    • GASTRIC BYPASS           EVALUATION  PT Ortho     Row Name 18 [id number]       Subjective Comments    Subjective Comments  Pt without complaints, states she reinforced the dressing with more tape, but states everything stayed in place from last tx.  -JM       Precautions and Contraindications    Precautions  blood thinners  -       Subjective Pain    Able to rate subjective pain?  yes  -JM    Pre-Treatment Pain Level  0  -JM    Post-Treatment Pain Level  0  -JM       Transfers    Sit-Stand [place] (Transfers)  independent  -JM    Stand-Sit [place] (Transfers)  independent  -    Comment (Transfers)  long sitting on stretcher for tx  -JM       Gait/Stairs  "Assessment/Training    [place] Level (Gait)  independent  -    Assistive Device (Gait)  walker, 4-wheeled  -      User Key  (r) = Recorded By, (t) = Taken By, (c) = Cosigned By    Initials Name Provider Type    Dayami Gallo PT Physical Therapist              LDA Wound     Row Name 12/18/18 0930             Wound 12/03/18 1100 Right anterior leg abscess;other (see comments)    Wound - Properties Group Date first assessed: 12/03/18  - Time first assessed: 1100  - Present On Admission : yes;picture taken  - Side: Right  - Orientation: anterior  - Location: leg  -JM Type: abscess;other (see comments)  - Additional Comments: fall on knee with hematoma  -    Wound Image  Images linked: 2  -JM      Dressing Appearance  intact;moist drainage  -      Base  pink;red/granulating;slough;subcutaneous;yellow  -      Periwound  intact;dry;edematous;indurated;redness;swelling only faint redness and min edema, improved  -      Periwound Temperature  warm  -      Periwound Skin Turgor  firm  -      Edges  open  -      Wound Length (cm)  3.5 cm superior satellite 1cm x 1cm x 0.8cm  -      Wound Width (cm)  4.2 cm  -      Wound Depth (cm)  -- depth obscured by slough  -      Tunneling [Depth (cm)/Location]  4cm@9:00, 5cm@11:00, 2:00 tunnel resolved  -      Drainage Characteristics/Odor  serosanguineous;serous;malodorous strong foul odor with dressing removal today  -      Drainage Amount  moderate  -      Care, Wound  cleansed with;wound cleanser;debrided  -      Dressing Care, Wound  dressing applied;antimicrobial agent applied;foam;low-adherent;border dressing HFBc, 6\" and 4\" optifoam gentle, primafix tape  -      Periwound Care, Wound  cleansed with pH balanced cleanser;dry periwound area maintained size 5 compressogrip to calf/knee, size 3 on foot/ankle  -        User Key  (r) = Recorded By, (t) = Taken By, (c) = Cosigned By    Initials Name Provider Type    ALENA Baca" Dayami BURGOS, PT Physical Therapist            WOUND DEBRIDEMENT  Total area of Debridement: 8cmsq  Debridement Site 1  Location- Site 1: RLE wound  Selective Debridement- Site 1: Wound Surface <20cmsq  Instruments- Site 1: tweezers  Excised Tissue Description- Site 1: moderate, slough  Bleeding- Site 1: scant, held pressure, 1 minute             Therapy Education     Row Name 12/18/18 0930             Therapy Education    Given  Bandaging/dressing change;Edema management  -      How Provided  Verbal;Demonstration  -      Provided to  Patient  -      Level of Understanding  Verbalized  -      Program  Reinforced  -        User Key  (r) = Recorded By, (t) = Taken By, (c) = Cosigned By    Initials Name Provider Type    Dayami Gallo, PT Physical Therapist          Recommendation and Plan  PT Assessment/Plan     Row Name 12/18/18 0930          PT Assessment    Functional Limitations  Other (comment);Performance in self-care ADL wound management  -     Impairments  Edema;Integumentary integrity  -     Assessment Comments  Wound R knee with less slough, increasing granulation.  Tunnel at 2:00 has resolved, but remaining tunnels without change in depth.  Wound with strong foul odor today, so PT obtained wound cultures and will request orders from MD to send to lab.  Overall, pt still improving with current tx.  -        PT Plan    PT Frequency  2x/week  -     Physical Therapy Interventions (Optional Details)  wound care;patient/family education  -     PT Plan Comments  debridement, dressing management, ?MIST  -       User Key  (r) = Recorded By, (t) = Taken By, (c) = Cosigned By    Initials Name Provider Type    Dayami Gallo, PT Physical Therapist          Goals  PT OP Goals     Row Name 12/18/18 0930          Time Calculation    PT Goal Re-Cert Due Date  03/03/19  -ALENA       User Key  (r) = Recorded By, (t) = Taken By, (c) = Cosigned By    Initials Name Provider Type    ALENA Baca  Dayami BURGOS, PT Physical Therapist          PT Goal Re-Cert Due Date: 03/03/19            Time Calculation: Start Time: 0930    Therapy Charges for Today     Code Description Service Date Service Provider Modifiers Qty    [id number] HC JUAN DEBRIDE OPEN WOUND UP TO 20CM 12/18/2018 Dayami Baca, PT GP 1                Dayami Baca, PT  12/18/2018

## 2018-12-18 NOTE — TELEPHONE ENCOUNTER
Patient was seen at PT wound care today.  Right knee wound improving but with new strong foul odor.  We obtained aerobic and anaerobic wound culture swabs and pended orders in Epic if you would like to sign them.  Treatment notes and wound photos are in Epic for your review. Thank you.  Dayami Baca, PT 12/18/2018 11:23 AM

## 2018-12-19 ENCOUNTER — APPOINTMENT (OUTPATIENT)
Dept: LAB | Facility: HOSPITAL | Age: 83
End: 2018-12-19

## 2018-12-19 PROCEDURE — 87077 CULTURE AEROBIC IDENTIFY: CPT | Performed by: INTERNAL MEDICINE

## 2018-12-19 PROCEDURE — 87075 CULTR BACTERIA EXCEPT BLOOD: CPT | Performed by: INTERNAL MEDICINE

## 2018-12-19 PROCEDURE — 87186 SC STD MICRODIL/AGAR DIL: CPT | Performed by: INTERNAL MEDICINE

## 2018-12-19 PROCEDURE — 87205 SMEAR GRAM STAIN: CPT | Performed by: INTERNAL MEDICINE

## 2018-12-19 PROCEDURE — 87147 CULTURE TYPE IMMUNOLOGIC: CPT | Performed by: INTERNAL MEDICINE

## 2018-12-19 PROCEDURE — 87070 CULTURE OTHR SPECIMN AEROBIC: CPT | Performed by: INTERNAL MEDICINE

## 2018-12-19 PROCEDURE — 87185 SC STD ENZYME DETCJ PER NZM: CPT | Performed by: INTERNAL MEDICINE

## 2018-12-21 ENCOUNTER — HOSPITAL ENCOUNTER (OUTPATIENT)
Dept: PHYSICAL THERAPY | Facility: HOSPITAL | Age: 83
Setting detail: THERAPIES SERIES
Discharge: HOME | End: 2018-12-21

## 2018-12-21 DIAGNOSIS — S81.801D OPEN WOUND OF RIGHT LOWER EXTREMITY WITH COMPLICATION, SUBSEQUENT ENCOUNTER: Primary | ICD-10-CM

## 2018-12-21 LAB
BACTERIA SPEC AEROBE CULT: ABNORMAL
GRAM STN SPEC: ABNORMAL
GRAM STN SPEC: ABNORMAL

## 2018-12-21 PROCEDURE — 97597 DBRDMT OPN WND 1ST 20 CM/<: CPT

## 2018-12-21 NOTE — THERAPY WOUND CARE TREATMENT
Outpatient Rehabilitation - Wound/Debridement Treatment Note   Ale[facility]     Patient Name: Lorna Lo  : 1934  MRN: [id number]  Today's Date: 2018                 Admit Date: 2018    Visit Dx:    ICD-10-CM ICD-9-CM   1. Open wound of right lower extremity with complication, subsequent encounter S81.801D V58.89     891.1       Patient Active Problem List   Diagnosis   • Coronary artery disease   • Essential hypertension   • Dyslipidemia   • Polymyositis (CMS/HCC)   • Dependent edema   • Stage 3 chronic kidney disease (CMS/HCC)   • Chronic anemia   • Osteopenia   • GERD (gastroesophageal reflux disease)   • PAF (paroxysmal atrial fibrillation) (CMS/HCC)        Past Medical History:   Diagnosis Date   • Chronic anemia     Chronic anemia, secondary to (CKD).    • CKD (chronic kidney disease)    • Coronary artery disease    • Dependent edema    • Dermatitis    • DVT (deep venous thrombosis) (CMS/HCC) 2009    History of DVT in .    • Dyslipidemia    • GERD (gastroesophageal reflux disease)    • Hyperlipidemia    • Hypertension    • Osteopenia    • Polymyositis (CMS/HCC)    • Renal disorder    • Thyroid disease         Past Surgical History:   Procedure Laterality Date   • CARDIAC SURGERY      Cardiac Stents    • GASTRIC BYPASS           EVALUATION  PT Ortho     Row Name 18 8230       Subjective Comments    Subjective Comments  Pt states she saw Dr. Jain yesterday, who lifted up the bandage to look at the wound, pt reporting MD thinks wound is looking better and he wants to see her in 3 weeks.  Pt c/o mild knee pain today but states she thinks it is from how she is propping her leg up.  -JM       Precautions and Contraindications    Precautions  blood thinners  -       Subjective Pain    Able to rate subjective pain?  yes  -JM    Pre-Treatment Pain Level  2  -JM    Post-Treatment Pain Level  2  -JM    Subjective Pain Comment  R knee  -       Transfers    Sit-Stand  "[place] (Transfers)  independent  -    Stand-Sit [place] (Transfers)  independent  -    Comment (Transfers)  long sitting on stretcher for tx  -       Gait/Stairs Assessment/Training    [place] Level (Gait)  independent  -    Assistive Device (Gait)  walker, 4-wheeled  -      User Key  (r) = Recorded By, (t) = Taken By, (c) = Cosigned By    Initials Name Provider Type    Dayami Gallo, PT Physical Therapist              LDA Wound     Row Name 12/21/18 0930             Wound 12/03/18 1100 Right anterior leg abscess;other (see comments)    Wound - Properties Group Date first assessed: 12/03/18  - Time first assessed: 1100  - Present On Admission : yes;picture taken  - Side: Right  - Orientation: anterior  - Location: leg  - Type: abscess;other (see comments)  - Additional Comments: fall on knee with hematoma  -    Dressing Appearance  intact;moist drainage  -      Base  pink;red/granulating;slough;subcutaneous;yellow  -      Periwound  intact;dry;edematous;indurated;redness;swelling only faint redness and min edema, improved  -      Periwound Temperature  warm  -      Periwound Skin Turgor  firm  -      Edges  open  -      Drainage Characteristics/Odor  serosanguineous;serous;malodorous moderate foul odor  -      Drainage Amount  moderate  -      Care, Wound  cleansed with;wound cleanser;debrided  -      Dressing Care, Wound  dressing applied;antimicrobial agent applied;foam;low-adherent;border dressing dry HFBc to pack and cover, 4\"& 6\" optifoams, cover-roll  -      Periwound Care, Wound  cleansed with pH balanced cleanser;dry periwound area maintained size 3 compressogrip foot/ankle, size 5 calf/knee  -        User Key  (r) = Recorded By, (t) = Taken By, (c) = Cosigned By    Initials Name Provider Type    Dayami Gallo, PT Physical Therapist            WOUND DEBRIDEMENT   Total area of Debridement: 6 cmsq  Debridement Site 1  Location- " Site 1: RLE wound  Selective Debridement- Site 1: Wound Surface <20cmsq  Instruments- Site 1: tweezers, other (comment)(saline flushes and cotton swabs to tunnels)  Excised Tissue Description- Site 1: minimum, slough  Bleeding- Site 1: scant             Therapy Education     Row Name 12/21/18 0930             Therapy Education    Given  Bandaging/dressing change;Edema management  -      How Provided  Verbal;Demonstration  -      Provided to  Patient  -      Level of Understanding  Verbalized  -      Program  Reinforced  -        User Key  (r) = Recorded By, (t) = Taken By, (c) = Cosigned By    Initials Name Provider Type    Dayami Gallo, PT Physical Therapist          Recommendation and Plan  PT Assessment/Plan     Row Name 12/21/18 0930          PT Assessment    Functional Limitations  Other (comment);Performance in self-care ADL wound management  -     Impairments  Edema;Integumentary integrity  -     Assessment Comments  Wound still with moderate foul odor today, wound cultures preliminary growth of staph aureus.  PT attempted to contact PCP but no answer, so left VM with Dr. Jain's assistant, Aylin, for MD to review cultures and address.  R knee wound with increasing granulation, still with slough and non-viable tissues requiring debridement.  Pt will continue to benefit from skilled therapy 2x/week.  -        PT Plan    PT Frequency  2x/week  -     Physical Therapy Interventions (Optional Details)  wound care;patient/family education  -     PT Plan Comments  debridement, dressings management  -       User Key  (r) = Recorded By, (t) = Taken By, (c) = Cosigned By    Initials Name Provider Type    Dayami Gallo PT Physical Therapist          Goals  PT OP Goals     Row Name 12/21/18 0930          Time Calculation    PT Goal Re-Cert Due Date  03/03/19  -       User Key  (r) = Recorded By, (t) = Taken By, (c) = Cosigned By    Initials Name Provider Type    ALENA Baca  Dayami BURGOS, PT Physical Therapist          PT Goal Re-Cert Due Date: 03/03/19            Time Calculation: Start Time: 0930    Therapy Charges for Today     Code Description Service Date Service Provider Modifiers Qty    [id number] HC JUAN DEBRIDE OPEN WOUND UP TO 20CM 12/21/2018 Dayami Baca, PT GP 1            Dayami Baca, PT  12/21/2018

## 2018-12-23 ENCOUNTER — HOSPITAL ENCOUNTER (OUTPATIENT)
Dept: PHYSICAL THERAPY | Facility: HOSPITAL | Age: 83
Setting detail: THERAPIES SERIES
Discharge: HOME | End: 2018-12-23

## 2018-12-23 DIAGNOSIS — S81.801D OPEN WOUND OF RIGHT LOWER EXTREMITY WITH COMPLICATION, SUBSEQUENT ENCOUNTER: Primary | ICD-10-CM

## 2018-12-23 PROCEDURE — 97597 DBRDMT OPN WND 1ST 20 CM/<: CPT | Performed by: PHYSICAL THERAPIST

## 2018-12-23 NOTE — THERAPY WOUND CARE TREATMENT
Outpatient Rehabilitation - Wound/Debridement Treatment Note   [facility]     Patient Name: Lorna Lo  : 1934  MRN: [id number]  Today's Date: 2018                 Admit Date: 2018    Visit Dx:    ICD-10-CM ICD-9-CM   1. Open wound of right lower extremity with complication, subsequent encounter S81.801D V58.89     891.1       Patient Active Problem List   Diagnosis   • Coronary artery disease   • Essential hypertension   • Dyslipidemia   • Polymyositis (CMS/HCC)   • Dependent edema   • Stage 3 chronic kidney disease (CMS/HCC)   • Chronic anemia   • Osteopenia   • GERD (gastroesophageal reflux disease)   • PAF (paroxysmal atrial fibrillation) (CMS/HCC)        Past Medical History:   Diagnosis Date   • Chronic anemia     Chronic anemia, secondary to (CKD).    • CKD (chronic kidney disease)    • Coronary artery disease    • Dependent edema    • Dermatitis    • DVT (deep venous thrombosis) (CMS/HCC) 2009    History of DVT in .    • Dyslipidemia    • GERD (gastroesophageal reflux disease)    • Hyperlipidemia    • Hypertension    • Osteopenia    • Polymyositis (CMS/HCC)    • Renal disorder    • Thyroid disease         Past Surgical History:   Procedure Laterality Date   • CARDIAC SURGERY      Cardiac Stents    • GASTRIC BYPASS           PT Ortho     Row Name 18 1030       Precautions and Contraindications    Precautions  blood thinners  -MW       Transfers    Sit-Stand [place] (Transfers)  independent  -MW    Stand-Sit [place] (Transfers)  independent  -MW    Comment (Transfers)  long sitting on stretcher for tx  -MW       Gait/Stairs Assessment/Training    [place] Level (Gait)  independent  -MW    Assistive Device (Gait)  walker, 4-wheeled  -MW      User Key  (r) = Recorded By, (t) = Taken By, (c) = Cosigned By    Initials Name Provider Type    Meenakshi Smith, PT Physical Therapist    Dayami Gallo, PT Physical Therapist              [facility] Wound      "Row Name 12/23/18 1030             Wound 12/03/18 1100 Right anterior leg abscess;other (see comments)    Wound - Properties Group Date first assessed: 12/03/18  - Time first assessed: 1100  - Present On Admission : yes;picture taken  - Side: Right  - Orientation: anterior  - Location: leg  - Type: abscess;other (see comments)  - Additional Comments: fall on knee with hematoma  -    Wound Image  Images linked: 1  -MW      Dressing Appearance  intact;moist drainage  -MW      Base  pink;red/granulating;slough;subcutaneous;yellow superior site with grey/green/brown tisse   -MW      Periwound  intact;dry;edematous;swelling;pink only faint redness and min edema, improved  -MW      Periwound Temperature  warm  -MW      Periwound Skin Turgor  firm;soft  -MW      Edges  open  -MW      Drainage Characteristics/Odor  serosanguineous;serous no odor noted   -MW      Drainage Amount  moderate;small  -MW      Care, Wound  cleansed with;wound cleanser;debrided Skintegrity to swabs to sweep tunnels   -MW      Dressing Care, Wound  dressing applied;antimicrobial agent applied;foam;low-adherent;border dressing HFBc, Qwick pad; 4 & 6\" optifoam, coveroll   -MW      Periwound Care, Wound  cleansed with pH balanced cleanser;dry periwound area maintained compressogrips 3 foot/ankle; 5 to calf   -MW        User Key  (r) = Recorded By, (t) = Taken By, (c) = Cosigned By    Initials Name Provider Type    MW Meenakshi Silva, PT Physical Therapist    Dayami Gallo, PT Physical Therapist        WOUND DEBRIDEMENT  Total area of Debridement: 6 cmsq  Debridement Site 1  Location- Site 1: RLE wound  Selective Debridement- Site 1: Wound Surface <20cmsq  Instruments- Site 1: tweezers, other (comment)(saline flushes and cotton swabs to tunnels)  Excised Tissue Description- Site 1: minimum, slough, necrotic(superior site grey/brown nonviable tissue )  Bleeding- Site 1: seeping, scant             Therapy Education     Row Name " 12/23/18 1030             Therapy Education    Education Details  Added extra absorbant dressing; should not need to change cover dressing between sessions.   -MW      Given  Bandaging/dressing change;Edema management  -MW      Program  Modified  -MW      How Provided  Verbal;Demonstration  -MW      Provided to  Patient  -MW      Level of Understanding  Verbalized  -MW        User Key  (r) = Recorded By, (t) = Taken By, (c) = Cosigned By    Initials Name Provider Type    Meenakshi Smith, PT Physical Therapist          Recommendation and Plan  PT Assessment/Plan     Row Name 12/23/18 1030          PT Assessment    Functional Limitations  Other (comment);Performance in self-care ADL wound management  -MW     Impairments  Edema;Integumentary integrity  -MW     Assessment Comments  Pt returns with dressing intact; min amount of drainage and not foul odor noted. Message to use Dakins soaked gauze but HFB now managing odor and likely bacterial load without distressing the healthy granulation bed present in the wound. Utilized damp HFBc for tunnels to minimize stress to wound with placement and to decrease risk of over packing as prior HFB was according like upon removal. Small superior wound with grey/brown nonviable tissues noted in side walls, which easily debrided, however difficult to then visualize base of mini-tunnel. Main wound with beefy granulation bed and min yellow fibrous tissue between larger buds. Added Qwick pad for extra absorption over holiday.   -MW     Rehab Potential  Good  -MW        PT Plan    PT Frequency  2x/week  -MW     Physical Therapy Interventions (Optional Details)  wound care;patient/family education  -MW     PT Plan Comments  debridement, dressings management  -MW       User Key  (r) = Recorded By, (t) = Taken By, (c) = Cosigned By    Initials Name Provider Type    Meenakshi Smith, PT Physical Therapist                         Time Calculation: Start Time: 1030    Therapy Charges  for Today     Code Description Service Date Service Provider Modifiers Qty    67619639712 HC JUAN DEBRIDE OPEN WOUND UP TO 20CM 12/23/2018 Meenakshi Silva, PT GP 1        Therapy Suggested Charges     Code   Minutes Charges    None                   Meenakshi Silva, PT  12/23/2018

## 2018-12-24 ENCOUNTER — APPOINTMENT (OUTPATIENT)
Dept: PHYSICAL THERAPY | Facility: HOSPITAL | Age: 83
End: 2018-12-24

## 2018-12-26 LAB — BACTERIA SPEC ANAEROBE CULT: NORMAL

## 2018-12-28 ENCOUNTER — TELEPHONE (OUTPATIENT)
Dept: INTERNAL MEDICINE | Facility: CLINIC | Age: 83
End: 2018-12-28

## 2018-12-28 ENCOUNTER — HOSPITAL ENCOUNTER (OUTPATIENT)
Dept: PHYSICAL THERAPY | Facility: HOSPITAL | Age: 83
Setting detail: THERAPIES SERIES
Discharge: HOME | End: 2018-12-28

## 2018-12-28 DIAGNOSIS — S81.801D OPEN WOUND OF RIGHT LOWER EXTREMITY WITH COMPLICATION, SUBSEQUENT ENCOUNTER: Primary | ICD-10-CM

## 2018-12-28 DIAGNOSIS — I10 HYPERTENSION, UNSPECIFIED TYPE: Primary | ICD-10-CM

## 2018-12-28 PROCEDURE — 97597 DBRDMT OPN WND 1ST 20 CM/<: CPT

## 2018-12-28 RX ORDER — ATENOLOL 100 MG/1
100 TABLET ORAL DAILY
Qty: 30 TABLET | Refills: 2 | Status: SHIPPED | OUTPATIENT
Start: 2018-12-28 | End: 2019-01-09 | Stop reason: SDUPTHER

## 2018-12-28 NOTE — THERAPY WOUND CARE TREATMENT
Outpatient Rehabilitation - Wound/Debridement Treatment Note  KAREN Flood     Patient Name: Lorna Lo  : 1934  MRN: [id number]  Today's Date: 2018                 Admit Date: 2018    Visit Dx:    ICD-10-CM ICD-9-CM   1. Open wound of right lower extremity with complication, subsequent encounter S81.801D V58.89     891.1       Patient Active Problem List   Diagnosis   • Coronary artery disease   • Essential hypertension   • Dyslipidemia   • Polymyositis (CMS/HCC)   • Dependent edema   • Stage 3 chronic kidney disease (CMS/HCC)   • Chronic anemia   • Osteopenia   • GERD (gastroesophageal reflux disease)   • PAF (paroxysmal atrial fibrillation) (CMS/HCC)        Past Medical History:   Diagnosis Date   • Chronic anemia     Chronic anemia, secondary to (CKD).    • CKD (chronic kidney disease)    • Coronary artery disease    • Dependent edema    • Dermatitis    • DVT (deep venous thrombosis) (CMS/HCC) 2009    History of DVT in .    • Dyslipidemia    • GERD (gastroesophageal reflux disease)    • Hyperlipidemia    • Hypertension    • Osteopenia    • Polymyositis (CMS/HCC)    • Renal disorder    • Thyroid disease         Past Surgical History:   Procedure Laterality Date   • CARDIAC SURGERY      Cardiac Stents    • GASTRIC BYPASS           EVALUATION  PT Ortho     Row Name 18 1087       Subjective Comments    Subjective Comments  Pt reports having to reinforce the edges of the bandages, otherwise no complaints.   -MC       Precautions and Contraindications    Precautions  blood thinners  -MC       Subjective Pain    Able to rate subjective pain?  yes  -MC    Pre-Treatment Pain Level  0  -MC    Post-Treatment Pain Level  0  -MC       Transfers    Sit-Stand [place] (Transfers)  independent  -MC    Stand-Sit [place] (Transfers)  independent  -MC    Comment (Transfers)  long sitting on stretcher for tx  -MC       Gait/Stairs Assessment/Training    [place] Level (Gait)   "independent  -    Assistive Device (Gait)  walker, 4-wheeled  -      User Key  (r) = Recorded By, (t) = Taken By, (c) = Cosigned By    Initials Name Provider Type    Alis Skelton, PT Physical Therapist              [facility] Wound     Row Name 12/28/18 0930             Wound 12/03/18 1100 Right anterior leg abscess;other (see comments)    Wound - Properties Group Date first assessed: 12/03/18  - Time first assessed: 1100  - Present On Admission : yes;picture taken  - Side: Right  - Orientation: anterior  - Location: leg  - Type: abscess;other (see comments)  - Additional Comments: fall on knee with hematoma  -    Dressing Appearance  intact;moist drainage  -      Base  pink;red/granulating;slough;subcutaneous;yellow min fibrous tissue remaining inferior site  -      Periwound  intact;dry;edematous;swelling;pink no redness, min edema  -      Periwound Temperature  warm  -      Periwound Skin Turgor  firm;soft  -      Edges  open  -      Drainage Characteristics/Odor  serosanguineous;serous;malodorous very faint odor, likely blood and HFB reaction  -      Drainage Amount  small  -      Care, Wound  cleansed with;wound cleanser;debrided  -      Dressing Care, Wound  dressing applied;antimicrobial agent applied;foam;other (see comments);low-adherent;border dressing dry HFBc x1 superior, x3 inferior wound, qwick, 6\" optifoam  -      Periwound Care, Wound  cleansed with pH balanced cleanser;dry periwound area maintained size 4 compressogrip, dbl over distal foot  -        User Key  (r) = Recorded By, (t) = Taken By, (c) = Cosigned By    Initials Name Provider Type    Alis Skelton, PT Physical Therapist    Dayami Gallo, PT Physical Therapist            WOUND DEBRIDEMENT  Total area of Debridement: 4 cm2  Debridement Site 1  Location- Site 1: RLE wound  Selective Debridement- Site 1: Wound Surface <20cmsq  Instruments- Site 1: tweezers, scissors  Excised Tissue " Description- Site 1: minimum, slough, necrotic(spongy necrotic tissue superior wound)  Bleeding- Site 1: minimum, held pressure, 1 minute             Therapy Education     Row Name 12/28/18 8789             Therapy Education    Education Details  Continue with current POC. Keep compressogrip around foot/ankle as well.  -MC      Given  Bandaging/dressing change;Edema management  -MC      Program  Reinforced  -MC      How Provided  Verbal;Demonstration  -MC      Provided to  Patient  -MC      Level of Understanding  Verbalized  -MC      Program  Reinforced  -MC        User Key  (r) = Recorded By, (t) = Taken By, (c) = Cosigned By    Initials Name Provider Type    Alis Skelton PT Physical Therapist          Recommendation and Plan  PT Assessment/Plan     Row Name 12/28/18 5690          PT Assessment    Functional Limitations  Other (comment);Performance in self-care ADL wound mgmt  -     Impairments  Edema;Integumentary integrity  -     Assessment Comments  Pt with increased granulation to R knee wounds, no creamy drainage, and very faint odor likely to be from HFBc and blood interacting. Pt still with notable tunneling, but the areas continue to be clean and moist. If dimensions have not improved much at next assessment, pt may benefit from MIST ultrasound to increase blood flow, decrease bioburden, and promote cellular activity.  -     Rehab Potential  Good  -     Patient/caregiver participated in establishment of treatment plan and goals  Yes  -     Patient would benefit from skilled therapy intervention  Yes  -MC        PT Plan    PT Frequency  2x/week  -     Physical Therapy Interventions (Optional Details)  patient/family education;wound care  -     PT Plan Comments  debridement, dressings management, MIST if clinically indicated  -       User Key  (r) = Recorded By, (t) = Taken By, (c) = Cosigned By    Initials Name Provider Type    Alis Skelton PT Physical Therapist           Goals  PT OP Goals     Row Name 12/28/18 0930          Time Calculation    PT Goal Re-Cert Due Date  03/03/19  -PAWAN       User Key  (r) = Recorded By, (t) = Taken By, (c) = Cosigned By    Initials Name Provider Type    Alis Skelton, PT Physical Therapist          PT Goal Re-Cert Due Date: 03/03/19            Time Calculation: Start Time: 0930    Therapy Charges for Today     Code Description Service Date Service Provider Modifiers Qty    86638521342 HC JUAN DEBRIDE OPEN WOUND UP TO 20CM 12/28/2018 Alis Johnson, PT GP 1        Therapy Suggested Charges     Code   Minutes Charges    None                   Alis Johnson PT  12/28/2018

## 2018-12-28 NOTE — TELEPHONE ENCOUNTER
----- Message from Vanesa Correa DO sent at 12/27/2018  6:30 PM EST -----  Will she go for a colonoscopy  ----- Message -----  From: Lauren Russ MA  Sent: 12/3/2018   5:18 PM  To: Vanesa Correa DO    Pt advised of results, she does not remember when or where she had her last colonoscopy but thinks its been at least 5 years

## 2018-12-28 NOTE — TELEPHONE ENCOUNTER
Pt now states she was in the hospital about 3 years ago and they did a colonoscopy then, she said she would look for the report

## 2018-12-31 ENCOUNTER — TELEPHONE (OUTPATIENT)
Dept: INTERNAL MEDICINE | Facility: CLINIC | Age: 83
End: 2018-12-31

## 2018-12-31 ENCOUNTER — HOSPITAL ENCOUNTER (OUTPATIENT)
Dept: PHYSICAL THERAPY | Facility: HOSPITAL | Age: 83
Setting detail: THERAPIES SERIES
Discharge: HOME | End: 2018-12-31

## 2018-12-31 DIAGNOSIS — L97.911 ULCER OF RIGHT LOWER EXTREMITY, LIMITED TO BREAKDOWN OF SKIN (HCC): Primary | ICD-10-CM

## 2018-12-31 DIAGNOSIS — S81.801D OPEN WOUND OF RIGHT LOWER EXTREMITY WITH COMPLICATION, SUBSEQUENT ENCOUNTER: Primary | ICD-10-CM

## 2018-12-31 PROCEDURE — 97597 DBRDMT OPN WND 1ST 20 CM/<: CPT

## 2018-12-31 RX ORDER — SULFAMETHOXAZOLE AND TRIMETHOPRIM 800; 160 MG/1; MG/1
1 TABLET ORAL 2 TIMES DAILY
Qty: 20 TABLET | Refills: 0 | Status: SHIPPED | OUTPATIENT
Start: 2018-12-31 | End: 2019-01-09 | Stop reason: SINTOL

## 2018-12-31 NOTE — TELEPHONE ENCOUNTER
----- Message from Vanesa Correa DO sent at 12/31/2018  1:16 PM EST -----  Call and let know culture resistant to clindamycin. Change to Bactrim ds po bid x 10 days

## 2019-01-01 ENCOUNTER — TELEPHONE (OUTPATIENT)
Dept: INTERNAL MEDICINE | Facility: CLINIC | Age: 84
End: 2019-01-01

## 2019-01-01 NOTE — TELEPHONE ENCOUNTER
PATIENT WAS GIVEN ANTIBIOTICS YESTERDAY, DAUGHTER CHARLEE RAWLS IS CALLING TO SAY SHE THINKS THE ANTIBIOTIC IS MAKING HER MOM SICK. PT SAYS SHE WILL NOT TAKE ANOTHER ONE OF THESE PILLS. DAUGHTER WANTS TO KNOW IF THERE IS ANOTHER ANTIBIOTIC SHE CAN TAKE.  DAUGHTER WOULD LIKE A CALL TO TALK TO SOMEONE ABOUT THIS.[phone]. PHARMACY IS [facility]

## 2019-01-01 NOTE — TELEPHONE ENCOUNTER
Called pt to get more information, she states she took it at night time with hardly any food and it kept her up all night and her head was hurting, I informed her to eat some food with it as it can use stomach ache and told her to try to take it in the morning time, but she refuses to take it anymore is there any other medicine she can take.    Please advise.

## 2019-01-02 NOTE — TELEPHONE ENCOUNTER
Call and see how she is feeling. She did not tolerate antibiotic and NP  Changed her doxycycline. If she is having nausea with this med can take with chocolate which will help .

## 2019-01-03 ENCOUNTER — HOSPITAL ENCOUNTER (OUTPATIENT)
Dept: PHYSICAL THERAPY | Facility: HOSPITAL | Age: 84
Setting detail: THERAPIES SERIES
Discharge: HOME | End: 2019-01-03

## 2019-01-03 DIAGNOSIS — S81.801D OPEN WOUND OF RIGHT LOWER EXTREMITY WITH COMPLICATION, SUBSEQUENT ENCOUNTER: Primary | ICD-10-CM

## 2019-01-03 PROCEDURE — 97597 DBRDMT OPN WND 1ST 20 CM/<: CPT

## 2019-01-03 NOTE — TELEPHONE ENCOUNTER
Pt said her daughter went to the pharmacy and her abx has been switched to something else but could not tell me the name of it, she says she feels much better and has an appt next Wed

## 2019-01-03 NOTE — THERAPY PROGRESS REPORT/RE-CERT
Outpatient Rehabilitation - Wound/Debridement Progress Note   Aleena     Patient Name: Lorna Lo  : 1934  MRN: [id number]  Today's Date: 1/3/2019                 Admit Date: 1/3/2019    Visit Dx:    ICD-10-CM ICD-9-CM   1. Open wound of right lower extremity with complication, subsequent encounter S81.801D V58.89     891.1       Patient Active Problem List   Diagnosis   • Coronary artery disease   • Essential hypertension   • Dyslipidemia   • Polymyositis (CMS/HCC)   • Dependent edema   • Stage 3 chronic kidney disease (CMS/HCC)   • Chronic anemia   • Osteopenia   • GERD (gastroesophageal reflux disease)   • PAF (paroxysmal atrial fibrillation) (CMS/HCC)        Past Medical History:   Diagnosis Date   • Chronic anemia     Chronic anemia, secondary to (CKD).    • CKD (chronic kidney disease)    • Coronary artery disease    • Dependent edema    • Dermatitis    • DVT (deep venous thrombosis) (CMS/HCC) 2009    History of DVT in .    • Dyslipidemia    • GERD (gastroesophageal reflux disease)    • Hyperlipidemia    • Hypertension    • Osteopenia    • Polymyositis (CMS/HCC)    • Renal disorder    • Thyroid disease         Past Surgical History:   Procedure Laterality Date   • CARDIAC SURGERY      Cardiac Stents    • GASTRIC BYPASS           EVALUATION  PT Ortho     Row Name 19 0945       Subjective Comments    Subjective Comments  Pt reports no pain, just intermittent stinging. Feels that her leg is getting back down to its normal size.   -MC       Precautions and Contraindications    Precautions  blood thinners  -       Subjective Pain    Able to rate subjective pain?  yes  -MC    Pre-Treatment Pain Level  0  -MC    Post-Treatment Pain Level  0  -MC       Transfers    Sit-Stand [place] (Transfers)  independent  -MC    Stand-Sit [place] (Transfers)  independent  -MC    Comment (Transfers)  long sitting on stretcher for tx  -       Gait/Stairs Assessment/Training     "[place] Level (Gait)  independent  -    Assistive Device (Gait)  walker, 4-wheeled  -      User Key  (r) = Recorded By, (t) = Taken By, (c) = Cosigned By    Initials Name Provider Type    Alis Skelton, PT Physical Therapist              [facility] Wound     Row Name 01/03/19 0945             Wound 12/03/18 1100 Right anterior leg abscess;other (see comments)    Wound - Properties Group Date first assessed: 12/03/18  - Time first assessed: 1100  - Present On Admission : yes;picture taken  - Side: Right  - Orientation: anterior  - Location: leg  - Type: abscess;other (see comments)  - Additional Comments: fall on knee with hematoma  -    Dressing Appearance  intact;moist drainage  -      Base  pink;red/granulating;slough;subcutaneous;yellow min hypergranulation superior edge of larger wound  -      Periwound  intact;dry;edematous;swelling;pink no redness, min edema  -      Periwound Temperature  warm  -      Periwound Skin Turgor  firm;soft  -      Edges  open  -      Drainage Characteristics/Odor  serosanguineous;serous  -      Drainage Amount  small  -      Care, Wound  cleansed with;wound cleanser;debrided;silver agent applied Ag Nitrate to hypergranulation  -      Dressing Care, Wound  dressing applied;antimicrobial agent applied;foam;other (see comments);low-adherent;border dressing dry HFBc to tunnels, moist HFBc, qwick, 6\" opti, primafix  -      Periwound Care, Wound  cleansed with pH balanced cleanser;dry periwound area maintained size 4 compressogrip, doubled distally  -        User Key  (r) = Recorded By, (t) = Taken By, (c) = Cosigned By    Initials Name Provider Type    Ails Skelton, PT Physical Therapist    Dayami Gallo, PT Physical Therapist            WOUND DEBRIDEMENT  Total area of Debridement: 3 cm2  Debridement Site 1  Location- Site 1: RLE wound  Selective Debridement- Site 1: Wound Surface <20cmsq  Instruments- Site 1: " arslanezers  Excised Tissue Description- Site 1: minimum, slough  Bleeding- Site 1: minimum, held pressure, 1 minute             Therapy Education     Row Name 19 0931             Therapy Education    Education Details  Continue with current POC.  -      Given  Bandaging/dressing change;Edema management  -      How Provided  Verbal;Demonstration  -MC      Provided to  Patient  -      Level of Understanding  Verbalized  -      Program  Reinforced  -        User Key  (r) = Recorded By, (t) = Taken By, (c) = Cosigned By    Initials Name Provider Type    Alis Skelton, PT Physical Therapist          Recommendation and Plan  PT Assessment/Plan     Row Name 19 2098          PT Assessment    Functional Limitations  Other (comment);Performance in self-care ADL wound mgmt  -     Impairments  Edema;Integumentary integrity  -     Assessment Comments  Pt has met all her STGs for PT wound care and is progressing well toward her remaining LTGs. Pt with increased granulation tissue, including small amount of hypergranulation requiring treatment with silver nitrate. Tunnelling appears improved, with remaining areas narrowing/decreasing in depth. Pt will continue to benefit from skilled PT wound care to continue progress.  -     Rehab Potential  Good  -     Patient/caregiver participated in establishment of treatment plan and goals  Yes  -     Patient would benefit from skilled therapy intervention  Yes  -        PT Plan    PT Frequency  2x/week  -     Predicted Duration of Therapy Intervention (Therapy Eval)  12 visits  -     Planned CPT's?  PT SELF CARE/MGMT/TRAIN 15 MIN: 52506;PT NONSELECT DEBRIDE 15 MIN: 07418;PT JUAN DEBRIDE OPEN WOUND UP TO 20 CM: 86694;PT JUAN DEBRIDE OPEN WOUND EA ADD 20 CM: 23908;PT NLFU MIST: 46196;PT MULTI LAYER COMP SYS LE  -     Physical Therapy Interventions (Optional Details)  patient/family education;wound care  -     PT Plan Comments  debridement,  dressings management. Consider changing packing to cutimed/opticell as tunnels narrow.  -       User Key  (r) = Recorded By, (t) = Taken By, (c) = Cosigned By    Initials Name Provider Type    Alis Skelton, PT Physical Therapist          Goals  PT OP Goals     Row Name 01/03/19 0945          PT Short Term Goals    STG 1  Patient and/ or caregiver able to verbalize signs and symptoms of infection.  -     STG 1 Progress  Met  -     STG 2  Decrease non-viable / necrotic tissue by 50% to facilitate clean wound bed for healing.  -     STG 2 Progress  Met  -     STG 3  Decrease wound dimensions by 25% as evidence of wound closure.  -     STG 3 Progress  Met  -        Long Term Goals    LTG 1  Patient and/ or caregiver independent with clean dressing changes.  -     LTG 1 Progress  Ongoing  -     LTG 2  Decrease non-viable / necrotic tissue by 75% to facilitate clean wound bed for healing.  -     LTG 2 Progress  Met  -     LTG 3  Decrease wound dimensions by 75% as evidence of wound closure.  -     LTG 3 Progress  Ongoing  -     LTG 4  Demonstrate no drainage from BLE to indicate healing progress.  -     LTG 4 Progress  Ongoing  -        Time Calculation    PT Goal Re-Cert Due Date  03/03/19  -       User Key  (r) = Recorded By, (t) = Taken By, (c) = Cosigned By    Initials Name Provider Type    Alis Skelton, PT Physical Therapist          PT Goal Re-Cert Due Date: 03/03/19  PT Short Term Goals  STG 1: Patient and/ or caregiver able to verbalize signs and symptoms of infection.  STG 1 Progress: Met  STG 2: Decrease non-viable / necrotic tissue by 50% to facilitate clean wound bed for healing.  STG 2 Progress: Met  STG 3: Decrease wound dimensions by 25% as evidence of wound closure.  STG 3 Progress: Met  Long Term Goals  LTG 1: Patient and/ or caregiver independent with clean dressing changes.  LTG 1 Progress: Ongoing  LTG 2: Decrease non-viable / necrotic tissue by 75% to  facilitate clean wound bed for healing.  LTG 2 Progress: Met  LTG 3: Decrease wound dimensions by 75% as evidence of wound closure.  LTG 3 Progress: Ongoing  LTG 4: Demonstrate no drainage from BLE to indicate healing progress.  LTG 4 Progress: Ongoing      Time Calculation: Start Time: 0945    Therapy Charges for Today     Code Description Service Date Service Provider Modifiers Qty    [id number] HC JUAN DEBRIDE OPEN WOUND UP TO 20CM 1/3/2019 Alis Johnson, PT GP 1        Therapy Suggested Charges     Code   Minutes Charges    None                   Alis Johnson, PT  1/3/2019

## 2019-01-08 ENCOUNTER — HOSPITAL ENCOUNTER (OUTPATIENT)
Dept: PHYSICAL THERAPY | Facility: HOSPITAL | Age: 84
Setting detail: THERAPIES SERIES
Discharge: HOME | End: 2019-01-08

## 2019-01-08 DIAGNOSIS — S81.801D OPEN WOUND OF RIGHT LOWER EXTREMITY WITH COMPLICATION, SUBSEQUENT ENCOUNTER: Primary | ICD-10-CM

## 2019-01-08 PROCEDURE — 97597 DBRDMT OPN WND 1ST 20 CM/<: CPT

## 2019-01-08 NOTE — THERAPY WOUND CARE TREATMENT
Outpatient Rehabilitation - Wound/Debridement Treatment Note  KAREN Flood     Patient Name: Lorna Lo  : 1934  MRN: [id number]  Today's Date: 2019             Proximal      Both wounds      Admit Date: 2019    Visit Dx:    ICD-10-CM ICD-9-CM   1. Open wound of right lower extremity with complication, subsequent encounter S81.801D V58.89     891.1       Patient Active Problem List   Diagnosis   • Coronary artery disease   • Essential hypertension   • Dyslipidemia   • Polymyositis (CMS/HCC)   • Dependent edema   • Stage 3 chronic kidney disease (CMS/HCC)   • Chronic anemia   • Osteopenia   • GERD (gastroesophageal reflux disease)   • PAF (paroxysmal atrial fibrillation) (CMS/HCC)        Past Medical History:   Diagnosis Date   • Chronic anemia     Chronic anemia, secondary to (CKD).    • CKD (chronic kidney disease)    • Coronary artery disease    • Dependent edema    • Dermatitis    • DVT (deep venous thrombosis) (CMS/HCC) 2009    History of DVT in .    • Dyslipidemia    • GERD (gastroesophageal reflux disease)    • Hyperlipidemia    • Hypertension    • Osteopenia    • Polymyositis (CMS/HCC)    • Renal disorder    • Thyroid disease         Past Surgical History:   Procedure Laterality Date   • CARDIAC SURGERY      Cardiac Stents    • GASTRIC BYPASS           EVALUATION  PT Ortho     Row Name 19 0945       Subjective Comments    Subjective Comments  No complaints or changes since last treatment.  -       Precautions and Contraindications    Precautions  blood thinners  -       Subjective Pain    Able to rate subjective pain?  yes  -MC    Pre-Treatment Pain Level  0  -MC    Post-Treatment Pain Level  0  -MC       Transfers    Sit-Stand [place] (Transfers)  independent  -    Stand-Sit [place] (Transfers)  independent  -    Comment (Transfers)  long sitting on stretcher for tx  -       Gait/Stairs Assessment/Training    [place] Level (Gait)  independent  -  "   Assistive Device (Gait)  walker, 4-wheeled  -      User Key  (r) = Recorded By, (t) = Taken By, (c) = Cosigned By    Initials Name Provider Type    Alis Skelton, PT Physical Therapist              [facility] Wound     Row Name 01/08/19 0945             Wound 12/03/18 1100 Right anterior leg abscess;other (see comments)    Wound - Properties Group Date first assessed: 12/03/18  - Time first assessed: 1100  - Present On Admission : yes;picture taken  - Side: Right  - Orientation: anterior  - Location: leg  - Type: abscess;other (see comments)  - Additional Comments: fall on knee with hematoma  -    Wound Image  Images linked: 2  -      Dressing Appearance  intact;moist drainage  -      Base  pink;red/granulating;slough;subcutaneous;yellow no hypergranulation, min yellow/white  -      Periwound  intact;dry;edematous;swelling;pink no redness, min edema  -      Periwound Temperature  warm  -      Periwound Skin Turgor  firm;soft  -      Edges  open  -      Wound Length (cm)  2.7 cm prox site: 0.7 x 1.1 x 0.3 cm. 0.7 cm tunneling at 1:00  -      Wound Width (cm)  3.1 cm  -      Wound Depth (cm)  0.3 cm  -      Tunneling [Depth (cm)/Location]  2.1 cm @ 9:00; 3.3 cm @ 11:00  -      Drainage Characteristics/Odor  serosanguineous;serous  -      Drainage Amount  small  -      Care, Wound  cleansed with;wound cleanser;debrided  -      Dressing Care, Wound  dressing applied;antimicrobial agent applied;gauze;foam;low-adherent;border dressing cutimed sorbact to distal tunnels, HFBt, qwick, 6\" optifoam  -      Periwound Care, Wound  cleansed with pH balanced cleanser;dry periwound area maintained;other (see comments) primafix tape to optifoam edges  -        User Key  (r) = Recorded By, (t) = Taken By, (c) = Cosigned By    Initials Name Provider Type    Alis Skelton, PT Physical Therapist    Dayami Gallo, PT Physical Therapist            WOUND " DEBRIDEMENT  Total area of Debridement: 4 cm2  Debridement Site 1  Location- Site 1: RLE wound  Selective Debridement- Site 1: Wound Surface <20cmsq  Instruments- Site 1: tweezers  Excised Tissue Description- Site 1: minimum, slough, other (comment)(biofilm)  Bleeding- Site 1: scant, held pressure, 1 minute             Therapy Education     Row Name 01/08/19 7888             Therapy Education    Education Details  Continue with current POC. Explained change to cutimed packing.  -MC      Given  Bandaging/dressing change;Edema management  -MC      Program  Reinforced  -MC      How Provided  Verbal;Demonstration  -MC      Provided to  Patient  -MC      Level of Understanding  Verbalized  -MC      Program  Reinforced  -MC        User Key  (r) = Recorded By, (t) = Taken By, (c) = Cosigned By    Initials Name Provider Type    Alis Skelton, PT Physical Therapist          Recommendation and Plan  PT Assessment/Plan     Row Name 01/08/19 4052          PT Assessment    Functional Limitations  Other (comment);Performance in self-care ADL wound mgmt  -MC     Impairments  Edema;Integumentary integrity  -     Assessment Comments  Pt with decreased wound measurements since last assessment, apart from tunneling at 11:00. The larger measurement at 11:00 is likely d/t PT being able to push past a small ridge of tissue today to reach a very narrow part of the tunnel. As the tunnels appear to be narrowing, switched from HFB to cutimed sorbact packing to continue to allow the tunnels to decrease in size. Pt with no remaining hypergranulation and additional granulation/epithelialization noted today. Pt will continue to benefit from the current POC to continue progress.  -MC     Rehab Potential  Good  -     Patient/caregiver participated in establishment of treatment plan and goals  Yes  -MC     Patient would benefit from skilled therapy intervention  Yes  -MC        PT Plan    PT Frequency  2x/week  -MC     Physical Therapy  Interventions (Optional Details)  patient/family education;wound care  -     PT Plan Comments  debridement, dressings management. Assess switch to cutimed.  -       User Key  (r) = Recorded By, (t) = Taken By, (c) = Cosigned By    Initials Name Provider Type    Alis Skelton, PT Physical Therapist          Goals  PT OP Goals     Row Name 01/08/19 0945          Time Calculation    PT Goal Re-Cert Due Date  03/03/19  -       User Key  (r) = Recorded By, (t) = Taken By, (c) = Cosigned By    Initials Name Provider Type    Alis Skelton, PT Physical Therapist          PT Goal Re-Cert Due Date: 03/03/19            Time Calculation: Start Time: 0945    Therapy Charges for Today     Code Description Service Date Service Provider Modifiers Qty    63459763852 [facility] DEBRIDE OPEN WOUND UP TO 20CM 1/8/2019 Alis Johnson, PT GP 1        Therapy Suggested Charges     Code   Minutes Charges    None                   Alis Johnson, VANESA  1/8/2019

## 2019-01-09 ENCOUNTER — OFFICE VISIT (OUTPATIENT)
Dept: INTERNAL MEDICINE | Facility: CLINIC | Age: 84
End: 2019-01-09

## 2019-01-09 VITALS
HEART RATE: 53 BPM | SYSTOLIC BLOOD PRESSURE: 140 MMHG | BODY MASS INDEX: 34.33 KG/M2 | DIASTOLIC BLOOD PRESSURE: 80 MMHG | OXYGEN SATURATION: 98 % | WEIGHT: 200 LBS

## 2019-01-09 DIAGNOSIS — S81.801D OPEN WOUND OF RIGHT LOWER EXTREMITY, SUBSEQUENT ENCOUNTER: Primary | ICD-10-CM

## 2019-01-09 DIAGNOSIS — Z78.0 POSTMENOPAUSAL: ICD-10-CM

## 2019-01-09 DIAGNOSIS — I10 HYPERTENSION, UNSPECIFIED TYPE: ICD-10-CM

## 2019-01-09 DIAGNOSIS — K21.00 GASTROESOPHAGEAL REFLUX DISEASE WITH ESOPHAGITIS: ICD-10-CM

## 2019-01-09 DIAGNOSIS — R73.9 HYPERGLYCEMIA: ICD-10-CM

## 2019-01-09 LAB
GLUCOSE BLDC GLUCOMTR-MCNC: 143 MG/DL (ref 70–130)
HBA1C MFR BLD: 6 %

## 2019-01-09 PROCEDURE — 82962 GLUCOSE BLOOD TEST: CPT | Performed by: INTERNAL MEDICINE

## 2019-01-09 PROCEDURE — 99213 OFFICE O/P EST LOW 20 MIN: CPT | Performed by: INTERNAL MEDICINE

## 2019-01-09 PROCEDURE — 83036 HEMOGLOBIN GLYCOSYLATED A1C: CPT | Performed by: INTERNAL MEDICINE

## 2019-01-09 RX ORDER — ATENOLOL 100 MG/1
100 TABLET ORAL DAILY
Qty: 90 TABLET | Refills: 1 | Status: ON HOLD | OUTPATIENT
Start: 2019-01-09 | End: 2019-09-11 | Stop reason: SDUPTHER

## 2019-01-09 RX ORDER — FOLIC ACID 1 MG/1
1 TABLET ORAL DAILY
Qty: 90 TABLET | Refills: 1 | Status: SHIPPED | OUTPATIENT
Start: 2019-01-09 | End: 2019-12-18

## 2019-01-09 RX ORDER — AMLODIPINE BESYLATE 10 MG/1
10 TABLET ORAL DAILY
Qty: 90 TABLET | Refills: 1 | Status: SHIPPED | OUTPATIENT
Start: 2019-01-09 | End: 2019-04-10 | Stop reason: SDUPTHER

## 2019-01-09 RX ORDER — RANITIDINE 150 MG/1
150 TABLET ORAL 2 TIMES DAILY
Qty: 90 TABLET | Refills: 1 | Status: SHIPPED | OUTPATIENT
Start: 2019-01-09 | End: 2020-01-01

## 2019-01-09 NOTE — PROGRESS NOTES
Subjective   Lorna Lo is a 84 y.o. female.   Chief Complaint   Patient presents with   • open wound right lower extremity       History of Present Illness   2 week f/u on open wound right lower extremity. Seeing wound care for debridement. Evaluated by surgery. Recommended continue with wound care with wet to dry dressings. Hypertension, reflux and hyperglycemia are stable   The following portions of the patient's history were reviewed and updated as appropriate: allergies, current medications, past family history, past medical history, past social history, past surgical history and problem list.    Review of Systems   Constitutional: Negative for activity change, appetite change, chills, diaphoresis, fatigue, fever, unexpected weight gain and unexpected weight loss.   HENT: Negative for ear discharge, ear pain, mouth sores, nosebleeds, sinus pressure, sneezing and sore throat.    Eyes: Negative for pain, discharge and itching.   Respiratory: Negative for cough, chest tightness, shortness of breath and wheezing.    Cardiovascular: Negative for chest pain, palpitations and leg swelling.   Gastrointestinal: Negative for abdominal pain, constipation, diarrhea, nausea and vomiting.   Endocrine: Negative for heat intolerance, polydipsia and polyphagia.   Genitourinary: Negative for dysuria, flank pain, frequency, hematuria and urgency.   Musculoskeletal: Negative for arthralgias, back pain, gait problem, myalgias, neck pain and neck stiffness.   Skin: Negative for color change, pallor and rash.        wound   Neurological: Negative for seizures, speech difficulty and numbness.   Psychiatric/Behavioral: Negative for agitation, decreased concentration and sleep disturbance. The patient is not nervous/anxious.      /80   Pulse 53   Wt 90.7 kg (200 lb)   LMP  (LMP Unknown)   SpO2 98%   BMI 34.33 kg/m²     Objective   Physical Exam   Constitutional: She is oriented to person, place, and time. She appears  well-developed.   HENT:   Head: Normocephalic.   Right Ear: External ear normal.   Left Ear: External ear normal.   Nose: Nose normal.   Mouth/Throat: Oropharynx is clear and moist.   Eyes: Conjunctivae are normal. Pupils are equal, round, and reactive to light.   Neck: No JVD present. No thyromegaly present.   Cardiovascular: Normal rate, regular rhythm and normal heart sounds. Exam reveals no friction rub.   No murmur heard.  Pulmonary/Chest: Effort normal and breath sounds normal. No respiratory distress. She has no wheezes. She has no rales.   Abdominal: Soft. Bowel sounds are normal. She exhibits no distension. There is no tenderness.   Musculoskeletal: She exhibits no edema or tenderness.   Lymphadenopathy:     She has no cervical adenopathy.   Neurological: She is oriented to person, place, and time. She displays normal reflexes. No cranial nerve deficit.   Skin: No rash noted.        Psychiatric: Her behavior is normal.   Nursing note and vitals reviewed.        Assessment/Plan   Lorna was seen today for open wound right lower extremity.    Diagnoses and all orders for this visit:    Open wound of right lower extremity, subsequent encounter    Hypertension, unspecified type  -     amLODIPine (NORVASC) 10 MG tablet; Take 1 tablet by mouth Daily.  -     atenolol (TENORMIN) 100 MG tablet; Take 1 tablet by mouth Daily.    Gastroesophageal reflux disease with esophagitis  -     raNITIdine (ZANTAC) 150 MG tablet; Take 1 tablet by mouth 2 (Two) Times a Day.    Postmenopausal  -     DEXA Bone Density Axial; Future    Hyperglycemia  A1c 6.0, prediabetic. Low carb diet  Other orders  -     folic acid (FOLVITE) 1 MG tablet; Take 1 tablet by mouth Daily.

## 2019-01-11 ENCOUNTER — HOSPITAL ENCOUNTER (OUTPATIENT)
Dept: PHYSICAL THERAPY | Facility: HOSPITAL | Age: 84
Setting detail: THERAPIES SERIES
Discharge: HOME | End: 2019-01-11

## 2019-01-11 DIAGNOSIS — S81.801D OPEN WOUND OF RIGHT LOWER EXTREMITY WITH COMPLICATION, SUBSEQUENT ENCOUNTER: Primary | ICD-10-CM

## 2019-01-11 PROCEDURE — 97597 DBRDMT OPN WND 1ST 20 CM/<: CPT

## 2019-01-11 NOTE — THERAPY WOUND CARE TREATMENT
Outpatient Rehabilitation - Wound/Debridement Treatment Note  KAREN Flood     Patient Name: Lorna Lo  : 1934  MRN: [id number]  Today's Date: 2019                 Admit Date: 2019    Visit Dx:    ICD-10-CM ICD-9-CM   1. Open wound of right lower extremity with complication, subsequent encounter S81.801D V58.89     891.1       Patient Active Problem List   Diagnosis   • Coronary artery disease   • Essential hypertension   • Dyslipidemia   • Polymyositis (CMS/HCC)   • Dependent edema   • Stage 3 chronic kidney disease (CMS/HCC)   • Chronic anemia   • Osteopenia   • GERD (gastroesophageal reflux disease)   • PAF (paroxysmal atrial fibrillation) (CMS/HCC)        Past Medical History:   Diagnosis Date   • Chronic anemia     Chronic anemia, secondary to (CKD).    • CKD (chronic kidney disease)    • Coronary artery disease    • Dependent edema    • Dermatitis    • DVT (deep venous thrombosis) (CMS/HCC) 2009    History of DVT in .    • Dyslipidemia    • GERD (gastroesophageal reflux disease)    • Hyperlipidemia    • Hypertension    • Osteopenia    • Polymyositis (CMS/HCC)    • Renal disorder    • Thyroid disease         Past Surgical History:   Procedure Laterality Date   • CARDIAC SURGERY      Cardiac Stents    • GASTRIC BYPASS           EVALUATION  PT Ortho     Row Name 19 5264       Subjective Comments    Subjective Comments  Pt without complaints  -       Precautions and Contraindications    Precautions  blood thinners  -       Subjective Pain    Able to rate subjective pain?  yes  -JM    Pre-Treatment Pain Level  0  -JM    Post-Treatment Pain Level  0  -JM       Transfers    Sit-Stand [place] (Transfers)  independent  -JM    Stand-Sit [place] (Transfers)  independent  -    Comment (Transfers)  long sitting on stretcher for tx  -       Gait/Stairs Assessment/Training    [place] Level (Gait)  independent  -    Assistive Device (Gait)  walker, 4-wheeled   -      User Key  (r) = Recorded By, (t) = Taken By, (c) = Cosigned By    Initials Name Provider Type    Dayami Gallo, PT Physical Therapist              [facility] Wound     Row Name 01/11/19 0930             Wound 12/03/18 1100 Right anterior leg abscess;other (see comments)    Wound - Properties Group Date first assessed: 12/03/18  - Time first assessed: 1100  - Present On Admission : yes;picture taken  - Side: Right  - Orientation: anterior  - Location: leg  - Type: abscess;other (see comments)  - Additional Comments: fall on knee with hematoma  -    Dressing Appearance  intact;moist drainage  -      Base  pink;red/granulating;slough;subcutaneous;yellow;epithelialization new epithelialization at edges  -      Periwound  intact;dry;edematous;swelling;pink no redness, min edema  -      Periwound Temperature  warm  -      Periwound Skin Turgor  firm;soft  -      Edges  open  -      Drainage Characteristics/Odor  serosanguineous;serous  -      Drainage Amount  small  -      Care, Wound  cleansed with;wound cleanser;debrided  -      Dressing Care, Wound  dressing applied;antimicrobial agent applied;foam;low-adherent;border dressing cutimed sorbact to superior wound and 11:00 tunnel  -      Periwound Care, Wound  cleansed with pH balanced cleanser;dry periwound area maintained  -        User Key  (r) = Recorded By, (t) = Taken By, (c) = Cosigned By    Initials Name Provider Type    Dayami Gallo, PT Physical Therapist            WOUND DEBRIDEMENT  Total area of Debridement: 3 cmsq  Debridement Site 1  Location- Site 1: RLE wound  Selective Debridement- Site 1: Wound Surface <20cmsq  Instruments- Site 1: tweezers  Excised Tissue Description- Site 1: minimum, slough  Bleeding- Site 1: scant             Therapy Education     Row Name 01/11/19 0930             Therapy Education    Given  Bandaging/dressing change;Edema management  -      Program  Reinforced  -      How  Provided  Verbal;Demonstration  -      Provided to  Patient  -      Level of Understanding  Verbalized  -      Program  Reinforced  -        User Key  (r) = Recorded By, (t) = Taken By, (c) = Cosigned By    Initials Name Provider Type    Dayami Gallo, PT Physical Therapist          Recommendation and Plan  PT Assessment/Plan     Row Name 01/11/19 0930          PT Assessment    Functional Limitations  Other (comment);Performance in self-care ADL wound mgmt  -     Impairments  Edema;Integumentary integrity  -     Assessment Comments  R knee wound with new epithelial growth at edges noted.  PT only packed 11:00 tunnel as 9:00 tunnel very small and narrow today.  PT also used cutimed to pack superior wound today to help promote wound closure.  Pt making good progress, will continue to benefit from debridement and dressing management until tunnels resolve, then decrease to once/week with family to assist at home.  -     Rehab Potential  Good  -     Patient/caregiver participated in establishment of treatment plan and goals  Yes  -     Patient would benefit from skilled therapy intervention  Yes  -        PT Plan    PT Frequency  2x/week  -     Physical Therapy Interventions (Optional Details)  patient/family education;wound care  -     PT Plan Comments  debridement, dressing management  -       User Key  (r) = Recorded By, (t) = Taken By, (c) = Cosigned By    Initials Name Provider Type    Dayami Gallo, PT Physical Therapist          Goals  PT OP Goals     Row Name 01/11/19 0930          Time Calculation    PT Goal Re-Cert Due Date  03/03/19  -       User Key  (r) = Recorded By, (t) = Taken By, (c) = Cosigned By    Initials Name Provider Type    Dayami Gallo, PT Physical Therapist          PT Goal Re-Cert Due Date: 03/03/19            Time Calculation: Start Time: 0930    Therapy Charges for Today     Code Description Service Date Service Provider Modifiers Qty     [id number] [facility] DEBRIDE OPEN WOUND UP TO 20CM 1/11/2019 Dayami Baca, PT GP 1            Dayami Baca, PT  1/11/2019

## 2019-01-15 ENCOUNTER — HOSPITAL ENCOUNTER (OUTPATIENT)
Dept: PHYSICAL THERAPY | Facility: HOSPITAL | Age: 84
Setting detail: THERAPIES SERIES
Discharge: HOME | End: 2019-01-15

## 2019-01-15 DIAGNOSIS — S81.801D OPEN WOUND OF RIGHT LOWER EXTREMITY WITH COMPLICATION, SUBSEQUENT ENCOUNTER: Primary | ICD-10-CM

## 2019-01-15 PROCEDURE — 97597 DBRDMT OPN WND 1ST 20 CM/<: CPT

## 2019-01-15 NOTE — THERAPY WOUND CARE TREATMENT
Outpatient Rehabilitation - Wound/Debridement Treatment Note  KAREN Flood     Patient Name: Lorna Lo  : 1934  MRN: [id number]  Today's Date: 1/15/2019                 Admit Date: 1/15/2019    Visit Dx:    ICD-10-CM ICD-9-CM   1. Open wound of right lower extremity with complication, subsequent encounter S81.801D V58.89     891.1       Patient Active Problem List   Diagnosis   • Coronary artery disease   • Essential hypertension   • Dyslipidemia   • Polymyositis (CMS/HCC)   • Dependent edema   • Stage 3 chronic kidney disease (CMS/HCC)   • Chronic anemia   • Osteopenia   • GERD (gastroesophageal reflux disease)   • PAF (paroxysmal atrial fibrillation) (CMS/HCC)        Past Medical History:   Diagnosis Date   • Chronic anemia     Chronic anemia, secondary to (CKD).    • CKD (chronic kidney disease)    • Coronary artery disease    • Dependent edema    • Dermatitis    • DVT (deep venous thrombosis) (CMS/HCC) 2009    History of DVT in .    • Dyslipidemia    • GERD (gastroesophageal reflux disease)    • Hyperlipidemia    • Hypertension    • Osteopenia    • Polymyositis (CMS/HCC)    • Renal disorder    • Thyroid disease         Past Surgical History:   Procedure Laterality Date   • CARDIAC SURGERY      Cardiac Stents    • GASTRIC BYPASS           EVALUATION  PT Ortho     Row Name 01/15/19 9810       Subjective Comments    Subjective Comments  Pt reports medial knee is tender to touch today.  Thinks it may be due to sleeping on her R side where she normally sleeps in LSL.  -JM       Precautions and Contraindications    Precautions  blood thinners  -       Subjective Pain    Able to rate subjective pain?  yes  -JM    Pre-Treatment Pain Level  0  -JM    Post-Treatment Pain Level  0  -JM    Subjective Pain Comment  no pain at rest, mild pain with palpation medial to R patella  -JM       Transfers    Sit-Stand [place] (Transfers)  independent  -    Stand-Sit [place] (Transfers)   "independent  -JM    Comment (Transfers)  long sitting on stretcher for tx  -JM       Gait/Stairs Assessment/Training    [place] Level (Gait)  independent  -    Assistive Device (Gait)  walker, 4-wheeled  -      User Key  (r) = Recorded By, (t) = Taken By, (c) = Cosigned By    Initials Name Provider Type    Dayami Gallo, PT Physical Therapist              [facility] Wound     Row Name 01/15/19 0930             Wound 12/03/18 1100 Right anterior leg abscess;other (see comments)    Wound - Properties Group Date first assessed: 12/03/18  - Time first assessed: 1100  -JM Present On Admission : yes;picture taken  - Side: Right  - Orientation: anterior  - Location: leg  -JM Type: abscess;other (see comments)  - Additional Comments: fall on knee with hematoma  -    Wound Image  Images linked: 2  -JM      Dressing Appearance  intact;moist drainage  -JM      Base  pink;red/granulating;slough;yellow;epithelialization new epithelialization at edges  -      Periwound  intact;dry;edematous;swelling;pink min edema, areas of small scabs likely from pt scratching  -      Periwound Temperature  warm  -      Periwound Skin Turgor  firm;soft  -JM      Edges  open  -      Wound Length (cm)  2.5 cm prox area: 0.4cm x 0.9cm x 0.4cm with 0.7cm tunnel 2:00  -      Wound Width (cm)  2.9 cm  -      Wound Depth (cm)  0.2 cm  -      Tunneling [Depth (cm)/Location]  1.5cm@11:00  -      Drainage Characteristics/Odor  serosanguineous;serous  -      Drainage Amount  small  -JM      Care, Wound  cleansed with;wound cleanser;debrided  -      Dressing Care, Wound  dressing applied;antimicrobial agent applied;gauze;low-adherent;foam cutimed sorbact, dry 4x4s, 6\" optifoam gentle, primafix tape  -      Periwound Care, Wound  cleansed with pH balanced cleanser;dry periwound area maintained  -        User Key  (r) = Recorded By, (t) = Taken By, (c) = Cosigned By    Initials Name Provider Type    ALENA Baca" Dayami BURGOS, PT Physical Therapist            WOUND DEBRIDEMENT  Total area of Debridement: 2 cmsq  Debridement Site 1  Location- Site 1: RLE wound  Selective Debridement- Site 1: Wound Surface <20cmsq  Instruments- Site 1: tweezers  Excised Tissue Description- Site 1: minimum, slough  Bleeding- Site 1: scant             Therapy Education     Row Name 01/15/19 1040             Therapy Education    Education Details  reinforced leg elevation to reduce edema, and to keep compressogrip pulled up without wrinkles/rolling  -ALENA      Given  Bandaging/dressing change;Edema management  -      Program  Reinforced  -ALENA      How Provided  Verbal;Demonstration  -      Provided to  Patient  -      Level of Understanding  Verbalized  -      Program  Reinforced  -        User Key  (r) = Recorded By, (t) = Taken By, (c) = Cosigned By    Initials Name Provider Type    Dayami Gallo, PT Physical Therapist          Recommendation and Plan  PT Assessment/Plan     Row Name 01/15/19 7146          PT Assessment    Functional Limitations  Other (comment);Performance in self-care ADL wound mgmt  -     Impairments  Edema;Integumentary integrity  -     Assessment Comments  R knee wounds improving, continued granulation and new epithelial growth at edges.  Tunnel at 11:00 narrowing and decreased depth to 1.5cm.  PT deferred packing this tunnel to help promote closure today.  Superior wound clean and beefy red with only minimal slough today, also with decreased depth of tunneling.  Pt reporting new tenderness of medial R knee but no erythema noted, only baseline pitting edema and scratches from pt's fingernails.  Will monitor.  May decrease to once/week tx after next appt.  -     Rehab Potential  Good  -        PT Plan    PT Frequency  2x/week;1x/week  -     Physical Therapy Interventions (Optional Details)  patient/family education;wound care  -     PT Plan Comments  debridement, dressing management, instruction in  home dressing changes next tx  -       User Key  (r) = Recorded By, (t) = Taken By, (c) = Cosigned By    Initials Name Provider Type    Dayami Gallo, PT Physical Therapist          Goals  PT OP Goals     Row Name 01/15/19 0930          Time Calculation    PT Goal Re-Cert Due Date  03/03/19  -       User Key  (r) = Recorded By, (t) = Taken By, (c) = Cosigned By    Initials Name Provider Type    Dayami Gallo, PT Physical Therapist          PT Goal Re-Cert Due Date: 03/03/19            Time Calculation: Start Time: 0930    Therapy Charges for Today     Code Description Service Date Service Provider Modifiers Qty    [id number] [facility] DEBRIDE OPEN WOUND UP TO 20CM 1/15/2019 Dayami Baca, PT GP 1            Dayami Baca, PT  1/15/2019

## 2019-01-18 ENCOUNTER — HOSPITAL ENCOUNTER (OUTPATIENT)
Dept: PHYSICAL THERAPY | Facility: HOSPITAL | Age: 84
Setting detail: THERAPIES SERIES
Discharge: HOME | End: 2019-01-18

## 2019-01-18 DIAGNOSIS — S81.801D OPEN WOUND OF RIGHT LOWER EXTREMITY WITH COMPLICATION, SUBSEQUENT ENCOUNTER: Primary | ICD-10-CM

## 2019-01-18 PROCEDURE — 97597 DBRDMT OPN WND 1ST 20 CM/<: CPT

## 2019-01-18 NOTE — THERAPY WOUND CARE TREATMENT
Outpatient Rehabilitation - Wound/Debridement Treatment Note   Ale[facility]     Patient Name: Lorna Lo  : 1934  MRN: [id number]  Today's Date: 2019                 Admit Date: 2019    Visit Dx:    ICD-10-CM ICD-9-CM   1. Open wound of right lower extremity with complication, subsequent encounter S81.801D V58.89     891.1       Patient Active Problem List   Diagnosis   • Coronary artery disease   • Essential hypertension   • Dyslipidemia   • Polymyositis (CMS/HCC)   • Dependent edema   • Stage 3 chronic kidney disease (CMS/HCC)   • Chronic anemia   • Osteopenia   • GERD (gastroesophageal reflux disease)   • PAF (paroxysmal atrial fibrillation) (CMS/HCC)        Past Medical History:   Diagnosis Date   • Chronic anemia     Chronic anemia, secondary to (CKD).    • CKD (chronic kidney disease)    • Coronary artery disease    • Dependent edema    • Dermatitis    • DVT (deep venous thrombosis) (CMS/HCC) 2009    History of DVT in .    • Dyslipidemia    • GERD (gastroesophageal reflux disease)    • Hyperlipidemia    • Hypertension    • Osteopenia    • Polymyositis (CMS/HCC)    • Renal disorder    • Thyroid disease         Past Surgical History:   Procedure Laterality Date   • CARDIAC SURGERY      Cardiac Stents    • GASTRIC BYPASS           EVALUATION  PT Ortho     Row Name 19 0945       Subjective Comments    Subjective Comments  No new complaints or changes since last treatment. She doesn't feel comfortable doing the dressing changes at home yet, but is willing to try after her next appt.  -MC       Precautions and Contraindications    Precautions  blood thinners  -MC       Subjective Pain    Able to rate subjective pain?  yes  -MC    Pre-Treatment Pain Level  0  -MC    Post-Treatment Pain Level  0  -MC       Transfers    Sit-Stand [place] (Transfers)  independent  -MC    Stand-Sit [place] (Transfers)  independent  -MC    Comment (Transfers)  long sitting on stretcher for  "tx  -       Gait/Stairs Assessment/Training    [place] Level (Gait)  independent  -    Assistive Device (Gait)  walker, 4-wheeled  -      User Key  (r) = Recorded By, (t) = Taken By, (c) = Cosigned By    Initials Name Provider Type    Alis Skelton, PT Physical Therapist              [facility] Wound     Row Name 01/18/19 0945             Wound 12/03/18 1100 Right anterior leg abscess;other (see comments)    Wound - Properties Group Date first assessed: 12/03/18  - Time first assessed: 1100  - Present On Admission : yes;picture taken  - Side: Right  - Orientation: anterior  - Location: leg  - Type: abscess;other (see comments)  - Additional Comments: fall on knee with hematoma  -    Dressing Appearance  intact;moist drainage  -      Base  pink;red/granulating;slough;yellow;epithelialization new epithelialization at edges  -      Periwound  intact;dry;edematous;swelling;pink min edema  -      Periwound Temperature  warm  -      Periwound Skin Turgor  firm;soft  -      Edges  open  -      Tunneling [Depth (cm)/Location]  -- unable to probe any tunneling today  -      Drainage Characteristics/Odor  serosanguineous;serous  -      Drainage Amount  small  -      Care, Wound  cleansed with;wound cleanser;debrided  -      Dressing Care, Wound  dressing applied;antimicrobial agent applied;gauze;low-adherent;foam;border dressing cutimed sorbact, 4x4 gauze, 6\" optifoam gentle, primafix  -      Periwound Care, Wound  cleansed with pH balanced cleanser;dry periwound area maintained size 4 compressogrip  -        User Key  (r) = Recorded By, (t) = Taken By, (c) = Cosigned By    Initials Name Provider Type    Alis Skelton, PT Physical Therapist    Dayami Gallo, PT Physical Therapist            WOUND DEBRIDEMENT  Total area of Debridement: 2 cm2  Debridement Site 1  Location- Site 1: RLE wound  Selective Debridement- Site 1: Wound Surface <20cmsq  Instruments- " Site 1: tweezers, scissors  Excised Tissue Description- Site 1: minimum, slough, other (comment)(periwound crusting)  Bleeding- Site 1: scant, held pressure, 1 minute             Therapy Education     Row Name 01/18/19 0945             Therapy Education    Education Details  extra time demonstrating clean dressing change so pt can attempt to demonstrate with PT oversight next treatment. Continue with leg elevation and appropriate compressogrip placement/maintenance.  -MC      Given  Bandaging/dressing change;Edema management  -MC      Program  Reinforced;Progressed  -MC      How Provided  Verbal;Demonstration  -MC      Provided to  Patient  -MC      Level of Understanding  Verbalized  -MC      Program  Reinforced  -MC        User Key  (r) = Recorded By, (t) = Taken By, (c) = Cosigned By    Initials Name Provider Type    Alis Skelton, PT Physical Therapist          Recommendation and Plan  PT Assessment/Plan     Row Name 01/18/19 0945          PT Assessment    Functional Limitations  Other (comment);Performance in self-care ADL wound mgmt  -     Impairments  Edema;Integumentary integrity  -     Assessment Comments  PT unable to probe any areas of tunneling today. Pt with new epithelialization at wound edges and minimal slough/crusting that required debridement today. Pt is progressing well and will be appropriate to decrease tx frequency to once/week once pt able to demonstrate the ability to change her dressings between treatments.  -     Rehab Potential  Good  -     Patient/caregiver participated in establishment of treatment plan and goals  Yes  -     Patient would benefit from skilled therapy intervention  Yes  -MC        PT Plan    PT Frequency  2x/week;1x/week  -     Physical Therapy Interventions (Optional Details)  patient/family education;wound care  -     PT Plan Comments  debridement, dressings management. Have pt attempt to demonstrate dressing change next treatment, instruction  performed today.  -       User Key  (r) = Recorded By, (t) = Taken By, (c) = Cosigned By    Initials Name Provider Type    Alis Skelton, PT Physical Therapist          Goals  PT OP Goals     Row Name 01/18/19 0945          Time Calculation    PT Goal Re-Cert Due Date  03/03/19  -       User Key  (r) = Recorded By, (t) = Taken By, (c) = Cosigned By    Initials Name Provider Type    Alis Skelton, PT Physical Therapist          PT Goal Re-Cert Due Date: 03/03/19            Time Calculation: Start Time: 0945    Therapy Charges for Today     Code Description Service Date Service Provider Modifiers Qty    39801392330 HC JUAN DEBRIDE OPEN WOUND UP TO 20CM 1/18/2019 Alis Johnson, PT GP 1        Therapy Suggested Charges     Code   Minutes Charges    None                   Alis Johnson, PT  1/18/2019

## 2019-01-22 ENCOUNTER — HOSPITAL ENCOUNTER (OUTPATIENT)
Dept: PHYSICAL THERAPY | Facility: HOSPITAL | Age: 84
Setting detail: THERAPIES SERIES
Discharge: HOME | End: 2019-01-22

## 2019-01-22 DIAGNOSIS — S81.801D OPEN WOUND OF RIGHT LOWER EXTREMITY WITH COMPLICATION, SUBSEQUENT ENCOUNTER: Primary | ICD-10-CM

## 2019-01-22 PROCEDURE — 97597 DBRDMT OPN WND 1ST 20 CM/<: CPT

## 2019-01-22 NOTE — THERAPY WOUND CARE TREATMENT
Outpatient Rehabilitation - Wound/Debridement Treatment Note  KAREN Flood     Patient Name: Lorna Lo  : 1934  MRN: [id number]  Today's Date: 2019                 Admit Date: 2019    Visit Dx:    ICD-10-CM ICD-9-CM   1. Open wound of right lower extremity with complication, subsequent encounter S81.801D V58.89     891.1       Patient Active Problem List   Diagnosis   • Coronary artery disease   • Essential hypertension   • Dyslipidemia   • Polymyositis (CMS/HCC)   • Dependent edema   • Stage 3 chronic kidney disease (CMS/HCC)   • Chronic anemia   • Osteopenia   • GERD (gastroesophageal reflux disease)   • PAF (paroxysmal atrial fibrillation) (CMS/HCC)        Past Medical History:   Diagnosis Date   • Chronic anemia     Chronic anemia, secondary to (CKD).    • CKD (chronic kidney disease)    • Coronary artery disease    • Dependent edema    • Dermatitis    • DVT (deep venous thrombosis) (CMS/HCC) 2009    History of DVT in .    • Dyslipidemia    • GERD (gastroesophageal reflux disease)    • Hyperlipidemia    • Hypertension    • Osteopenia    • Polymyositis (CMS/HCC)    • Renal disorder    • Thyroid disease         Past Surgical History:   Procedure Laterality Date   • CARDIAC SURGERY      Cardiac Stents    • GASTRIC BYPASS           EVALUATION  PT Ortho     Row Name 19 3144       Subjective Comments    Subjective Comments  No complaints or changes since last tx.  -MC       Precautions and Contraindications    Precautions  blood thinners  -       Subjective Pain    Able to rate subjective pain?  yes  -MC    Pre-Treatment Pain Level  0  -MC    Post-Treatment Pain Level  0  -MC       Transfers    Sit-Stand [place] (Transfers)  independent  -MC    Stand-Sit [place] (Transfers)  independent  -MC    Comment (Transfers)  long sitting on stretcher for tx  -       Gait/Stairs Assessment/Training    [place] Level (Gait)  independent  -    Assistive Device (Gait)   "walker, 4-wheeled  -      User Key  (r) = Recorded By, (t) = Taken By, (c) = Cosigned By    Initials Name Provider Type    Alis Skelton, PT Physical Therapist              [facility] Wound     Row Name 01/22/19 0930             Wound 12/03/18 1100 Right anterior leg abscess;other (see comments)    Wound - Properties Group Date first assessed: 12/03/18  - Time first assessed: 1100  - Present On Admission : yes;picture taken  - Side: Right  - Orientation: anterior  - Location: leg  - Type: abscess;other (see comments)  - Additional Comments: fall on knee with hematoma  -    Wound Image  Images linked: 1  -      Dressing Appearance  intact;moist drainage  -      Base  pink;red/granulating;slough;yellow;epithelialization new epithelialization at edges  -      Periwound  intact;dry;edematous;swelling;pink min edema  -      Periwound Temperature  warm  -      Periwound Skin Turgor  firm;soft  -      Edges  open  -      Wound Length (cm)  2.1 cm proximal area: 0.4 x 0.7 x 0.2 cm, 0.2 cm undermining @ 12-2  -      Wound Width (cm)  2.4 cm  -      Wound Depth (cm)  0.2 cm  -      Drainage Characteristics/Odor  serosanguineous;serous  -      Drainage Amount  small  -      Care, Wound  cleansed with;wound cleanser;debrided  -      Dressing Care, Wound  dressing applied;antimicrobial agent applied;gauze;low-adherent;foam;border dressing cutimed sorbact, 4x4 gauze, 6\" optifoam, primafix tape  -      Periwound Care, Wound  cleansed with pH balanced cleanser;dry periwound area maintained;other (see comments) size 4 compressogrip  -        User Key  (r) = Recorded By, (t) = Taken By, (c) = Cosigned By    Initials Name Provider Type    Alis Skelton, PT Physical Therapist    Dayami Gallo, PT Physical Therapist            WOUND DEBRIDEMENT  Total area of Debridement: 6 cm2  Debridement Site 1  Location- Site 1: RLE wounds  Selective Debridement- Site 1: Wound Surface " <20cmsq  Instruments- Site 1: tweezers  Excised Tissue Description- Site 1: moderate, slough, other (comment)(biofilm)  Bleeding- Site 1: scant, held pressure, 1 minute             Therapy Education     Row Name 01/22/19 0536             Therapy Education    Education Details  Continue with current POC, will assess ability to do dressing changes at home next visit.  -MC      Given  Bandaging/dressing change;Edema management  -      Program  Reinforced  -      How Provided  Verbal;Demonstration  -MC      Provided to  Patient  -MC      Level of Understanding  Verbalized  -      Program  Reinforced  -        User Key  (r) = Recorded By, (t) = Taken By, (c) = Cosigned By    Initials Name Provider Type    Alis Skelton, PT Physical Therapist          Recommendation and Plan  PT Assessment/Plan     Row Name 01/22/19 8956          PT Assessment    Functional Limitations  Other (comment);Performance in self-care ADL wound mgmt  -     Impairments  Edema;Integumentary integrity  -     Assessment Comments  Pt with notable decrease in wound measurements since last assessment. Pt still with development of biofilm/slough that requires ongoing debridement. Pt is progressing well with the current POC, and will be appropriate to decrease treatment frequency if she's able to demonstrate independent dressing changes.  -     Rehab Potential  Good  -     Patient/caregiver participated in establishment of treatment plan and goals  Yes  -     Patient would benefit from skilled therapy intervention  Yes  -        PT Plan    PT Frequency  2x/week;1x/week  -     Physical Therapy Interventions (Optional Details)  patient/family education;wound care  -     PT Plan Comments  debridement, dressings management. Have pt attempt to demonstrate dressing change again next tx.  -       User Key  (r) = Recorded By, (t) = Taken By, (c) = Cosigned By    Initials Name Provider Type    Alis Skelton PT Physical  Therapist          Goals  PT OP Goals     Row Name 01/22/19 0930          Time Calculation    PT Goal Re-Cert Due Date  03/03/19  -PAWAN       User Key  (r) = Recorded By, (t) = Taken By, (c) = Cosigned By    Initials Name Provider Type    Alis Skelton, PT Physical Therapist          PT Goal Re-Cert Due Date: 03/03/19            Time Calculation: Start Time: 0930    Therapy Charges for Today     Code Description Service Date Service Provider Modifiers Qty    10428604625 HC JUAN DEBRIDE OPEN WOUND UP TO 20CM 1/22/2019 Alis Johnson, PT GP 1        Therapy Suggested Charges     Code   Minutes Charges    None                   Alis Johnson, PT  1/22/2019

## 2019-01-25 ENCOUNTER — HOSPITAL ENCOUNTER (OUTPATIENT)
Dept: PHYSICAL THERAPY | Facility: HOSPITAL | Age: 84
Setting detail: THERAPIES SERIES
Discharge: HOME | End: 2019-01-25

## 2019-01-25 DIAGNOSIS — S81.801D OPEN WOUND OF RIGHT LOWER EXTREMITY WITH COMPLICATION, SUBSEQUENT ENCOUNTER: Primary | ICD-10-CM

## 2019-01-25 PROCEDURE — 97597 DBRDMT OPN WND 1ST 20 CM/<: CPT

## 2019-01-25 NOTE — THERAPY WOUND CARE TREATMENT
Outpatient Rehabilitation - Wound/Debridement Treatment Note  KAREN Flood     Patient Name: Lorna Lo  : 1934  MRN: [id number]  Today's Date: 2019                 Admit Date: 2019    Visit Dx:    ICD-10-CM ICD-9-CM   1. Open wound of right lower extremity with complication, subsequent encounter S81.801D V58.89     891.1       Patient Active Problem List   Diagnosis   • Coronary artery disease   • Essential hypertension   • Dyslipidemia   • Polymyositis (CMS/HCC)   • Dependent edema   • Stage 3 chronic kidney disease (CMS/HCC)   • Chronic anemia   • Osteopenia   • GERD (gastroesophageal reflux disease)   • PAF (paroxysmal atrial fibrillation) (CMS/HCC)        Past Medical History:   Diagnosis Date   • Chronic anemia     Chronic anemia, secondary to (CKD).    • CKD (chronic kidney disease)    • Coronary artery disease    • Dependent edema    • Dermatitis    • DVT (deep venous thrombosis) (CMS/HCC) 2009    History of DVT in .    • Dyslipidemia    • GERD (gastroesophageal reflux disease)    • Hyperlipidemia    • Hypertension    • Osteopenia    • Polymyositis (CMS/HCC)    • Renal disorder    • Thyroid disease         Past Surgical History:   Procedure Laterality Date   • CARDIAC SURGERY      Cardiac Stents    • GASTRIC BYPASS           EVALUATION  PT Ortho     Row Name 19 2296       Subjective Comments    Subjective Comments  Pt without complaints.  -       Precautions and Contraindications    Precautions  blood thinners  -       Subjective Pain    Able to rate subjective pain?  yes  -    Pre-Treatment Pain Level  0  -    Post-Treatment Pain Level  0  -       Transfers    Sit-Stand [place] (Transfers)  independent  -    Stand-Sit [place] (Transfers)  independent  -    Comment (Transfers)  long sitting on stretcher for txi  -       Gait/Stairs Assessment/Training    [place] Level (Gait)  independent  -    Assistive Device (Gait)  walker, 4-wheeled   "-      User Key  (r) = Recorded By, (t) = Taken By, (c) = Cosigned By    Initials Name Provider Type    Dayami Gallo, PT Physical Therapist              [facility] Wound     Row Name 01/25/19 0930             Wound 12/03/18 1100 Right anterior leg abscess;other (see comments)    Wound - Properties Group Date first assessed: 12/03/18  - Time first assessed: 1100  - Present On Admission : yes;picture taken  - Side: Right  - Orientation: anterior  - Location: leg  - Type: abscess;other (see comments)  - Additional Comments: fall on knee with hematoma  -    Dressing Appearance  intact;moist drainage  -      Base  pink;red/granulating;slough;yellow;epithelialization new epithelialization at edges, min hypergranulation  -      Periwound  intact;dry;edematous;swelling;pink min edema  -      Periwound Temperature  warm  -      Periwound Skin Turgor  firm;soft  -      Edges  open  -      Drainage Characteristics/Odor  serosanguineous;yellow;green faint green tint to drainage, no odor  -      Drainage Amount  small  -      Care, Wound  cleansed with;wound cleanser;debrided  -      Dressing Care, Wound  dressing applied;antimicrobial agent applied;foam;low-adherent cutimed sorbact, 4x4s, 6\" optifoam gentle, primafix tape  -      Periwound Care, Wound  cleansed with pH balanced cleanser;dry periwound area maintained size 4 compressogrip  -        User Key  (r) = Recorded By, (t) = Taken By, (c) = Cosigned By    Initials Name Provider Type    Dayami Gallo, PT Physical Therapist            WOUND DEBRIDEMENT  Total area of Debridement: 6cmsq  Debridement Site 1  Location- Site 1: RLE wounds  Selective Debridement- Site 1: Wound Surface <20cmsq  Instruments- Site 1: tweezers  Excised Tissue Description- Site 1: minimum, slough, other (comment)(biofilm)  Bleeding- Site 1: scant, held pressure, 1 minute             Therapy Education     Row Name 01/25/19 0930             Therapy " Education    Education Details  Instructed in clean home dressing changes.  -      Given  Bandaging/dressing change;Edema management  -      Program  Reinforced  -      How Provided  Verbal;Demonstration  -      Provided to  Patient  -      Level of Understanding  Verbalized;Teach back education performed  -      Program  Reinforced  -        User Key  (r) = Recorded By, (t) = Taken By, (c) = Cosigned By    Initials Name Provider Type    Dayami Gallo, PT Physical Therapist          Recommendation and Plan  PT Assessment/Plan     Row Name 01/25/19 0930          PT Assessment    Functional Limitations  Other (comment);Performance in self-care ADL wound mgmt  -     Impairments  Edema;Integumentary integrity  -     Assessment Comments  R knee wounds continuing to improve with continued new epithelial growth at edges.  Still with min slough/biofilm requiring debridement.  Also min hypergranulation noted today.  Will monitor and treat with Ag Nitrate if indicated.  Pt able to verbalize clean dressing technique, son to assist with dressing change Monday and follow-up on Tuesday.  Then will decrease to once/week if no issues.  -     Rehab Potential  Good  -     Patient/caregiver participated in establishment of treatment plan and goals  Yes  -     Patient would benefit from skilled therapy intervention  Yes  -        PT Plan    PT Frequency  1x/week  -     Physical Therapy Interventions (Optional Details)  patient/family education;wound care  -     PT Plan Comments  debridement, dressings management, check hypergranulation and ability to perform home dressing change  -       User Key  (r) = Recorded By, (t) = Taken By, (c) = Cosigned By    Initials Name Provider Type    Dayami Gallo, PT Physical Therapist          Goals  PT OP Goals     Row Name 01/25/19 0930          Time Calculation    PT Goal Re-Cert Due Date  03/03/19  -       User Key  (r) = Recorded By, (t) = Taken  By, (c) = Cosigned By    Initials Name Provider Type    Dayami Gallo, PT Physical Therapist          PT Goal Re-Cert Due Date: 03/03/19            Time Calculation: Start Time: 0930    Therapy Charges for Today     Code Description Service Date Service Provider Modifiers Qty    [id number] HC JUAN DEBRIDE OPEN WOUND UP TO 20CM 1/25/2019 Dayami Baca, PT GP 1            Dayami Baca, PT  1/25/2019

## 2019-01-29 ENCOUNTER — HOSPITAL ENCOUNTER (OUTPATIENT)
Dept: PHYSICAL THERAPY | Facility: HOSPITAL | Age: 84
Setting detail: THERAPIES SERIES
Discharge: HOME | End: 2019-01-29

## 2019-01-29 DIAGNOSIS — S81.801D OPEN WOUND OF RIGHT LOWER EXTREMITY WITH COMPLICATION, SUBSEQUENT ENCOUNTER: Primary | ICD-10-CM

## 2019-01-29 PROCEDURE — 97597 DBRDMT OPN WND 1ST 20 CM/<: CPT

## 2019-01-29 NOTE — THERAPY WOUND CARE TREATMENT
Outpatient Rehabilitation - Wound/Debridement Treatment Note  KAREN Flood     Patient Name: Lorna Lo  : 1934  MRN: [id number]  Today's Date: 2019                 Admit Date: 2019    Visit Dx:    ICD-10-CM ICD-9-CM   1. Open wound of right lower extremity with complication, subsequent encounter S81.801D V58.89     891.1       Patient Active Problem List   Diagnosis   • Coronary artery disease   • Essential hypertension   • Dyslipidemia   • Polymyositis (CMS/HCC)   • Dependent edema   • Stage 3 chronic kidney disease (CMS/HCC)   • Chronic anemia   • Osteopenia   • GERD (gastroesophageal reflux disease)   • PAF (paroxysmal atrial fibrillation) (CMS/HCC)        Past Medical History:   Diagnosis Date   • Chronic anemia     Chronic anemia, secondary to (CKD).    • CKD (chronic kidney disease)    • Coronary artery disease    • Dependent edema    • Dermatitis    • DVT (deep venous thrombosis) (CMS/HCC) 2009    History of DVT in .    • Dyslipidemia    • GERD (gastroesophageal reflux disease)    • Hyperlipidemia    • Hypertension    • Osteopenia    • Polymyositis (CMS/HCC)    • Renal disorder    • Thyroid disease         Past Surgical History:   Procedure Laterality Date   • CARDIAC SURGERY      Cardiac Stents    • GASTRIC BYPASS           EVALUATION  PT Ortho     Row Name 19 7780       Subjective Comments    Subjective Comments  Pt reports she changed the dressing herself yesterday, as instructed by PT last tx.  -JM       Precautions and Contraindications    Precautions  blood thinners  -JM       Subjective Pain    Able to rate subjective pain?  yes  -JM    Pre-Treatment Pain Level  0  -JM    Post-Treatment Pain Level  0  -JM       Transfers    Sit-Stand [place] (Transfers)  independent  -JM    Stand-Sit [place] (Transfers)  independent  -JM    Comment (Transfers)  long sitting on stretcher for tx  -JM       Gait/Stairs Assessment/Training    [place] Level (Gait)   "independent  -    Assistive Device (Gait)  walker, 4-wheeled  -      User Key  (r) = Recorded By, (t) = Taken By, (c) = Cosigned By    Initials Name Provider Type    Dayami Gallo, PT Physical Therapist              LDA Wound     Row Name 01/29/19 0930             Wound 12/03/18 1100 Right anterior leg abscess;other (see comments)    Wound - Properties Group Date first assessed: 12/03/18  - Time first assessed: 1100  -JM Present On Admission : yes;picture taken  - Side: Right  - Orientation: anterior  -JM Location: leg  -JM Type: abscess;other (see comments)  - Additional Comments: fall on knee with hematoma  -    Wound Image  Images linked: 2  -JM      Dressing Appearance  intact;moist drainage  -      Base  pink;red/granulating;slough;yellow;epithelialization new epithelialization at edges, min hypergranulation  -      Periwound  intact;dry;edematous;swelling;pink min edema, baseline  -      Periwound Temperature  warm  -      Periwound Skin Turgor  firm;soft  -JM      Edges  open  -      Wound Length (cm)  1.7 cm prox area 0.2cm x 0.3cm x 0.2cm  -      Wound Width (cm)  1.8 cm  -      Wound Depth (cm)  0.1 cm  -JM      Drainage Characteristics/Odor  serosanguineous;yellow  -JM      Drainage Amount  small  -JM      Care, Wound  cleansed with;wound cleanser;collagenase agent applied;silver agent applied Ag nitrate stick to hypergranulation  -      Dressing Care, Wound  dressing applied;antimicrobial agent applied;gauze;low-adherent;foam cutimed sorbact, 4x4 gauze, 6\" optifoam gentle, primafix  -      Periwound Care, Wound  cleansed with pH balanced cleanser;dry periwound area maintained size 4 compressogrip  -        User Key  (r) = Recorded By, (t) = Taken By, (c) = Cosigned By    Initials Name Provider Type    Dayami Gallo, PT Physical Therapist            WOUND DEBRIDEMENT  Total area of Debridement: 4 cmsq  Debridement Site 1  Location- Site 1: RLE " wounds  Selective Debridement- Site 1: Wound Surface <20cmsq  Instruments- Site 1: tweezers  Excised Tissue Description- Site 1: minimum, slough  Bleeding- Site 1: scant             Therapy Education     Row Name 01/29/19 0930             Therapy Education    Education Details  Change dressing every 3-4 days or PRN for drainage.  Plan to decrease to once/week tx.  -      Given  Bandaging/dressing change;Edema management  -      Program  Reinforced  -      How Provided  Verbal;Demonstration  -      Provided to  Patient  -      Level of Understanding  Verbalized;Teach back education performed  -      Program  Reinforced  -        User Key  (r) = Recorded By, (t) = Taken By, (c) = Cosigned By    Initials Name Provider Type    Dayami Gallo, PT Physical Therapist          Recommendation and Plan  PT Assessment/Plan     Row Name 01/29/19 0930          PT Assessment    Functional Limitations  Other (comment);Performance in self-care ADL wound mgmt  -     Impairments  Edema;Integumentary integrity  -     Assessment Comments  R knee wounds with decreased area.  Min hypergranulation noted that PT tx with Ag Nitrate stick today to promote continued re-epithelialization.  Min slough buildup requiring debridement today.  Pt demonstrated clean dressing change, is appropriate to decrease to once/week tx for debridement/dressing management.  -     Rehab Potential  Good  -     Patient/caregiver participated in establishment of treatment plan and goals  Yes  -     Patient would benefit from skilled therapy intervention  Yes  -        PT Plan    PT Frequency  1x/week  -     Physical Therapy Interventions (Optional Details)  patient/family education;wound care  -     PT Plan Comments  debridement, dressing management  -       User Key  (r) = Recorded By, (t) = Taken By, (c) = Cosigned By    Initials Name Provider Type    Dayami Gallo, PT Physical Therapist          Goals  PT OP Goals      Row Name 01/29/19 0930          Time Calculation    PT Goal Re-Cert Due Date  03/03/19  -ALENA       User Key  (r) = Recorded By, (t) = Taken By, (c) = Cosigned By    Initials Name Provider Type    Dayami Gallo, PT Physical Therapist          PT Goal Re-Cert Due Date: 03/03/19            Time Calculation: Start Time: 0930    Therapy Charges for Today     Code Description Service Date Service Provider Modifiers Qty    29242575654 HC JUAN DEBRIDE OPEN WOUND UP TO 20CM 1/29/2019 Dayami Baca, PT GP 1            Dayami Baca, PT  1/29/2019

## 2019-02-05 ENCOUNTER — HOSPITAL ENCOUNTER (OUTPATIENT)
Dept: PHYSICAL THERAPY | Facility: HOSPITAL | Age: 84
Setting detail: THERAPIES SERIES
Discharge: HOME | End: 2019-02-05

## 2019-02-05 DIAGNOSIS — S81.801D OPEN WOUND OF RIGHT LOWER EXTREMITY WITH COMPLICATION, SUBSEQUENT ENCOUNTER: Primary | ICD-10-CM

## 2019-02-05 PROCEDURE — 97597 DBRDMT OPN WND 1ST 20 CM/<: CPT

## 2019-02-05 NOTE — THERAPY PROGRESS REPORT/RE-CERT
Outpatient Rehabilitation - Wound/Debridement Progress Note   Aleena     Patient Name: Lorna Lo  : 1934  MRN: [id number]  Today's Date: 2019                 Admit Date: 2019    Visit Dx:    ICD-10-CM ICD-9-CM   1. Open wound of right lower extremity with complication, subsequent encounter S81.801D V58.89     891.1       Patient Active Problem List   Diagnosis   • Coronary artery disease   • Essential hypertension   • Dyslipidemia   • Polymyositis (CMS/HCC)   • Dependent edema   • Stage 3 chronic kidney disease (CMS/HCC)   • Chronic anemia   • Osteopenia   • GERD (gastroesophageal reflux disease)   • PAF (paroxysmal atrial fibrillation) (CMS/HCC)        Past Medical History:   Diagnosis Date   • Chronic anemia     Chronic anemia, secondary to (CKD).    • CKD (chronic kidney disease)    • Coronary artery disease    • Dependent edema    • Dermatitis    • DVT (deep venous thrombosis) (CMS/HCC) 2009    History of DVT in .    • Dyslipidemia    • GERD (gastroesophageal reflux disease)    • Hyperlipidemia    • Hypertension    • Osteopenia    • Polymyositis (CMS/HCC)    • Renal disorder    • Thyroid disease         Past Surgical History:   Procedure Laterality Date   • CARDIAC SURGERY      Cardiac Stents    • GASTRIC BYPASS           EVALUATION  PT Ortho     Row Name 19 0000       Subjective Comments    Subjective Comments  No complaints or changes. No difficulty with dressing change at home.  -MC       Precautions and Contraindications    Precautions  blood thinners  -       Subjective Pain    Able to rate subjective pain?  yes  -MC    Pre-Treatment Pain Level  0  -MC    Post-Treatment Pain Level  0  -MC       Transfers    Sit-Stand [place] (Transfers)  independent  -    Stand-Sit [place] (Transfers)  independent  -    Comment (Transfers)  long sitting on stretcher for tx  -       Gait/Stairs Assessment/Training    [place] Level (Gait)  independent  -     "Assistive Device (Gait)  walker, 4-wheeled  -      User Key  (r) = Recorded By, (t) = Taken By, (c) = Cosigned By    Initials Name Provider Type    Alis Skelton, PT Physical Therapist              [facility] Wound     Row Name 02/05/19 0930             Wound 12/03/18 1100 Right anterior leg abscess;other (see comments)    Wound - Properties Group Date first assessed: 12/03/18  - Time first assessed: 1100  - Present On Admission : yes;picture taken  - Side: Right  - Orientation: anterior  - Location: leg  - Type: abscess;other (see comments)  - Additional Comments: fall on knee with hematoma  -    Wound Image  Images linked: 1  -      Dressing Appearance  intact;moist drainage  -      Base  pink;red/granulating;slough;yellow;epithelialization new epithelialization at edges, no hypergranulation today  -      Periwound  intact;dry;edematous;swelling;pink min edema, baseline. Satellite area closed today  -      Periwound Temperature  warm  -      Periwound Skin Turgor  firm;soft  -      Edges  open  -      Wound Length (cm)  1.7 cm  -      Wound Width (cm)  1.6 cm  -      Wound Depth (cm)  0.1 cm  -      Drainage Characteristics/Odor  serosanguineous;yellow  -      Drainage Amount  small  -      Care, Wound  cleansed with;wound cleanser;debrided  -      Dressing Care, Wound  dressing applied;antimicrobial agent applied;gauze;low-adherent;foam;border dressing cutimed sorbact, 2x2 gauze, 4\" optifoam gentle border  -      Periwound Care, Wound  cleansed with pH balanced cleanser;dry periwound area maintained  -        User Key  (r) = Recorded By, (t) = Taken By, (c) = Cosigned By    Initials Name Provider Type    Alis Skelton, PT Physical Therapist    Dayami Gallo, PT Physical Therapist            WOUND DEBRIDEMENT  Total area of Debridement: 5 cm2  Debridement Site 1  Location- Site 1: RLE wounds  Selective Debridement- Site 1: Wound Surface " <20cmsq  Instruments- Site 1: tweezers  Excised Tissue Description- Site 1: moderate, slough, other (comment)(periwound hypertrophic crust, crust over newly closed satellite)  Bleeding- Site 1: scant, held pressure, 1 minute             Therapy Education     Row Name 19 0929             Therapy Education    Given  Bandaging/dressing change;Edema management  -      Program  Reinforced  -      How Provided  Verbal;Demonstration  -MC      Provided to  Patient  -      Level of Understanding  Verbalized;Teach back education performed  -      Program  Reinforced  -MC        User Key  (r) = Recorded By, (t) = Taken By, (c) = Cosigned By    Initials Name Provider Type    Alis Skelton, PT Physical Therapist          Recommendation and Plan  PT Assessment/Plan     Row Name 19 0933          PT Assessment    Functional Limitations  Other (comment);Performance in self-care ADL wound mgmt  -     Impairments  Edema;Integumentary integrity  -     Assessment Comments  Proximal satellite area closed today. Principal wound area continues to improve. Slight decrease in wound measurements noted, but new epithelialization also present around the edges of the wound. No hypergranulation noted today. Pt will continue to benefit from skilled PT wound care to continue progress.  -     Rehab Potential  Good  -     Patient/caregiver participated in establishment of treatment plan and goals  Yes  -     Patient would benefit from skilled therapy intervention  Yes  -        PT Plan    PT Frequency  1x/week  -     Predicted Duration of Therapy Intervention (Therapy Eval)  6 visits  -     Planned CPT's?  PT SELF CARE/MGMT/TRAIN 15 MIN: 65370;PT JUAN DEBRIDE OPEN WOUND UP TO 20 CM: 10498;PT NLFU MIST: 31890;PT MULTI LAYER COMP SYS LE  -     Physical Therapy Interventions (Optional Details)  patient/family education;wound care  -     PT Plan Comments  debridement, dressings management  -        User Key  (r) = Recorded By, (t) = Taken By, (c) = Cosigned By    Initials Name Provider Type    Alis Skelton, PT Physical Therapist          Goals  PT OP Goals     Row Name 02/05/19 0930          PT Short Term Goals    STG 1  Patient and/ or caregiver able to verbalize signs and symptoms of infection.  -     STG 1 Progress  Met  -     STG 2  Decrease non-viable / necrotic tissue by 50% to facilitate clean wound bed for healing.  -     STG 2 Progress  Met  -     STG 3  Decrease wound dimensions by 25% as evidence of wound closure.  -     STG 3 Progress  Met  -        Long Term Goals    LTG 1  Patient and/ or caregiver independent with clean dressing changes.  -     LTG 1 Progress  Met  -     LTG 2  Decrease non-viable / necrotic tissue by 75% to facilitate clean wound bed for healing.  -     LTG 2 Progress  Met  -     LTG 3  Decrease wound dimensions by 75% as evidence of wound closure.  -     LTG 3 Progress  Ongoing  -     LTG 4  Demonstrate no drainage from BLE to indicate healing progress.  -     LTG 4 Progress  Ongoing  -        Time Calculation    PT Goal Re-Cert Due Date  03/03/19  -       User Key  (r) = Recorded By, (t) = Taken By, (c) = Cosigned By    Initials Name Provider Type    Alis kSelton, PT Physical Therapist          PT Goal Re-Cert Due Date: 03/03/19  PT Short Term Goals  STG 1: Patient and/ or caregiver able to verbalize signs and symptoms of infection.  STG 1 Progress: Met  STG 2: Decrease non-viable / necrotic tissue by 50% to facilitate clean wound bed for healing.  STG 2 Progress: Met  STG 3: Decrease wound dimensions by 25% as evidence of wound closure.  STG 3 Progress: Met  Long Term Goals  LTG 1: Patient and/ or caregiver independent with clean dressing changes.  LTG 1 Progress: Met  LTG 2: Decrease non-viable / necrotic tissue by 75% to facilitate clean wound bed for healing.  LTG 2 Progress: Met  LTG 3: Decrease wound dimensions by 75% as  evidence of wound closure.  LTG 3 Progress: Ongoing  LTG 4: Demonstrate no drainage from BLE to indicate healing progress.  LTG 4 Progress: Ongoing      Time Calculation: Start Time: 0930    Therapy Charges for Today     Code Description Service Date Service Provider Modifiers Qty    22197125049 HC JUAN DEBRIDE OPEN WOUND UP TO 20CM 2/5/2019 Alis Johnson, PT GP 1        Therapy Suggested Charges     Code   Minutes Charges    None                   Alis Johnson, PT  2/5/2019

## 2019-02-12 ENCOUNTER — HOSPITAL ENCOUNTER (OUTPATIENT)
Dept: PHYSICAL THERAPY | Facility: HOSPITAL | Age: 84
Setting detail: THERAPIES SERIES
Discharge: HOME | End: 2019-02-12

## 2019-02-12 DIAGNOSIS — S81.801D OPEN WOUND OF RIGHT LOWER EXTREMITY WITH COMPLICATION, SUBSEQUENT ENCOUNTER: Primary | ICD-10-CM

## 2019-02-12 PROCEDURE — 97597 DBRDMT OPN WND 1ST 20 CM/<: CPT

## 2019-02-12 NOTE — THERAPY WOUND CARE TREATMENT
Outpatient Rehabilitation - Wound/Debridement Treatment Note  KAREN Flood     Patient Name: Lorna Lo  : 1934  MRN: [id number]  Today's Date: 2019                 Admit Date: 2019    Visit Dx:    ICD-10-CM ICD-9-CM   1. Open wound of right lower extremity with complication, subsequent encounter S81.801D V58.89     891.1   RLE wound:      Patient Active Problem List   Diagnosis   • Coronary artery disease   • Essential hypertension   • Dyslipidemia   • Polymyositis (CMS/HCC)   • Dependent edema   • Stage 3 chronic kidney disease (CMS/HCC)   • Chronic anemia   • Osteopenia   • GERD (gastroesophageal reflux disease)   • PAF (paroxysmal atrial fibrillation) (CMS/HCC)        Past Medical History:   Diagnosis Date   • Chronic anemia     Chronic anemia, secondary to (CKD).    • CKD (chronic kidney disease)    • Coronary artery disease    • Dependent edema    • Dermatitis    • DVT (deep venous thrombosis) (CMS/HCC) 2009    History of DVT in .    • Dyslipidemia    • GERD (gastroesophageal reflux disease)    • Hyperlipidemia    • Hypertension    • Osteopenia    • Polymyositis (CMS/HCC)    • Renal disorder    • Thyroid disease         Past Surgical History:   Procedure Laterality Date   • CARDIAC SURGERY      Cardiac Stents    • GASTRIC BYPASS           EVALUATION  PT Ortho     Row Name 19 0920       Subjective Comments    Subjective Comments  Pt without complaints.  -       Precautions and Contraindications    Precautions  blood thinners  -       Subjective Pain    Able to rate subjective pain?  yes  -JM    Pre-Treatment Pain Level  0  -JM    Post-Treatment Pain Level  0  -JM       Transfers    Sit-Stand [place] (Transfers)  independent  -    Stand-Sit [place] (Transfers)  independent  -    Comment (Transfers)  long sitting on stretcher for tx  -JM       Gait/Stairs Assessment/Training    [place] Level (Gait)  independent  -    Assistive Device (Gait)  walker,  "4-wheeled  -      User Key  (r) = Recorded By, (t) = Taken By, (c) = Cosigned By    Initials Name Provider Type    Dayami Gallo, PT Physical Therapist              LDA Wound     Row Name 02/12/19 0920             Wound 12/03/18 1100 Right anterior leg abscess;other (see comments)    Wound - Properties Group Date first assessed: 12/03/18  - Time first assessed: 1100  - Present On Admission : yes;picture taken  - Side: Right  - Orientation: anterior  - Location: leg  -JM Type: abscess;other (see comments)  - Additional Comments: fall on knee with hematoma  -    Wound Image  Images linked: 2  -JM      Dressing Appearance  intact;moist drainage  -      Base  pink;red/granulating;slough;yellow;epithelialization  -      Periwound  intact;dry;pink  -JM      Periwound Temperature  warm  -      Periwound Skin Turgor  firm;soft  -      Edges  open  -      Wound Length (cm)  1.2 cm  -      Wound Width (cm)  1.6 cm  -      Wound Depth (cm)  0.1 cm  -JM      Drainage Characteristics/Odor  serosanguineous;yellow  -JM      Drainage Amount  small  -JM      Care, Wound  cleansed with;wound cleanser;debrided  -      Dressing Care, Wound  dressing applied;antimicrobial agent applied;foam;low-adherent;border dressing cutimed sorbact, 4\" optifoam gentle  -      Periwound Care, Wound  cleansed with pH balanced cleanser;dry periwound area maintained  -        User Key  (r) = Recorded By, (t) = Taken By, (c) = Cosigned By    Initials Name Provider Type    Dayami Gallo, PT Physical Therapist            WOUND DEBRIDEMENT  Total area of Debridement: 3cmsq  Debridement Site 1  Location- Site 1: RLE wounds  Selective Debridement- Site 1: Wound Surface <20cmsq  Instruments- Site 1: tweezers  Excised Tissue Description- Site 1: minimum, slough, other (comment)(hypertrophic crusts)  Bleeding- Site 1: scant             Therapy Education     Row Name 02/12/19 0920             Therapy Education    " Education Details  Continue home dressing changes every 2-3 days, apply cutimed and small optifoam gentle to cover.  -      Given  Bandaging/dressing change;Edema management  -      Program  Reinforced  -      How Provided  Verbal;Demonstration  -      Provided to  Patient  -      Level of Understanding  Verbalized;Teach back education performed  -      Program  Reinforced  -        User Key  (r) = Recorded By, (t) = Taken By, (c) = Cosigned By    Initials Name Provider Type    Dayami Gallo, PT Physical Therapist          Recommendation and Plan  PT Assessment/Plan     Row Name 02/12/19 0920          PT Assessment    Functional Limitations  Other (comment);Performance in self-care ADL wound mgmt  -     Impairments  Edema;Integumentary integrity  -     Assessment Comments  RLE wound continuing to re-epithelialize at periphery, still with min biofilm/slough and hypertrophic crusting of periwound requring debridement.  Pt continues to be appropriate for once/week tx.  -     Rehab Potential  Good  -     Patient/caregiver participated in establishment of treatment plan and goals  Yes  -     Patient would benefit from skilled therapy intervention  Yes  -        PT Plan    PT Frequency  1x/week  -     Physical Therapy Interventions (Optional Details)  patient/family education;wound care  -     PT Plan Comments  debridement, dressings management  -       User Key  (r) = Recorded By, (t) = Taken By, (c) = Cosigned By    Initials Name Provider Type    Dayami Gallo, PT Physical Therapist          Goals  PT OP Goals     Row Name 02/12/19 0920          Time Calculation    PT Goal Re-Cert Due Date  03/03/19  -       User Key  (r) = Recorded By, (t) = Taken By, (c) = Cosigned By    Initials Name Provider Type    Dayami Gallo, PT Physical Therapist          PT Goal Re-Cert Due Date: 03/03/19            Time Calculation: Start Time: 0920    Therapy Charges for Today      Code Description Service Date Service Provider Modifiers Qty    [id number]  JUAN DEBRIDE OPEN WOUND UP TO 20CM 2/12/2019 Dayami Baca, PT GP 1            Dayami Baca, PT  2/12/2019

## 2019-02-13 ENCOUNTER — HOSPITAL ENCOUNTER (OUTPATIENT)
Dept: BONE DENSITY | Facility: HOSPITAL | Age: 84
Discharge: HOME | End: 2019-02-13
Attending: INTERNAL MEDICINE | Admitting: INTERNAL MEDICINE

## 2019-02-13 DIAGNOSIS — Z78.0 POSTMENOPAUSAL: ICD-10-CM

## 2019-02-13 PROCEDURE — 77080 DXA BONE DENSITY AXIAL: CPT

## 2019-02-19 ENCOUNTER — TELEPHONE (OUTPATIENT)
Dept: INTERNAL MEDICINE | Facility: CLINIC | Age: 84
End: 2019-02-19

## 2019-02-25 ENCOUNTER — HOSPITAL ENCOUNTER (OUTPATIENT)
Dept: PHYSICAL THERAPY | Facility: HOSPITAL | Age: 84
Setting detail: THERAPIES SERIES
Discharge: HOME | End: 2019-02-25

## 2019-02-25 DIAGNOSIS — S81.801D OPEN WOUND OF RIGHT LOWER EXTREMITY WITH COMPLICATION, SUBSEQUENT ENCOUNTER: Primary | ICD-10-CM

## 2019-02-25 PROCEDURE — 97597 DBRDMT OPN WND 1ST 20 CM/<: CPT

## 2019-02-26 NOTE — THERAPY WOUND CARE TREATMENT
Outpatient Rehabilitation - Wound/Debridement Treatment Note  KAREN Flood     Patient Name: Lorna Lo  : 1934  MRN: [id number]  Today's Date: 2019                 Admit Date: 2019    Visit Dx:    ICD-10-CM ICD-9-CM   1. Open wound of right lower extremity with complication, subsequent encounter S81.801D V58.89     891.1       Patient Active Problem List   Diagnosis   • Coronary artery disease   • Essential hypertension   • Dyslipidemia   • Polymyositis (CMS/HCC)   • Dependent edema   • Stage 3 chronic kidney disease (CMS/HCC)   • Chronic anemia   • Osteopenia   • GERD (gastroesophageal reflux disease)   • PAF (paroxysmal atrial fibrillation) (CMS/HCC)        Past Medical History:   Diagnosis Date   • Chronic anemia     Chronic anemia, secondary to (CKD).    • CKD (chronic kidney disease)    • Coronary artery disease    • Dependent edema    • Dermatitis    • DVT (deep venous thrombosis) (CMS/HCC) 2009    History of DVT in .    • Dyslipidemia    • GERD (gastroesophageal reflux disease)    • Hyperlipidemia    • Hypertension    • Osteopenia    • Polymyositis (CMS/HCC)    • Renal disorder    • Thyroid disease         Past Surgical History:   Procedure Laterality Date   • CARDIAC SURGERY      Cardiac Stents    • GASTRIC BYPASS           EVALUATION  PT Ortho     Row Name 19 0990       Subjective Comments    Subjective Comments  Pt without complaint  -ES       Precautions and Contraindications    Precautions  blood thinners  -ES       Subjective Pain    Able to rate subjective pain?  yes  -ES    Pre-Treatment Pain Level  0  -ES    Post-Treatment Pain Level  0  -ES       Transfers    Sit-Stand [place] (Transfers)  independent  -ES    Stand-Sit [place] (Transfers)  independent  -ES    Comment (Transfers)  seated in ARJO chair for tx  -ES       Gait/Stairs Assessment/Training    [place] Level (Gait)  independent  -ES    Assistive Device (Gait)  walker, 4-wheeled  -ES     "  User Key  (r) = Recorded By, (t) = Taken By, (c) = Cosigned By    Initials Name Provider Type    Aylin Serrano, PT Physical Therapist              [facility] Wound     Row Name 02/25/19 0950             Wound 12/03/18 1100 Right anterior leg abscess;other (see comments)    Wound - Properties Group Date first assessed: 12/03/18  - Time first assessed: 1100  - Present On Admission : yes;picture taken  - Side: Right  - Orientation: anterior  - Location: leg  - Type: abscess;other (see comments)  - Additional Comments: fall on knee with hematoma  -    Dressing Appearance  intact;moist drainage  -ES      Base  pink;red/granulating;slough;yellow;epithelialization  -ES      Periwound  intact;dry;pink  -ES      Periwound Temperature  warm  -ES      Periwound Skin Turgor  firm;soft  -ES      Edges  open  -ES      Wound Length (cm)  0.8 cm  -ES      Wound Width (cm)  0.7 cm  -ES      Wound Depth (cm)  0.1 cm  -ES      Drainage Characteristics/Odor  serosanguineous;yellow  -ES      Drainage Amount  small  -ES      Care, Wound  cleansed with;wound cleanser;debrided  -ES      Dressing Care, Wound  dressing applied;antimicrobial agent applied;gauze;low-adherent;border dressing cutimed, 4\"optifoam  -ES      Periwound Care, Wound  cleansed with pH balanced cleanser;dry periwound area maintained  -ES        User Key  (r) = Recorded By, (t) = Taken By, (c) = Cosigned By    Initials Name Provider Type    Aylin Serrano, PT Physical Therapist    Dayami Gallo, PT Physical Therapist            WOUND DEBRIDEMENT  Total area of Debridement: 2sqcm  Debridement Site 1  Location- Site 1: RLE wounds  Selective Debridement- Site 1: Wound Surface <20cmsq  Instruments- Site 1: tweezers  Excised Tissue Description- Site 1: minimum, slough, other (comment)(hypertrophic crust)  Bleeding- Site 1: scant             Therapy Education     Row Name 02/25/19 0950             Therapy Education    Given  Bandaging/dressing " change;Edema management  -ES      How Provided  Verbal;Demonstration  -ES      Provided to  Patient  -ES      Level of Understanding  Verbalized;Teach back education performed  -ES      Program  Reinforced  -ES        User Key  (r) = Recorded By, (t) = Taken By, (c) = Cosigned By    Initials Name Provider Type    Aylin Serrano, PT Physical Therapist          Recommendation and Plan  PT Assessment/Plan     Row Name 02/25/19 0950          PT Assessment    Functional Limitations  Other (comment);Performance in self-care ADL wound management  -ES     Impairments  Edema;Integumentary integrity  -ES     Assessment Comments  RLE wound continues to show improvement in biofillm buildup and decreased deminsions. Overall epithelial growth with small skin bridge centrally. Will continue to benefit from periodic debridement and dressing management.  -ES     Rehab Potential  Excellent  -ES     Patient/caregiver participated in establishment of treatment plan and goals  Yes  -ES     Patient would benefit from skilled therapy intervention  Yes  -ES        PT Plan    PT Frequency  1x/week  -ES     Predicted Duration of Therapy Intervention (Therapy Eval)  6 visits  -ES     Physical Therapy Interventions (Optional Details)  patient/family education;wound care  -ES     PT Plan Comments  debridement, dressing management  -ES       User Key  (r) = Recorded By, (t) = Taken By, (c) = Cosigned By    Initials Name Provider Type    Aylin Serrano, PT Physical Therapist          Goals  PT OP Goals     Row Name 02/25/19 0990          PT Short Term Goals    STG 1  Patient and/ or caregiver able to verbalize signs and symptoms of infection.  -ES     STG 1 Progress  Met  -ES     STG 2  Decrease non-viable / necrotic tissue by 50% to facilitate clean wound bed for healing.  -ES     STG 2 Progress  Met  -ES     STG 3  Decrease wound dimensions by 25% as evidence of wound closure.  -ES     STG 3 Progress  Met  -ES        Long Term Goals     LTG 1  Patient and/ or caregiver independent with clean dressing changes.  -ES     LTG 1 Progress  Met  -ES     LTG 2  Decrease non-viable / necrotic tissue by 75% to facilitate clean wound bed for healing.  -ES     LTG 2 Progress  Met  -ES     LTG 3  Decrease wound dimensions by 75% as evidence of wound closure.  -ES     LTG 3 Progress  Progressing  -ES     LTG 4  Demonstrate no drainage from BLE to indicate healing progress.  -ES     LTG 4 Progress  Progressing  -ES        Time Calculation    PT Goal Re-Cert Due Date  05/26/19  -ES       User Key  (r) = Recorded By, (t) = Taken By, (c) = Cosigned By    Initials Name Provider Type    Aylin Serrano, PT Physical Therapist          PT Goal Re-Cert Due Date: 05/26/19  PT Short Term Goals  STG 1: Patient and/ or caregiver able to verbalize signs and symptoms of infection.  STG 1 Progress: Met  STG 2: Decrease non-viable / necrotic tissue by 50% to facilitate clean wound bed for healing.  STG 2 Progress: Met  STG 3: Decrease wound dimensions by 25% as evidence of wound closure.  STG 3 Progress: Met  Long Term Goals  LTG 1: Patient and/ or caregiver independent with clean dressing changes.  LTG 1 Progress: Met  LTG 2: Decrease non-viable / necrotic tissue by 75% to facilitate clean wound bed for healing.  LTG 2 Progress: Met  LTG 3: Decrease wound dimensions by 75% as evidence of wound closure.  LTG 3 Progress: Progressing  LTG 4: Demonstrate no drainage from BLE to indicate healing progress.  LTG 4 Progress: Progressing      Time Calculation: Start Time: 0930    Therapy Charges for Today     Code Description Service Date Service Provider Modifiers Qty    78798727725  JUAN DEBRIDE OPEN WOUND UP TO 20CM 2/25/2019 Aylin Durand, PT GP 1        Therapy Suggested Charges     Code   Minutes Charges    None             PT G-Codes  Outcome Measure Options: BWAT (Cummings-Manzo Wound Assess Tool)     Aylin Durand, PT  2/26/2019

## 2019-03-12 ENCOUNTER — HOSPITAL ENCOUNTER (OUTPATIENT)
Dept: PHYSICAL THERAPY | Facility: HOSPITAL | Age: 84
Setting detail: THERAPIES SERIES
Discharge: HOME | End: 2019-03-12

## 2019-03-12 DIAGNOSIS — S81.801D OPEN WOUND OF RIGHT LOWER EXTREMITY WITH COMPLICATION, SUBSEQUENT ENCOUNTER: Primary | ICD-10-CM

## 2019-03-12 PROCEDURE — 97597 DBRDMT OPN WND 1ST 20 CM/<: CPT

## 2019-03-12 NOTE — THERAPY DISCHARGE NOTE
Outpatient Rehabilitation - Wound/Debridement Treatment Note &  Discharge Summary   [place]     Patient Name: Lorna Lo  : 1934  MRN: [id number]  Today's Date: 3/12/2019                  Admit Date: 3/12/2019    Visit Dx:    ICD-10-CM ICD-9-CM   1. Open wound of right lower extremity with complication, subsequent encounter S81.801D V58.89     891.1       Patient Active Problem List   Diagnosis   • Coronary artery disease   • Essential hypertension   • Dyslipidemia   • Polymyositis (CMS/HCC)   • Dependent edema   • Stage 3 chronic kidney disease (CMS/HCC)   • Chronic anemia   • Osteopenia   • GERD (gastroesophageal reflux disease)   • PAF (paroxysmal atrial fibrillation) (CMS/HCC)        Past Medical History:   Diagnosis Date   • Chronic anemia     Chronic anemia, secondary to (CKD).    • CKD (chronic kidney disease)    • Coronary artery disease    • Dependent edema    • Dermatitis    • DVT (deep venous thrombosis) (CMS/HCC) 2009    History of DVT in .    • Dyslipidemia    • GERD (gastroesophageal reflux disease)    • Hyperlipidemia    • Hypertension    • Osteopenia    • Polymyositis (CMS/HCC)    • Renal disorder    • Thyroid disease         Past Surgical History:   Procedure Laterality Date   • CARDIAC SURGERY      Cardiac Stents    • GASTRIC BYPASS           EVALUATION  PT Ortho     Row Name 19 0915       Subjective Comments    Subjective Comments  Pt without complaint  -ES       Subjective Pain    Able to rate subjective pain?  yes  -ES    Pre-Treatment Pain Level  0  -ES    Post-Treatment Pain Level  0  -ES       Transfers    Sit-Stand [place] (Transfers)  independent  -ES    Stand-Sit [place] (Transfers)  independent  -ES    Comment (Transfers)  seated in ARJO  -ES       Gait/Stairs Assessment/Training    [place] Level (Gait)  independent  -ES    Assistive Device (Gait)  walker, 4-wheeled  -ES      User Key  (r) = Recorded By, (t) = Taken By, (c) = Cosigned By     Initials Name Provider Type    Aylin Serrano, PT Physical Therapist              [facility] Wound     Row Name 03/12/19 0915             [REMOVED] Wound 12/03/18 1100 Right anterior leg abscess;other (see comments)    Wound - Properties Group Date first assessed: 12/03/18  - Time first assessed: 1100  - Present On Admission : yes;picture taken  - Side: Right  - Orientation: anterior  - Location: leg  - Type: abscess;other (see comments)  - Additional Comments: fall on knee with hematoma  -JM Resolution Date: 03/12/19  -ES Resolution Time: 1009  -ES    Dressing Appearance  intact;dry  -ES      Base  closed/resurfaced  -ES      Periwound  intact;dry;pink  -ES      Periwound Temperature  warm  -ES      Periwound Skin Turgor  soft  -ES      Drainage Amount  none  -ES      Care, Wound  cleansed with;wound cleanser;debrided  -ES      Dressing Care, Wound  dressing applied;low-adherent;border dressing  -ES      Periwound Care, Wound  cleansed with pH balanced cleanser;dry periwound area maintained  -ES        User Key  (r) = Recorded By, (t) = Taken By, (c) = Cosigned By    Initials Name Provider Type    ES Aylin Durand, PT Physical Therapist    Dayami Gallo, PT Physical Therapist            WOUND DEBRIDEMENT  Total area of Debridement: 2sqcm  Debridement Site 1  Location- Site 1: RLE wounds  Selective Debridement- Site 1: Wound Surface <20cmsq  Instruments- Site 1: tweezers  Excised Tissue Description- Site 1: minimum, other (comment)(hypertrophic crust)  Bleeding- Site 1: none             Therapy Education  Given: Bandaging/dressing change, Edema management  Program: Reinforced  How Provided: Verbal, Demonstration  Provided to: Patient  Level of Understanding: Verbalized, Teach back education performed    Therapy Education     Row Name 03/12/19 0915             Therapy Education    Given  Bandaging/dressing change;Edema management  -ES      Program  Reinforced  -ES      How Provided   Verbal;Demonstration  -ES      Provided to  Patient  -ES      Level of Understanding  Verbalized;Teach back education performed  -ES        User Key  (r) = Recorded By, (t) = Taken By, (c) = Cosigned By    Initials Name Provider Type    Aylin Serrano PT Physical Therapist          Recommendation and Plan  PT Assessment/Plan     Row Name 03/12/19 0915          PT Assessment    Functional Limitations  Other (comment);Performance in self-care ADL wound management  -ES     Impairments  Edema;Integumentary integrity  -ES     Assessment Comments  Pt with complete closure of RLE wound requiring some debridement of hypertrophic crusting that revealed intact epithelial tissue beneath. Advised patient to continue to wear bandage for 1 week to protect newly formed tissues. Otherwise, appropriate for D/C.  -ES        PT Plan    Physical Therapy Interventions (Optional Details)  patient/family education;wound care  -ES     PT Plan Comments  D/C  -ES       User Key  (r) = Recorded By, (t) = Taken By, (c) = Cosigned By    Initials Name Provider Type    Aylin Serrano PT Physical Therapist          Goals  PT OP Goals     Row Name 03/12/19 0915          PT Short Term Goals    STG 1  Patient and/ or caregiver able to verbalize signs and symptoms of infection.  -ES     STG 1 Progress  Met  -ES     STG 2  Decrease non-viable / necrotic tissue by 50% to facilitate clean wound bed for healing.  -ES     STG 2 Progress  Met  -ES     STG 3  Decrease wound dimensions by 25% as evidence of wound closure.  -ES     STG 3 Progress  Met  -ES        Long Term Goals    LTG 1  Patient and/ or caregiver independent with clean dressing changes.  -ES     LTG 1 Progress  Met  -ES     LTG 2  Decrease non-viable / necrotic tissue by 75% to facilitate clean wound bed for healing.  -ES     LTG 2 Progress  Met  -ES     LTG 3  Decrease wound dimensions by 75% as evidence of wound closure.  -ES     LTG 3 Progress  Met  -ES     LTG 4  Demonstrate no  drainage from BLE to indicate healing progress.  -ES     LTG 4 Progress  Met  -ES       User Key  (r) = Recorded By, (t) = Taken By, (c) = Cosigned By    Initials Name Provider Type    Aylin Serrano, PT Physical Therapist          Time Calculation: Start Time: 0915    Therapy Charges for Today     Code Description Service Date Service Provider Modifiers Qty    [id number] HC JUAN DEBRIDE OPEN WOUND UP TO 20CM 3/12/2019 Aylin Durand, PT GP 1        Therapy Suggested Charges     Code   Minutes Charges    None             PT G-Codes  Outcome Measure Options: BWAT (Cummings-Manzo Wound Assess Tool)     OP Discharge Summary     Row Name 03/12/19 0915             OP PT Discharge Summary    Date of Discharge  03/12/19  -ES      Reason for Discharge  All goals achieved  -ES      Outcomes Achieved  Able to achieve all goals within established timeline  -ES      Discharge Destination  Home without follow-up  -ES        User Key  (r) = Recorded By, (t) = Taken By, (c) = Cosigned By    Initials Name Provider Type    Aylin Serrano, PT Physical Therapist          Aylin Durand PT  3/12/2019

## 2019-04-08 ENCOUNTER — TELEPHONE (OUTPATIENT)
Dept: INTERNAL MEDICINE | Facility: CLINIC | Age: 84
End: 2019-04-08

## 2019-04-08 NOTE — TELEPHONE ENCOUNTER
PT. CALLED AND WANTED  TO  SEE IF  CAN SEND A NEW ORDER TO THE WOUND CLINIC AT [facility] FOR THE PLACE ON HER KNEE; SHE STATED THAT IT IS RED AND HAS A LUMP OVER IT NOW; PLEASE CALL IF ANY QUESTIONS [phone]

## 2019-04-10 ENCOUNTER — OFFICE VISIT (OUTPATIENT)
Dept: INTERNAL MEDICINE | Facility: CLINIC | Age: 84
End: 2019-04-10

## 2019-04-10 VITALS
HEART RATE: 64 BPM | BODY MASS INDEX: 34.66 KG/M2 | HEIGHT: 64 IN | OXYGEN SATURATION: 98 % | DIASTOLIC BLOOD PRESSURE: 78 MMHG | WEIGHT: 203 LBS | SYSTOLIC BLOOD PRESSURE: 146 MMHG | TEMPERATURE: 97.6 F

## 2019-04-10 DIAGNOSIS — I10 HYPERTENSION, UNSPECIFIED TYPE: ICD-10-CM

## 2019-04-10 DIAGNOSIS — I87.2 VENOUS STASIS DERMATITIS OF RIGHT LOWER EXTREMITY: Primary | ICD-10-CM

## 2019-04-10 PROCEDURE — 99213 OFFICE O/P EST LOW 20 MIN: CPT | Performed by: NURSE PRACTITIONER

## 2019-04-10 PROCEDURE — 85025 COMPLETE CBC W/AUTO DIFF WBC: CPT | Performed by: NURSE PRACTITIONER

## 2019-04-10 PROCEDURE — 85652 RBC SED RATE AUTOMATED: CPT | Performed by: NURSE PRACTITIONER

## 2019-04-10 RX ORDER — AMLODIPINE BESYLATE 10 MG/1
10 TABLET ORAL DAILY
Qty: 90 TABLET | Refills: 1 | Status: SHIPPED | OUTPATIENT
Start: 2019-04-10 | End: 2020-04-01

## 2019-04-10 NOTE — PROGRESS NOTES
"Subjective   Lorna Lo is a 84 y.o. female.   Chief Complaint   Patient presents with   • Discuss referral to wound care     right leg wound      History of Present Illness Needs new order for wound care to right leg.  Patient has history of stasis dermatitis.  She has just completed wound therapy for a slow healing wound to the right shin area.  She notes today that the skin to the shin area appears discolored.  She also wonders if she could have some tunneling she has noted a ridge under her knee.  She denies fever or chills.  No pain.  No shortness of air, chest pain, abdominal pain, nausea vomiting.    The following portions of the patient's history were reviewed and updated as appropriate: allergies, current medications, past family history, past medical history, past social history, past surgical history and problem list.    Current Outpatient Medications:   •  amLODIPine (NORVASC) 10 MG tablet, Take 1 tablet by mouth Daily., Disp: 90 tablet, Rfl: 1  •  aspirin 81 MG EC tablet, Take 81 mg by mouth daily., Disp: , Rfl:   •  atenolol (TENORMIN) 100 MG tablet, Take 1 tablet by mouth Daily., Disp: 90 tablet, Rfl: 1  •  cholecalciferol (VITAMIN D3) 1000 UNITS tablet, Take 1,000 Units by mouth Daily., Disp: , Rfl:   •  folic acid (FOLVITE) 1 MG tablet, Take 1 tablet by mouth Daily., Disp: 90 tablet, Rfl: 1  •  levothyroxine (SYNTHROID, LEVOTHROID) 88 MCG tablet, Take 1 tablet by mouth Daily., Disp: 90 tablet, Rfl: 1  •  raNITIdine (ZANTAC) 150 MG tablet, Take 1 tablet by mouth 2 (Two) Times a Day., Disp: 90 tablet, Rfl: 1  •  rivaroxaban (XARELTO) 20 MG tablet, Take 1 tablet by mouth Daily., Disp: 30 tablet, Rfl: 1    Review of Systems Consitutional, HEENT, Respiratory, CV, GI, , Skin, Musculoskeletal, Neuro-mental, Endocrinological, Hematological were reviewed.  Positives were discussed in the HPI, otherwise ROS was negative   /78   Pulse 64   Temp 97.6 °F (36.4 °C)   Ht 162.6 cm (64\")   Wt " 92.1 kg (203 lb)   LMP  (LMP Unknown)   SpO2 98%   BMI 34.84 kg/m²     Objective   Allergies   Allergen Reactions   • Lortab [Hydrocodone-Acetaminophen] Shortness Of Breath   • Methotrexate Derivatives      Multisystem toxicity, reported per PCP office notes   • Amoxicillin      Pt does not recall reaction, reported by daughter   • Azathioprine      Pt does not recall reaction, allergy reported per PCP office notes   • Cellcept [Mycophenolate]      Pt does not recall reaction, allergy reported per PCP office notes   • Contrast Dye Hives     INTRAVENOUS CONTRAST DYE.    • Fosamax [Alendronate]      Pt does not recall reaction, allergy reported per PCP office notes   • Hydrochlorothiazide      Unknown reaction, allergy reported per PCP office notes   • Lisinopril Cough   • Methotrexate Unknown (See Comments)   • Simvastatin Other (See Comments)     Sleep disturbance, reported per PCP office notes   • Bactrim [Sulfamethoxazole-Trimethoprim] Rash   • Chlorthalidone Rash   • Ciprofloxacin Hcl Rash       Physical Exam   Constitutional: She is oriented to person, place, and time. She appears well-developed and well-nourished. No distress.   Cardiovascular: Normal rate, regular rhythm, normal heart sounds and intact distal pulses.   Pulmonary/Chest: Effort normal and breath sounds normal. No respiratory distress.   Musculoskeletal:   Right lower extremity at the same area has a well-healed wound.  There is no breakdown of the wound.  There is no heat, swelling.  She does have what appears to be a scar under the right knee.  It is nontender.   Neurological: She is alert and oriented to person, place, and time.   Skin: Skin is warm and dry. Capillary refill takes less than 2 seconds.   Is pink, no rash    Nursing note and vitals reviewed.      Procedures    LABS  Results for orders placed or performed in visit on 01/09/19   POC Glucose   Result Value Ref Range    Glucose 143 (A) 70 - 130 mg/dL   POC Glycosylated Hemoglobin  (Hb A1C)   Result Value Ref Range    Hemoglobin A1C 6.0 %       Assessment/Plan   Lorna was seen today for discuss referral to wound care.    Diagnoses and all orders for this visit:    Venous stasis dermatitis of right lower extremity  -     CBC & Differential  -     Sedimentation rate, automated  -     Ambulatory Referral to Wound Clinic  -     CBC Auto Differential    Hypertension, unspecified type  -     amLODIPine (NORVASC) 10 MG tablet; Take 1 tablet by mouth Daily.          Patient Instructions   Amlodipine for hypertension was refilled at this visit.  We will send to wound therapy.  Laboratory request for CBC sed rate.  Monitor for new signs symptoms of infection such as redness swelling drainage. Pt verbalizes understanding and agreement with plan of care.       Gail Bonner, RAFA

## 2019-04-10 NOTE — PATIENT INSTRUCTIONS
Amlodipine for hypertension was refilled at this visit.  We will send to wound therapy.  Laboratory request for CBC sed rate.  Monitor for new signs symptoms of infection such as redness swelling drainage. Pt verbalizes understanding and agreement with plan of care.

## 2019-04-11 LAB
BASOPHILS # BLD AUTO: 0.07 10*3/MM3 (ref 0–0.2)
BASOPHILS NFR BLD AUTO: 0.8 % (ref 0–1.5)
DEPRECATED RDW RBC AUTO: 54.8 FL (ref 37–54)
EOSINOPHIL # BLD AUTO: 0.33 10*3/MM3 (ref 0–0.4)
EOSINOPHIL NFR BLD AUTO: 3.7 % (ref 0.3–6.2)
ERYTHROCYTE [DISTWIDTH] IN BLOOD BY AUTOMATED COUNT: 16.4 % (ref 12.3–15.4)
ERYTHROCYTE [SEDIMENTATION RATE] IN BLOOD: 17 MM/HR (ref 0–30)
HCT VFR BLD AUTO: 39.8 % (ref 34–46.6)
HGB BLD-MCNC: 11.8 G/DL (ref 12–15.9)
IMM GRANULOCYTES # BLD AUTO: 0.07 10*3/MM3 (ref 0–0.05)
IMM GRANULOCYTES NFR BLD AUTO: 0.8 % (ref 0–0.5)
LYMPHOCYTES # BLD AUTO: 1.22 10*3/MM3 (ref 0.7–3.1)
LYMPHOCYTES NFR BLD AUTO: 13.6 % (ref 19.6–45.3)
MCH RBC QN AUTO: 27.1 PG (ref 26.6–33)
MCHC RBC AUTO-ENTMCNC: 29.6 G/DL (ref 31.5–35.7)
MCV RBC AUTO: 91.5 FL (ref 79–97)
MONOCYTES # BLD AUTO: 0.86 10*3/MM3 (ref 0.1–0.9)
MONOCYTES NFR BLD AUTO: 9.6 % (ref 5–12)
NEUTROPHILS # BLD AUTO: 6.4 10*3/MM3 (ref 1.4–7)
NEUTROPHILS NFR BLD AUTO: 71.5 % (ref 42.7–76)
NRBC BLD AUTO-RTO: 0 /100 WBC (ref 0–0)
PLATELET # BLD AUTO: 173 10*3/MM3 (ref 140–450)
PMV BLD AUTO: 10.9 FL (ref 6–12)
RBC # BLD AUTO: 4.35 10*6/MM3 (ref 3.77–5.28)
WBC NRBC COR # BLD: 8.95 10*3/MM3 (ref 3.4–10.8)

## 2019-04-18 ENCOUNTER — TELEPHONE (OUTPATIENT)
Dept: INTERNAL MEDICINE | Facility: CLINIC | Age: 84
End: 2019-04-18

## 2019-04-18 NOTE — TELEPHONE ENCOUNTER
PT RECEIVED A PACKET OF PAPERWORK FROM OUR OFFICE AND SHE'S NOT SURE WHAT IT IS.    PLEASE ADVISE.

## 2019-04-25 ENCOUNTER — HOSPITAL ENCOUNTER (OUTPATIENT)
Dept: PHYSICAL THERAPY | Facility: HOSPITAL | Age: 84
Setting detail: THERAPIES SERIES
Discharge: HOME | End: 2019-04-25

## 2019-04-25 DIAGNOSIS — R60.0 EDEMA OF BOTH LEGS: Primary | ICD-10-CM

## 2019-04-25 PROCEDURE — 97161 PT EVAL LOW COMPLEX 20 MIN: CPT

## 2019-04-25 PROCEDURE — 97535 SELF CARE MNGMENT TRAINING: CPT

## 2019-04-25 NOTE — THERAPY DISCHARGE NOTE
Outpatient Rehabilitation - Wound/Debridement Initial Eval &  Discharge Summary   [place]     Patient Name: Lorna Lo  : 1934  MRN: [id number]  Today's Date: 2019                 Admit Date: 2019    Visit Dx:    ICD-10-CM ICD-9-CM   1. Edema of both legs R60.0 782.3       R knee (closed wound from previous episode):      Patient Active Problem List   Diagnosis   • Coronary artery disease   • Essential hypertension   • Dyslipidemia   • Polymyositis (CMS/HCC)   • Dependent edema   • Stage 3 chronic kidney disease (CMS/HCC)   • Chronic anemia   • Osteopenia   • GERD (gastroesophageal reflux disease)   • PAF (paroxysmal atrial fibrillation) (CMS/HCC)        Past Medical History:   Diagnosis Date   • Chronic anemia     Chronic anemia, secondary to (CKD).    • CKD (chronic kidney disease)    • Coronary artery disease    • Dependent edema    • Dermatitis    • DVT (deep venous thrombosis) (CMS/HCC) 2009    History of DVT in .    • Dyslipidemia    • GERD (gastroesophageal reflux disease)    • Hyperlipidemia    • Hypertension    • Osteopenia    • Polymyositis (CMS/HCC)    • Renal disorder    • Thyroid disease         Past Surgical History:   Procedure Laterality Date   • CARDIAC SURGERY      Cardiac Stents    • GASTRIC BYPASS         Patient History     Row Name 19 1000             History    Chief Complaint  Swelling;Ulcer, wound or other skin conditions  -ALENA      Brief Description of Current Complaint  Pt reports about 2 weeks ago she started having redness with streaking down her R medial leg, near to previous wound area.  Pt not wearing compression at the time, has since resumed use of compressogrips from previous episodes.  Pt was referred by PCP office for tx.  -ALENA      Previous treatment for THIS PROBLEM  Other (comment) compression stockings  -ALENA      Patient/Caregiver Goals  Decrease swelling;Know what to do to help the symptoms  -ALENA      Patient's Rating of General  Health  Good  -JM      Occupation/sports/leisure activities  retired, lives alone, relies on family for transportation  -JM      How has patient tried to help current problem?  Pt using compressogrips from previous episodes.  -JM      Related/Recent Hospitalizations  No  -JM      Are you or can you be pregnant  No  -JM         Pain     Difficulties with ADL's?  Reports difficulty with donning compression stockings, but is able to don/doff compressogrips without difficulty  -JM         Fall Risk Assessment    Any falls in the past year:  Yes  -JM      Number of falls reported in the last 12 months  1  -JM      Factors that contributed to the fall:  Lost balance  -JM      Does patient have a fear of falling  No  -JM      Previous Functional Level  Other (comment) independent with ADLs and household mobility, uses rollator  -JM         Services    Are you currently receiving Home Health services  No  -JM      Do you plan to receive Home Health services in the near future  No  -JM         Daily Activities    Primary Language  English  -JM      Are you able to read  Yes  -JM      Are you able to write  Yes  -JM      Teaching needs identified  Management of Condition  -      Barriers to learning  None  -JM      Pt Participated in POC and Goals  Yes  -        User Key  (r) = Recorded By, (t) = Taken By, (c) = Cosigned By    Initials Name Provider Type    Dayami Gallo, PT Physical Therapist          EVALUATION  PT Ortho     Row Name 04/25/19 1000       Subjective Comments    Subjective Comments  Pt reports she has been using compressogrips for about the last 1.5 weeks, and the redness and swelling have gone away, but she wanted to have PT look at her to make sure everything is ok.  -       Precautions and Contraindications    Precautions  blood thinners  -       Subjective Pain    Able to rate subjective pain?  yes  -JM    Pre-Treatment Pain Level  0  -JM    Post-Treatment Pain Level  0  -JM       Transfers     Sit-Stand [place] (Transfers)  independent  -    Stand-Sit [place] (Transfers)  independent  -    Comment (Transfers)  seated in arjo chair for tx  -       Gait/Stairs Assessment/Training    [place] Level (Gait)  independent  -    Assistive Device (Gait)  walker, 4-wheeled  -      User Key  (r) = Recorded By, (t) = Taken By, (c) = Cosigned By    Initials Name Provider Type    Dayami Gallo, PT Physical Therapist            Lymphedema     Row Name 04/25/19 1000             Lymphedema Edema Assessment    Ptting Edema Category  By severity  -      Pitting Edema  Mild  -         Skin Changes/Observations    Lower Extremity Conditions  right:;intact;clean;dry;hairless;fragile  -      Lower Extremity Color/Pigment  right:;hyperpigmented;hypopigmented hypopigmented in area of previous wounds  -      Lower Extremity Skin Details  intact in area of previous wound, skin hypopigmented with decreased soft tissue mobility  -         Lymphedema Sensation    Lymphedema Sensation Reports  RLE:;normal;numbness reports area of numbness lat knee  -         Lymphedema Pulses/Capillary Refill    Dorsalis Pedis Pulse  right:;+2 normal  -      Posterior Tibialis Pulse  right:;+2 normal  -      Lower Extremity Capillary Refill  right:;less than 3 seconds  -         RLE Quick Girth (cm)    Met-heads  21.5 cm  -      Mid foot  19.8 cm  -      Smallest ankle  19.8 cm  -      Largest calf  36.5 cm  -      Tib tuberosity  35.5 cm  -      RLE Quick Girth Total  133.1  -         Manual Lymphatic Drainage    Manual Therapy  demonstrated soft tissue mobilization/scar massage for hypomobile area at previous wound site  -         Compression/Skin Care    Compression/Skin Care  skin care;wrapping location;bandaging  -      Skin Care  washed/dried;lotion applied  -      Wrapping Location  lower extremity  -      Wrapping Location LE  right:;foot to knee  -      Wrapping  Comments  size 4 compressogrip  -        User Key  (r) = Recorded By, (t) = Taken By, (c) = Cosigned By    Initials Name Provider Type    Dayami Gallo, PT Physical Therapist                Therapy Education     Row Name 04/25/19 1000             Therapy Education    Education Details  Pt to elevate legs for edema, wear compression daily for edema management.  Issued extra compressogrip and gave pt info for family to assist with ordering compressogrip online.  Educated on daily skin care and soft tissue mobilization.  -      Given  Symptoms/condition management;Edema management  -      How Provided  Verbal;Demonstration;Written  -      Provided to  Patient  -ALENA      Level of Understanding  Verbalized;Teach back education performed  -      76768 - PT Self Care/Mgmt Minutes  15  -      Program  New  -        User Key  (r) = Recorded By, (t) = Taken By, (c) = Cosigned By    Initials Name Provider Type    Dayami Gallo, PT Physical Therapist          Recommendation and Plan  PT Assessment/Plan     Row Name 04/25/19 1000          PT Assessment    Functional Limitations  Other (comment);Performance in self-care ADL edema management  -     Impairments  Edema;Integumentary integrity  -     Assessment Comments  Pt without open wounds or redness/streaking at this time, appears resolve since pt resumed use of compression.  Pt presents with stable pitting edema of RLE, also with hypomobile soft tissue in area of previously healed wound.  PT educated pt on home compression use, edema management, and daily skin care and soft tissue management.  No further skilled PT needs at this time.  -     Rehab Potential  Excellent  -     Patient/caregiver participated in establishment of treatment plan and goals  Yes  -     Patient would benefit from skilled therapy intervention  No  -        PT Plan    PT Frequency  One time visit  -       User Key  (r) = Recorded By, (t) = Taken By, (c) =  Cosigned By    Initials Name Provider Type    Dayami Gallo, PT Physical Therapist          Goals      Time Calculation: Start Time: 1000    Therapy Charges for Today     Code Description Service Date Service Provider Modifiers Qty    29020179610 HC PT SELF CARE/MGMT/TRAIN EA 15 MIN 4/25/2019 Dayami Baca, PT GP 1    21309225094 HC PT EVAL LOW COMPLEXITY 3 4/25/2019 Dayami Baca, PT GP 1                OP Discharge Summary     Row Name 04/25/19 1040             OP PT Discharge Summary    Date of Discharge  04/25/19  -JM      Reason for Discharge  Independent;At baseline function  -JM      Outcomes Achieved  Discharge from facility occurred on same date as evluation  -      Discharge Destination  Home with home program  -        User Key  (r) = Recorded By, (t) = Taken By, (c) = Cosigned By    Initials Name Provider Type    Dayami Gallo, PT Physical Therapist          Dayami Baca, PT  4/25/2019

## 2019-05-15 ENCOUNTER — TELEPHONE (OUTPATIENT)
Dept: INTERNAL MEDICINE | Facility: CLINIC | Age: 84
End: 2019-05-15

## 2019-05-15 NOTE — TELEPHONE ENCOUNTER
PT CALLING TO LET US KNOW TO BE EXPECTING PAPERWORK FAXED FROM [employer]. THE PAPERWORK IS TO HAVE HER XARELTO PAID FOR.    PT HAD ALREADY FILLED OUT THE PAPERWORK BUT DIDN'T DATE IT. SHE DOESN'T HAVE A COMPUTER, SO THAT IS WHY THEY ARE FAXING IT TO OUR OFFICE.    HER DAUGHTER WILL COME PICK IT UP AND HAVE HER MOTHER SIGN IT AND THEN BRING IT BACK TO THE OFFICE FOR US TO FAX BACK TO [employer].    PT'S DAUGHTER CHARLEE RAWLS [phone]

## 2019-05-22 RX ORDER — RIVAROXABAN 20 MG/1
TABLET, FILM COATED ORAL
Qty: 90 TABLET | Refills: 3 | Status: SHIPPED | OUTPATIENT
Start: 2019-05-22 | End: 2020-06-04 | Stop reason: SDUPTHER

## 2019-05-28 ENCOUNTER — OFFICE VISIT (OUTPATIENT)
Dept: INTERNAL MEDICINE | Facility: CLINIC | Age: 84
End: 2019-05-28

## 2019-05-28 ENCOUNTER — TELEPHONE (OUTPATIENT)
Dept: INTERNAL MEDICINE | Facility: CLINIC | Age: 84
End: 2019-05-28

## 2019-05-28 VITALS
HEIGHT: 64 IN | DIASTOLIC BLOOD PRESSURE: 60 MMHG | BODY MASS INDEX: 35 KG/M2 | OXYGEN SATURATION: 100 % | WEIGHT: 205 LBS | SYSTOLIC BLOOD PRESSURE: 150 MMHG | HEART RATE: 56 BPM | TEMPERATURE: 98.2 F

## 2019-05-28 DIAGNOSIS — R51.9 ACUTE NONINTRACTABLE HEADACHE, UNSPECIFIED HEADACHE TYPE: Primary | ICD-10-CM

## 2019-05-28 DIAGNOSIS — R20.2 TINGLING OF FACE: ICD-10-CM

## 2019-05-28 PROCEDURE — 99214 OFFICE O/P EST MOD 30 MIN: CPT | Performed by: NURSE PRACTITIONER

## 2019-05-28 RX ORDER — PREDNISONE 1 MG/1
5 TABLET ORAL 2 TIMES DAILY
Refills: 3 | COMMUNITY
Start: 2019-04-22

## 2019-05-28 NOTE — TELEPHONE ENCOUNTER
Patient is requesting a return call regarding headaches she has been experiencing for the past several weeks. She would like to see Dr Correa today for this issue but is willing to see another provider, if necessary. She recently had cataract surgery and is unsure if these headaches are related.     Call back [phone]

## 2019-05-28 NOTE — PROGRESS NOTES
Chief Complaint   Patient presents with   • Headache     states her cheeks her burn and then she will be SOA       History of Present Illness  84 y.o.female presents for headache.  Headache first started on left side head above temple  now on both sides; intermittent will get a sharp pain above both temples then quickly feels like travels thru her body.  Both of her sides of face cheeks start burning then feels short of air. Have to go sit down.  Onset 3 weeks intermittent. Cataract taken off right eye; going to get shots every 6 weeks using drops; other than that no other eye or vision changes. She feels weak and has a dry mouth.  Has been eating drinking ok no nausea vomiting or diarrhea. Chronic right leg swelling after past surgery/wound.  Wound has healed ok. No leg pain or erythema.   Has been taking her prescribed meds without difficulty; including xarelto. Remote hx of dvt 2009.      Review of Systems   Constitutional: Negative for chills and fever.   Eyes: Negative for blurred vision and visual disturbance.   Respiratory: Positive for shortness of breath. Negative for chest tightness and wheezing.    Cardiovascular: Positive for leg swelling. Negative for chest pain and palpitations.   Gastrointestinal: Negative for diarrhea, nausea and vomiting.   Neurological: Positive for weakness and headache. Negative for dizziness and light-headedness.        Facial burning sensation          PMSFH  The following portions of the patient's history were reviewed and updated as appropriate: allergies, current medications, past family history, past medical history, past social history, past surgical history and problem list.       Past Medical History:   Diagnosis Date   • Chronic anemia     Chronic anemia, secondary to (CKD).    • CKD (chronic kidney disease)    • Coronary artery disease    • Dependent edema    • Dermatitis    • DVT (deep venous thrombosis) (CMS/[facility]) 2009    History of DVT in 2009.    • Dyslipidemia    •  GERD (gastroesophageal reflux disease)    • Hyperlipidemia    • Hypertension    • Osteopenia    • Polymyositis (CMS/HCC)    • Renal disorder    • Thyroid disease       Past Surgical History:   Procedure Laterality Date   • CARDIAC SURGERY      Cardiac Stents    • GASTRIC BYPASS        Allergies   Allergen Reactions   • Lortab [Hydrocodone-Acetaminophen] Shortness Of Breath   • Methotrexate Derivatives      Multisystem toxicity, reported per PCP office notes   • Amoxicillin      Pt does not recall reaction, reported by daughter   • Azathioprine      Pt does not recall reaction, allergy reported per PCP office notes   • Cellcept [Mycophenolate]      Pt does not recall reaction, allergy reported per PCP office notes   • Contrast Dye Hives     INTRAVENOUS CONTRAST DYE.    • Fosamax [Alendronate]      Pt does not recall reaction, allergy reported per PCP office notes   • Hydrochlorothiazide      Unknown reaction, allergy reported per PCP office notes   • Lisinopril Cough   • Methotrexate Unknown (See Comments)   • Simvastatin Other (See Comments)     Sleep disturbance, reported per PCP office notes   • Bactrim [Sulfamethoxazole-Trimethoprim] Rash   • Chlorthalidone Rash   • Ciprofloxacin Hcl Rash      Family History   Problem Relation Age of Onset   • Heart attack Father             Current Outpatient Medications:   •  amLODIPine (NORVASC) 10 MG tablet, Take 1 tablet by mouth Daily., Disp: 90 tablet, Rfl: 1  •  aspirin 81 MG EC tablet, Take 81 mg by mouth daily., Disp: , Rfl:   •  atenolol (TENORMIN) 100 MG tablet, Take 1 tablet by mouth Daily., Disp: 90 tablet, Rfl: 1  •  cholecalciferol (VITAMIN D3) 1000 UNITS tablet, Take 1,000 Units by mouth Daily., Disp: , Rfl:   •  folic acid (FOLVITE) 1 MG tablet, Take 1 tablet by mouth Daily., Disp: 90 tablet, Rfl: 1  •  levothyroxine (SYNTHROID, LEVOTHROID) 88 MCG tablet, Take 1 tablet by mouth Daily., Disp: 90 tablet, Rfl: 1  •  predniSONE (DELTASONE) 5 MG tablet, , Disp: ,  "Rfl: 3  •  raNITIdine (ZANTAC) 150 MG tablet, Take 1 tablet by mouth 2 (Two) Times a Day., Disp: 90 tablet, Rfl: 1  •  XARELTO 20 MG tablet, TAKE 1 TABLET BY MOUTH ONCE DAILY, Disp: 90 tablet, Rfl: 3    VITALS:  /60   Pulse 56   Temp 98.2 °F (36.8 °C)   Ht 162.6 cm (64\")   Wt 93 kg (205 lb)   LMP  (LMP Unknown)   SpO2 100%   BMI 35.19 kg/m²     Physical Exam   Constitutional: She is oriented to person, place, and time. She appears well-developed and well-nourished. No distress.   HENT:   Head: Normocephalic.   Right Ear: External ear and ear canal normal. cerumen impaction is present.  Left Ear: Tympanic membrane, external ear and ear canal normal.  No middle ear effusion.   Nose: Nose normal.   Mouth/Throat: Oropharynx is clear and moist and mucous membranes are normal.   No temporal pulsations   Eyes: EOM are normal. Pupils are equal, round, and reactive to light.   Neck: Normal range of motion. Neck supple.   Cardiovascular: Normal rate, regular rhythm and normal heart sounds.   Negative homans; right lower leg  1/2 cm larger calf circumgerence   Pulmonary/Chest: Effort normal and breath sounds normal. No respiratory distress. She has no wheezes. She has no rales.   Abdominal: Soft. Bowel sounds are normal. There is no tenderness.   Musculoskeletal:   Walks with walker   Lymphadenopathy:     She has no cervical adenopathy.   Neurological: She is alert and oriented to person, place, and time. She has normal strength. No cranial nerve deficit or sensory deficit. Coordination normal. GCS eye subscore is 4. GCS verbal subscore is 5. GCS motor subscore is 6.   Tongue midline   Skin: Skin is warm and dry. Capillary refill takes less than 2 seconds. No rash noted.   Scar to right lower leg; no erythema or warmth or pain noted.  No facial flushing   Psychiatric: She has a normal mood and affect. Her behavior is normal.       LABS  Results for orders placed or performed in visit on 04/10/19   Sedimentation " rate, automated   Result Value Ref Range    Sed Rate 17 0 - 30 mm/hr   CBC Auto Differential   Result Value Ref Range    WBC 8.95 3.40 - 10.80 10*3/mm3    RBC 4.35 3.77 - 5.28 10*6/mm3    Hemoglobin 11.8 (L) 12.0 - 15.9 g/dL    Hematocrit 39.8 34.0 - 46.6 %    MCV 91.5 79.0 - 97.0 fL    MCH 27.1 26.6 - 33.0 pg    MCHC 29.6 (L) 31.5 - 35.7 g/dL    RDW 16.4 (H) 12.3 - 15.4 %    RDW-SD 54.8 (H) 37.0 - 54.0 fl    MPV 10.9 6.0 - 12.0 fL    Platelets 173 140 - 450 10*3/mm3    Neutrophil % 71.5 42.7 - 76.0 %    Lymphocyte % 13.6 (L) 19.6 - 45.3 %    Monocyte % 9.6 5.0 - 12.0 %    Eosinophil % 3.7 0.3 - 6.2 %    Basophil % 0.8 0.0 - 1.5 %    Immature Grans % 0.8 (H) 0.0 - 0.5 %    Neutrophils, Absolute 6.40 1.40 - 7.00 10*3/mm3    Lymphocytes, Absolute 1.22 0.70 - 3.10 10*3/mm3    Monocytes, Absolute 0.86 0.10 - 0.90 10*3/mm3    Eosinophils, Absolute 0.33 0.00 - 0.40 10*3/mm3    Basophils, Absolute 0.07 0.00 - 0.20 10*3/mm3    Immature Grans, Absolute 0.07 (H) 0.00 - 0.05 10*3/mm3    nRBC 0.0 0.0 - 0.0 /100 WBC       ASSESSMENT/PLAN  Lorna was seen today for headache.    Diagnoses and all orders for this visit:    Acute nonintractable headache, unspecified headache type  -     CT Head Without Contrast; Future  -     Duplex Carotid Ultrasound CAR; Future    Tingling of face  Comments:  with feeling of facial burning  Orders:  -     Duplex Carotid Ultrasound CAR; Future    Recent normal sed rate noted.  Reviewed presentation with Dr Correa  Will obtain above studies.  Advised pt if sx worsen or if do not resolve with rest she needs to seek emergency care.  Today I have spent a total of 25 minutes face to face with Lorna Lo.  During this time, a total of 15 minutes was spent counseling on the nature of the diagnosis including risks and benefits of treatment, complications, implications, management.  We discussed the work up and specialist referral process if needed. Patient education is provided today  concerning related diagnosis.  I encouraged compliance with follow up appointments.      I discussed the patients findings and my recommendations with patient.  Patient was encouraged to keep me informed of any acute changes, lack of improvement, or any new concerning symptoms.    Patient voiced understanding of all instructions and denied further questions.      FOLLOW-UP  Return in about 1 week (around 6/4/2019), or if symptoms worsen or fail to improve, for Recheck with Dr Correa.    Electronically signed by:    RAFA Pak  05/28/2019

## 2019-05-29 ENCOUNTER — HOSPITAL ENCOUNTER (OUTPATIENT)
Dept: CT IMAGING | Facility: HOSPITAL | Age: 84
Discharge: HOME | End: 2019-05-29

## 2019-05-29 ENCOUNTER — HOSPITAL ENCOUNTER (OUTPATIENT)
Dept: CARDIOLOGY | Facility: HOSPITAL | Age: 84
Discharge: HOME | End: 2019-05-29
Admitting: NURSE PRACTITIONER

## 2019-05-29 VITALS — WEIGHT: 205 LBS | HEIGHT: 64 IN | BODY MASS INDEX: 35 KG/M2

## 2019-05-29 DIAGNOSIS — R20.2 TINGLING OF FACE: ICD-10-CM

## 2019-05-29 DIAGNOSIS — R51.9 ACUTE NONINTRACTABLE HEADACHE, UNSPECIFIED HEADACHE TYPE: ICD-10-CM

## 2019-05-29 LAB
BH CV ECHO MEAS - BSA(HAYCOCK): 2.1 M^2
BH CV ECHO MEAS - BSA: 2 M^2
BH CV ECHO MEAS - BZI_BMI: 35.2 KILOGRAMS/M^2
BH CV ECHO MEAS - BZI_METRIC_HEIGHT: 162.6 CM
BH CV ECHO MEAS - BZI_METRIC_WEIGHT: 93 KG
BH CV XLRA MEAS LEFT CCA RATIO VEL: 111 CM/SEC
BH CV XLRA MEAS LEFT DIST CCA EDV: 17.3 CM/SEC
BH CV XLRA MEAS LEFT DIST CCA PSV: 101 CM/SEC
BH CV XLRA MEAS LEFT DIST ICA EDV: 20.4 CM/SEC
BH CV XLRA MEAS LEFT DIST ICA PSV: 106 CM/SEC
BH CV XLRA MEAS LEFT ICA RATIO VEL: 111 CM/SEC
BH CV XLRA MEAS LEFT ICA/CCA RATIO: 1
BH CV XLRA MEAS LEFT MID CCA EDV: 14.8 CM/SEC
BH CV XLRA MEAS LEFT MID CCA PSV: 111 CM/SEC
BH CV XLRA MEAS LEFT MID ICA EDV: 13.4 CM/SEC
BH CV XLRA MEAS LEFT MID ICA PSV: 86.4 CM/SEC
BH CV XLRA MEAS LEFT PROX CCA EDV: 14 CM/SEC
BH CV XLRA MEAS LEFT PROX CCA PSV: 96 CM/SEC
BH CV XLRA MEAS LEFT PROX ECA PSV: 170 CM/SEC
BH CV XLRA MEAS LEFT PROX ICA EDV: 18.1 CM/SEC
BH CV XLRA MEAS LEFT PROX ICA PSV: 111 CM/SEC
BH CV XLRA MEAS LEFT PROX SCLA PSV: 140 CM/SEC
BH CV XLRA MEAS LEFT VERTEBRAL A PSV: 68.8 CM/SEC
BH CV XLRA MEAS RIGHT CCA RATIO VEL: 79.6 CM/SEC
BH CV XLRA MEAS RIGHT DIST CCA EDV: 11.9 CM/SEC
BH CV XLRA MEAS RIGHT DIST CCA PSV: 81.7 CM/SEC
BH CV XLRA MEAS RIGHT ICA RATIO VEL: 98.2 CM/SEC
BH CV XLRA MEAS RIGHT ICA/CCA RATIO: 1.2
BH CV XLRA MEAS RIGHT MID CCA EDV: 11.2 CM/SEC
BH CV XLRA MEAS RIGHT MID CCA PSV: 79.6 CM/SEC
BH CV XLRA MEAS RIGHT MID ICA EDV: 15.7 CM/SEC
BH CV XLRA MEAS RIGHT MID ICA PSV: 103 CM/SEC
BH CV XLRA MEAS RIGHT PROX CCA EDV: 8.4 CM/SEC
BH CV XLRA MEAS RIGHT PROX CCA PSV: 101 CM/SEC
BH CV XLRA MEAS RIGHT PROX ECA PSV: 172 CM/SEC
BH CV XLRA MEAS RIGHT PROX ICA EDV: 18.7 CM/SEC
BH CV XLRA MEAS RIGHT PROX ICA PSV: 98.2 CM/SEC
BH CV XLRA MEAS RIGHT PROX SCLA PSV: 239 CM/SEC
BH CV XLRA MEAS RIGHT VERTEBRAL A PSV: 54.6 CM/SEC
LEFT ARM BP: NORMAL MMHG
RIGHT ARM BP: NORMAL MMHG

## 2019-05-29 PROCEDURE — 93880 EXTRACRANIAL BILAT STUDY: CPT | Performed by: INTERNAL MEDICINE

## 2019-05-29 PROCEDURE — 70450 CT HEAD/BRAIN W/O DYE: CPT

## 2019-05-29 PROCEDURE — 93880 EXTRACRANIAL BILAT STUDY: CPT

## 2019-06-10 ENCOUNTER — OFFICE VISIT (OUTPATIENT)
Dept: INTERNAL MEDICINE | Facility: CLINIC | Age: 84
End: 2019-06-10

## 2019-06-10 VITALS
OXYGEN SATURATION: 98 % | BODY MASS INDEX: 35.03 KG/M2 | DIASTOLIC BLOOD PRESSURE: 90 MMHG | HEART RATE: 60 BPM | SYSTOLIC BLOOD PRESSURE: 160 MMHG | WEIGHT: 204.2 LBS

## 2019-06-10 DIAGNOSIS — I10 ESSENTIAL HYPERTENSION: ICD-10-CM

## 2019-06-10 DIAGNOSIS — R51.9 TEMPORAL HEADACHE: Primary | ICD-10-CM

## 2019-06-10 LAB
BASOPHILS # BLD AUTO: 0.07 10*3/MM3 (ref 0–0.2)
BASOPHILS NFR BLD AUTO: 0.7 % (ref 0–1.5)
DEPRECATED RDW RBC AUTO: 49.9 FL (ref 37–54)
EOSINOPHIL # BLD AUTO: 0.29 10*3/MM3 (ref 0–0.4)
EOSINOPHIL NFR BLD AUTO: 2.9 % (ref 0.3–6.2)
ERYTHROCYTE [DISTWIDTH] IN BLOOD BY AUTOMATED COUNT: 14.9 % (ref 12.3–15.4)
ERYTHROCYTE [SEDIMENTATION RATE] IN BLOOD: 21 MM/HR (ref 0–30)
HCT VFR BLD AUTO: 39.3 % (ref 34–46.6)
HGB BLD-MCNC: 11.6 G/DL (ref 12–15.9)
IMM GRANULOCYTES # BLD AUTO: 0.06 10*3/MM3 (ref 0–0.05)
IMM GRANULOCYTES NFR BLD AUTO: 0.6 % (ref 0–0.5)
LYMPHOCYTES # BLD AUTO: 2.04 10*3/MM3 (ref 0.7–3.1)
LYMPHOCYTES NFR BLD AUTO: 20.2 % (ref 19.6–45.3)
MCH RBC QN AUTO: 26.9 PG (ref 26.6–33)
MCHC RBC AUTO-ENTMCNC: 29.5 G/DL (ref 31.5–35.7)
MCV RBC AUTO: 91.2 FL (ref 79–97)
MONOCYTES # BLD AUTO: 1.08 10*3/MM3 (ref 0.1–0.9)
MONOCYTES NFR BLD AUTO: 10.7 % (ref 5–12)
NEUTROPHILS # BLD AUTO: 6.55 10*3/MM3 (ref 1.7–7)
NEUTROPHILS NFR BLD AUTO: 64.9 % (ref 42.7–76)
NRBC BLD AUTO-RTO: 0 /100 WBC (ref 0–0.2)
PLATELET # BLD AUTO: 183 10*3/MM3 (ref 140–450)
PMV BLD AUTO: 11.4 FL (ref 6–12)
RBC # BLD AUTO: 4.31 10*6/MM3 (ref 3.77–5.28)
WBC NRBC COR # BLD: 10.09 10*3/MM3 (ref 3.4–10.8)

## 2019-06-10 PROCEDURE — 85652 RBC SED RATE AUTOMATED: CPT | Performed by: INTERNAL MEDICINE

## 2019-06-10 PROCEDURE — 99213 OFFICE O/P EST LOW 20 MIN: CPT | Performed by: INTERNAL MEDICINE

## 2019-06-10 PROCEDURE — 85025 COMPLETE CBC W/AUTO DIFF WBC: CPT | Performed by: INTERNAL MEDICINE

## 2019-06-10 NOTE — PROGRESS NOTES
"Subjective   Lorna Lo is a 84 y.o. female.   Chief Complaint   Patient presents with   • Sharp pains in her head     Follow Up       History of Present Illness   1 Week f/u on Headache (described as a jab). Seen by NP . Ct and carotids done. Started above left temporal and now on both sides. Sends shooting burning sensation throughout her entire body. Has been seeing opth for \"foggy vision\"..No weight loss. No night sweats. No fever. Headaches have improved. No jaw pain after chewing. Ct showed mild prominence of lateral ventricles. Carotids.  The following portions of the patient's history were reviewed and updated as appropriate: allergies, current medications, past family history, past medical history, past social history, past surgical history and problem list.    Review of Systems   Constitutional: Negative for activity change, appetite change, chills, diaphoresis, fatigue, fever and unexpected weight change.   HENT: Negative for congestion, ear discharge, ear pain, mouth sores, nosebleeds, sinus pressure, sneezing and sore throat.    Eyes: Negative for pain, discharge and itching.   Respiratory: Negative for cough, chest tightness, shortness of breath and wheezing.    Cardiovascular: Negative for chest pain, palpitations and leg swelling.   Gastrointestinal: Negative for abdominal pain, constipation, diarrhea, nausea and vomiting.   Endocrine: Negative for cold intolerance, heat intolerance, polydipsia and polyphagia.   Genitourinary: Negative for dysuria, flank pain, frequency, hematuria and urgency.   Musculoskeletal: Negative for arthralgias, back pain, gait problem, myalgias, neck pain and neck stiffness.   Skin: Negative for color change, pallor and rash.   Neurological: Positive for headaches. Negative for seizures, speech difficulty and numbness.   Psychiatric/Behavioral: Negative for agitation, confusion, decreased concentration and sleep disturbance. The patient is not nervous/anxious.  "     /90   Pulse 60   Wt 92.6 kg (204 lb 3.2 oz)   LMP  (LMP Unknown)   SpO2 98%   BMI 35.03 kg/m²     Objective   Physical Exam   Constitutional: She is oriented to person, place, and time. She appears well-developed.   HENT:   Head: Normocephalic.   Right Ear: External ear normal.   Left Ear: External ear normal.   Nose: Nose normal.   Mouth/Throat: Oropharynx is clear and moist.   Eyes: Conjunctivae are normal. Pupils are equal, round, and reactive to light.   Neck: No JVD present. No thyromegaly present.   Cardiovascular: Normal rate, regular rhythm and normal heart sounds. Exam reveals no friction rub.   No murmur heard.  Pulmonary/Chest: Effort normal and breath sounds normal. No respiratory distress. She has no wheezes. She has no rales.   Abdominal: Soft. Bowel sounds are normal. She exhibits no distension. There is no tenderness. There is no guarding.   Musculoskeletal: She exhibits no edema or tenderness.   Lymphadenopathy:     She has no cervical adenopathy.   Neurological: She is oriented to person, place, and time. She displays normal reflexes. No cranial nerve deficit.   Skin: No rash noted.   Psychiatric: Her behavior is normal.   Nursing note and vitals reviewed.      Assessment/Plan   Lorna was seen today for sharp pains in her head.    Diagnoses and all orders for this visit:    Temporal headache  -     CBC & Differential  -     Sedimentation Rate  She is currently on steroids for polymyositis. She does state that she is no longer having head pain.     Hypertension  At home lower in 130-140. Gets nervous when she is at doctor office.

## 2019-06-19 RX ORDER — LEVOTHYROXINE SODIUM 88 UG/1
TABLET ORAL
Qty: 30 TABLET | Refills: 2 | Status: SHIPPED | OUTPATIENT
Start: 2019-06-19 | End: 2019-09-05 | Stop reason: SDUPTHER

## 2019-07-18 ENCOUNTER — HOSPITAL ENCOUNTER (OUTPATIENT)
Dept: GENERAL RADIOLOGY | Facility: HOSPITAL | Age: 84
Discharge: HOME | End: 2019-07-18
Admitting: INTERNAL MEDICINE

## 2019-07-18 ENCOUNTER — OFFICE VISIT (OUTPATIENT)
Dept: INTERNAL MEDICINE | Facility: CLINIC | Age: 84
End: 2019-07-18

## 2019-07-18 VITALS
WEIGHT: 207.4 LBS | BODY MASS INDEX: 35.58 KG/M2 | DIASTOLIC BLOOD PRESSURE: 70 MMHG | OXYGEN SATURATION: 98 % | HEART RATE: 61 BPM | SYSTOLIC BLOOD PRESSURE: 130 MMHG

## 2019-07-18 DIAGNOSIS — R06.02 SOB (SHORTNESS OF BREATH): Primary | ICD-10-CM

## 2019-07-18 DIAGNOSIS — R53.83 OTHER FATIGUE: ICD-10-CM

## 2019-07-18 DIAGNOSIS — R06.02 SOB (SHORTNESS OF BREATH): ICD-10-CM

## 2019-07-18 DIAGNOSIS — R12 HEARTBURN: ICD-10-CM

## 2019-07-18 LAB
BASOPHILS # BLD AUTO: 0.06 10*3/MM3 (ref 0–0.2)
BASOPHILS NFR BLD AUTO: 0.6 % (ref 0–1.5)
D DIMER PPP FEU-MCNC: 0.6 MCGFEU/ML (ref 0–0.56)
DEPRECATED RDW RBC AUTO: 51 FL (ref 37–54)
EOSINOPHIL # BLD AUTO: 0.1 10*3/MM3 (ref 0–0.4)
EOSINOPHIL NFR BLD AUTO: 0.9 % (ref 0.3–6.2)
ERYTHROCYTE [DISTWIDTH] IN BLOOD BY AUTOMATED COUNT: 14.8 % (ref 12.3–15.4)
FOLATE SERPL-MCNC: >20 NG/ML (ref 4.78–24.2)
HCT VFR BLD AUTO: 37.5 % (ref 34–46.6)
HGB BLD-MCNC: 10.7 G/DL (ref 12–15.9)
IMM GRANULOCYTES # BLD AUTO: 0.07 10*3/MM3 (ref 0–0.05)
IMM GRANULOCYTES NFR BLD AUTO: 0.6 % (ref 0–0.5)
LYMPHOCYTES # BLD AUTO: 1.32 10*3/MM3 (ref 0.7–3.1)
LYMPHOCYTES NFR BLD AUTO: 12.1 % (ref 19.6–45.3)
MCH RBC QN AUTO: 26.7 PG (ref 26.6–33)
MCHC RBC AUTO-ENTMCNC: 28.5 G/DL (ref 31.5–35.7)
MCV RBC AUTO: 93.5 FL (ref 79–97)
MONOCYTES # BLD AUTO: 0.68 10*3/MM3 (ref 0.1–0.9)
MONOCYTES NFR BLD AUTO: 6.2 % (ref 5–12)
NEUTROPHILS # BLD AUTO: 8.66 10*3/MM3 (ref 1.7–7)
NEUTROPHILS NFR BLD AUTO: 79.6 % (ref 42.7–76)
NRBC BLD AUTO-RTO: 0 /100 WBC (ref 0–0.2)
NT-PROBNP SERPL-MCNC: 1344 PG/ML (ref 5–1800)
PLATELET # BLD AUTO: 205 10*3/MM3 (ref 140–450)
PMV BLD AUTO: 11.3 FL (ref 6–12)
RBC # BLD AUTO: 4.01 10*6/MM3 (ref 3.77–5.28)
TSH SERPL DL<=0.05 MIU/L-ACNC: 1.75 MIU/ML (ref 0.27–4.2)
VIT B12 BLD-MCNC: 585 PG/ML (ref 211–946)
WBC NRBC COR # BLD: 10.89 10*3/MM3 (ref 3.4–10.8)

## 2019-07-18 PROCEDURE — 85379 FIBRIN DEGRADATION QUANT: CPT | Performed by: INTERNAL MEDICINE

## 2019-07-18 PROCEDURE — 99214 OFFICE O/P EST MOD 30 MIN: CPT | Performed by: INTERNAL MEDICINE

## 2019-07-18 PROCEDURE — 83880 ASSAY OF NATRIURETIC PEPTIDE: CPT | Performed by: INTERNAL MEDICINE

## 2019-07-18 PROCEDURE — 82746 ASSAY OF FOLIC ACID SERUM: CPT | Performed by: INTERNAL MEDICINE

## 2019-07-18 PROCEDURE — 84443 ASSAY THYROID STIM HORMONE: CPT | Performed by: INTERNAL MEDICINE

## 2019-07-18 PROCEDURE — 71046 X-RAY EXAM CHEST 2 VIEWS: CPT

## 2019-07-18 PROCEDURE — 85025 COMPLETE CBC W/AUTO DIFF WBC: CPT | Performed by: INTERNAL MEDICINE

## 2019-07-18 PROCEDURE — 82607 VITAMIN B-12: CPT | Performed by: INTERNAL MEDICINE

## 2019-07-18 NOTE — PROGRESS NOTES
Subjective   Lorna Lo is a 84 y.o. female.   Chief Complaint   Patient presents with   • Trouble Sleeping / Gasping for breath     Acute       History of Present Illness   Feels like at night can't breathe. Does not happen during the day. Started about 3 weeks ago. Brings clear drainage up. Has heartburn. Nothing makes it worst or better.  The following portions of the patient's history were reviewed and updated as appropriate: allergies, current medications, past family history, past medical history, past social history, past surgical history and problem list.    Review of Systems   Constitutional: Positive for fatigue. Negative for activity change, appetite change, chills, diaphoresis, fever and unexpected weight change.   HENT: Negative for congestion, ear discharge, ear pain, mouth sores, nosebleeds, sinus pressure, sneezing and sore throat.    Eyes: Negative for pain, discharge and itching.   Respiratory: Positive for shortness of breath. Negative for cough, chest tightness and wheezing.    Cardiovascular: Negative for chest pain, palpitations and leg swelling.   Gastrointestinal: Negative for abdominal pain, constipation, diarrhea, nausea and vomiting.   Endocrine: Negative for cold intolerance, heat intolerance, polydipsia and polyphagia.   Genitourinary: Negative for dysuria, flank pain, frequency, hematuria and urgency.   Musculoskeletal: Negative for arthralgias, back pain, gait problem, myalgias, neck pain and neck stiffness.   Skin: Negative for color change, pallor and rash.   Neurological: Negative for seizures, speech difficulty, numbness and headaches.   Psychiatric/Behavioral: Negative for agitation, confusion, decreased concentration and sleep disturbance. The patient is not nervous/anxious.      /70   Pulse 61   Wt 94.1 kg (207 lb 6.4 oz)   LMP  (LMP Unknown)   SpO2 98%   BMI 35.58 kg/m²     Objective   Physical Exam   Constitutional: She appears well-developed.   HENT:   Head:  Normocephalic.   Right Ear: External ear normal.   Left Ear: External ear normal.   Nose: Nose normal.   Mouth/Throat: Oropharynx is clear and moist.   Eyes: Conjunctivae are normal. Pupils are equal, round, and reactive to light.   Neck: No JVD present. No thyromegaly present.   Cardiovascular: Normal rate, regular rhythm and normal heart sounds. Exam reveals no friction rub.   No murmur heard.  Pulmonary/Chest: Effort normal and breath sounds normal. No respiratory distress. She has no wheezes. She has no rales.   Abdominal: Soft. Bowel sounds are normal. She exhibits no distension. There is no tenderness. There is no guarding.   Musculoskeletal: She exhibits no edema or tenderness.   Lymphadenopathy:     She has no cervical adenopathy.   Neurological: She displays normal reflexes. No cranial nerve deficit.   Skin: No rash noted.   Psychiatric: Her behavior is normal.   Nursing note and vitals reviewed.      Assessment/Plan   Lorna was seen today for trouble sleeping / gasping for breath.    Diagnoses and all orders for this visit:    SOB (shortness of breath)  -     D-dimer, Quantitative  -     BNP  -     CBC & Differential  cxr  Other fatigue  -     TSH  -     Folate  -     Vitamin B12    Heartburn  On Zantac 150 mg po

## 2019-07-19 ENCOUNTER — TELEPHONE (OUTPATIENT)
Dept: INTERNAL MEDICINE | Facility: CLINIC | Age: 84
End: 2019-07-19

## 2019-07-19 DIAGNOSIS — R35.0 URINARY FREQUENCY: Primary | ICD-10-CM

## 2019-07-19 RX ORDER — CEFDINIR 300 MG/1
300 CAPSULE ORAL 2 TIMES DAILY
Qty: 20 CAPSULE | Refills: 0 | Status: SHIPPED | OUTPATIENT
Start: 2019-07-19 | End: 2019-07-29

## 2019-07-19 NOTE — TELEPHONE ENCOUNTER
----- Message from Vanesa Correa DO sent at 7/19/2019  3:01 PM EDT -----  Let know mild increase in d dimer> If still SOB, to the ER. Has increased white count. CXr did not see pneumonia.Any other sxs?

## 2019-07-19 NOTE — TELEPHONE ENCOUNTER
Pt still having a little sob but stops upon rest, she is having urinary sxs , Dr Correa ordered omnicef and that has been sent to her pharmacy

## 2019-07-25 ENCOUNTER — OFFICE VISIT (OUTPATIENT)
Dept: INTERNAL MEDICINE | Facility: CLINIC | Age: 84
End: 2019-07-25

## 2019-07-25 VITALS
HEART RATE: 58 BPM | DIASTOLIC BLOOD PRESSURE: 90 MMHG | OXYGEN SATURATION: 99 % | WEIGHT: 209.4 LBS | SYSTOLIC BLOOD PRESSURE: 140 MMHG | BODY MASS INDEX: 35.93 KG/M2

## 2019-07-25 DIAGNOSIS — Z00.00 MEDICARE ANNUAL WELLNESS VISIT, SUBSEQUENT: Primary | ICD-10-CM

## 2019-07-25 DIAGNOSIS — N18.30 STAGE 3 CHRONIC KIDNEY DISEASE (HCC): ICD-10-CM

## 2019-07-25 DIAGNOSIS — R60.9 DEPENDENT EDEMA: ICD-10-CM

## 2019-07-25 DIAGNOSIS — K21.9 GASTROESOPHAGEAL REFLUX DISEASE WITHOUT ESOPHAGITIS: ICD-10-CM

## 2019-07-25 DIAGNOSIS — I10 ESSENTIAL HYPERTENSION: ICD-10-CM

## 2019-07-25 DIAGNOSIS — E78.5 DYSLIPIDEMIA: ICD-10-CM

## 2019-07-25 DIAGNOSIS — E66.01 MORBIDLY OBESE (HCC): ICD-10-CM

## 2019-07-25 DIAGNOSIS — I48.0 PAF (PAROXYSMAL ATRIAL FIBRILLATION) (HCC): ICD-10-CM

## 2019-07-25 DIAGNOSIS — M85.88 OSTEOPENIA OF LUMBAR SPINE: ICD-10-CM

## 2019-07-25 DIAGNOSIS — M81.0 OSTEOPOROSIS OF FEMUR WITHOUT PATHOLOGICAL FRACTURE: ICD-10-CM

## 2019-07-25 DIAGNOSIS — M33.20 POLYMYOSITIS (HCC): ICD-10-CM

## 2019-07-25 DIAGNOSIS — R73.9 HYPERGLYCEMIA: ICD-10-CM

## 2019-07-25 DIAGNOSIS — I25.10 CORONARY ARTERY DISEASE INVOLVING NATIVE CORONARY ARTERY OF NATIVE HEART WITHOUT ANGINA PECTORIS: ICD-10-CM

## 2019-07-25 DIAGNOSIS — D64.9 CHRONIC ANEMIA: ICD-10-CM

## 2019-07-25 LAB
CHOLEST SERPL-MCNC: 191 MG/DL (ref 0–200)
GLUCOSE BLDC GLUCOMTR-MCNC: 114 MG/DL (ref 70–130)
HBA1C MFR BLD: 6.2 %
HDLC SERPL-MCNC: 72 MG/DL (ref 40–60)
LDLC SERPL CALC-MCNC: 97 MG/DL (ref 0–100)
LDLC/HDLC SERPL: 1.34 {RATIO}
TRIGL SERPL-MCNC: 112 MG/DL (ref 0–150)
VLDLC SERPL-MCNC: 22.4 MG/DL (ref 5–40)

## 2019-07-25 PROCEDURE — 82962 GLUCOSE BLOOD TEST: CPT | Performed by: INTERNAL MEDICINE

## 2019-07-25 PROCEDURE — G0439 PPPS, SUBSEQ VISIT: HCPCS | Performed by: INTERNAL MEDICINE

## 2019-07-25 PROCEDURE — 83036 HEMOGLOBIN GLYCOSYLATED A1C: CPT | Performed by: INTERNAL MEDICINE

## 2019-07-25 PROCEDURE — 80061 LIPID PANEL: CPT | Performed by: INTERNAL MEDICINE

## 2019-07-25 NOTE — PROGRESS NOTES
The ABCs of the Annual Wellness Visit  Subsequent Medicare Wellness Visit    Chief Complaint   Patient presents with   • Medicare Wellness-subsequent       Subjective   History of Present Illness:  Lorna Lo is a 84 y.o. female who presents for a Subsequent Medicare Wellness Visit.    HEALTH RISK ASSESSMENT    Recent Hospitalizations:  No hospitalization(s) within the last year.    Current Medical Providers:  Patient Care Team:  Vanesa Correa DO as PCP - General (Internal Medicine)  Vanesa Correa DO as PCP - Claims Attributed    Smoking Status:  Social History     Tobacco Use   Smoking Status Former Smoker   • Types: Cigarettes   • Last attempt to quit: 1960   • Years since quittin.6   Smokeless Tobacco Never Used       Alcohol Consumption:  Social History     Substance and Sexual Activity   Alcohol Use No       Depression Screen:   PHQ-2/PHQ-9 Depression Screening 2019   Little interest or pleasure in doing things 0   Feeling down, depressed, or hopeless 0   Total Score 0       Fall Risk Screen:  NOREEN Fall Risk Assessment has not been completed.    Health Habits and Functional and Cognitive Screening:  Functional & Cognitive Status 2019   Do you have difficulty preparing food and eating? No   Do you have difficulty bathing yourself, getting dressed or grooming yourself? No   Do you have difficulty using the toilet? No   Do you have difficulty moving around from place to place? No   Do you have trouble with steps or getting out of a bed or a chair? Yes   Current Diet Well Balanced Diet   Dental Exam Not up to date   Eye Exam Not up to date   Exercise (times per week) 0 times per week   Current Exercise Activities Include No Regular Exercise   Do you need help using the phone?  No   Are you deaf or do you have serious difficulty hearing?  Yes   Do you need help with transportation? No   Do you need help shopping? No   Do you need help preparing meals?  No   Do you need help with  housework?  No   Do you need help with laundry? No   Do you need help taking your medications? No   Do you need help managing money? No   Do you ever drive or ride in a car without wearing a seat belt? No   Have you felt unusual stress, anger or loneliness in the last month? No   Who do you live with? Other   If you need help, do you have trouble finding someone available to you? No   Have you been bothered in the last four weeks by sexual problems? No   Do you have difficulty concentrating, remembering or making decisions? No         Does the patient have evidence of cognitive impairment? No    Asprin use counseling:Taking ASA appropriately as indicated    Age-appropriate Screening Schedule:  Refer to the list below for future screening recommendations based on patient's age, sex and/or medical conditions. Orders for these recommended tests are listed in the plan section. The patient has been provided with a written plan.    Health Maintenance   Topic Date Due   • TDAP/TD VACCINES (1 - Tdap) 02/03/2020 (Originally 2/25/2011)   • ZOSTER VACCINE (1 of 2) 08/02/2020 (Originally 10/7/1984)   • INFLUENZA VACCINE  08/01/2019   • LIPID PANEL  07/25/2020   • DXA SCAN  02/13/2021   • PNEUMOCOCCAL VACCINES (65+ LOW/MEDIUM RISK)  Completed          The following portions of the patient's history were reviewed and updated as appropriate: allergies, current medications, past family history, past medical history, past social history, past surgical history and problem list.    Outpatient Medications Prior to Visit   Medication Sig Dispense Refill   • amLODIPine (NORVASC) 10 MG tablet Take 1 tablet by mouth Daily. 90 tablet 1   • aspirin 81 MG EC tablet Take 81 mg by mouth daily.     • atenolol (TENORMIN) 100 MG tablet Take 1 tablet by mouth Daily. 90 tablet 1   • cefdinir (OMNICEF) 300 MG capsule Take 1 capsule by mouth 2 (Two) Times a Day for 10 days. 20 capsule 0   • cholecalciferol (VITAMIN D3) 1000 UNITS tablet Take 1,000  Units by mouth Daily.     • folic acid (FOLVITE) 1 MG tablet Take 1 tablet by mouth Daily. 90 tablet 1   • levothyroxine (SYNTHROID, LEVOTHROID) 88 MCG tablet Take 1 tablet by mouth Daily. 90 tablet 1   • levothyroxine (SYNTHROID, LEVOTHROID) 88 MCG tablet TAKE 1 TABLET BY MOUTH ONCE DAILY 30 tablet 2   • predniSONE (DELTASONE) 5 MG tablet   3   • raNITIdine (ZANTAC) 150 MG tablet Take 1 tablet by mouth 2 (Two) Times a Day. 90 tablet 1   • XARELTO 20 MG tablet TAKE 1 TABLET BY MOUTH ONCE DAILY 90 tablet 3     No facility-administered medications prior to visit.        Patient Active Problem List   Diagnosis   • Coronary artery disease   • Essential hypertension   • Dyslipidemia   • Polymyositis (CMS/HCC)   • Dependent edema   • Stage 3 chronic kidney disease (CMS/[facility])   • Chronic anemia   • Osteopenia   • GERD (gastroesophageal reflux disease)   • PAF (paroxysmal atrial fibrillation) (CMS/[facility])   • Morbidly obese (CMS/[facility])       Advanced Care Planning:  Patient has an advance directive - a copy has not been provided. Have asked the patient to send this to us to add to record    Review of Systems   Constitutional: Negative for activity change, appetite change, chills, diaphoresis, fatigue, fever and unexpected weight change.   HENT: Negative for congestion, ear discharge, ear pain, mouth sores, nosebleeds, sinus pressure, sneezing and sore throat.    Eyes: Negative for pain, discharge and itching.   Respiratory: Negative for cough, chest tightness, shortness of breath and wheezing.    Cardiovascular: Negative for chest pain, palpitations and leg swelling.   Gastrointestinal: Negative for abdominal pain, constipation, diarrhea, nausea and vomiting.   Endocrine: Negative for cold intolerance, heat intolerance, polydipsia and polyphagia.   Genitourinary: Negative for dysuria, flank pain, frequency, hematuria and urgency.   Musculoskeletal: Negative for arthralgias, back pain, gait problem, myalgias, neck pain and neck  stiffness.   Skin: Negative for color change, pallor and rash.   Neurological: Negative for seizures, speech difficulty, numbness and headaches.   Psychiatric/Behavioral: Negative for agitation, confusion, decreased concentration and sleep disturbance. The patient is not nervous/anxious.        Compared to one year ago, the patient feels her physical health is the same.  Compared to one year ago, the patient feels her mental health is the same.    Reviewed chart for potential of high risk medication in the elderly: yes  Reviewed chart for potential of harmful drug interactions in the elderly:yes    Objective         Vitals:    07/25/19 0913   BP: 140/90   Pulse: 58   SpO2: 99%   Weight: 95 kg (209 lb 6.4 oz)   PainSc: 0-No pain       Body mass index is 35.93 kg/m².  Discussed the patient's BMI with her. The BMI is above average; BMI management plan is completed.  /90   Pulse 58   Wt 95 kg (209 lb 6.4 oz)   LMP  (LMP Unknown)   SpO2 99%   BMI 35.93 kg/m²     Physical Exam   Constitutional: She appears well-developed.   HENT:   Head: Normocephalic.   Right Ear: External ear normal.   Left Ear: External ear normal.   Nose: Nose normal.   Mouth/Throat: Oropharynx is clear and moist.   Eyes: Conjunctivae are normal. Pupils are equal, round, and reactive to light.   Neck: No JVD present. No thyromegaly present.   Cardiovascular: Normal rate, regular rhythm and normal heart sounds. Exam reveals no friction rub.   No murmur heard.  Pulmonary/Chest: Effort normal and breath sounds normal. No respiratory distress. She has no wheezes. She has no rales.   Abdominal: Soft. Bowel sounds are normal. She exhibits no distension. There is no tenderness. There is no guarding.   Musculoskeletal: She exhibits no edema or tenderness.   Lymphadenopathy:     She has no cervical adenopathy.   Neurological: She displays normal reflexes. No cranial nerve deficit.   Skin: No rash noted.   Psychiatric: Her behavior is normal.    Nursing note and vitals reviewed.            Assessment/Plan   Medicare Risks and Personalized Health Plan  CMS Preventative Services Quick Reference  Fall Risk  Hearing Problem  Obesity/Overweight     The above risks/problems have been discussed with the patient.  Pertinent information has been shared with the patient in the After Visit Summary.  Follow up plans and orders are seen below in the Assessment/Plan Section.    Diagnoses and all orders for this visit:    1. Medicare annual wellness visit, subsequent (Primary)  Done today  2. Coronary artery disease involving native coronary artery of native heart without angina pectoris  stable  3. Essential hypertension  Up here but home readings are lower 130-70-80  4. PAF (paroxysmal atrial fibrillation) (CMS/[facility])  Stable on Eliquis  5. Gastroesophageal reflux disease without esophagitis  stable  6. Osteopenia of lumbar spine  stadontrellke  7. Polymyositis (CMS/[facility])  Followed by Dr. Sanderson.  8. Stage 3 chronic kidney disease (CMS/[facility])  stable  9. Chronic anemia  Stable. Had Hand H a month ago with Rheum and normal  10. Dependent edema    11. Dyslipidemia  -     Lipid Panel    12. Hyperglycemia  A1c today  13. Op femur  dexa done in 2/2019  14. Morbid obesity  Working oin smaller portions  Follow Up:  No Follow-up on file.     An After Visit Summary and PPPS were given to the patient.

## 2019-08-14 ENCOUNTER — TELEPHONE (OUTPATIENT)
Dept: INTERNAL MEDICINE | Facility: CLINIC | Age: 84
End: 2019-08-14

## 2019-08-14 NOTE — TELEPHONE ENCOUNTER
PATIENTS DAUGHTER STATES THAT THE PATIENT HAS FLUID AND WOULD LIKE TO GET A MEDICATION CALLED INTO HER PHARM FOR THE MATTER. DAUGHTER WAS ADVISED THAT PATIENT MAY NEED TO BE SEEN BEFORE ANYTHING IS PRESCRIBED BUT THAT A MSG WOULD STILL BE SENT BACK. PLEASE GIVE THE DAUGHTER A CALL BACK -[phone]

## 2019-08-15 ENCOUNTER — OFFICE VISIT (OUTPATIENT)
Dept: INTERNAL MEDICINE | Facility: CLINIC | Age: 84
End: 2019-08-15

## 2019-08-15 VITALS
HEIGHT: 64 IN | DIASTOLIC BLOOD PRESSURE: 72 MMHG | OXYGEN SATURATION: 100 % | HEART RATE: 63 BPM | SYSTOLIC BLOOD PRESSURE: 140 MMHG | WEIGHT: 208 LBS | BODY MASS INDEX: 35.51 KG/M2 | TEMPERATURE: 98.2 F

## 2019-08-15 DIAGNOSIS — R60.9 DEPENDENT EDEMA: Primary | ICD-10-CM

## 2019-08-15 PROCEDURE — 99212 OFFICE O/P EST SF 10 MIN: CPT | Performed by: PHYSICIAN ASSISTANT

## 2019-08-15 NOTE — PROGRESS NOTES
Chief Complaint   Patient presents with   • Edema     BLE       Subjective   Lorna Lo is a 84 y.o. female.       History of Present Illness     Pt fell about 6 months ago on her bilateral knees- required wound care treatment for several months. Since then she noticed some swelling in her bilateral lateral calves and somewhat in her ankles. Although also notes she has had some swelling for years. She tries to elevate her feet when sitting and wears a compression sleeve from wound care- relatively loose. She had some lasix at home because she had some left over from a previous prescription, not sure who gave them to her. Not sure how much it helped. Pt's daughter became concerned about swelling.      Current Outpatient Medications:   •  amLODIPine (NORVASC) 10 MG tablet, Take 1 tablet by mouth Daily., Disp: 90 tablet, Rfl: 1  •  aspirin 81 MG EC tablet, Take 81 mg by mouth daily., Disp: , Rfl:   •  atenolol (TENORMIN) 100 MG tablet, Take 1 tablet by mouth Daily., Disp: 90 tablet, Rfl: 1  •  cholecalciferol (VITAMIN D3) 1000 UNITS tablet, Take 1,000 Units by mouth Daily., Disp: , Rfl:   •  folic acid (FOLVITE) 1 MG tablet, Take 1 tablet by mouth Daily., Disp: 90 tablet, Rfl: 1  •  levothyroxine (SYNTHROID, LEVOTHROID) 88 MCG tablet, Take 1 tablet by mouth Daily., Disp: 90 tablet, Rfl: 1  •  levothyroxine (SYNTHROID, LEVOTHROID) 88 MCG tablet, TAKE 1 TABLET BY MOUTH ONCE DAILY, Disp: 30 tablet, Rfl: 2  •  predniSONE (DELTASONE) 5 MG tablet, , Disp: , Rfl: 3  •  raNITIdine (ZANTAC) 150 MG tablet, Take 1 tablet by mouth 2 (Two) Times a Day., Disp: 90 tablet, Rfl: 1  •  XARELTO 20 MG tablet, TAKE 1 TABLET BY MOUTH ONCE DAILY, Disp: 90 tablet, Rfl: 3     PMFSH  The following portions of the patient's history were reviewed and updated as appropriate: allergies, current medications, past family history, past medical history, past social history, past surgical history and problem list.    Review of Systems  "  Constitutional: Negative for activity change, appetite change and fatigue.   HENT: Negative for congestion and rhinorrhea.    Respiratory: Negative for chest tightness and shortness of breath.    Cardiovascular: Positive for leg swelling. Negative for chest pain and palpitations.   Gastrointestinal: Negative for abdominal pain.   Genitourinary: Negative for dysuria.   Musculoskeletal: Negative for arthralgias and myalgias.   Neurological: Negative for dizziness, weakness, light-headedness and headaches.   Psychiatric/Behavioral: Negative for dysphoric mood. The patient is not nervous/anxious.        Objective   /72   Pulse 63   Temp 98.2 °F (36.8 °C)   Ht 162.6 cm (64.02\")   Wt 94.3 kg (208 lb)   LMP  (LMP Unknown)   SpO2 100%   BMI 35.68 kg/m²     Physical Exam   Constitutional: She appears well-developed and well-nourished.   HENT:   Head: Normocephalic.   Right Ear: Hearing, tympanic membrane, external ear and ear canal normal.   Left Ear: Hearing, tympanic membrane, external ear and ear canal normal.   Nose: Nose normal.   Mouth/Throat: Oropharynx is clear and moist.   Eyes: Conjunctivae are normal. Pupils are equal, round, and reactive to light.   Neck: Normal range of motion.   Cardiovascular: Normal rate, regular rhythm and normal heart sounds.   Pulmonary/Chest: Effort normal and breath sounds normal. She has no decreased breath sounds. She has no wheezes. She has no rhonchi. She has no rales.   Musculoskeletal: Normal range of motion.        Legs:  Neurological: She is alert.   Skin: Skin is warm and dry.   Psychiatric: She has a normal mood and affect. Her behavior is normal.   Nursing note and vitals reviewed.      ASSESSMENT/PLAN    Problem List Items Addressed This Visit        Other    Dependent edema - Primary     Increase water intake and reduce sodium intake. Pt has some tighter compression socks at home that she will try. Discussed risk of impacting kidneys with taking daily lasix- " will hold off on refill for now. Call if swelling does not improve.                    Return if symptoms worsen or fail to improve, for Next scheduled follow up.

## 2019-08-18 NOTE — ASSESSMENT & PLAN NOTE
Increase water intake and reduce sodium intake. Pt has some tighter compression socks at home that she will try. Discussed risk of impacting kidneys with taking daily lasix- will hold off on refill for now. Call if swelling does not improve.

## 2019-09-05 ENCOUNTER — OFFICE VISIT (OUTPATIENT)
Dept: CARDIOLOGY | Facility: CLINIC | Age: 84
End: 2019-09-05

## 2019-09-05 VITALS
SYSTOLIC BLOOD PRESSURE: 169 MMHG | HEIGHT: 64 IN | WEIGHT: 205 LBS | HEART RATE: 64 BPM | DIASTOLIC BLOOD PRESSURE: 81 MMHG | BODY MASS INDEX: 35 KG/M2

## 2019-09-05 DIAGNOSIS — I20.0 UNSTABLE ANGINA (HCC): ICD-10-CM

## 2019-09-05 DIAGNOSIS — E78.5 DYSLIPIDEMIA: ICD-10-CM

## 2019-09-05 DIAGNOSIS — I25.10 CORONARY ARTERY DISEASE INVOLVING NATIVE CORONARY ARTERY OF NATIVE HEART WITHOUT ANGINA PECTORIS: ICD-10-CM

## 2019-09-05 DIAGNOSIS — I10 ESSENTIAL HYPERTENSION: ICD-10-CM

## 2019-09-05 DIAGNOSIS — I48.0 PAF (PAROXYSMAL ATRIAL FIBRILLATION) (HCC): Primary | ICD-10-CM

## 2019-09-05 PROCEDURE — 99214 OFFICE O/P EST MOD 30 MIN: CPT | Performed by: INTERNAL MEDICINE

## 2019-09-05 RX ORDER — POTASSIUM CHLORIDE 20 MEQ/1
20 TABLET, EXTENDED RELEASE ORAL DAILY
COMMUNITY
End: 2020-05-14 | Stop reason: SDUPTHER

## 2019-09-05 RX ORDER — FUROSEMIDE 20 MG/1
20 TABLET ORAL DAILY
COMMUNITY
End: 2020-05-14 | Stop reason: SDUPTHER

## 2019-09-05 NOTE — PROGRESS NOTES
"  OFFICE FOLLOW UP     Date of Encounter:2019     Name: Lorna Lo  : 1934  Address: [address] [place] [id number]    PCP: Vanesa Correa DO  [address] [facility] [address]  [facility] [address]    Lorna Lo is a 84 y.o. female.      Chief Complaint: Follow up of CAD, HTN, PAF    Problem List:   1.  Coronary artery disease:   a.  Coronary artery bypass grafting, .  b. Left heart catheterization , at [facility], IDB.  Nonobstructive.    c. Increased  shortness of air, summer 2015.   d. LHC 2015- patent LIMA to LAD and SVG to RCA. No myocardium in jeopardy. Normal LVEF   e. History of statin \"intolerances\".  2. Hypertension.   3. Paroxysmal atrial fibrillation, Winter 2017.  a. Chads-vasc=5, NOAC instituted by PCP.  4. Dyslipidemia.   5. Polymyositis.   6. Dependent edema.    7. CKD.   8. Chronic anemia, secondary to (CKD).   9. Osteopenia.    10. GERD.   11. History of DVT in .     Allergies:  Allergies   Allergen Reactions   • Lortab [Hydrocodone-Acetaminophen] Shortness Of Breath   • Methotrexate Derivatives      Multisystem toxicity, reported per PCP office notes   • Amoxicillin      Pt does not recall reaction, reported by daughter   • Azathioprine      Pt does not recall reaction, allergy reported per PCP office notes   • Cellcept [Mycophenolate]      Pt does not recall reaction, allergy reported per PCP office notes   • Contrast Dye Hives     INTRAVENOUS CONTRAST DYE.    • Fosamax [Alendronate]      Pt does not recall reaction, allergy reported per PCP office notes   • Hydrochlorothiazide      Unknown reaction, allergy reported per PCP office notes   • Lisinopril Cough   • Methotrexate Unknown (See Comments)   • Simvastatin Other (See Comments)     Sleep disturbance, reported per PCP office notes   • Bactrim [Sulfamethoxazole-Trimethoprim] Rash   • Chlorthalidone Rash   • Ciprofloxacin Hcl Rash       Current Medications:  •  amLODIPine (NORVASC) 10 MG " "tablet, Take 1 tablet by mouth Daily.  •  aspirin 81 MG EC tablet, Take 81 mg by mouth daily  •  atenolol (TENORMIN) 100 MG tablet, Take 1 tablet by mouth Daily.  •  cholecalciferol (VITAMIN D3) 1000 UNITS tablet, Take 1,000 Units by mouth Daily.  •  folic acid (FOLVITE) 1 MG tablet, Take 1 tablet by mouth Daily.  •  levothyroxine (SYNTHROID, LEVOTHROID) 88 MCG tablet, Take 1 tablet by mouth Daily   •  predniSONE (DELTASONE) 5 MG tablet, 2 (Two) Times a Day As Needed.  •  raNITIdine (ZANTAC) 150 MG tablet, Take 1 tablet by mouth 2 (Two) Times a Day.  •  XARELTO 20 MG tablet, TAKE 1 TABLET BY MOUTH ONCE DAILY  She is taking furosemide 20 mg every other day with KDur 20 meq.    History of Present Illness:   Lorna Lo returns for follow up today with her son. She brings a list of issues her daughter transcribed. She sleeps only a couple hours at night and wakes up short of breath without wheezing, fever, chills, or cough. This has been going on for about 8-10 weeks.  She sleeps on 1-2 pillows. She has to get up, cough and use an intranasal saline spray to get her breath back. She is able to go back to sleep in a recliner. This has been happening over the last 3 months. She describes a \"choking\" sensation in her neck when she awakens with shortness of air.  She denies chest pain, nausea, jaw pain, wheezing, or any arm pain. She had an \"old\" prescription for furosemide and potassium and started taking this twice weekly on her own on August 7th. This has helped a bit but she still can't sleep comfortably at night. She is frustrated. Her history of remote CABG is noted in the problem list above.    The following portions of the patient's history were reviewed and updated as appropriate: allergies, current medications and problem list.    ROS: Pertinent positives as listed in the HPI.  All other systems reviewed and negative.    Objective:    Vitals:    09/05/19 1503 09/05/19 1504   BP: (!) 183/78 169/81   BP " "Location: Left arm Left arm   Patient Position: Sitting Standing   Pulse: 58 64   Weight: 93 kg (205 lb) 93 kg (205 lb)   Height: 162.6 cm (64\") 162.6 cm (64\")       Physical Exam:  GENERAL: Alert, cooperative, in no acute distress.   HEENT: Normocephalic, no adenopathy, no jugular venous distention  HEART: No discrete PMI is noted. Regular rhythm, normal rate, and no murmurs, gallops, or rubs.   LUNGS: Clear to auscultation bilaterally. No wheezing, rales or ronchi.  ABDOMEN: Soft, bowel sounds present, non-tender   NEUROLOGIC: No focal abnormalities involving strength or sensation are noted.   EXTREMITIES: No clubbing, cyanosis, or edema noted.     Diagnostic Data:    Lab Results   Component Value Date    CHOL 191 07/25/2019    CHLPL 213 (H) 11/04/2015    TRIG 112 07/25/2019    HDL 72 (H) 07/25/2019    LDL 97 07/25/2019     Lab Results   Component Value Date    HGBA1C 6.2 07/25/2019     Lab Results   Component Value Date    WBC 10.89 (H) 07/18/2019    HGB 10.7 (L) 07/18/2019    HCT 37.5 07/18/2019    MCV 93.5 07/18/2019     07/18/2019       Procedures      Assessment and Plan:     #1: Shortness of air c/w PND: The symptoms describes strongly suggest angina decubitus with associated pulmonary congestion.  This is made more probable by her history of long-standing coronary artery disease with a very remote (1990) coronary bypass procedure.  She needs a cardiac catheterization study ASAP. We will hold Eliquis for 2 days and carry out the study.  #2: HPT: We will not change medications today but will address the next few days.  #3: Statin \"intolerances\": She is not on a statin for this reason and I will not rechallenge, at least before her schedule catheterization. The LDL is 97 without statins.  #4: PAF: Lifelong NOAC and rate control.    The rationale for my diagnosis and recommendations were discussed in detail with the patient and her .  Should symptoms change over the weekend, she knows that I am on " call and that that I will be available to respond.  The catheterization is scheduled in 4 days (next Monday).        Will schedule follow up after heart cath.    Scribed for Pablito Cade MD by Mary Benson RN. 09/05/2019 3:45 PM.

## 2019-09-05 NOTE — H&P (VIEW-ONLY)
"  OFFICE FOLLOW UP     Date of Encounter:2019     Name: Lorna Lo  : 1934  Address: [address] [place] [id number]    PCP: Vanesa Correa DO  [address] [facility] [address]  [facility] [address]    Lorna Lo is a 84 y.o. female.      Chief Complaint: Follow up of CAD, HTN, PAF    Problem List:   1.  Coronary artery disease:   a.  Coronary artery bypass grafting, .  b. Left heart catheterization , at [facility], IDB.  Nonobstructive.    c. Increased  shortness of air, summer 2015.   d. LHC 2015- patent LIMA to LAD and SVG to RCA. No myocardium in jeopardy. Normal LVEF   e. History of statin \"intolerances\".  2. Hypertension.   3. Paroxysmal atrial fibrillation, Winter 2017.  a. Chads-vasc=5, NOAC instituted by PCP.  4. Dyslipidemia.   5. Polymyositis.   6. Dependent edema.    7. CKD.   8. Chronic anemia, secondary to (CKD).   9. Osteopenia.    10. GERD.   11. History of DVT in .     Allergies:  Allergies   Allergen Reactions   • Lortab [Hydrocodone-Acetaminophen] Shortness Of Breath   • Methotrexate Derivatives      Multisystem toxicity, reported per PCP office notes   • Amoxicillin      Pt does not recall reaction, reported by daughter   • Azathioprine      Pt does not recall reaction, allergy reported per PCP office notes   • Cellcept [Mycophenolate]      Pt does not recall reaction, allergy reported per PCP office notes   • Contrast Dye Hives     INTRAVENOUS CONTRAST DYE.    • Fosamax [Alendronate]      Pt does not recall reaction, allergy reported per PCP office notes   • Hydrochlorothiazide      Unknown reaction, allergy reported per PCP office notes   • Lisinopril Cough   • Methotrexate Unknown (See Comments)   • Simvastatin Other (See Comments)     Sleep disturbance, reported per PCP office notes   • Bactrim [Sulfamethoxazole-Trimethoprim] Rash   • Chlorthalidone Rash   • Ciprofloxacin Hcl Rash       Current Medications:  •  amLODIPine (NORVASC) 10 MG " "tablet, Take 1 tablet by mouth Daily.  •  aspirin 81 MG EC tablet, Take 81 mg by mouth daily  •  atenolol (TENORMIN) 100 MG tablet, Take 1 tablet by mouth Daily.  •  cholecalciferol (VITAMIN D3) 1000 UNITS tablet, Take 1,000 Units by mouth Daily.  •  folic acid (FOLVITE) 1 MG tablet, Take 1 tablet by mouth Daily.  •  levothyroxine (SYNTHROID, LEVOTHROID) 88 MCG tablet, Take 1 tablet by mouth Daily   •  predniSONE (DELTASONE) 5 MG tablet, 2 (Two) Times a Day As Needed.  •  raNITIdine (ZANTAC) 150 MG tablet, Take 1 tablet by mouth 2 (Two) Times a Day.  •  XARELTO 20 MG tablet, TAKE 1 TABLET BY MOUTH ONCE DAILY  She is taking furosemide 20 mg every other day with KDur 20 meq.    History of Present Illness:   Lorna Lo returns for follow up today with her son. She brings a list of issues her daughter transcribed. She sleeps only a couple hours at night and wakes up short of breath without wheezing, fever, chills, or cough. This has been going on for about 8-10 weeks.  She sleeps on 1-2 pillows. She has to get up, cough and use an intranasal saline spray to get her breath back. She is able to go back to sleep in a recliner. This has been happening over the last 3 months. She describes a \"choking\" sensation in her neck when she awakens with shortness of air.  She denies chest pain, nausea, jaw pain, wheezing, or any arm pain. She had an \"old\" prescription for furosemide and potassium and started taking this twice weekly on her own on August 7th. This has helped a bit but she still can't sleep comfortably at night. She is frustrated. Her history of remote CABG is noted in the problem list above.    The following portions of the patient's history were reviewed and updated as appropriate: allergies, current medications and problem list.    ROS: Pertinent positives as listed in the HPI.  All other systems reviewed and negative.    Objective:    Vitals:    09/05/19 1503 09/05/19 1504   BP: (!) 183/78 169/81   BP " "Location: Left arm Left arm   Patient Position: Sitting Standing   Pulse: 58 64   Weight: 93 kg (205 lb) 93 kg (205 lb)   Height: 162.6 cm (64\") 162.6 cm (64\")       Physical Exam:  GENERAL: Alert, cooperative, in no acute distress.   HEENT: Normocephalic, no adenopathy, no jugular venous distention  HEART: No discrete PMI is noted. Regular rhythm, normal rate, and no murmurs, gallops, or rubs.   LUNGS: Clear to auscultation bilaterally. No wheezing, rales or ronchi.  ABDOMEN: Soft, bowel sounds present, non-tender   NEUROLOGIC: No focal abnormalities involving strength or sensation are noted.   EXTREMITIES: No clubbing, cyanosis, or edema noted.     Diagnostic Data:    Lab Results   Component Value Date    CHOL 191 07/25/2019    CHLPL 213 (H) 11/04/2015    TRIG 112 07/25/2019    HDL 72 (H) 07/25/2019    LDL 97 07/25/2019     Lab Results   Component Value Date    HGBA1C 6.2 07/25/2019     Lab Results   Component Value Date    WBC 10.89 (H) 07/18/2019    HGB 10.7 (L) 07/18/2019    HCT 37.5 07/18/2019    MCV 93.5 07/18/2019     07/18/2019       Procedures      Assessment and Plan:     #1: Shortness of air c/w PND: The symptoms describes strongly suggest angina decubitus with associated pulmonary congestion.  This is made more probable by her history of long-standing coronary artery disease with a very remote (1990) coronary bypass procedure.  She needs a cardiac catheterization study ASAP. We will hold Eliquis for 2 days and carry out the study.  #2: HPT: We will not change medications today but will address the next few days.  #3: Statin \"intolerances\": She is not on a statin for this reason and I will not rechallenge, at least before her schedule catheterization. The LDL is 97 without statins.  #4: PAF: Lifelong NOAC and rate control.    The rationale for my diagnosis and recommendations were discussed in detail with the patient and her .  Should symptoms change over the weekend, she knows that I am on " call and that that I will be available to respond.  The catheterization is scheduled in 4 days (next Monday).        Will schedule follow up after heart cath.    Scribed for Pablito Cade MD by Mary Benson RN. 09/05/2019 3:45 PM.

## 2019-09-08 ENCOUNTER — PREP FOR SURGERY (OUTPATIENT)
Dept: OTHER | Facility: HOSPITAL | Age: 84
End: 2019-09-08

## 2019-09-08 RX ORDER — SODIUM CHLORIDE 0.9 % (FLUSH) 0.9 %
3 SYRINGE (ML) INJECTION EVERY 12 HOURS SCHEDULED
Status: CANCELLED | OUTPATIENT
Start: 2019-09-08

## 2019-09-08 RX ORDER — NITROGLYCERIN 0.4 MG/1
0.4 TABLET SUBLINGUAL
Status: CANCELLED | OUTPATIENT
Start: 2019-09-08

## 2019-09-08 RX ORDER — CLOPIDOGREL BISULFATE 75 MG/1
600 TABLET ORAL ONCE
Status: CANCELLED | OUTPATIENT
Start: 2019-09-08 | End: 2019-09-08

## 2019-09-08 RX ORDER — CLOPIDOGREL BISULFATE 75 MG/1
75 TABLET ORAL DAILY
Status: CANCELLED | OUTPATIENT
Start: 2019-09-09

## 2019-09-08 RX ORDER — SODIUM CHLORIDE 0.9 % (FLUSH) 0.9 %
10 SYRINGE (ML) INJECTION AS NEEDED
Status: CANCELLED | OUTPATIENT
Start: 2019-09-08

## 2019-09-09 ENCOUNTER — APPOINTMENT (OUTPATIENT)
Dept: CARDIOLOGY | Facility: HOSPITAL | Age: 84
End: 2019-09-09

## 2019-09-09 ENCOUNTER — APPOINTMENT (OUTPATIENT)
Dept: GENERAL RADIOLOGY | Facility: HOSPITAL | Age: 84
End: 2019-09-09

## 2019-09-09 ENCOUNTER — HOSPITAL ENCOUNTER (INPATIENT)
Facility: HOSPITAL | Age: 84
LOS: 4 days | Discharge: HOME HEALTH | End: 2019-09-13
Attending: INTERNAL MEDICINE | Admitting: INTERNAL MEDICINE

## 2019-09-09 DIAGNOSIS — I25.10 CORONARY ARTERY DISEASE INVOLVING NATIVE CORONARY ARTERY OF NATIVE HEART WITHOUT ANGINA PECTORIS: ICD-10-CM

## 2019-09-09 DIAGNOSIS — I20.0 UNSTABLE ANGINA (HCC): ICD-10-CM

## 2019-09-09 DIAGNOSIS — N18.30 STAGE 3 CHRONIC KIDNEY DISEASE (HCC): Primary | ICD-10-CM

## 2019-09-09 DIAGNOSIS — Z78.9 IMPAIRED MOBILITY AND ADLS: ICD-10-CM

## 2019-09-09 DIAGNOSIS — Z74.09 IMPAIRED MOBILITY AND ADLS: ICD-10-CM

## 2019-09-09 PROBLEM — S30.1XXA HEMATOMA OF GROIN: Status: ACTIVE | Noted: 2019-09-09

## 2019-09-09 LAB
ABO GROUP BLD: NORMAL
ACT BLD: 246 SECONDS (ref 82–152)
ALBUMIN SERPL-MCNC: 3.7 G/DL (ref 3.5–5.2)
ALBUMIN/GLOB SERPL: 1.1 G/DL
ALP SERPL-CCNC: 67 U/L (ref 39–117)
ALT SERPL W P-5'-P-CCNC: 17 U/L (ref 1–33)
ANION GAP SERPL CALCULATED.3IONS-SCNC: 14 MMOL/L (ref 5–15)
AST SERPL-CCNC: 26 U/L (ref 1–32)
BILIRUB SERPL-MCNC: 0.6 MG/DL (ref 0.2–1.2)
BLD GP AB SCN SERPL QL: NEGATIVE
BUN BLD-MCNC: 25 MG/DL (ref 8–23)
BUN/CREAT SERPL: 17.1 (ref 7–25)
CALCIUM SPEC-SCNC: 9.6 MG/DL (ref 8.6–10.5)
CHLORIDE SERPL-SCNC: 103 MMOL/L (ref 98–107)
CHOLEST SERPL-MCNC: 218 MG/DL (ref 0–200)
CO2 SERPL-SCNC: 24 MMOL/L (ref 22–29)
CREAT BLD-MCNC: 1.46 MG/DL (ref 0.57–1)
DEPRECATED RDW RBC AUTO: 47.5 FL (ref 37–54)
DEPRECATED RDW RBC AUTO: 47.8 FL (ref 37–54)
ERYTHROCYTE [DISTWIDTH] IN BLOOD BY AUTOMATED COUNT: 14.6 % (ref 12.3–15.4)
ERYTHROCYTE [DISTWIDTH] IN BLOOD BY AUTOMATED COUNT: 14.7 % (ref 12.3–15.4)
GFR SERPL CREATININE-BSD FRML MDRD: 34 ML/MIN/1.73
GLOBULIN UR ELPH-MCNC: 3.5 GM/DL
GLUCOSE BLD-MCNC: 113 MG/DL (ref 65–99)
HBA1C MFR BLD: 6.5 % (ref 4.8–5.6)
HCT VFR BLD AUTO: 30.1 % (ref 34–46.6)
HCT VFR BLD AUTO: 30.6 % (ref 34–46.6)
HCT VFR BLD AUTO: 38.3 % (ref 34–46.6)
HDLC SERPL-MCNC: 64 MG/DL (ref 40–60)
HGB BLD-MCNC: 11.4 G/DL (ref 12–15.9)
HGB BLD-MCNC: 8.9 G/DL (ref 12–15.9)
HGB BLD-MCNC: 9 G/DL (ref 12–15.9)
LDLC SERPL CALC-MCNC: 126 MG/DL (ref 0–100)
LDLC/HDLC SERPL: 1.97 {RATIO}
MCH RBC QN AUTO: 26.3 PG (ref 26.6–33)
MCH RBC QN AUTO: 26.3 PG (ref 26.6–33)
MCHC RBC AUTO-ENTMCNC: 29.6 G/DL (ref 31.5–35.7)
MCHC RBC AUTO-ENTMCNC: 29.8 G/DL (ref 31.5–35.7)
MCV RBC AUTO: 88.5 FL (ref 79–97)
MCV RBC AUTO: 89.1 FL (ref 79–97)
PLATELET # BLD AUTO: 165 10*3/MM3 (ref 140–450)
PLATELET # BLD AUTO: 181 10*3/MM3 (ref 140–450)
PMV BLD AUTO: 10.6 FL (ref 6–12)
PMV BLD AUTO: 10.6 FL (ref 6–12)
POTASSIUM BLD-SCNC: 4 MMOL/L (ref 3.5–5.2)
PROT SERPL-MCNC: 7.2 G/DL (ref 6–8.5)
RBC # BLD AUTO: 3.38 10*6/MM3 (ref 3.77–5.28)
RBC # BLD AUTO: 4.33 10*6/MM3 (ref 3.77–5.28)
RH BLD: POSITIVE
SODIUM BLD-SCNC: 141 MMOL/L (ref 136–145)
T&S EXPIRATION DATE: NORMAL
TRIGL SERPL-MCNC: 140 MG/DL (ref 0–150)
VLDLC SERPL-MCNC: 28 MG/DL
WBC NRBC COR # BLD: 8.81 10*3/MM3 (ref 3.4–10.8)
WBC NRBC COR # BLD: 9.56 10*3/MM3 (ref 3.4–10.8)

## 2019-09-09 PROCEDURE — 86901 BLOOD TYPING SEROLOGIC RH(D): CPT | Performed by: INTERNAL MEDICINE

## 2019-09-09 PROCEDURE — 86850 RBC ANTIBODY SCREEN: CPT | Performed by: INTERNAL MEDICINE

## 2019-09-09 PROCEDURE — 85347 COAGULATION TIME ACTIVATED: CPT

## 2019-09-09 PROCEDURE — C1769 GUIDE WIRE: HCPCS | Performed by: INTERNAL MEDICINE

## 2019-09-09 PROCEDURE — 85027 COMPLETE CBC AUTOMATED: CPT | Performed by: PHYSICIAN ASSISTANT

## 2019-09-09 PROCEDURE — 25010000002 FENTANYL CITRATE (PF) 100 MCG/2ML SOLUTION: Performed by: INTERNAL MEDICINE

## 2019-09-09 PROCEDURE — 86923 COMPATIBILITY TEST ELECTRIC: CPT

## 2019-09-09 PROCEDURE — 93459 L HRT ART/GRFT ANGIO: CPT | Performed by: INTERNAL MEDICINE

## 2019-09-09 PROCEDURE — 25010000002 PHENYLEPHRINE PER 1 ML: Performed by: INTERNAL MEDICINE

## 2019-09-09 PROCEDURE — 63710000001 DIPHENHYDRAMINE PER 50 MG: Performed by: PHYSICIAN ASSISTANT

## 2019-09-09 PROCEDURE — 83036 HEMOGLOBIN GLYCOSYLATED A1C: CPT | Performed by: PHYSICIAN ASSISTANT

## 2019-09-09 PROCEDURE — 25010000002 SULFUR HEXAFLUORIDE MICROSPH 60.7-25 MG RECONSTITUTED SUSPENSION: Performed by: PHYSICIAN ASSISTANT

## 2019-09-09 PROCEDURE — 63710000001 ASPIRIN 81 MG TABLET DELAYED-RELEASE: Performed by: INTERNAL MEDICINE

## 2019-09-09 PROCEDURE — 93571 IV DOP VEL&/PRESS C FLO 1ST: CPT | Performed by: INTERNAL MEDICINE

## 2019-09-09 PROCEDURE — C1725 CATH, TRANSLUMIN NON-LASER: HCPCS | Performed by: INTERNAL MEDICINE

## 2019-09-09 PROCEDURE — 80053 COMPREHEN METABOLIC PANEL: CPT | Performed by: PHYSICIAN ASSISTANT

## 2019-09-09 PROCEDURE — B2151ZZ FLUOROSCOPY OF LEFT HEART USING LOW OSMOLAR CONTRAST: ICD-10-PCS | Performed by: INTERNAL MEDICINE

## 2019-09-09 PROCEDURE — 25010000002 HEPARIN (PORCINE) PER 1000 UNITS: Performed by: INTERNAL MEDICINE

## 2019-09-09 PROCEDURE — 85018 HEMOGLOBIN: CPT | Performed by: INTERNAL MEDICINE

## 2019-09-09 PROCEDURE — C1894 INTRO/SHEATH, NON-LASER: HCPCS | Performed by: INTERNAL MEDICINE

## 2019-09-09 PROCEDURE — 85014 HEMATOCRIT: CPT | Performed by: INTERNAL MEDICINE

## 2019-09-09 PROCEDURE — C1760 CLOSURE DEV, VASC: HCPCS | Performed by: INTERNAL MEDICINE

## 2019-09-09 PROCEDURE — 25010000002 MORPHINE PER 10 MG: Performed by: PHYSICIAN ASSISTANT

## 2019-09-09 PROCEDURE — 80061 LIPID PANEL: CPT | Performed by: PHYSICIAN ASSISTANT

## 2019-09-09 PROCEDURE — C1874 STENT, COATED/COV W/DEL SYS: HCPCS | Performed by: INTERNAL MEDICINE

## 2019-09-09 PROCEDURE — 25010000002 DIPHENHYDRAMINE PER 50 MG: Performed by: INTERNAL MEDICINE

## 2019-09-09 PROCEDURE — A9270 NON-COVERED ITEM OR SERVICE: HCPCS | Performed by: INTERNAL MEDICINE

## 2019-09-09 PROCEDURE — 75710 ARTERY X-RAYS ARM/LEG: CPT | Performed by: INTERNAL MEDICINE

## 2019-09-09 PROCEDURE — 36415 COLL VENOUS BLD VENIPUNCTURE: CPT

## 2019-09-09 PROCEDURE — 93005 ELECTROCARDIOGRAM TRACING: CPT | Performed by: PHYSICIAN ASSISTANT

## 2019-09-09 PROCEDURE — 86900 BLOOD TYPING SEROLOGIC ABO: CPT | Performed by: INTERNAL MEDICINE

## 2019-09-09 PROCEDURE — 93306 TTE W/DOPPLER COMPLETE: CPT

## 2019-09-09 PROCEDURE — 71045 X-RAY EXAM CHEST 1 VIEW: CPT

## 2019-09-09 PROCEDURE — 0 IOPAMIDOL PER 1 ML: Performed by: INTERNAL MEDICINE

## 2019-09-09 PROCEDURE — C1887 CATHETER, GUIDING: HCPCS | Performed by: INTERNAL MEDICINE

## 2019-09-09 PROCEDURE — 25010000002 ONDANSETRON PER 1 MG: Performed by: INTERNAL MEDICINE

## 2019-09-09 PROCEDURE — 99233 SBSQ HOSP IP/OBS HIGH 50: CPT | Performed by: INTERNAL MEDICINE

## 2019-09-09 PROCEDURE — 027034Z DILATION OF CORONARY ARTERY, ONE ARTERY WITH DRUG-ELUTING INTRALUMINAL DEVICE, PERCUTANEOUS APPROACH: ICD-10-PCS | Performed by: INTERNAL MEDICINE

## 2019-09-09 PROCEDURE — B2111ZZ FLUOROSCOPY OF MULTIPLE CORONARY ARTERIES USING LOW OSMOLAR CONTRAST: ICD-10-PCS | Performed by: INTERNAL MEDICINE

## 2019-09-09 PROCEDURE — 4A023N7 MEASUREMENT OF CARDIAC SAMPLING AND PRESSURE, LEFT HEART, PERCUTANEOUS APPROACH: ICD-10-PCS | Performed by: INTERNAL MEDICINE

## 2019-09-09 PROCEDURE — 25010000002 ATROPINE PER 0.01 MG: Performed by: INTERNAL MEDICINE

## 2019-09-09 PROCEDURE — 25010000002 MIDAZOLAM PER 1 MG: Performed by: INTERNAL MEDICINE

## 2019-09-09 PROCEDURE — 85027 COMPLETE CBC AUTOMATED: CPT | Performed by: INTERNAL MEDICINE

## 2019-09-09 PROCEDURE — 36246 INS CATH ABD/L-EXT ART 2ND: CPT | Performed by: INTERNAL MEDICINE

## 2019-09-09 PROCEDURE — 92928 PRQ TCAT PLMT NTRAC ST 1 LES: CPT | Performed by: INTERNAL MEDICINE

## 2019-09-09 PROCEDURE — C9600 PERC DRUG-EL COR STENT SING: HCPCS | Performed by: INTERNAL MEDICINE

## 2019-09-09 PROCEDURE — 93306 TTE W/DOPPLER COMPLETE: CPT | Performed by: INTERNAL MEDICINE

## 2019-09-09 RX ORDER — CLOPIDOGREL BISULFATE 75 MG/1
75 TABLET ORAL DAILY
Status: DISCONTINUED | OUTPATIENT
Start: 2019-09-10 | End: 2019-09-09 | Stop reason: HOSPADM

## 2019-09-09 RX ORDER — MIDAZOLAM HYDROCHLORIDE 1 MG/ML
INJECTION INTRAMUSCULAR; INTRAVENOUS AS NEEDED
Status: DISCONTINUED | OUTPATIENT
Start: 2019-09-09 | End: 2019-09-09 | Stop reason: HOSPADM

## 2019-09-09 RX ORDER — LIDOCAINE HYDROCHLORIDE 10 MG/ML
INJECTION, SOLUTION EPIDURAL; INFILTRATION; INTRACAUDAL; PERINEURAL AS NEEDED
Status: DISCONTINUED | OUTPATIENT
Start: 2019-09-09 | End: 2019-09-09 | Stop reason: HOSPADM

## 2019-09-09 RX ORDER — CLOPIDOGREL BISULFATE 75 MG/1
600 TABLET ORAL ONCE
Status: COMPLETED | OUTPATIENT
Start: 2019-09-09 | End: 2019-09-09

## 2019-09-09 RX ORDER — AMLODIPINE BESYLATE 10 MG/1
10 TABLET ORAL DAILY
Status: DISCONTINUED | OUTPATIENT
Start: 2019-09-10 | End: 2019-09-13 | Stop reason: HOSPADM

## 2019-09-09 RX ORDER — SODIUM CHLORIDE 0.9 % (FLUSH) 0.9 %
3 SYRINGE (ML) INJECTION EVERY 12 HOURS SCHEDULED
Status: DISCONTINUED | OUTPATIENT
Start: 2019-09-09 | End: 2019-09-09 | Stop reason: HOSPADM

## 2019-09-09 RX ORDER — ATENOLOL 50 MG/1
100 TABLET ORAL DAILY
Status: DISCONTINUED | OUTPATIENT
Start: 2019-09-10 | End: 2019-09-13 | Stop reason: HOSPADM

## 2019-09-09 RX ORDER — CLOPIDOGREL BISULFATE 75 MG/1
75 TABLET ORAL DAILY
Status: DISCONTINUED | OUTPATIENT
Start: 2019-09-10 | End: 2019-09-09

## 2019-09-09 RX ORDER — DIPHENHYDRAMINE HYDROCHLORIDE 50 MG/ML
25 INJECTION INTRAMUSCULAR; INTRAVENOUS EVERY 6 HOURS PRN
Status: DISCONTINUED | OUTPATIENT
Start: 2019-09-09 | End: 2019-09-13 | Stop reason: HOSPADM

## 2019-09-09 RX ORDER — SODIUM CHLORIDE 9 MG/ML
3 INJECTION, SOLUTION INTRAVENOUS CONTINUOUS
Status: ACTIVE | OUTPATIENT
Start: 2019-09-09 | End: 2019-09-09

## 2019-09-09 RX ORDER — SODIUM CHLORIDE 0.9 % (FLUSH) 0.9 %
10 SYRINGE (ML) INJECTION AS NEEDED
Status: DISCONTINUED | OUTPATIENT
Start: 2019-09-09 | End: 2019-09-09

## 2019-09-09 RX ORDER — SODIUM CHLORIDE 0.9 % (FLUSH) 0.9 %
10 SYRINGE (ML) INJECTION AS NEEDED
Status: DISCONTINUED | OUTPATIENT
Start: 2019-09-09 | End: 2019-09-09 | Stop reason: HOSPADM

## 2019-09-09 RX ORDER — MORPHINE SULFATE 2 MG/ML
1 INJECTION, SOLUTION INTRAMUSCULAR; INTRAVENOUS ONCE
Status: COMPLETED | OUTPATIENT
Start: 2019-09-09 | End: 2019-09-09

## 2019-09-09 RX ORDER — DIPHENHYDRAMINE HCL 25 MG
25 CAPSULE ORAL ONCE
Status: COMPLETED | OUTPATIENT
Start: 2019-09-09 | End: 2019-09-09

## 2019-09-09 RX ORDER — CLOPIDOGREL BISULFATE 75 MG/1
75 TABLET ORAL DAILY
Qty: 30 TABLET | Refills: 11 | Status: SHIPPED | OUTPATIENT
Start: 2019-09-09 | End: 2020-05-14

## 2019-09-09 RX ORDER — FENTANYL CITRATE 50 UG/ML
INJECTION, SOLUTION INTRAMUSCULAR; INTRAVENOUS AS NEEDED
Status: DISCONTINUED | OUTPATIENT
Start: 2019-09-09 | End: 2019-09-09 | Stop reason: HOSPADM

## 2019-09-09 RX ORDER — SODIUM CHLORIDE 0.9 % (FLUSH) 0.9 %
10 SYRINGE (ML) INJECTION AS NEEDED
Status: DISCONTINUED | OUTPATIENT
Start: 2019-09-09 | End: 2019-09-13 | Stop reason: HOSPADM

## 2019-09-09 RX ORDER — HEPARIN SODIUM 1000 [USP'U]/ML
INJECTION, SOLUTION INTRAVENOUS; SUBCUTANEOUS AS NEEDED
Status: DISCONTINUED | OUTPATIENT
Start: 2019-09-09 | End: 2019-09-09 | Stop reason: HOSPADM

## 2019-09-09 RX ORDER — ATROPINE SULFATE 1 MG/ML
INJECTION, SOLUTION INTRAMUSCULAR; INTRAVENOUS; SUBCUTANEOUS AS NEEDED
Status: DISCONTINUED | OUTPATIENT
Start: 2019-09-09 | End: 2019-09-09 | Stop reason: HOSPADM

## 2019-09-09 RX ORDER — ASPIRIN 81 MG/1
81 TABLET ORAL DAILY
Status: DISCONTINUED | OUTPATIENT
Start: 2019-09-09 | End: 2019-09-13 | Stop reason: HOSPADM

## 2019-09-09 RX ORDER — NITROGLYCERIN 0.4 MG/1
0.4 TABLET SUBLINGUAL
Status: DISCONTINUED | OUTPATIENT
Start: 2019-09-09 | End: 2019-09-09 | Stop reason: HOSPADM

## 2019-09-09 RX ORDER — FAMOTIDINE 20 MG/1
20 TABLET, FILM COATED ORAL 2 TIMES DAILY
Status: DISCONTINUED | OUTPATIENT
Start: 2019-09-09 | End: 2019-09-13 | Stop reason: HOSPADM

## 2019-09-09 RX ORDER — ATROPINE SULFATE 1 MG/ML
INJECTION, SOLUTION INTRAMUSCULAR; INTRAVENOUS; SUBCUTANEOUS
Status: DISPENSED
Start: 2019-09-09 | End: 2019-09-10

## 2019-09-09 RX ORDER — ONDANSETRON 2 MG/ML
4 INJECTION INTRAMUSCULAR; INTRAVENOUS EVERY 6 HOURS PRN
Status: DISCONTINUED | OUTPATIENT
Start: 2019-09-09 | End: 2019-09-13 | Stop reason: HOSPADM

## 2019-09-09 RX ORDER — SODIUM CHLORIDE 0.9 % (FLUSH) 0.9 %
10 SYRINGE (ML) INJECTION EVERY 12 HOURS SCHEDULED
Status: DISCONTINUED | OUTPATIENT
Start: 2019-09-09 | End: 2019-09-13 | Stop reason: HOSPADM

## 2019-09-09 RX ORDER — SODIUM CHLORIDE 0.9 % (FLUSH) 0.9 %
10 SYRINGE (ML) INJECTION EVERY 12 HOURS SCHEDULED
Status: DISCONTINUED | OUTPATIENT
Start: 2019-09-09 | End: 2019-09-09

## 2019-09-09 RX ORDER — LEVOTHYROXINE SODIUM 88 UG/1
88 TABLET ORAL
Status: DISCONTINUED | OUTPATIENT
Start: 2019-09-10 | End: 2019-09-13 | Stop reason: HOSPADM

## 2019-09-09 RX ORDER — CLOPIDOGREL BISULFATE 75 MG/1
75 TABLET ORAL ONCE
Status: COMPLETED | OUTPATIENT
Start: 2019-09-09 | End: 2019-09-10

## 2019-09-09 RX ADMIN — SODIUM CHLORIDE 3 ML/KG/HR: 9 INJECTION, SOLUTION INTRAVENOUS at 09:51

## 2019-09-09 RX ADMIN — ASPIRIN 81 MG: 81 TABLET, COATED ORAL at 14:03

## 2019-09-09 RX ADMIN — MORPHINE SULFATE 1 MG: 2 INJECTION, SOLUTION INTRAMUSCULAR; INTRAVENOUS at 16:33

## 2019-09-09 RX ADMIN — SODIUM CHLORIDE, PRESERVATIVE FREE 10 ML: 5 INJECTION INTRAVENOUS at 22:25

## 2019-09-09 RX ADMIN — SODIUM CHLORIDE, PRESERVATIVE FREE 3 ML: 5 INJECTION INTRAVENOUS at 09:48

## 2019-09-09 RX ADMIN — CLOPIDOGREL BISULFATE 600 MG: 75 TABLET ORAL at 09:49

## 2019-09-09 RX ADMIN — DIPHENHYDRAMINE HYDROCHLORIDE 25 MG: 25 CAPSULE ORAL at 09:48

## 2019-09-09 RX ADMIN — DIPHENHYDRAMINE HYDROCHLORIDE 25 MG: 50 INJECTION INTRAMUSCULAR; INTRAVENOUS at 23:09

## 2019-09-09 RX ADMIN — ONDANSETRON 4 MG: 2 INJECTION INTRAMUSCULAR; INTRAVENOUS at 22:22

## 2019-09-09 RX ADMIN — SULFUR HEXAFLUORIDE 2 ML: KIT at 18:00

## 2019-09-09 NOTE — INTERVAL H&P NOTE
H&P reviewed. The patient was examined and there are no changes to the H&P.    Labs pending.  Vitals reviewed.  PE performed.  Patient is here today to undergo LHC +/- PCI with Dr. Cade.  Risks and benefits have been reviewed with the patient and family and she wishes to proceed.  Further recommendations based on outcomes.  She has held Eliquis x 2 days.    She does have a dye allergy (hives).  She did not take pre medications so we will order those this morning.    Dina Galindo PA-C

## 2019-09-10 PROBLEM — D62 ACUTE BLOOD LOSS ANEMIA: Status: ACTIVE | Noted: 2019-09-10

## 2019-09-10 LAB
ABO GROUP BLD: NORMAL
ANION GAP SERPL CALCULATED.3IONS-SCNC: 12 MMOL/L (ref 5–15)
ASCENDING AORTA: 3.1 CM
BH CV ECHO MEAS - AO MAX PG (FULL): 11.1 MMHG
BH CV ECHO MEAS - AO MAX PG: 17 MMHG
BH CV ECHO MEAS - AO MEAN PG (FULL): 5.5 MMHG
BH CV ECHO MEAS - AO MEAN PG: 9.1 MMHG
BH CV ECHO MEAS - AO ROOT AREA (BSA CORRECTED): 1.5
BH CV ECHO MEAS - AO ROOT AREA: 6.5 CM^2
BH CV ECHO MEAS - AO ROOT DIAM: 2.9 CM
BH CV ECHO MEAS - AO V2 MAX: 206.3 CM/SEC
BH CV ECHO MEAS - AO V2 MEAN: 142.1 CM/SEC
BH CV ECHO MEAS - AO V2 VTI: 49.5 CM
BH CV ECHO MEAS - ASC AORTA: 3.1 CM
BH CV ECHO MEAS - AVA(I,A): 1.9 CM^2
BH CV ECHO MEAS - AVA(I,D): 1.9 CM^2
BH CV ECHO MEAS - AVA(V,A): 1.9 CM^2
BH CV ECHO MEAS - AVA(V,D): 1.9 CM^2
BH CV ECHO MEAS - BSA(HAYCOCK): 2.1 M^2
BH CV ECHO MEAS - BSA: 2 M^2
BH CV ECHO MEAS - BZI_BMI: 32.9 KILOGRAMS/M^2
BH CV ECHO MEAS - BZI_METRIC_HEIGHT: 165.1 CM
BH CV ECHO MEAS - BZI_METRIC_WEIGHT: 89.8 KG
BH CV ECHO MEAS - EDV(CUBED): 78.8 ML
BH CV ECHO MEAS - EDV(MOD-SP2): 94 ML
BH CV ECHO MEAS - EDV(MOD-SP4): 97 ML
BH CV ECHO MEAS - EDV(TEICH): 82.5 ML
BH CV ECHO MEAS - EF(CUBED): 70.4 %
BH CV ECHO MEAS - EF(MOD-BP): 80 %
BH CV ECHO MEAS - EF(MOD-SP2): 79.8 %
BH CV ECHO MEAS - EF(MOD-SP4): 77.3 %
BH CV ECHO MEAS - EF(TEICH): 62.4 %
BH CV ECHO MEAS - ESV(CUBED): 23.3 ML
BH CV ECHO MEAS - ESV(MOD-SP2): 19 ML
BH CV ECHO MEAS - ESV(MOD-SP4): 22 ML
BH CV ECHO MEAS - ESV(TEICH): 31 ML
BH CV ECHO MEAS - FS: 33.4 %
BH CV ECHO MEAS - IVS/LVPW: 0.95
BH CV ECHO MEAS - IVSD: 1.4 CM
BH CV ECHO MEAS - LA DIMENSION: 3.6 CM
BH CV ECHO MEAS - LA/AO: 1.3
BH CV ECHO MEAS - LAD MAJOR: 5.9 CM
BH CV ECHO MEAS - LAT PEAK E' VEL: 7.2 CM/SEC
BH CV ECHO MEAS - LATERAL E/E' RATIO: 13.7
BH CV ECHO MEAS - LV DIASTOLIC VOL/BSA (35-75): 49.2 ML/M^2
BH CV ECHO MEAS - LV MASS(C)D: 236.9 GRAMS
BH CV ECHO MEAS - LV MASS(C)DI: 120.3 GRAMS/M^2
BH CV ECHO MEAS - LV MAX PG: 5.9 MMHG
BH CV ECHO MEAS - LV MEAN PG: 3.6 MMHG
BH CV ECHO MEAS - LV SYSTOLIC VOL/BSA (12-30): 11.2 ML/M^2
BH CV ECHO MEAS - LV V1 MAX: 121.9 CM/SEC
BH CV ECHO MEAS - LV V1 MEAN: 87.9 CM/SEC
BH CV ECHO MEAS - LV V1 VTI: 30.3 CM
BH CV ECHO MEAS - LVIDD: 4.3 CM
BH CV ECHO MEAS - LVIDS: 2.9 CM
BH CV ECHO MEAS - LVLD AP2: 6.9 CM
BH CV ECHO MEAS - LVLD AP4: 7.3 CM
BH CV ECHO MEAS - LVLS AP2: 5.5 CM
BH CV ECHO MEAS - LVLS AP4: 5.2 CM
BH CV ECHO MEAS - LVOT AREA (M): 3.1 CM^2
BH CV ECHO MEAS - LVOT AREA: 3.1 CM^2
BH CV ECHO MEAS - LVOT DIAM: 2 CM
BH CV ECHO MEAS - LVPWD: 1.5 CM
BH CV ECHO MEAS - MED PEAK E' VEL: 5.2 CM/SEC
BH CV ECHO MEAS - MEDIAL E/E' RATIO: 19.2
BH CV ECHO MEAS - MV A MAX VEL: 80.9 CM/SEC
BH CV ECHO MEAS - MV DEC TIME: 0.37 SEC
BH CV ECHO MEAS - MV E MAX VEL: 101.7 CM/SEC
BH CV ECHO MEAS - MV E/A: 1.3
BH CV ECHO MEAS - MV MAX PG: 4.8 MMHG
BH CV ECHO MEAS - MV MEAN PG: 2 MMHG
BH CV ECHO MEAS - MV V2 MAX: 109.5 CM/SEC
BH CV ECHO MEAS - MV V2 MEAN: 66.7 CM/SEC
BH CV ECHO MEAS - MV V2 VTI: 39.4 CM
BH CV ECHO MEAS - MVA(VTI): 2.4 CM^2
BH CV ECHO MEAS - PA ACC SLOPE: 579.7 CM/SEC^2
BH CV ECHO MEAS - PA ACC TIME: 0.13 SEC
BH CV ECHO MEAS - PA MAX PG: 8 MMHG
BH CV ECHO MEAS - PA PR(ACCEL): 20.4 MMHG
BH CV ECHO MEAS - PA V2 MAX: 141.2 CM/SEC
BH CV ECHO MEAS - PI END-D VEL: 103.2 CM/SEC
BH CV ECHO MEAS - RAP SYSTOLE: 3 MMHG
BH CV ECHO MEAS - RVSP: 24.2 MMHG
BH CV ECHO MEAS - SI(AO): 164.5 ML/M^2
BH CV ECHO MEAS - SI(CUBED): 28.2 ML/M^2
BH CV ECHO MEAS - SI(LVOT): 48.2 ML/M^2
BH CV ECHO MEAS - SI(MOD-SP2): 38.1 ML/M^2
BH CV ECHO MEAS - SI(MOD-SP4): 38.1 ML/M^2
BH CV ECHO MEAS - SI(TEICH): 26.1 ML/M^2
BH CV ECHO MEAS - SV(AO): 324 ML
BH CV ECHO MEAS - SV(CUBED): 55.5 ML
BH CV ECHO MEAS - SV(LVOT): 95 ML
BH CV ECHO MEAS - SV(MOD-SP2): 75 ML
BH CV ECHO MEAS - SV(MOD-SP4): 75 ML
BH CV ECHO MEAS - SV(TEICH): 51.5 ML
BH CV ECHO MEAS - TAPSE (>1.6): 2 CM2
BH CV ECHO MEAS - TR MAX PG: 21.2 MMHG
BH CV ECHO MEAS - TR MAX VEL: 230.2 CM/SEC
BH CV ECHO MEASUREMENTS AVERAGE E/E' RATIO: 16.4
BH CV VAS BP LEFT ARM: NORMAL MMHG
BH CV XLRA - RV BASE: 3.8 CM
BH CV XLRA - RV LENGTH: 6.7 CM
BH CV XLRA - RV MID: 3.3 CM
BH CV XLRA - TDI S': 11 CM/SEC
BUN BLD-MCNC: 26 MG/DL (ref 8–23)
BUN/CREAT SERPL: 17 (ref 7–25)
CALCIUM SPEC-SCNC: 8.4 MG/DL (ref 8.6–10.5)
CHLORIDE SERPL-SCNC: 110 MMOL/L (ref 98–107)
CO2 SERPL-SCNC: 23 MMOL/L (ref 22–29)
CREAT BLD-MCNC: 1.53 MG/DL (ref 0.57–1)
DEPRECATED RDW RBC AUTO: 48.2 FL (ref 37–54)
ERYTHROCYTE [DISTWIDTH] IN BLOOD BY AUTOMATED COUNT: 14.8 % (ref 12.3–15.4)
GFR SERPL CREATININE-BSD FRML MDRD: 32 ML/MIN/1.73
GLUCOSE BLD-MCNC: 136 MG/DL (ref 65–99)
HCT VFR BLD AUTO: 25.9 % (ref 34–46.6)
HCT VFR BLD AUTO: 26.9 % (ref 34–46.6)
HCT VFR BLD AUTO: 38.2 % (ref 34–46.6)
HGB BLD-MCNC: 12 G/DL (ref 12–15.9)
HGB BLD-MCNC: 7.6 G/DL (ref 12–15.9)
HGB BLD-MCNC: 7.8 G/DL (ref 12–15.9)
LEFT ATRIUM VOLUME INDEX: 38.6 ML/M^2
LEFT ATRIUM VOLUME: 76 ML
MCH RBC QN AUTO: 26.2 PG (ref 26.6–33)
MCHC RBC AUTO-ENTMCNC: 29.3 G/DL (ref 31.5–35.7)
MCV RBC AUTO: 89.3 FL (ref 79–97)
MV VENA CONTRACTA: 0.4 CM
PLATELET # BLD AUTO: 149 10*3/MM3 (ref 140–450)
PMV BLD AUTO: 10.9 FL (ref 6–12)
POTASSIUM BLD-SCNC: 4 MMOL/L (ref 3.5–5.2)
RBC # BLD AUTO: 2.9 10*6/MM3 (ref 3.77–5.28)
RH BLD: POSITIVE
SODIUM BLD-SCNC: 145 MMOL/L (ref 136–145)
WBC NRBC COR # BLD: 8.6 10*3/MM3 (ref 3.4–10.8)

## 2019-09-10 PROCEDURE — 25010000002 FUROSEMIDE PER 20 MG: Performed by: PHYSICIAN ASSISTANT

## 2019-09-10 PROCEDURE — P9016 RBC LEUKOCYTES REDUCED: HCPCS

## 2019-09-10 PROCEDURE — 85014 HEMATOCRIT: CPT | Performed by: INTERNAL MEDICINE

## 2019-09-10 PROCEDURE — 86900 BLOOD TYPING SEROLOGIC ABO: CPT

## 2019-09-10 PROCEDURE — 36430 TRANSFUSION BLD/BLD COMPNT: CPT

## 2019-09-10 PROCEDURE — 85018 HEMOGLOBIN: CPT | Performed by: INTERNAL MEDICINE

## 2019-09-10 PROCEDURE — 80048 BASIC METABOLIC PNL TOTAL CA: CPT | Performed by: INTERNAL MEDICINE

## 2019-09-10 PROCEDURE — 85027 COMPLETE CBC AUTOMATED: CPT | Performed by: INTERNAL MEDICINE

## 2019-09-10 PROCEDURE — 25010000002 HALOPERIDOL LACTATE PER 5 MG: Performed by: NURSE PRACTITIONER

## 2019-09-10 PROCEDURE — 99232 SBSQ HOSP IP/OBS MODERATE 35: CPT | Performed by: PHYSICIAN ASSISTANT

## 2019-09-10 PROCEDURE — 99232 SBSQ HOSP IP/OBS MODERATE 35: CPT | Performed by: INTERNAL MEDICINE

## 2019-09-10 PROCEDURE — 86901 BLOOD TYPING SEROLOGIC RH(D): CPT

## 2019-09-10 RX ORDER — CLOPIDOGREL BISULFATE 75 MG/1
75 TABLET ORAL DAILY
Status: DISCONTINUED | OUTPATIENT
Start: 2019-09-11 | End: 2019-09-10

## 2019-09-10 RX ORDER — FUROSEMIDE 10 MG/ML
20 INJECTION INTRAMUSCULAR; INTRAVENOUS ONCE
Status: COMPLETED | OUTPATIENT
Start: 2019-09-10 | End: 2019-09-10

## 2019-09-10 RX ORDER — HALOPERIDOL 5 MG/ML
1 INJECTION INTRAMUSCULAR EVERY 6 HOURS PRN
Status: DISCONTINUED | OUTPATIENT
Start: 2019-09-10 | End: 2019-09-10

## 2019-09-10 RX ORDER — HALOPERIDOL 5 MG/ML
1 INJECTION INTRAMUSCULAR EVERY 6 HOURS PRN
Status: DISCONTINUED | OUTPATIENT
Start: 2019-09-10 | End: 2019-09-11

## 2019-09-10 RX ORDER — CLOPIDOGREL BISULFATE 75 MG/1
75 TABLET ORAL DAILY
Status: DISCONTINUED | OUTPATIENT
Start: 2019-09-10 | End: 2019-09-13 | Stop reason: HOSPADM

## 2019-09-10 RX ADMIN — ATENOLOL 100 MG: 50 TABLET ORAL at 08:00

## 2019-09-10 RX ADMIN — SODIUM CHLORIDE, PRESERVATIVE FREE 10 ML: 5 INJECTION INTRAVENOUS at 20:54

## 2019-09-10 RX ADMIN — FAMOTIDINE 20 MG: 20 TABLET ORAL at 02:33

## 2019-09-10 RX ADMIN — FUROSEMIDE 20 MG: 10 INJECTION, SOLUTION INTRAMUSCULAR; INTRAVENOUS at 11:49

## 2019-09-10 RX ADMIN — HALOPERIDOL LACTATE 1 MG: 5 INJECTION, SOLUTION INTRAMUSCULAR at 00:38

## 2019-09-10 RX ADMIN — AMLODIPINE BESYLATE 10 MG: 10 TABLET ORAL at 08:00

## 2019-09-10 RX ADMIN — CLOPIDOGREL BISULFATE 75 MG: 75 TABLET ORAL at 02:34

## 2019-09-10 RX ADMIN — LEVOTHYROXINE SODIUM 88 MCG: 88 TABLET ORAL at 08:02

## 2019-09-10 RX ADMIN — DEXMEDETOMIDINE HYDROCHLORIDE 0.2 MCG/KG/HR: 100 INJECTION, SOLUTION INTRAVENOUS at 02:28

## 2019-09-10 RX ADMIN — CLOPIDOGREL BISULFATE 75 MG: 75 TABLET ORAL at 20:27

## 2019-09-10 RX ADMIN — ASPIRIN 81 MG: 81 TABLET, COATED ORAL at 08:00

## 2019-09-10 RX ADMIN — SODIUM CHLORIDE, PRESERVATIVE FREE 10 ML: 5 INJECTION INTRAVENOUS at 08:03

## 2019-09-10 RX ADMIN — FAMOTIDINE 20 MG: 20 TABLET ORAL at 20:27

## 2019-09-10 RX ADMIN — FAMOTIDINE 20 MG: 20 TABLET ORAL at 08:00

## 2019-09-10 RX ADMIN — HALOPERIDOL LACTATE 1 MG: 5 INJECTION, SOLUTION INTRAMUSCULAR at 23:40

## 2019-09-10 RX ADMIN — DEXMEDETOMIDINE HYDROCHLORIDE 0.2 MCG/KG/HR: 100 INJECTION, SOLUTION INTRAVENOUS at 21:05

## 2019-09-10 NOTE — PROGRESS NOTES
Pulmonary/Critical Care Follow-up     LOS: 1 day   Patient Care Team:  Vanesa Correa DO as PCP - General (Internal Medicine)  Vanesa Correa DO as PCP - Claims Attributed    Chief Complaint: Acute blood loss anemia      Subjective    Initial history (from 9/9/2019 note):  84-year-old woman with a known history of coronary disease, remote CABG in 1990 presenting with progressive shortness of air and chest pain.    Left heart catheterization [on 9/9/19] revealed occluded LAD proximally, no disease circumflex, 70% ostial RCA, 70% mid RCA, 30% ostial PDA, 80% ramus intermedius, saphenous vein to RCA patent, LIMA to LAD, patent.  She underwent a stent to the ramus intermedius lesion.  Postprocedure she had a large right groin hematoma.  FemoStop was applied.  Right common femoral artery angiogram performed.  The common femoral artery, profundus femoris and superficial femoral arteries were all widely patent with good flow and no leak.  Patient had some flushing, hypotension with the angiogram.  She does have a history of a dye allergy.  In the ICU she developed nausea and vomiting.  She c/o some abdominal pain. She denies fever, chills    (End of copied text).    Interval History:   Patient received 1 unit of packed red blood cells overnight.  She is pleasantly confused currently.  She does know that she is in the hospital.  No fevers or chills.  No nausea or vomiting.  No active bleeding.  She has good pulses.    History taken from: Patient    PMH/FH/Social History were reviewed and updated appropriately in the electronic medical record.     Review of Systems:    Review of 14 systems was completed with positives and pertinent negatives noted in the subjective section.  All other systems reviewed and are negative.         Objective     Vital Signs  Temp:  [96.3 °F (35.7 °C)-98 °F (36.7 °C)] 98 °F (36.7 °C)  Heart Rate:  [44-66] 60  Resp:  [14-24] 22  BP: ()/() 160/114  09/09 0701 - 09/10 0700  In:  29.5 [I.V.:29.5]  Out: -   Body mass index is 33.46 kg/m².     Physical Exam:     Constitutional:   Alert, cooperative, in no acute distress   Head:   Normocephalic, without obvious abnormality, atraumatic   Eyes:           Lids and lashes normal, conjunctivae and sclerae normal.  No icterus, no pallor, corneas clear, PER   ENMT:  Ears appear intact with no abnormalities noted     No oral lesions, no thrush, oral mucosa moist   Neck:  No adenopathy, supple, trachea midline, no thyromegaly, no JVD   Lungs/Resp:    Normal effort, symmetric chest rise, no crepitus, clear to      auscultation bilaterally, no chest wall tenderness                Heart/CV:   Regular rhythm and normal rate, normal S1 and S2, grade 2 out of 6 right upper sternal border murmur.   Abdomen/GI:    Normal bowel sounds, no masses, no organomegaly, soft        non-tender, non-distended   :    Deferred   Extremities/MSK:  No clubbing or cyanosis.  No edema.  Normal tone.    No deformities.    Pulses:  Pulses palpable and equal bilaterally   Skin:  No bleeding, bruising or rash   Heme/Lymph:  No cervical or supraclavicular adenopathy.   Neurologic:    Psychiatric:    Moves all extremities with no obvious focal motor deficit.  Cranial nerves 2 - 12 grossly intact.  She is pleasantly confused.  Oriented x 2, normal affect.      Results Review:     I reviewed the patient's new clinical results.   Results from last 7 days   Lab Units 09/10/19  0604 09/09/19  0914   SODIUM mmol/L 145 141   POTASSIUM mmol/L 4.0 4.0   CHLORIDE mmol/L 110* 103   CO2 mmol/L 23.0 24.0   BUN mg/dL 26* 25*   CREATININE mg/dL 1.53* 1.46*   CALCIUM mg/dL 8.4* 9.6   BILIRUBIN mg/dL  --  0.6   ALK PHOS U/L  --  67   ALT (SGPT) U/L  --  17   AST (SGOT) U/L  --  26   GLUCOSE mg/dL 136* 113*     Results from last 7 days   Lab Units 09/10/19  1640 09/10/19  0604 09/10/19  0131  09/09/19 2035 09/09/19  0914   WBC 10*3/mm3  --  8.60  --   --  8.81 9.56   HEMOGLOBIN g/dL 12.0 7.6* 7.8*    < > 8.9* 11.4*   HEMATOCRIT % 38.2 25.9* 26.9*   < > 30.1* 38.3   PLATELETS 10*3/mm3  --  149  --   --  165 181    < > = values in this interval not displayed.               I reviewed the patient's new imaging including images and reports.  Chest x-ray 9/9/2019: Borderline cardiomegaly with prior sternotomy wires, calcified aorta chronic changes.    Medication Review:     amLODIPine 10 mg Oral Daily   aspirin 81 mg Oral Daily   atenolol 100 mg Oral Daily   [START ON 9/11/2019] clopidogrel 75 mg Oral Daily   famotidine 20 mg Oral BID   levothyroxine 88 mcg Oral Q AM   sodium chloride 10 mL Intravenous Q12H       dexmedetomidine 0.2-1.5 mcg/kg/hr Last Rate: Stopped (09/10/19 0700)       Assessment/Plan       Stage 3 chronic kidney disease (CMS/HCC)    PAF (paroxysmal atrial fibrillation) (CMS/HCC)    Coronary artery disease involving native coronary artery of native heart without angina pectoris    Unstable angina (CMS/HCC)    Hematoma of groin, right    Acute blood loss anemia    84-year-old female status post left heart catheterization with stent placement for coronary disease he developed large groin hematoma and acute blood loss anemia.  Her hemoglobin has now essentially stabilized.  She is confused.    Plan:  1.  We will continue to monitor in the intensive care unit.  2.  Transfuse as needed.  3.  Continue monitoring hemoglobin and hematocrit.  4.  Continue thyroid replacement.  5.  Continue Pepcid.      Nito Lafleur MD  09/10/19  6:05 PM        *. Please note that portions of this note were completed with a voice recognition program. Efforts were made to edit the dictations, but occasionally words are mistranscribed.

## 2019-09-10 NOTE — PROGRESS NOTES
"Clinical Nutrition Note      Patient Name: Lorna Lo  MRN: [id number]  Admission date: 9/9/2019      Multidisciplinary Rounds    Additional information obtained during MDR:  RN reports pt stable after LHC and stent placement; dc is cancelled d/t lg groin hematoma; she is currently receiving 1 u PRBC's has 2 units ordered.    Current diet: Diet Regular; Cardiac    Pertinent medical data reviewed  No nutrition risk identified on nursing screen; MST score \"0\"    Intervention:  Menu provided, pt advised of alternate selections  Plan of care and goals reviewed    Monitor:  RD to follow per protocol      Mariah Mcdonald MS,RD,LD  09/10/19 12:22 PM  Time: 20 mins       "

## 2019-09-10 NOTE — PROGRESS NOTES
"Critical Care Note     LOS: 0 days   Patient Care Team:  Vanesa Correa DO as PCP - General (Internal Medicine)  Vanesa Correa DO as PCP - Claims Attributed    Chief Complaint/Reason for visit:    CAD  [facility]    Subjective      84-year-old woman with a known history of coronary disease, remote CABG in 1990 presenting with progressive shortness of air and chest pain.    Interval History:     Left heart catheterization revealed occluded LAD proximally, no disease circumflex, 70% ostial RCA, 70% mid RCA, 30% ostial PDA, 80% ramus intermedius, saphenous vein to RCA patent, LIMA to LAD, patent.  She underwent a stent to the ramus intermedius lesion.  Postprocedure she had a large right groin hematoma.  FemoStop was applied.  Right common femoral artery angiogram performed.  The common femoral artery, profundus femoris and superficial femoral arteries were all widely patent with good flow and no leak.  Patient had some flushing, hypotension with the angiogram.  She does have a history of a dye allergy.  In the ICU she developed nausea and vomiting.  She c/o some abdominal pain. She denies fever, chills      Review of Systems:    All systems were reviewed and negative except as noted in subjective.    Medical history, surgical history, social history, family history reviewed  Paroxysmal atrial fibrillation on chronic anticoagulation, history of DVT 2009, chronic kidney disease, chronic anemia, hypertension, dyslipidemia, GERD, polymyositis, hypothyroid, previous gastric bypass  Objective     Intake/Output:  No intake or output data in the 24 hours ending 09/09/19 8394    Nutrition:  Diet Regular; Cardiac    Infusions:         Telemetry: Sinus bradycardia             Vital Signs  Blood pressure 96/58, pulse 62, temperature 97.5 °F (36.4 °C), temperature source Temporal, resp. rate 16, height 165.1 cm (65\"), weight 89.8 kg (198 lb), SpO2 100 %    Physical Exam:  General Appearance:   Elderly white woman in no " distress   Head:   Normocephalic, atraumatic   Eyes:          Conjunctiva pale, pupils equal and reactive to light   Ears:     Throat:  Oral mucosa moist   Neck:  Trachea midline, no palpable thyroid   Back:      Lungs:    Symmetric chest expansion.  Breath sounds bilateral, equal, clear    Heart:   PMI prominent.  S1, S2 auscultated, regular rhythm, no murmur   Abdomen:    Bowel sounds are present.  No epigastric tenderness.   Rectal:   Deferred   Extremities:  Right groin hematoma extending medially into the labia and laterally up the buttocks as well as medially down the thigh.  Right brachial sheath in place.  Right hand warm and pink.   Pulses:  Pedal pulses present.   Skin:  Cool and dry   Lymph nodes:    Neurologic:  Awake and cooperative      Results Review:     I reviewed the patient's new clinical results.   Results from last 7 days   Lab Units 09/09/19  0914   SODIUM mmol/L 141   POTASSIUM mmol/L 4.0   CHLORIDE mmol/L 103   CO2 mmol/L 24.0   BUN mg/dL 25*   CREATININE mg/dL 1.46*   CALCIUM mg/dL 9.6   BILIRUBIN mg/dL 0.6   ALK PHOS U/L 67   ALT (SGPT) U/L 17   AST (SGOT) U/L 26   GLUCOSE mg/dL 113*     Results from last 7 days   Lab Units 09/09/19  2144 09/09/19  2035 09/09/19  0914   WBC 10*3/mm3  --  8.81 9.56   HEMOGLOBIN g/dL 9.0* 8.9* 11.4*   HEMATOCRIT % 30.6* 30.1* 38.3   PLATELETS 10*3/mm3  --  165 181         No results found for: BLOODCX  No results found for: URINECX    I reviewed the patient's new imaging including images and reports.    FINDINGS: Portable chest reveals heart to be enlarged. Patient status  post median sternotomy.  Calcifications seen within the thoracic aorta. Mild increased markings  at lung bases. Calcified granuloma identified within the right lung  base. Degenerative changes seen within the spine. The upper lung fields  are clear.     IMPRESSION:  Heart is borderline enlarged with underlying chronic and  emphysematous changes seen in the lung fields bilaterally.  "Calcified  granuloma identified within the right lung base.     D:  09/09/2019  E:  09/09/2019    Procedures Performed: Angiography of the right common femoral artery and proximal aspects of the right profunda femoris and superficial femoral arteries.     Indications: This patient underwent diagnostic catheterization and stenting in the [facility] labs earlier today.  Access was gained in the proximal portion of her right superficial femoral artery because of a \"high bifurcation\".  The site of insertion of the 6 Chinese catheter was closed with a StarClose device.  There were no issues until the patient ambulated and voided.  She developed a large hematoma over the right groin, right symphysis pubis, and lateral aspect of the abdominal wall.  She had no hemodynamic compromise; however, when I evaluated her hematoma I felt that I could not exclude a continued \"leak\".  The patient came to the catheterization laboratory for angiography of the side of her prior vascular closure.     Procedure Narrative: Initially, I was unable to access the right radial artery.  I was able to access the right brachial artery.  A 6 Chinese sheath was placed.  I then placed a \"long\" 6 Chinese JR 4.0 catheter over a long Donnell wire into the proximal common femoral artery.  Angiography was carried out.  The procedure was terminated.  The patient was transported to the intensive care unit for monitoring.     Complications: The patient complained of abdominal pain, feeling \"flushed\", and presyncope while access was being obtained.  She was noted to be hypotensive and bradycardic.  This responded to fluids and 1 mg of atropine sulfate as well as a single 200 mcg bolus of Filiberto-Synephrine it was attributed to a vasovagal event.     Findings: The common femoral artery and proximal portions of the profunda femoris and superficial femoral arteries are widely patent with good flow.  There is no \"leak\".           Final Impression: The patient's \"leak\" " "from her vascular closure device has \"sealed\".    All medications reviewed.     atropine      [START ON 9/10/2019] amLODIPine 10 mg Oral Daily   aspirin 81 mg Oral Daily   [START ON 9/10/2019] atenolol 100 mg Oral Daily   clopidogrel 75 mg Oral Once   [START ON 9/10/2019] levothyroxine 88 mcg Oral Q AM   sodium chloride 10 mL Intravenous Q12H         Assessment/Plan       Hematoma of groin, right    Stage 3 chronic kidney disease (CMS/[facility])    PAF (paroxysmal atrial fibrillation) (CMS/[facility])    Coronary artery disease involving native coronary artery of native heart without angina pectoris    Unstable angina (CMS/[facility])    #1 right groin hematoma, hemoglobin 11.4 earlier today, 8.9 currently.  Angiogram revealed no vessel leak.    #2 coronary artery disease with increasing symptoms status post heart catheterization today with stent to her ramus intermedius.  Echocardiogram revealed 80% EF, left ventricular hypertrophy, calcification of the aortic valve, no stenosis, trace mitral regurgitation    #3 chronic kidney disease, creatinine is 1.46 at baseline    #4 hypertension on amlodipine, atenolol as an outpatient.  Dropped her blood pressure during the angiogram procedure but it has recovered and is normal currently    #5 history of proximal atrial fibrillation and previous DVT on chronic anticoagulation with Xarelto    #6 hypothyroidism on replacement therapy    PLAN:  Monitor H&H  Draw type and screen  Zofran for nausea  Monitor right groin site  Bedrest  Aspirin, Plavix  Hold Xarelto  Thyroid replacement  Resume antihypertensives as able  Monitor rhythm, urine output  Follow-up echocardiogram      Amparo Samayoa MD  09/09/19  10:36 PM      Time: Critical care 35 min  I personally provided care to this critically ill patient as documented above.  Critical care time does not include time spent on separately billed procedures.  Non of my critical care time was concurrent with other critical care providers.   "

## 2019-09-10 NOTE — PROGRESS NOTES
" [facility] at [facility] - Progress Note    Lorna Lo  1934  N233/1    09/10/19, 9:16 AM      Chief Complaint: Following for CAD s/p PCI, post procedural Hematoma.    Subjective:   Transferred to ICU p developing large hematoma post LHC.  Fem stop placed in CVOU yesterday.  Patient vitals stable but she had significant blood loss. Taken back to cath lab via brachial access:  The common femoral artery and proximal portions of the profunda femoris and superficial femoral arteries are widely patent with good flow.  There is no \"leak\".    She is in ICU, daughter at bedside.  She is quite drowsy (received Haldol and precedex last night) but arousable and A&O x3.  She is pale in color.  She received 1 unit PRBC last night.    Denies chest pain, denies dyspnea.  Again, alert.  States she's just tired and weak.  Vitals have remained stable - BP WNL. Bradycardia noted but she was bradycardic last night with re look.        Review of Systems:  Pertinent positives are listed above and in physical exam.  All others have been reviewed and are negative.      amLODIPine 10 mg Oral Daily   aspirin 81 mg Oral Daily   atenolol 100 mg Oral Daily   atropine      [START ON 9/11/2019] clopidogrel 75 mg Oral Daily   famotidine 20 mg Oral BID   furosemide 20 mg Intravenous Once   levothyroxine 88 mcg Oral Q AM   sodium chloride 10 mL Intravenous Q12H       Objective:  Vitals:   height is 165.1 cm (65\") and weight is 91.2 kg (201 lb 1 oz). Her axillary temperature is 97 °F (36.1 °C). Her blood pressure is 129/62 and her pulse is 50. Her respiration is 16 and oxygen saturation is 100%.     Intake/Output Summary (Last 24 hours) at 9/10/2019 0916  Last data filed at 9/10/2019 0830  Gross per 24 hour   Intake 389.5 ml   Output --   Net 389.5 ml       Physical Exam:  General:  WN, NAD, A and O x3. Palor  CV:  Normal S1,S2. No murmur, rub, or gallop.  Resp:  CTA Adrian, equal, non-labored.  Abd:  Soft, + BS, no " "organomegaly. Non-tender to palpation.  Extrem:  No edema BLE, 2+ pedal/PT pulses.   Groin still with hematoma - no active bleeding.  Area is soft and mildly tender.           Results from last 7 days   Lab Units 09/10/19  0604   WBC 10*3/mm3 8.60   HEMOGLOBIN g/dL 7.6*   HEMATOCRIT % 25.9*   PLATELETS 10*3/mm3 149     Results from last 7 days   Lab Units 09/10/19  0604 09/09/19  0914   SODIUM mmol/L 145 141   POTASSIUM mmol/L 4.0 4.0   CHLORIDE mmol/L 110* 103   CO2 mmol/L 23.0 24.0   BUN mg/dL 26* 25*   CREATININE mg/dL 1.53* 1.46*   CALCIUM mg/dL 8.4* 9.6   BILIRUBIN mg/dL  --  0.6   ALK PHOS U/L  --  67   ALT (SGPT) U/L  --  17   AST (SGOT) U/L  --  26   GLUCOSE mg/dL 136* 113*             Results from last 7 days   Lab Units 09/09/19  0914   CHOLESTEROL mg/dL 218*   TRIGLYCERIDES mg/dL 140   HDL CHOL mg/dL 64*   LDL CHOL mg/dL 126*           Tele:  SB    Assessment and Plan:  1.  Right groin hematoma   -  Developed post PCI.   -  Associated blood loss.  S/P 1unit PRBC with continued anemia.  I spoke with Dr. Cade - will transfuse 2 units PRBCs today, give 20mg IV Lasix after 1 unit.  Check blood counts q4 for now.  Dr. Cade will visit her after clinic today.  Currently she is hemodynamically stable.    2.  Coronary Artery Disease   -  Dyspnea with PND   -  S/P C 9/9:  2/2 patent bypass grafts.  Very high degree stenosis in the proximal ramus intermediate ends artery that is associated with an abnormal RFR.  This was treated with PTCA and the placement of a zotarolimus eluding stent.   -  Continue DAPT, trial Livalo at time of DC.    3. Paroxysmal Atrial Fibrillation   -   CHADS2 Vasc =  5.   -  Xarelto held 48 hours prior to cath.  Continue to hold for now.   -  Obtain EKG in a.m.    4.  CKDz   -  Labs reviewed today.  Slight increase in Cr   -  Daily labs.    5.  Essential Hypertension   -  Stable   -  Continue to monitor.    Looks much better. Tentative discharge 9/11 if renal \"OK\".  Will not restart NOAC " for a few days.  Talked with her.    I discussed the patients findings and my recommendations with the patient, any present family members, and the nursing staff.  Pablito Cade MD saw and examined patient, verified hx and PE, read all radiographic studies, reviewed labs and micro data, and formulated dx, plan for treatment and all medical decision making.      Dina Galindo PA-C  09/10/19, 9:16 AM    Electronically signed by ARISTIDES Greene, 09/10/19, 9:27 AM.

## 2019-09-10 NOTE — PROGRESS NOTES
Discharge Planning Assessment  [facility]     Patient Name: Lorna Lo  MRN: [id number]  Today's Date: 9/10/2019    Admit Date: 9/9/2019    Discharge Needs Assessment     Row Name 09/10/19 1030       Living Environment    Lives With  alone    Current Living Arrangements  home/apartment/condo    Primary Care Provided by  self    Provides Primary Care For  no one    Family Caregiver if Needed  child(scout), adult    Family Caregiver Names  Amirah  dtr    Quality of Family Relationships  helpful;involved;supportive    Able to Return to Prior Arrangements  yes    Living Arrangement Comments  Pt lives alone in 1 level home in [facility]. Amirah daughter will assist with care if needed.       Resource/Environmental Concerns    Resource/Environmental Concerns  none       Transition Planning    Patient/Family Anticipates Transition to  home with family    Patient/Family Anticipated Services at Transition  none    Transportation Anticipated  family or friend will provide       Discharge Needs Assessment    Readmission Within the Last 30 Days  no previous admission in last 30 days    Concerns to be Addressed  no discharge needs identified    Equipment Currently Used at Home  cane, straight;rollator rollator and st cane pt does not use.    Equipment Needed After Discharge  none        Discharge Plan     Row Name 09/10/19 1032       Plan    Plan  Home    Patient/Family in Agreement with Plan  yes    Plan Comments  Met with pt at . She lives alone in 1 level home in [facility]. Pt is independent with ADL's PTA. Pt has a daughter Amirah that will be available to assist with needs. Pt has not had HH. Plan home at discharge.    Final Discharge Disposition Code  01 - home or self-care        Destination      No service coordination in this encounter.      Durable Medical Equipment      No service coordination in this encounter.      Dialysis/Infusion      No service coordination in this encounter.      Home Medical  Care      No service coordination in this encounter.      Therapy      No service coordination in this encounter.      Community Resources      No service coordination in this encounter.        Expected Discharge Date and Time     Expected Discharge Date Expected Discharge Time    Sep 9, 2019         Demographic Summary     Row Name 09/10/19 1027       General Information    Admission Type  inpatient    Arrived From  home    Referral Source  admission list    Reason for Consult  discharge planning    Preferred Language  English       Contact Information    Permission Granted to Share Info With          Functional Status     Row Name 09/10/19 1030       Functional Status    Usual Activity Tolerance  good       Functional Status, IADL    Medications  independent    Meal Preparation  independent    Housekeeping  independent    Laundry  independent    Shopping  independent       Employment/    Employment/ Comments  [employer]        Psychosocial    No documentation.       Abuse/Neglect    No documentation.       Legal    No documentation.       Substance Abuse    No documentation.       Patient Forms    No documentation.           Amparo Huntley RN

## 2019-09-11 ENCOUNTER — APPOINTMENT (OUTPATIENT)
Dept: CT IMAGING | Facility: HOSPITAL | Age: 84
End: 2019-09-11

## 2019-09-11 ENCOUNTER — APPOINTMENT (OUTPATIENT)
Dept: CARDIOLOGY | Facility: HOSPITAL | Age: 84
End: 2019-09-11

## 2019-09-11 DIAGNOSIS — I10 HYPERTENSION, UNSPECIFIED TYPE: ICD-10-CM

## 2019-09-11 LAB
ABO + RH BLD: NORMAL
ANION GAP SERPL CALCULATED.3IONS-SCNC: 13 MMOL/L (ref 5–15)
BH BB BLOOD EXPIRATION DATE: NORMAL
BH BB BLOOD TYPE BARCODE: 5100
BH BB BLOOD TYPE BARCODE: 5100
BH BB BLOOD TYPE BARCODE: 9500
BH BB DISPENSE STATUS: NORMAL
BH BB PRODUCT CODE: NORMAL
BH BB UNIT NUMBER: NORMAL
BH CV ECHO MEAS - BSA(HAYCOCK): 2.1 M^2
BH CV ECHO MEAS - BSA: 2 M^2
BH CV ECHO MEAS - BZI_BMI: 33.4 KILOGRAMS/M^2
BH CV ECHO MEAS - BZI_METRIC_HEIGHT: 165.1 CM
BH CV ECHO MEAS - BZI_METRIC_WEIGHT: 91.2 KG
BH CV RIGHT GROIN PSA PROCEDURE SCRIPTING LRR: 1
BUN BLD-MCNC: 24 MG/DL (ref 8–23)
BUN/CREAT SERPL: 17.1 (ref 7–25)
CALCIUM SPEC-SCNC: 8.7 MG/DL (ref 8.6–10.5)
CHLORIDE SERPL-SCNC: 107 MMOL/L (ref 98–107)
CO2 SERPL-SCNC: 22 MMOL/L (ref 22–29)
CREAT BLD-MCNC: 1.4 MG/DL (ref 0.57–1)
DEPRECATED RDW RBC AUTO: 51.8 FL (ref 37–54)
DEPRECATED RDW RBC AUTO: 55.2 FL (ref 37–54)
ERYTHROCYTE [DISTWIDTH] IN BLOOD BY AUTOMATED COUNT: 16.4 % (ref 12.3–15.4)
ERYTHROCYTE [DISTWIDTH] IN BLOOD BY AUTOMATED COUNT: 16.4 % (ref 12.3–15.4)
GFR SERPL CREATININE-BSD FRML MDRD: 36 ML/MIN/1.73
GLUCOSE BLD-MCNC: 141 MG/DL (ref 65–99)
HCT VFR BLD AUTO: 34.2 % (ref 34–46.6)
HCT VFR BLD AUTO: 37.7 % (ref 34–46.6)
HGB BLD-MCNC: 10.6 G/DL (ref 12–15.9)
HGB BLD-MCNC: 11.1 G/DL (ref 12–15.9)
MCH RBC QN AUTO: 26.3 PG (ref 26.6–33)
MCH RBC QN AUTO: 26.8 PG (ref 26.6–33)
MCHC RBC AUTO-ENTMCNC: 29.4 G/DL (ref 31.5–35.7)
MCHC RBC AUTO-ENTMCNC: 31 G/DL (ref 31.5–35.7)
MCV RBC AUTO: 84.9 FL (ref 79–97)
MCV RBC AUTO: 91.1 FL (ref 79–97)
PLATELET # BLD AUTO: 119 10*3/MM3 (ref 140–450)
PLATELET # BLD AUTO: 131 10*3/MM3 (ref 140–450)
PMV BLD AUTO: 10.7 FL (ref 6–12)
PMV BLD AUTO: 9.9 FL (ref 6–12)
POTASSIUM BLD-SCNC: 3.5 MMOL/L (ref 3.5–5.2)
PROX PFA PSV RIGHT: 109 CM/SEC
PROX SFA PSV RIGHT: 96.9 CM/SEC
RBC # BLD AUTO: 4.03 10*6/MM3 (ref 3.77–5.28)
RBC # BLD AUTO: 4.14 10*6/MM3 (ref 3.77–5.28)
RIGHT CFA PROX SYS PSV: 173 CM/SEC
RIGHT GROIN CFA SYS: 191 CM/SEC
SODIUM BLD-SCNC: 142 MMOL/L (ref 136–145)
UNIT  ABO: NORMAL
UNIT  RH: NORMAL
WBC NRBC COR # BLD: 8.71 10*3/MM3 (ref 3.4–10.8)
WBC NRBC COR # BLD: 9.19 10*3/MM3 (ref 3.4–10.8)

## 2019-09-11 PROCEDURE — 80048 BASIC METABOLIC PNL TOTAL CA: CPT | Performed by: PHYSICIAN ASSISTANT

## 2019-09-11 PROCEDURE — 93005 ELECTROCARDIOGRAM TRACING: CPT | Performed by: PHYSICIAN ASSISTANT

## 2019-09-11 PROCEDURE — 99232 SBSQ HOSP IP/OBS MODERATE 35: CPT | Performed by: INTERNAL MEDICINE

## 2019-09-11 PROCEDURE — 85027 COMPLETE CBC AUTOMATED: CPT | Performed by: PHYSICIAN ASSISTANT

## 2019-09-11 PROCEDURE — 93926 LOWER EXTREMITY STUDY: CPT | Performed by: INTERNAL MEDICINE

## 2019-09-11 PROCEDURE — 93926 LOWER EXTREMITY STUDY: CPT

## 2019-09-11 PROCEDURE — 25010000002 HALOPERIDOL LACTATE PER 5 MG: Performed by: NURSE PRACTITIONER

## 2019-09-11 PROCEDURE — 99232 SBSQ HOSP IP/OBS MODERATE 35: CPT | Performed by: PHYSICIAN ASSISTANT

## 2019-09-11 PROCEDURE — 71250 CT THORAX DX C-: CPT

## 2019-09-11 PROCEDURE — 93010 ELECTROCARDIOGRAM REPORT: CPT | Performed by: INTERNAL MEDICINE

## 2019-09-11 PROCEDURE — 97162 PT EVAL MOD COMPLEX 30 MIN: CPT

## 2019-09-11 PROCEDURE — 97165 OT EVAL LOW COMPLEX 30 MIN: CPT

## 2019-09-11 RX ORDER — MAGNESIUM SULFATE HEPTAHYDRATE 40 MG/ML
2 INJECTION, SOLUTION INTRAVENOUS AS NEEDED
Status: DISCONTINUED | OUTPATIENT
Start: 2019-09-11 | End: 2019-09-13 | Stop reason: HOSPADM

## 2019-09-11 RX ORDER — QUETIAPINE FUMARATE 25 MG/1
25 TABLET, FILM COATED ORAL ONCE
Status: COMPLETED | OUTPATIENT
Start: 2019-09-11 | End: 2019-09-11

## 2019-09-11 RX ORDER — POTASSIUM CHLORIDE 7.45 MG/ML
10 INJECTION INTRAVENOUS
Status: DISCONTINUED | OUTPATIENT
Start: 2019-09-11 | End: 2019-09-13 | Stop reason: HOSPADM

## 2019-09-11 RX ORDER — POTASSIUM CHLORIDE 750 MG/1
40 CAPSULE, EXTENDED RELEASE ORAL AS NEEDED
Status: DISCONTINUED | OUTPATIENT
Start: 2019-09-11 | End: 2019-09-13 | Stop reason: HOSPADM

## 2019-09-11 RX ORDER — HALOPERIDOL 5 MG/ML
1 INJECTION INTRAMUSCULAR EVERY 6 HOURS PRN
Status: DISCONTINUED | OUTPATIENT
Start: 2019-09-11 | End: 2019-09-12

## 2019-09-11 RX ORDER — QUETIAPINE FUMARATE 25 MG/1
25 TABLET, FILM COATED ORAL NIGHTLY
Status: DISCONTINUED | OUTPATIENT
Start: 2019-09-11 | End: 2019-09-12

## 2019-09-11 RX ORDER — POTASSIUM CHLORIDE 1.5 G/1.77G
40 POWDER, FOR SOLUTION ORAL AS NEEDED
Status: DISCONTINUED | OUTPATIENT
Start: 2019-09-11 | End: 2019-09-13 | Stop reason: HOSPADM

## 2019-09-11 RX ORDER — MAGNESIUM SULFATE HEPTAHYDRATE 40 MG/ML
4 INJECTION, SOLUTION INTRAVENOUS AS NEEDED
Status: DISCONTINUED | OUTPATIENT
Start: 2019-09-11 | End: 2019-09-13 | Stop reason: HOSPADM

## 2019-09-11 RX ORDER — HALOPERIDOL 5 MG/ML
1 INJECTION INTRAMUSCULAR EVERY 6 HOURS PRN
Status: DISCONTINUED | OUTPATIENT
Start: 2019-09-11 | End: 2019-09-11

## 2019-09-11 RX ADMIN — ASPIRIN 81 MG: 81 TABLET, COATED ORAL at 09:53

## 2019-09-11 RX ADMIN — QUETIAPINE FUMARATE 25 MG: 25 TABLET ORAL at 01:37

## 2019-09-11 RX ADMIN — SODIUM CHLORIDE, PRESERVATIVE FREE 10 ML: 5 INJECTION INTRAVENOUS at 20:06

## 2019-09-11 RX ADMIN — QUETIAPINE FUMARATE 25 MG: 25 TABLET ORAL at 17:45

## 2019-09-11 RX ADMIN — HALOPERIDOL LACTATE 1 MG: 5 INJECTION, SOLUTION INTRAMUSCULAR at 17:01

## 2019-09-11 RX ADMIN — LEVOTHYROXINE SODIUM 88 MCG: 88 TABLET ORAL at 09:53

## 2019-09-11 RX ADMIN — CLOPIDOGREL BISULFATE 75 MG: 75 TABLET ORAL at 09:53

## 2019-09-11 RX ADMIN — FAMOTIDINE 20 MG: 20 TABLET ORAL at 20:05

## 2019-09-11 RX ADMIN — POTASSIUM CHLORIDE 40 MEQ: 750 CAPSULE, EXTENDED RELEASE ORAL at 09:53

## 2019-09-11 RX ADMIN — HALOPERIDOL LACTATE 1 MG: 5 INJECTION, SOLUTION INTRAMUSCULAR at 23:10

## 2019-09-11 RX ADMIN — FAMOTIDINE 20 MG: 20 TABLET ORAL at 09:54

## 2019-09-11 RX ADMIN — POTASSIUM CHLORIDE 40 MEQ: 750 CAPSULE, EXTENDED RELEASE ORAL at 20:06

## 2019-09-11 RX ADMIN — QUETIAPINE FUMARATE 25 MG: 25 TABLET ORAL at 20:05

## 2019-09-11 NOTE — PLAN OF CARE
Problem: Patient Care Overview  Goal: Plan of Care Review  Outcome: Ongoing (interventions implemented as appropriate)   09/11/19 7606   Coping/Psychosocial   Plan of Care Reviewed With patient   OTHER   Outcome Summary PT initial evaluation completed for pt presenting with significant confusion, generalized weakness, and decreased functional mobility. Pt ambulated 250ft with RW and CGA 1+1. Pt's decreased independence warrants PT skilled care. Recommend D/C home with 24/7 care and  OT/PT services.

## 2019-09-11 NOTE — PROGRESS NOTES
" [facility] at [facility] - Progress Note    Lorna Lo  1934  N233/1    09/11/19, 9:16 AM      Chief Complaint: Following for CAD s/p PCI, post procedural Hematoma.    Subjective:   Transferred to ICU p developing large hematoma post LHC.  Patient vitals stable but she had significant blood loss. Taken back to cath lab via brachial access:  The common femoral artery and proximal portions of the profunda femoris and superficial femoral arteries are widely patent with good flow.  There is no \"leak\".    She remains in ICU. She is now s/p 3 units total transfused PRBCs.  Blood counts are elevated.  She had recurrent confusion last night - and is still quite confused this morning.  Alert to name and place.        Review of Systems:  Pertinent positives are listed above and in physical exam.  All others have been reviewed and are negative.      amLODIPine 10 mg Oral Daily   aspirin 81 mg Oral Daily   atenolol 100 mg Oral Daily   clopidogrel 75 mg Oral Daily   famotidine 20 mg Oral BID   levothyroxine 88 mcg Oral Q AM   sodium chloride 10 mL Intravenous Q12H       Objective:  Vitals:   height is 165.1 cm (65\") and weight is 91.6 kg (201 lb 15.1 oz). Her axillary temperature is 97.2 °F (36.2 °C). Her blood pressure is 136/61 and her pulse is 53. Her respiration is 16 and oxygen saturation is 94%.       Intake/Output Summary (Last 24 hours) at 9/11/2019 0822  Last data filed at 9/11/2019 0400  Gross per 24 hour   Intake 1982.35 ml   Output 700 ml   Net 1282.35 ml       Physical Exam:  General:  WN, NAD, A and O x3.   CV:  Bradycardic. II/VI murmur.  No rub or gallop.  Resp:  CTA Adrian, equal, non-labored.  Abd:  Soft, + BS, no organomegaly. Non-tender to palpation.  Extrem:  No edema BLE, 2+ pedal/PT pulses.   Groin still with hematoma - no active bleeding.  Area is soft and mildly tender.           Results from last 7 days   Lab Units 09/11/19  0357   WBC 10*3/mm3 9.19   HEMOGLOBIN g/dL 10.6* "   HEMATOCRIT % 34.2   PLATELETS 10*3/mm3 131*     Results from last 7 days   Lab Units 09/11/19  0357  09/09/19  0914   SODIUM mmol/L 142   < > 141   POTASSIUM mmol/L 3.5   < > 4.0   CHLORIDE mmol/L 107   < > 103   CO2 mmol/L 22.0   < > 24.0   BUN mg/dL 24*   < > 25*   CREATININE mg/dL 1.40*   < > 1.46*   CALCIUM mg/dL 8.7   < > 9.6   BILIRUBIN mg/dL  --   --  0.6   ALK PHOS U/L  --   --  67   ALT (SGPT) U/L  --   --  17   AST (SGOT) U/L  --   --  26   GLUCOSE mg/dL 141*   < > 113*    < > = values in this interval not displayed.             Results from last 7 days   Lab Units 09/09/19  0914   CHOLESTEROL mg/dL 218*   TRIGLYCERIDES mg/dL 140   HDL CHOL mg/dL 64*   LDL CHOL mg/dL 126*           Tele:  Atrial Fibrillation    Assessment and Plan:  1.  Right groin hematoma   -  Developed post PCI.   -  Associated blood loss.  S/P 3units total PRBC.     -  Blood counts improved after transfusion   -  Repeat ultrasound today.     2.  Coronary Artery Disease   -  Dyspnea with PND   -  S/P C 9/9:  2/2 patent bypass grafts.  Very high degree stenosis in the proximal ramus intermediate ends artery that is associated with an abnormal RFR.  This was treated with PTCA and the placement of a zotarolimus eluding stent.   -  Continue DAPT, trial Livalo at time of DC.    3. Paroxysmal Atrial Fibrillation   -   CHADS2 Vasc =  5.   -  Xarelto held 48 hours prior to cath - holding for now. Will need to hold for a few days.   -  2 EKGs reviewed. Appears to be in AFib with controlled rates.     4.  CKDz   -  Slight increase in Cr post cath. She also received 20mg IV Lasix p blood.    -  Labs reviewed today - Cr 1.4 (baseline).     5.  Essential Hypertension   -  Stable throughout hospitalization.   -  Continue to monitor.    6.  Confusion   -  Possible sundowning    -  Will need to Follow up with PCP as outpatient.       I discussed the patients findings and my recommendations with the patient, any present family members, and the  nursing staff.  Pablito Cade MD saw and examined patient, verified hx and PE, read all radiographic studies, reviewed labs and micro data, and formulated dx, plan for treatment and all medical decision making.      Dina Galindo PA-C  09/11/19, 9:16 AM  Electronically signed by ARISTIDES Greene, 09/11/19, 8:26 AM.

## 2019-09-11 NOTE — PLAN OF CARE
"Problem: Patient Care Overview  Goal: Plan of Care Review  Outcome: Ongoing (interventions implemented as appropriate)  Pt progressively more confused through out the shift, escalating to the point of panic and hysteria with hallucinations, illusions, and paranoia. Dr. Cade on the unit at approx 1940 to make sure orders were in for plavix to be given every day, that the pt's home med Xarelto was not restarted, and recommended that Haldol be used to help with the pt's confusion and anxiety which could be dosed by the intensivist. IV access was lost due the pt's excessive movement and attempts to get out of bed. An attempt was made to place new access but was removed per the pt after about 30 minutes. Restraints were then ordered for bilateral upper extremities in an effort to prevent loss of access. Contacted the pt's daughter, \"GEORGE\", via her home number to make her aware of her mother's worsening confusion. She states that her mother has bad vision and \"sometimes confuses things like a pillow on the couch for a person and has full conversations with them, but there is nothing wrong with her mind.\" The nurse explained to GEORGE that the pt's safety was priority and that Dr. Cade himself had recommended Haldol. IM Haldol was given with little to no effect so the nurse contacted the NP for additional support in calming the pt. The use of Precedex was recommended once IV access was regained. The nurse placed an IV via ultrasound and started Precedex at around 2330. The patient's confusion and panic continued despite appropriate titration of Precedex and constant redirection in to combativeness and paranoia, even accusing the nurse of \"locking her up in the basement\" and \"leaving the car running in the garage to kill me with that other gas.\" Despite redirection and reassurance from the nurse, the pt continued to be confused. The NP was once again notified and ordered Seroquel 25mg which was given with applesauce. By 0300, " the patient was becoming more calm, although still quite confused, at which time Precedex was titrated down and eventually off. Overall, the pt only slept from around 0400 until after morning shift change. The nurse recommends a workup for possible Dementia, as the pt lives alone and will be discharged soon and seems to be sundowning. VSS throughout the night, UOP minimal, WCTM.

## 2019-09-11 NOTE — PROGRESS NOTES
Pulmonary/Critical Care Follow-up     LOS: 2 days   Patient Care Team:  Vanesa Correa DO as PCP - General (Internal Medicine)  Vanesa Correa DO as PCP - Claims Attributed    Chief Complaint: Acute blood loss anemia      Subjective    Initial history (from 9/9/2019 note):  84-year-old woman with a known history of coronary disease, remote CABG in 1990 presenting with progressive shortness of air and chest pain.    Left heart catheterization [on 9/9/19] revealed occluded LAD proximally, no disease circumflex, 70% ostial RCA, 70% mid RCA, 30% ostial PDA, 80% ramus intermedius, saphenous vein to RCA patent, LIMA to LAD, patent.  She underwent a stent to the ramus intermedius lesion.  Postprocedure she had a large right groin hematoma.  FemoStop was applied.  Right common femoral artery angiogram performed.  The common femoral artery, profundus femoris and superficial femoral arteries were all widely patent with good flow and no leak.  Patient had some flushing, hypotension with the angiogram.  She does have a history of a dye allergy.  In the ICU she developed nausea and vomiting.  She c/o some abdominal pain. She denies fever, chills    (End of copied text).    Interval History:   Patient remains pleasantly confused.  No fevers or chills.  She was agitated overnight and required Precedex drip.  She had some bradycardia with the Precedex and it was stopped this morning.  No definite active bleeding.  No dyspnea.  No nausea or vomiting.  Patient thinks she is ready to go home.    History taken from: Patient    PMH/FH/Social History were reviewed and updated appropriately in the electronic medical record.     Review of Systems:    Review of 14 systems was completed with positives and pertinent negatives noted in the subjective section.  All other systems reviewed and are negative.         Objective     Vital Signs  Temp:  [96.5 °F (35.8 °C)-98.2 °F (36.8 °C)] 97.6 °F (36.4 °C)  Heart Rate:  [39-80] 61  Resp:   [16-30] 18  BP: ()/() 135/104  09/10 0701 - 09/11 0700  In: 1982.4 [P.O.:170; I.V.:732.4]  Out: 700 [Urine:700]  Body mass index is 33.6 kg/m².     Physical Exam:     Constitutional:   Alert, cooperative, in no acute distress   Head:   Normocephalic, without obvious abnormality, atraumatic   Eyes:           Lids and lashes normal, conjunctivae and sclerae normal.  No icterus, no pallor, corneas clear, PER   ENMT:  Ears appear intact with no abnormalities noted     No oral lesions, no thrush, oral mucosa moist   Neck:  No adenopathy, supple, trachea midline, no thyromegaly, no JVD   Lungs/Resp:    Normal effort, symmetric chest rise, no crepitus, clear to      auscultation bilaterally, no chest wall tenderness                Heart/CV:   Regular rhythm and normal rate, normal S1 and S2, grade 2 out of 6 right upper sternal border murmur.   Abdomen/GI:    Normal bowel sounds, no masses, no organomegaly, soft        non-tender, non-distended   :     Right groin hematoma, soft.   Extremities/MSK:  No clubbing or cyanosis.  No edema.  Normal tone.    No deformities.    Pulses:  Pulses palpable and equal bilaterally   Skin:  No bleeding, bruising or rash   Heme/Lymph:  No cervical or supraclavicular adenopathy.   Neurologic:    Psychiatric:    Moves all extremities with no obvious focal motor deficit.  Cranial nerves 2 - 12 grossly intact.  She is pleasantly confused.  Oriented x 2, normal affect.      Results Review:     I reviewed the patient's new clinical results.   Results from last 7 days   Lab Units 09/11/19  0357 09/10/19  0604 09/09/19  0914   SODIUM mmol/L 142 145 141   POTASSIUM mmol/L 3.5 4.0 4.0   CHLORIDE mmol/L 107 110* 103   CO2 mmol/L 22.0 23.0 24.0   BUN mg/dL 24* 26* 25*   CREATININE mg/dL 1.40* 1.53* 1.46*   CALCIUM mg/dL 8.7 8.4* 9.6   BILIRUBIN mg/dL  --   --  0.6   ALK PHOS U/L  --   --  67   ALT (SGPT) U/L  --   --  17   AST (SGOT) U/L  --   --  26   GLUCOSE mg/dL 141* 136* 113*      Results from last 7 days   Lab Units 09/11/19  0924 09/11/19  0357 09/10/19  1640 09/10/19  0604   WBC 10*3/mm3 8.71 9.19  --  8.60   HEMOGLOBIN g/dL 11.1* 10.6* 12.0 7.6*   HEMATOCRIT % 37.7 34.2 38.2 25.9*   PLATELETS 10*3/mm3 119* 131*  --  149               I reviewed the patient's new imaging including images and reports.  Chest x-ray 9/9/2019: Borderline cardiomegaly with prior sternotomy wires, calcified aorta chronic changes.    Medication Review:     amLODIPine 10 mg Oral Daily   aspirin 81 mg Oral Daily   atenolol 100 mg Oral Daily   clopidogrel 75 mg Oral Daily   famotidine 20 mg Oral BID   levothyroxine 88 mcg Oral Q AM   QUEtiapine 25 mg Oral Nightly   sodium chloride 10 mL Intravenous Q12H          Assessment/Plan       Stage 3 chronic kidney disease (CMS/HCC)    PAF (paroxysmal atrial fibrillation) (CMS/HCC)    Coronary artery disease involving native coronary artery of native heart without angina pectoris    Unstable angina (CMS/HCC)    Hematoma of groin, right    Acute blood loss anemia    84-year-old female status post left heart catheterization with stent placement for coronary disease he developed large groin hematoma and acute blood loss anemia.  Her hemoglobin has now essentially stabilized.  She is confused.    Plan:  1.  We will continue to monitor in the intensive care unit.  2.  Transfuse as needed.  3.  Continue monitoring hemoglobin and hematocrit.  4.  Continue thyroid replacement.  5.  Continue Pepcid.  6.  Discharge when okay with cardiology.  They plan to hold NOAC for a few days.       Nito Lafleur MD  09/11/19  2:15 PM        *. Please note that portions of this note were completed with a voice recognition program. Efforts were made to edit the dictations, but occasionally words are mistranscribed.

## 2019-09-11 NOTE — PLAN OF CARE
Problem: Patient Care Overview  Goal: Plan of Care Review  Outcome: Ongoing (interventions implemented as appropriate)   09/11/19 1853   Coping/Psychosocial   Plan of Care Reviewed With patient;family   OTHER   Outcome Summary OT completed a brief chart review. Pt Min Ax2 for bed mobility and CGA for t/fs, however limited d/t poor safety awareness and significant confusion. Recommend cont skilled IPOT POC. Recommend pt DC home w/ 24/7 assist and HH OT/PT services, however if family unable to provide 24/7 assist pt will require SNF placement d/t confusion and being a high fall risk.

## 2019-09-11 NOTE — PLAN OF CARE
Problem: Patient Care Overview  Goal: Individualization and Mutuality  Outcome: Ongoing (interventions implemented as appropriate)    Goal: Discharge Needs Assessment  Outcome: Ongoing (interventions implemented as appropriate)    Goal: Interprofessional Rounds/Family Conf  Outcome: Ongoing (interventions implemented as appropriate)      Problem: Fall Risk (Adult)  Goal: Identify Related Risk Factors and Signs and Symptoms  Outcome: Ongoing (interventions implemented as appropriate)    Goal: Absence of Fall  Outcome: Ongoing (interventions implemented as appropriate)      Problem: Pain, Chronic (Adult)  Goal: Identify Related Risk Factors and Signs and Symptoms  Outcome: Ongoing (interventions implemented as appropriate)    Goal: Acceptable Pain/Comfort Level and Functional Ability  Outcome: Ongoing (interventions implemented as appropriate)      Problem: Cardiac: ACS (Acute Coronary Syndrome) (Adult)  Goal: Signs and Symptoms of Listed Potential Problems Will be Absent, Minimized or Managed (Cardiac: ACS)  Outcome: Ongoing (interventions implemented as appropriate)      Problem: Cardiac Catheterization (Diagnostic/Interventional) (Adult)  Goal: Anesthesia/Sedation Recovery  Outcome: Ongoing (interventions implemented as appropriate)      Problem: Restraint, Nonbehavioral (Nonviolent)  Goal: Rationale and Justification  Outcome: Ongoing (interventions implemented as appropriate)    Goal: Nonbehavioral (Nonviolent) Restraint: Absence of Injury/Harm  Outcome: Ongoing (interventions implemented as appropriate)    Goal: Nonbehavioral (Nonviolent) Restraint: Achievement of Discontinuation Criteria  Outcome: Ongoing (interventions implemented as appropriate)    Goal: Nonbehavioral (Nonviolent) Restraint: Preservation of Dignity and Wellbeing  Outcome: Ongoing (interventions implemented as appropriate)      Problem: Skin Injury Risk (Adult)  Goal: Identify Related Risk Factors and Signs and Symptoms  Outcome: Ongoing  (interventions implemented as appropriate)    Goal: Skin Health and Integrity  Outcome: Ongoing (interventions implemented as appropriate)

## 2019-09-11 NOTE — NURSING NOTE
Spoke to pt's daughter on the phone about possible options for treating the pt's confusion and anxiety. The daughter agreed that she needed to be given something to calm down, as the pt was confused and becoming more paranoid and anxiety. The medications that are normally used were explained by the nurse and the daughter verbalized understanding and agreement.

## 2019-09-11 NOTE — PLAN OF CARE
Problem: Patient Care Overview  Goal: Plan of Care Review  Outcome: Ongoing (interventions implemented as appropriate)   09/11/19 0745 09/11/19 1609 09/11/19 1914   Coping/Psychosocial   Plan of Care Reviewed With --  patient --    Plan of Care Review   Progress no change --  --    OTHER   Outcome Summary --  --  Pt up to chair today, CT completed, pt refused PFT, pt became very agitated and resstless starting at approx 1600 prn medication given, spoke with Dr. Lafleur order for seroquel given, pt remained agitated and restless, VSS, WCTM

## 2019-09-11 NOTE — PLAN OF CARE
"Problem: Patient Care Overview  Goal: Plan of Care Review   09/11/19 1338   OTHER   Outcome Summary Pt progressively more confused through out the shift, escalating to the point of panic and hysteria with hallucinations, illusions, and paranoia. Dr. Cade on the unit at approx 1940 to make sure orders were in for plavix to be given every day, that the pt's home med Xarelto was not restarted, and recommended that Haldol be used to help with the pt's confusion and anxiety which could be dosed by the intensivist. IV access was lost due the pt's excessive movement and attempts to get out of bed. An attempt was made to place new access but was removed per the pt after about 30 minutes. Restraints were then ordered for bilateral upper extremities in an effort to prevent loss of access. Contacted the pt's daughter, \"GEORGE\", via her home number to make her aware of her mother's worsening confusion. She states that her mother has bad vision and \"sometimes confuses things like a pillow on the couch for a person and has full conversations with them, but there is nothing wrong with her mind.\" The nurse explained to GEORGE that the pt's safety was priority and that Dr. Cade himself had recommended Haldol. IM Haldol was given with little to no effect so the nurse contacted the NP for additional support in calming the pt. The use of Precedex was recommended once IV access was regained. The nurse placed an IV via ultrasound and started Precedex at around 2330. The patient's confusion and panic continued despite appropriate titration of Precedex and constant redirection in to combativeness and paranoia, even accusing the nurse of \"locking her up in the basement\" and \"leaving the car running in the garage to kill me with that other gas.\" Despite redirection and reassurance from the nurse, the pt continued to be confused. The NP was once again notified and ordered Seroquel 25mg which was given with applesauce. By 0300, the patient was " becoming more calm, although still quite confused, at which time Precedex was titrated down and eventually off. Overall, the pt only slept from around 0400 until after morning shift change. The nurse recommends a workup for possible Dementia, as the pt lives alone and will be discharged soon and seems to be sundowning. VSS throughout the night, UOP minimal, WCTM.

## 2019-09-11 NOTE — THERAPY EVALUATION
Acute Care - Occupational Therapy Initial Evaluation  [facility]     Patient Name: Lorna Lo  : 1934  MRN: [id number]  Today's Date: 2019  Onset of Illness/Injury or Date of Surgery: 19  Date of Referral to OT: 19  Referring Physician: MD Ciarra    Admit Date: 2019       ICD-10-CM ICD-9-CM   1. Stage 3 chronic kidney disease (CMS/[facility]) N18.3 585.3   2. Coronary artery disease involving native coronary artery of native heart without angina pectoris I25.10 414.01   3. Unstable angina (CMS/[facility]) I20.0 411.1   4. Impaired mobility and ADLs Z74.09 799.89     Patient Active Problem List   Diagnosis   • Coronary artery disease   • Essential hypertension   • Dyslipidemia   • Polymyositis (CMS/[facility])   • Dependent edema   • Stage 3 chronic kidney disease (CMS/[facility])   • Chronic anemia   • Osteopenia   • GERD (gastroesophageal reflux disease)   • PAF (paroxysmal atrial fibrillation) (CMS/[facility])   • Morbidly obese (CMS/[facility])   • Coronary artery disease involving native coronary artery of native heart without angina pectoris   • Unstable angina (CMS/[facility])   • Hematoma of groin, right   • Acute blood loss anemia     Past Medical History:   Diagnosis Date   • Chronic anemia     Chronic anemia, secondary to (CKD).    • CKD (chronic kidney disease)    • Coronary artery disease    • Dependent edema    • Dermatitis    • DVT (deep venous thrombosis) (CMS/[facility]) 2009    History of DVT in .    • Dyslipidemia    • GERD (gastroesophageal reflux disease)    • Hyperlipidemia    • Hypertension    • Osteopenia    • Polymyositis (CMS/[facility])    • Renal disorder    • Thyroid disease    • Venous stasis      Past Surgical History:   Procedure Laterality Date   • ARTERIOGRAM Bilateral 2019    Procedure: ARTERIOGRAM;  Surgeon: Pablito Cade MD;  Location: [facility] INVASIVE LOCATION;  Service: Cardiology   • CARDIAC CATHETERIZATION     • CARDIAC SURGERY      Cardiac Stents    • CORONARY ARTERY BYPASS GRAFT      • GASTRIC BYPASS            OT ASSESSMENT FLOWSHEET (last 12 hours)      Occupational Therapy Evaluation     Row Name 09/11/19 1429                   OT Evaluation Time/Intention    Subjective Information  complains of;pain  -CL        Document Type  evaluation  -CL        Mode of Treatment  occupational therapy  -CL        Patient Effort  good  -CL        Symptoms Noted During/After Treatment  none  -CL           General Information    Patient Profile Reviewed?  yes  -CL        Onset of Illness/Injury or Date of Surgery  09/09/19  -CL        Referring Physician  MD Ciarra  -CL        Prior Level of Function  independent:;all household mobility;transfer;ADL's;dressing;bathing  -CL        Equipment Currently Used at Home  cane, straight;rollator not using pTA  -CL        Existing Precautions/Restrictions  fall;other (see comments) significant confusion, impulsive  -CL        Limitations/Impairments  safety/cognitive  -CL        Risks Reviewed  patient:;LOB;nausea/vomiting;dizziness;change in vital signs;increased discomfort;lines disloged;patient and family:  -CL        Benefits Reviewed  patient and family:;improve function;increase independence;increase balance;increase strength;decrease pain;increase knowledge  -CL        Barriers to Rehab  cognitive status  -CL           Relationship/Environment    Lives With  alone Family reports can provide 24/7 assist  -CL           Resource/Environmental Concerns    Current Living Arrangements  home/apartment/condo  -CL           Cognitive Assessment/Interventions    Additional Documentation  Cognitive Assessment/Intervention (Group)  -CL           Cognitive Assessment/Intervention- PT/OT    Affect/Mental Status (Cognitive)  confused  -CL        Orientation Status (Cognition)  oriented x 3  -CL        Follows Commands (Cognition)  follows one step commands;75-90% accuracy;repetition of directions required;verbal cues/prompting required  -CL        Cognitive Function  (Cognitive)  safety deficit  -CL        Safety Deficit (Cognitive)  moderate deficit;awareness of need for assistance;impulsivity;insight into deficits/self awareness;safety precautions awareness  -CL           Bed Mobility Assessment/Treatment    Bed Mobility Assessment/Treatment  supine-sit  -CL        Supine-Sit [place] (Bed Mobility)  minimum assist (75% patient effort);2 person assist;verbal cues  -CL        Assistive Device (Bed Mobility)  bed rails;draw sheet;head of bed elevated  -CL           Functional Mobility    Functional Mobility- Comment  Defer to PT.   -CL           Transfer Assessment/Treatment    Transfer Assessment/Treatment  sit-stand transfer;stand-sit transfer  -CL           Sit-Stand Transfer    Sit-Stand [place] (Transfers)  contact guard;verbal cues  -CL        Assistive Device (Sit-Stand Transfers)  walker, front-wheeled  -CL           Stand-Sit Transfer    Stand-Sit [place] (Transfers)  contact guard;verbal cues  -CL        Assistive Device (Stand-Sit Transfers)  walker, front-wheeled  -CL           ADL Assessment/Intervention    BADL Assessment/Intervention  lower body dressing  -CL           Lower Body Dressing Assessment/Training    Lower Body Dressing [place] Level  don;shoes/slippers;dependent (less than 25% patient effort)  -CL        Lower Body Dressing Position  supine  -CL           General ROM    GENERAL ROM COMMENTS  BUE grossly WFL.   -CL           MMT (Manual Muscle Testing)    General MMT Comments  BUE observed grossly 3+/5, limited formal assessment d/t cognitive status.   -CL           Motor Assessment/Interventions    Additional Documentation  Balance (Group);Therapeutic Exercise (Group)  -CL           Balance    Balance  static sitting balance;static standing balance  -CL           Static Sitting Balance    Level of [place] (Unsupported Sitting, Static Balance)  contact guard assist  -CL        Sitting Position (Unsupported Sitting, Static  Balance)  sitting on edge of bed  -CL        Time Able to Maintain Position (Unsupported Sitting, Static Balance)  more than 5 minutes  -CL           Static Standing Balance    Level of [place] (Supported Standing, Static Balance)  contact guard assist  -CL        Assistive Device Utilized (Supported Standing, Static Balance)  walker, rolling  -CL           Sensory Assessment/Intervention    Sensory General Assessment  no sensation deficits identified  -CL           Positioning and Restraints    Pre-Treatment Position  in bed  -CL        Post Treatment Position  chair  -CL        In Chair  notified nsg;reclined;call light within reach;encouraged to call for assist;exit alarm on;with nsg;waffle cushion;legs elevated  -CL           Pain Assessment    Additional Documentation  Pain Scale 2: FACES Pre/Post-Treatment (Group)  -CL           Pain Scale 2: FACES Pre/Post-Treatment    Pain 2: FACES Scale, Pretreatment  2-->hurts little bit  -CL        Pain 2: FACES Scale, Post-Treatment  2-->hurts little bit  -CL        Pain Location 2  back  -CL        Pre/Post Treatment Pain 2 Comment  tolerated  -CL        Pain Intervention(s) 2  Repositioned;Ambulation/increased activity  -CL           Wound 09/09/19 1940 Right anterior;lateral groin Puncture    Wound - Properties Group Date first assessed: 09/09/19  -RL Time first assessed: 1940  -RL Present on Hospital Admission: N  -RL Side: Right  -RL Orientation: anterior;lateral  -RL Location: groin  -RL Primary Wound Type: Puncture  -RL Additional Comments: Cath site  -RL       Wound 09/09/19 2200 Right antecubital Puncture    Wound - Properties Group Date first assessed: 09/09/19  -RL Time first assessed: 2200  -RL Present on Hospital Admission: N  -RL Side: Right  -RL Location: antecubital  -RL Primary Wound Type: Puncture  -RL Additional Comments: Cath site  -RL       Clinical Impression (OT)    Date of Referral to OT  09/11/19  -CL        OT Diagnosis  Decreased  independence in ADLs.   -CL        Patient/Family Goals Statement (OT Eval)  Return to PLOF.   -CL        Criteria for Skilled Therapeutic Interventions Met (OT Eval)  yes;treatment indicated  -CL        Rehab Potential (OT Eval)  good, to achieve stated therapy goals  -CL        Therapy Frequency (OT Eval)  daily  -CL        Anticipated Equipment Needs at Discharge (OT)  -- TBA further  -CL        Anticipated Discharge Disposition (OT)  home with 24/7 care;home with home health  -CL           Vital Signs    Pre Systolic BP Rehab  -- VSS  -CL           OT Goals    Transfer Goal Selection (OT)  transfer, OT goal 1  -CL        Dressing Goal Selection (OT)  dressing, OT goal 1  -CL        Toileting Goal Selection (OT)  toileting, OT goal 1  -CL        Safety Awareness Goal Selection (OT)  safety awareness, OT goal 1  -CL        Additional Documentation  Safety Awareness Goal Selection (OT) (Row)  -CL           Transfer Goal 1 (OT)    Activity/Assistive Device (Transfer Goal 1, OT)  sit-to-stand/stand-to-sit;toilet  -CL        [place] Level/Cues Needed (Transfer Goal 1, OT)  supervision required;verbal cues required  -CL        Time Frame (Transfer Goal 1, OT)  by discharge;long term goal (LTG)  -CL        Progress/Outcome (Transfer Goal 1, OT)  goal ongoing  -CL           Dressing Goal 1 (OT)    Activity/Assistive Device (Dressing Goal 1, OT)  lower body dressing Don/doff socks w/ AAD  -CL        [place]/Cues Needed (Dressing Goal 1, OT)  minimum assist (75% or more patient effort);verbal cues required  -CL        Time Frame (Dressing Goal 1, OT)  by discharge;long term goal (LTG)  -CL        Progress/Outcome (Dressing Goal 1, OT)  goal ongoing  -CL           Toileting Goal 1 (OT)    Activity/Device (Toileting Goal 1, OT)  adjust/manage clothing;perform perineal hygiene  -CL        [place] Level/Cues Needed (Toileting Goal 1, OT)  minimum assist (75% or more patient effort);verbal cues required  -CL         Time Frame (Toileting Goal 1, OT)  by discharge;long term goal (LTG)  -CL        Progress/Outcome (Toileting Goal 1, OT)  goal ongoing  -CL           Safety Awareness Goal 1 (OT)    Activity (Safety Awareness Goal 1, OT)  impulse control;follow through of safety precautions;demonstrates understanding;demonstration of safe behaviors  -CL        [place]/Cues/Accuracy (Safety Awareness Goal 1, OT)  with minimum;physical/tactile cues;verbal cues/redirection;with 90% accuracy  -CL        Time Frame (Safety Awareness Goal 1, OT)  by discharge;long term goal (LTG)  -CL        Progress/Outcome (Safety Awareness Goal 1, OT)  goal ongoing  -CL          User Key  (r) = Recorded By, (t) = Taken By, (c) = Cosigned By    Initials Name Effective Dates    RL Lauren Montano RN 06/16/16 -     CL Marilee Reese OT 04/03/18 -          Occupational Therapy Education     Title: PT OT SLP Therapies (In Progress)     Topic: Occupational Therapy (In Progress)     Point: ADL training (In Progress)     Description: Instruct learner(s) on proper safety adaptation and remediation techniques during self care or transfers.   Instruct in proper use of assistive devices.    Learning Progress Summary           Patient Acceptance, E, NR by CL at 9/11/2019  2:29 PM   Family Acceptance, E, NR by CL at 9/11/2019  2:29 PM                   Point: Precautions (In Progress)     Description: Instruct learner(s) on prescribed precautions during self-care and functional transfers.    Learning Progress Summary           Patient Acceptance, E, NR by CL at 9/11/2019  2:29 PM   Family Acceptance, E, NR by CL at 9/11/2019  2:29 PM                   Point: Body mechanics (In Progress)     Description: Instruct learner(s) on proper positioning and spine alignment during self-care, functional mobility activities and/or exercises.    Learning Progress Summary           Patient Acceptance, E, NR by CL at 9/11/2019  2:29 PM   Family Acceptance, E, NR by CL at  9/11/2019  2:29 PM                               User Key     Initials Effective Dates Name Provider Type Discipline    CL 04/03/18 -  Marilee Reese OT Occupational Therapist OT                  OT Recommendation and Plan  Outcome Summary/Treatment Plan (OT)  Anticipated Equipment Needs at Discharge (OT): (TBA further)  Anticipated Discharge Disposition (OT): home with 24/7 care, home with home health  Therapy Frequency (OT Eval): daily  Plan of Care Review  Plan of Care Reviewed With: patient, family  Plan of Care Reviewed With: patient, family  Outcome Summary: OT completed a brief chart review. Pt Min Ax2 for bed mobility and CGA for t/fs, however limited d/t poor safety awareness and significant confusion. Recommend cont skilled IPOT POC. Recommend pt DC home w/ 24/7 assist and HH OT/PT services, however if family unable to provide 24/7 assist pt will require SNF placement d/t confusion and being a high fall risk.     Outcome Measures     Row Name 09/11/19 1429             How much help from another is currently needed...    Putting on and taking off regular lower body clothing?  2  -CL      Bathing (including washing, rinsing, and drying)  2  -CL      Toileting (which includes using toilet bed pan or urinal)  2  -CL      Putting on and taking off regular upper body clothing  3  -CL      Taking care of personal grooming (such as brushing teeth)  3  -CL      Eating meals  4  -CL      AM-PAC 6 Clicks Score (OT)  16  -CL         Functional Assessment    Outcome Measure Options  AM-PAC 6 Clicks Daily Activity (OT)  -CL        User Key  (r) = Recorded By, (t) = Taken By, (c) = Cosigned By    Initials Name Provider Type    CL Marilee Reese OT Occupational Therapist          Time Calculation:   Time Calculation- OT     Row Name 09/11/19 1429             Time Calculation- OT    OT Start Time  1429  -CL      OT Received On  09/11/19  -CL      OT Goal Re-Cert Due Date  09/21/19  -CL        User Key  (r) = Recorded By, (t)  = Taken By, (c) = Cosigned By    Initials Name Provider Type    CL Marilee Reese OT Occupational Therapist        Therapy Charges for Today     Code Description Service Date Service Provider Modifiers Qty    [id number] HC OT EVAL LOW COMPLEXITY 4 9/11/2019 Marilee Reese OT GO 1               Marilee Reese OT  9/11/2019

## 2019-09-11 NOTE — THERAPY EVALUATION
Patient Name: Lorna Lo  : 1934    MRN: [id number]                              Today's Date: 2019       Admit Date: 2019    Visit Dx:     ICD-10-CM ICD-9-CM   1. Stage 3 chronic kidney disease (CMS/[facility]) N18.3 585.3   2. Coronary artery disease involving native coronary artery of native heart without angina pectoris I25.10 414.01   3. Unstable angina (CMS/[facility]) I20.0 411.1   4. Impaired mobility and ADLs Z74.09 799.89     Patient Active Problem List   Diagnosis   • Coronary artery disease   • Essential hypertension   • Dyslipidemia   • Polymyositis (CMS/HCC)   • Dependent edema   • Stage 3 chronic kidney disease (CMS/HCC)   • Chronic anemia   • Osteopenia   • GERD (gastroesophageal reflux disease)   • PAF (paroxysmal atrial fibrillation) (CMS/[facility])   • Morbidly obese (CMS/[facility])   • Coronary artery disease involving native coronary artery of native heart without angina pectoris   • Unstable angina (CMS/[facility])   • Hematoma of groin, right   • Acute blood loss anemia     Past Medical History:   Diagnosis Date   • Chronic anemia     Chronic anemia, secondary to (CKD).    • CKD (chronic kidney disease)    • Coronary artery disease    • Dependent edema    • Dermatitis    • DVT (deep venous thrombosis) (CMS/[facility]) 2009    History of DVT in .    • Dyslipidemia    • GERD (gastroesophageal reflux disease)    • Hyperlipidemia    • Hypertension    • Osteopenia    • Polymyositis (CMS/HCC)    • Renal disorder    • Thyroid disease    • Venous stasis      Past Surgical History:   Procedure Laterality Date   • ARTERIOGRAM Bilateral 2019    Procedure: ARTERIOGRAM;  Surgeon: Pablito Cade MD;  Location: [facility] INVASIVE LOCATION;  Service: Cardiology   • CARDIAC CATHETERIZATION     • CARDIAC SURGERY      Cardiac Stents    • CORONARY ARTERY BYPASS GRAFT     • GASTRIC BYPASS       General Information     Row Name 19 1604          PT Evaluation Time/Intention    Document Type  evaluation  -KR      Mode of Treatment  physical therapy  -KR     Row Name 09/11/19 1604          General Information    Patient Profile Reviewed?  yes  -KR     Prior Level of Function  independent:;all household mobility;transfer;ADL's;dressing;bathing  -KR     Existing Precautions/Restrictions  fall;other (see comments) significant confusion; impulsive; macular degeneration  -KR     Barriers to Rehab  cognitive status  -KR     Row Name 09/11/19 1604          Relationship/Environment    Lives With  alone  -KR     Row Name 09/11/19 1604          Resource/Environmental Concerns    Current Living Arrangements  home/apartment/condo  -KR     Row Name 09/11/19 1604          Home Main Entrance    Number of Stairs, Main Entrance  -- pt is poor historian; TBA later  -KR     Row Name 09/11/19 1604          Cognitive Assessment/Intervention- PT/OT    Orientation Status (Cognition)  oriented to;person;place;time  -KR     Row Name 09/11/19 1604          Safety Issues, Functional Mobility    Safety Issues Affecting Function (Mobility)  ability to follow commands;awareness of need for assistance;impulsivity;insight into deficits/self awareness;safety precaution awareness;safety precautions follow-through/compliance  -KR     Impairments Affecting Function (Mobility)  balance;cognition;coordination;endurance/activity tolerance;strength  -KR       User Key  (r) = Recorded By, (t) = Taken By, (c) = Cosigned By    Initials Name Provider Type    KR Donita Ulrich PT Physical Therapist        Mobility     Row Name 09/11/19 1606          Bed Mobility Assessment/Treatment    Bed Mobility Assessment/Treatment  supine-sit  -KR     Supine-Sit [place] (Bed Mobility)  minimum assist (75% patient effort);2 person assist;verbal cues  -KR     Assistive Device (Bed Mobility)  draw sheet;head of bed elevated  -KR     Comment (Bed Mobility)  VC's for sequencing. Pt required assistance at trunk and BLEs. Required increased time to complete due to poor command  following and increased confusion.   -KR     Row Name 09/11/19 1606          Transfer Assessment/Treatment    Comment (Transfers)  VC's for sequencing and increased safety awareness. Pt impulsive; attempting to stand prior to instruction.   -KR     Row Name 09/11/19 1606          Sit-Stand Transfer    Sit-Stand [place] (Transfers)  contact guard;verbal cues  -KR     Assistive Device (Sit-Stand Transfers)  walker, front-wheeled  -KR     Row Name 09/11/19 1606          Gait/Stairs Assessment/Training    Gait/Stairs Assessment/Training  gait/ambulation assistive device  -KR     [place] Level (Gait)  contact guard;1 person assist;1 person to manage equipment;verbal cues  -KR     Assistive Device (Gait)  walker, front-wheeled  -KR     Distance in Feet (Gait)  250  -KR     Pattern (Gait)  step-through  -KR     Deviations/Abnormal Patterns (Gait)  base of support, narrow;elma decreased;stride length decreased  -KR     Bilateral Gait Deviations  forward flexed posture;heel strike decreased  -KR     Comment (Gait/Stairs)  Pt demonstrated step through gait pattern with slow elma and decreased step length. Pt required periods of verbal and tactile cues to manage RW. VC's for direction changes to avoid obstacles. Pt denied any c/o pain during ambulation.   -KR       User Key  (r) = Recorded By, (t) = Taken By, (c) = Cosigned By    Initials Name Provider Type    Donita Carter, PT Physical Therapist        Obj/Interventions     Row Name 09/11/19 1608          General ROM    GENERAL ROM COMMENTS  BLE WFL   -KR     Row Name 09/11/19 1608          MMT (Manual Muscle Testing)    General MMT Comments  BLE grossly 3+/5; formal assessment limited due to cognitive status  -KR     Row Name 09/11/19 1608          Static Sitting Balance    Level of [place] (Unsupported Sitting, Static Balance)  contact guard assist  -KR     Sitting Position (Unsupported Sitting, Static Balance)  sitting in chair  -KR     Row Name  09/11/19 1608          Static Standing Balance    Level of [place] (Supported Standing, Static Balance)  contact guard assist  -KR     Assistive Device Utilized (Supported Standing, Static Balance)  walker, rolling  -KR     Row Name 09/11/19 1608          Dynamic Standing Balance    Level of [place], Reaches Outside Midline (Standing, Dynamic Balance)  contact guard assist  -KR     Assistive Device Utilized (Supported Standing, Dynamic Balance)  walker, rolling  -KR     Row Name 09/11/19 1608          Sensory Assessment/Intervention    Sensory General Assessment  -- unable to formally assess due to cognitive status   -KR       User Key  (r) = Recorded By, (t) = Taken By, (c) = Cosigned By    Initials Name Provider Type    Donita Carter, PT Physical Therapist        Goals/Plan     Row Name 09/11/19 1610          Bed Mobility Goal 1 (PT)    Activity/Assistive Device (Bed Mobility Goal 1, PT)  bed mobility activities, all  -KR     [place] Level/Cues Needed (Bed Mobility Goal 1, PT)  supervision required  -KR     Time Frame (Bed Mobility Goal 1, PT)  2 weeks  -KR     Progress/Outcomes (Bed Mobility Goal 1, PT)  goal ongoing  -KR     Row Name 09/11/19 1610          Transfer Goal 1 (PT)    Activity/Assistive Device (Transfer Goal 1, PT)  sit-to-stand/stand-to-sit;bed-to-chair/chair-to-bed;walker, rolling  -KR     [place] Level/Cues Needed (Transfer Goal 1, PT)  supervision required  -KR     Time Frame (Transfer Goal 1, PT)  2 weeks  -KR     Progress/Outcome (Transfer Goal 1, PT)  goal ongoing  -KR     Row Name 09/11/19 1610          Gait Training Goal 1 (PT)    Activity/Assistive Device (Gait Training Goal 1, PT)  gait (walking locomotion);assistive device use;walker, rolling  -KR     [place] Level (Gait Training Goal 1, PT)  standby assist  -KR     Distance (Gait Goal 1, PT)  300 feet  -KR     Time Frame (Gait Training Goal 1, PT)  2 weeks  -KR     Progress/Outcome (Gait Training Goal 1, PT)   goal ongoing  -KR       User Key  (r) = Recorded By, (t) = Taken By, (c) = Cosigned By    Initials Name Provider Type    Donita Carter, PT Physical Therapist        Clinical Impression     Row Name 09/11/19 1609          Pain Assessment    Additional Documentation  Pain Scale: Numbers Pre/Post-Treatment (Group)  -KR     Row Name 09/11/19 1609          Pain Scale: Numbers Pre/Post-Treatment    Pain Scale: Numbers, Pretreatment  0/10 - no pain  -KR     Pain Scale: Numbers, Post-Treatment  0/10 - no pain  -KR     Row Name 09/11/19 1609          Plan of Care Review    Plan of Care Reviewed With  patient  -KR     Row Name 09/11/19 1609          Physical Therapy Clinical Impression    Patient/Family Goals Statement (PT Clinical Impression)  return to PLOF  -KR     Criteria for Skilled Interventions Met (PT Clinical Impression)  yes;treatment indicated  -KR     Rehab Potential (PT Clinical Summary)  good, to achieve stated therapy goals  -KR     Row Name 09/11/19 1609          Vital Signs    Pre Systolic BP Rehab  164  -KR     Pre Treatment Diastolic BP  67  -KR     Post Systolic BP Rehab  165  -KR     Post Treatment Diastolic BP  70  -KR     Pretreatment Heart Rate (beats/min)  76  -KR     Posttreatment Heart Rate (beats/min)  73  -KR     Pre SpO2 (%)  98  -KR     O2 Delivery Pre Treatment  room air  -KR     Post SpO2 (%)  98  -KR     O2 Delivery Post Treatment  room air  -KR     Pre Patient Position  Supine  -KR     Intra Patient Position  Standing  -KR     Post Patient Position  Sitting  -KR     Row Name 09/11/19 1609          Positioning and Restraints    Pre-Treatment Position  in bed  -KR     Post Treatment Position  chair  -KR     In Chair  notified nsg;reclined;call light within reach;encouraged to call for assist;exit alarm on;RUE elevated;LUE elevated;legs elevated  -KR       User Key  (r) = Recorded By, (t) = Taken By, (c) = Cosigned By    Initials Name Provider Type    Donita Carter, PT Physical  Therapist        Outcome Measures     Row Name 09/11/19 1613          How much help from another person do you currently need...    Turning from your back to your side while in flat bed without using bedrails?  3  -KR     Moving from lying on back to sitting on the side of a flat bed without bedrails?  3  -KR     Moving to and from a bed to a chair (including a wheelchair)?  3  -KR     Standing up from a chair using your arms (e.g., wheelchair, bedside chair)?  3  -KR     Climbing 3-5 steps with a railing?  2  -KR     To walk in hospital room?  3  -KR     AM-PAC 6 Clicks Score (PT)  17  -KR     Row Name 09/11/19 1613          Functional Assessment    Outcome Measure Options  AM-PAC 6 Clicks Basic Mobility (PT)  -KR       User Key  (r) = Recorded By, (t) = Taken By, (c) = Cosigned By    Initials Name Provider Type    Donita Carter, PT Physical Therapist        Physical Therapy Education     Title: PT OT SLP Therapies (In Progress)     Topic: Physical Therapy (In Progress)     Point: Mobility training (In Progress)     Learning Progress Summary           Patient Acceptance, E, NR by NICHO at 9/11/2019  2:52 PM                   Point: Body mechanics (In Progress)     Learning Progress Summary           Patient Acceptance, E, NR by NICHO at 9/11/2019  2:52 PM                   Point: Precautions (In Progress)     Learning Progress Summary           Patient Acceptance, E, NR by KR at 9/11/2019  2:52 PM                               User Key     Initials Effective Dates Name Provider Type Discipline    NICHO 04/03/18 -  Donita Ulrich, PT Physical Therapist PT              PT Recommendation and Plan  Planned Therapy Interventions (PT Eval): balance training, bed mobility training, gait training, strengthening, transfer training  Outcome Summary/Treatment Plan (PT)  Anticipated Discharge Disposition (PT): home with 24/7 care, home with home health  Plan of Care Reviewed With: patient  Outcome Summary: PT initial evaluation  completed for pt presenting with significant confusion, generalized weakness, and decreased functional mobility. Pt ambulated 250ft with RW and CGA 1+1. Pt's decreased independence warrants PT skilled care. Recommend D/C home with 24/7 care and HH OT/PT services.      Time Calculation:   PT Charges     Row Name 09/11/19 1452             Time Calculation    Start Time  1452  -KR      PT Received On  09/11/19  -KR      PT Goal Re-Cert Due Date  09/21/19  -KR        User Key  (r) = Recorded By, (t) = Taken By, (c) = Cosigned By    Initials Name Provider Type    Donita Carter, PT Physical Therapist        Therapy Charges for Today     Code Description Service Date Service Provider Modifiers Qty    36647484467 HC PT EVAL MOD COMPLEXITY 4 9/11/2019 Donita Ulrich, PT GP 1          PT G-Codes  Outcome Measure Options: AM-PAC 6 Clicks Basic Mobility (PT)  AM-PAC 6 Clicks Score (PT): 17  AM-PAC 6 Clicks Score (OT): 16    Bernadette Ulrich PT  9/11/2019

## 2019-09-11 NOTE — PROGRESS NOTES
"Clinical Nutrition Note      Patient Name: Lorna Lo  MRN: [id number]  Admission date: 9/9/2019      Multidisciplinary Rounds    Additional information obtained during MDR:  RN reports pt confused over night required precedex and haldol, she is now lethargic , sleep thru breakfast. cardiology ordered US of groin hematoma as H/H is dropping; she has rec'd 3u PRBCs    Current diet: Diet Regular; Cardiac    Pertinent medical data reviewed  No nutrition risk identified on nursing screen; MST score \"0\"    Intervention:  Plan of care and goals reviewed    Monitor:  RD to follow per protocol      Mariah Mcdonald MS,RD,LD  09/11/19 2:16 PM  Time: 20 mins       "

## 2019-09-12 PROBLEM — R41.0 DELIRIUM, ACUTE: Status: ACTIVE | Noted: 2019-09-12

## 2019-09-12 LAB — POTASSIUM BLD-SCNC: 4.2 MMOL/L (ref 3.5–5.2)

## 2019-09-12 PROCEDURE — 25010000002 HALOPERIDOL LACTATE PER 5 MG: Performed by: NURSE PRACTITIONER

## 2019-09-12 PROCEDURE — 99233 SBSQ HOSP IP/OBS HIGH 50: CPT | Performed by: INTERNAL MEDICINE

## 2019-09-12 PROCEDURE — 99232 SBSQ HOSP IP/OBS MODERATE 35: CPT | Performed by: INTERNAL MEDICINE

## 2019-09-12 PROCEDURE — 84132 ASSAY OF SERUM POTASSIUM: CPT | Performed by: INTERNAL MEDICINE

## 2019-09-12 RX ORDER — ATENOLOL 100 MG/1
TABLET ORAL
Qty: 90 TABLET | Refills: 1 | Status: SHIPPED | OUTPATIENT
Start: 2019-09-12 | End: 2020-03-16

## 2019-09-12 RX ORDER — HALOPERIDOL 5 MG/ML
2 INJECTION INTRAMUSCULAR
Status: DISCONTINUED | OUTPATIENT
Start: 2019-09-12 | End: 2019-09-13 | Stop reason: HOSPADM

## 2019-09-12 RX ORDER — CHOLECALCIFEROL (VITAMIN D3) 125 MCG
5 CAPSULE ORAL NIGHTLY
Status: DISCONTINUED | OUTPATIENT
Start: 2019-09-12 | End: 2019-09-13 | Stop reason: HOSPADM

## 2019-09-12 RX ORDER — RIVASTIGMINE 4.6 MG/24H
1 PATCH, EXTENDED RELEASE TRANSDERMAL DAILY
Status: DISCONTINUED | OUTPATIENT
Start: 2019-09-12 | End: 2019-09-13

## 2019-09-12 RX ORDER — QUETIAPINE FUMARATE 100 MG/1
100 TABLET, FILM COATED ORAL NIGHTLY
Status: DISCONTINUED | OUTPATIENT
Start: 2019-09-12 | End: 2019-09-13

## 2019-09-12 RX ORDER — FOLIC ACID 1 MG/1
TABLET ORAL
Qty: 30 TABLET | Refills: 2 | OUTPATIENT
Start: 2019-09-12 | End: 2019-09-13 | Stop reason: HOSPADM

## 2019-09-12 RX ADMIN — SODIUM CHLORIDE, PRESERVATIVE FREE 10 ML: 5 INJECTION INTRAVENOUS at 20:48

## 2019-09-12 RX ADMIN — MELATONIN TAB 5 MG 5 MG: 5 TAB at 20:48

## 2019-09-12 RX ADMIN — FAMOTIDINE 20 MG: 20 TABLET ORAL at 20:48

## 2019-09-12 RX ADMIN — QUETIAPINE FUMARATE 100 MG: 100 TABLET ORAL at 20:48

## 2019-09-12 RX ADMIN — AMLODIPINE BESYLATE 10 MG: 10 TABLET ORAL at 08:10

## 2019-09-12 RX ADMIN — RIVASTIGMINE TRANSDERMAL SYSTEM 1 PATCH: 4.6 PATCH, EXTENDED RELEASE TRANSDERMAL at 18:51

## 2019-09-12 RX ADMIN — FAMOTIDINE 20 MG: 20 TABLET ORAL at 08:10

## 2019-09-12 RX ADMIN — LEVOTHYROXINE SODIUM 88 MCG: 88 TABLET ORAL at 05:54

## 2019-09-12 RX ADMIN — HALOPERIDOL LACTATE 1 MG: 5 INJECTION, SOLUTION INTRAMUSCULAR at 08:10

## 2019-09-12 RX ADMIN — ATENOLOL 100 MG: 50 TABLET ORAL at 08:10

## 2019-09-12 RX ADMIN — CLOPIDOGREL BISULFATE 75 MG: 75 TABLET ORAL at 08:10

## 2019-09-12 RX ADMIN — ASPIRIN 81 MG: 81 TABLET, COATED ORAL at 08:10

## 2019-09-12 NOTE — PROGRESS NOTES
"  [facility] at [facility]  PROGRESS NOTE    Date of Admission: 9/9/2019  Length of Stay: 3  Primary Care Physician: Vanesa Correa DO    Chief Complaint: f/u CAD with [place], confusion   Problem List:   1.  Coronary artery disease:   a.  Coronary artery bypass grafting, 1990.  b. Left heart catheterization 2010, at [facility], IDB.  Nonobstructive.    c. Increased  shortness of air, summer 2015.   d. [facility] 11/2015- patent LIMA to LAD and SVG to RCA. No myocardium in jeopardy. Normal LVEF   e. History of statin \"intolerances\".  f. [facility] for [place] 9/9/19: 2/2 patent grafts, high degree stenosis in proximal ramus intermedians, treated with PTCA and ZES  g. Echo 9/9/19: EF 80%, mild-mod LVH  2. Hypertension.   3. Paroxysmal atrial fibrillation, Winter 2017.  a. Chads-vasc=5, NOAC instituted by PCP.  4. Dyslipidemia.   5. Polymyositis.   6. Dependent edema.    7. CKD.   8. Chronic anemia, secondary to (CKD).   9. Osteopenia.    10. GERD.   11. History of DVT in 2009.   12. Cognitive impairment with confusion during ICU admission 09/2019    Subjective      Patient is confused this morning, oriented only to herself. Not oriented to time or place. Daughter is present and states she cannot take care of her mother at discharge. CM is following and working on disposition. Patient reports she feels better, no chest pain or discomfort.       Objective   Vitals: /89   Pulse 84   Temp 98.1 °F (36.7 °C) (Axillary)   Resp 20   Ht 165.1 cm (65\")   Wt 86.9 kg (191 lb 9.3 oz)   LMP  (LMP Unknown)   SpO2 98%   BMI 31.88 kg/m²     Physical Exam:  GENERAL: Alert, cooperative, in no acute distress.   HEENT: Normocephalic, no jugular venous distention  HEART: No discrete PMI is noted. Irregular rhythm, normal rate, and no murmurs, gallops, or rubs.   LUNGS: Clear to auscultation bilaterally. No wheezing, rales or rhonchi.  ABDOMEN: Soft, bowel sounds present, non-tender   NEUROLOGIC: No focal " abnormalities involving strength or sensation are noted. Pleasantly confused    EXTREMITIES: No clubbing, cyanosis. Right groin large ecchymosis, no bleeding.     Results:  Results from last 7 days   Lab Units 09/11/19  0924 09/11/19  0357 09/10/19  1640 09/10/19  0604   WBC 10*3/mm3 8.71 9.19  --  8.60   HEMOGLOBIN g/dL 11.1* 10.6* 12.0 7.6*   HEMATOCRIT % 37.7 34.2 38.2 25.9*   PLATELETS 10*3/mm3 119* 131*  --  149     Results from last 7 days   Lab Units 09/12/19  0742 09/11/19  0357 09/10/19  0604 09/09/19  0914   SODIUM mmol/L  --  142 145 141   POTASSIUM mmol/L 4.2 3.5 4.0 4.0   CHLORIDE mmol/L  --  107 110* 103   CO2 mmol/L  --  22.0 23.0 24.0   BUN mg/dL  --  24* 26* 25*   CREATININE mg/dL  --  1.40* 1.53* 1.46*   GLUCOSE mg/dL  --  141* 136* 113*      Lab Results   Component Value Date    CHOL 218 (H) 09/09/2019    TRIG 140 09/09/2019    HDL 64 (H) 09/09/2019     (H) 09/09/2019    AST 26 09/09/2019    ALT 17 09/09/2019     Results from last 7 days   Lab Units 09/09/19  0914   HEMOGLOBIN A1C % 6.50*     Results from last 7 days   Lab Units 09/09/19  0914   CHOLESTEROL mg/dL 218*   TRIGLYCERIDES mg/dL 140   HDL CHOL mg/dL 64*   LDL CHOL mg/dL 126*       Intake/Output Summary (Last 24 hours) at 9/12/2019 0927  Last data filed at 9/11/2019 1200  Gross per 24 hour   Intake 720 ml   Output --   Net 720 ml       I personally reviewed the patient's EKG/Telemetry data    Radiology Data:   CT Chest Hi resolution 9/11/19:  IMPRESSION:     Overall findings indeterminate for usual interstitial pneumonia without  convincing CT evidence for honeycombing. There are mild subpleural  reticulations and mild bronchiolectasis involving the bilateral lower  lobes in a basilar predominant pattern. There are additional findings  suggestive of granulomatous disease throughout the mediastinum, hilum,  and lungs consistent with sequela of chronic granulomatous disease.     R finesse duplex 9/11/19:   Interpretation Summary      · No evidence of right groin pseudoaneurysm or AV fistula.  · Hematoma is noted without any evidence of abnormal flow.        Current Medications:    amLODIPine 10 mg Oral Daily   aspirin 81 mg Oral Daily   atenolol 100 mg Oral Daily   clopidogrel 75 mg Oral Daily   famotidine 20 mg Oral BID   levothyroxine 88 mcg Oral Q AM   QUEtiapine 25 mg Oral Nightly   sodium chloride 10 mL Intravenous Q12H          Assessment and Plan:   1.  Right groin hematoma              -  Developed post PCI.              -  Associated blood loss.  S/P 3units total PRBC.                -  stable and no evidence of pseduo by duplex yesterday    2.  Coronary Artery Disease              -  Dyspnea with PND              -  S/P LHC 9/9:  2/2 patent bypass grafts.  Very high degree stenosis in the proximal ramus intermediate ends artery that is associated with an abnormal RFR.  This was treated with PTCA and the placement of a zotarolimus eluding stent.              -  Continue DAPT, trial Livalo at time of DC.     3. Paroxysmal Atrial Fibrillation              -   CHADS2 Vasc =  5.              -  Xarelto held 48 hours prior to cath - holding for now. Will restart AM.              -  2 EKGs reviewed. Appears to be in AFib with controlled rates.      4.  CKD              -  Slight increase in Cr post cath. She also received 20mg IV Lasix p blood.               -  Labs reviewed today - Cr 1.4 (baseline).      5.  Essential Hypertension              -  Stable throughout hospitalization.              -  Continue to monitor.     6.  Confusion              -  Possible sundowning               -  Will need to Follow up with PCP as outpatient.   - this remains patient's largest problem at this time as she is not safe for discharge home by herself at this time    Disposition: Patient is medically ready for discharge today. CM working with patient and family regarding disposition. She may resume Xarelto at discharge and continue Plavix. Will stop  ASA per WOEST protocol. She will follow up with Dr. Cade in 3 months, sooner if needed.     It is my understanding that the family will take her home for care on 9/13. No family in room ar time of my visit.    Electronically signed by Omayra Chen PA-C, 09/12/19, 9:36 AM.

## 2019-09-12 NOTE — CONSULTS
Neurology    Referring provider:   Pablito Cade MD  [address] [facility]  [address]  [place] [address]    Reason for Consultation: Agitation    Chief complaint: Sedated not awake and    History of present illness: 84-year-old woman seen at the request of Dr. Willis for agitation.  The patient had a heart cath day before yesterday developed a large hematoma.  Yesterday she became extremely agitated.  According to the family she is not slept for 4 days.  She received 50 mg of Seroquel last night without benefit.  She also received a milligram of Haldol and finally went to sleep this morning about 2 hours ago.    Her son tells me that his mother lives independently but sleeps poorly.  She wanders at night and has short-term memory issues.  He is gotten significantly worse recently.    She is still bright enough to recognize phone scans which apparently, regular basis with people asking her for Social Security number.  Her usual responses tell me her Social Security number.    She is with a shortness of breath and congestive heart failure and is under the care of Dr. Cade.    She had a stent with her coronary angiogram today.        Review of Systems: Patient is sedated not awake    Home meds:   Medications Prior to Admission   Medication Sig Dispense Refill Last Dose   • amLODIPine (NORVASC) 10 MG tablet Take 1 tablet by mouth Daily. 90 tablet 1 9/9/2019 at 0600   • aspirin 81 MG EC tablet Take 81 mg by mouth daily.   Past Week at Unknown time   • cholecalciferol (VITAMIN D3) 1000 UNITS tablet Take 1,000 Units by mouth Daily.   9/9/2019 at 0600   • folic acid (FOLVITE) 1 MG tablet Take 1 tablet by mouth Daily. 90 tablet 1 9/9/2019 at 0600   • furosemide (LASIX) 20 MG tablet Take 20 mg by mouth Every Other Day. Since august 7th (she had this at home)   9/8/2019 at Unknown time   • levothyroxine (SYNTHROID, LEVOTHROID) 88 MCG tablet Take 1 tablet by mouth Daily. 90 tablet 1 9/9/2019 at 0600   • potassium chloride  (K-DUR,KLOR-CON) 20 MEQ CR tablet Take 20 mEq by mouth Every Other Day. With LAsix   2019 at Unknown time   • predniSONE (DELTASONE) 5 MG tablet 2 (Two) Times a Day As Needed.  3 2019 at 0600   • raNITIdine (ZANTAC) 150 MG tablet Take 1 tablet by mouth 2 (Two) Times a Day. 90 tablet 1 2019 at 0600   • XARELTO 20 MG tablet TAKE 1 TABLET BY MOUTH ONCE DAILY 90 tablet 3 2019       History  Past Medical History:   Diagnosis Date   • Chronic anemia     Chronic anemia, secondary to (CKD).    • CKD (chronic kidney disease)    • Coronary artery disease    • Dependent edema    • Dermatitis    • DVT (deep venous thrombosis) (CMS/[facility])     History of DVT in .    • Dyslipidemia    • GERD (gastroesophageal reflux disease)    • Hyperlipidemia    • Hypertension    • Osteopenia    • Polymyositis (CMS/[facility])    • Renal disorder    • Thyroid disease    • Venous stasis    ,   Past Surgical History:   Procedure Laterality Date   • ARTERIOGRAM Bilateral 2019    Procedure: ARTERIOGRAM;  Surgeon: Pablito Cade MD;  Location:  [facility];  Service: Cardiology   • CARDIAC CATHETERIZATION     • CARDIAC CATHETERIZATION N/A 2019    Procedure: Left Heart Cath;  Surgeon: Pablito Cade MD;  Location:  [facility];  Service: Cardiology   • CARDIAC SURGERY      Cardiac Stents    • CORONARY ARTERY BYPASS GRAFT     • GASTRIC BYPASS     ,   Family History   Problem Relation Age of Onset   • Heart attack Father    ,   Social History     Tobacco Use   • Smoking status: Former Smoker     Types: Cigarettes     Last attempt to quit: 1960     Years since quittin.7   • Smokeless tobacco: Never Used   • Tobacco comment: tried it in highschool   Substance Use Topics   • Alcohol use: No   • Drug use: No    and Allergies:  Lortab [hydrocodone-acetaminophen]; Methotrexate derivatives; Amoxicillin; Azathioprine; Cellcept [mycophenolate]; Contrast dye; Fosamax [alendronate];  "Hydrochlorothiazide; Lisinopril; Methotrexate; Simvastatin; Bactrim [sulfamethoxazole-trimethoprim]; Chlorthalidone; and Ciprofloxacin hcl,    Vital Signs   Blood pressure 122/62, pulse 70, temperature 98.8 °F (37.1 °C), temperature source Axillary, resp. rate 24, height 165.1 cm (65\"), weight 86.9 kg (191 lb 9.3 oz), SpO2 97 %, not currently breastfeeding.  Body mass index is 31.88 kg/m².    Physical Exam:   General: Sleeping              Head: Not examined              Neck: Not examined              Resp: Not examined              Cor: Not examined              Extremities: Not examined              skin: Not examined              Neuro: Not examined  Results Review: Labs reviewed    Labs:  Lab Results (last 72 hours)     Procedure Component Value Units Date/Time    Potassium [[id number]]  (Normal) Collected:  09/12/19 0742    Specimen:  Blood Updated:  09/12/19 0838     Potassium 4.2 mmol/L     CBC (No Diff) [[id number]]  (Abnormal) Collected:  09/11/19 0924    Specimen:  Blood Updated:  09/11/19 0956     WBC 8.71 10*3/mm3      RBC 4.14 10*6/mm3      Hemoglobin 11.1 g/dL      Hematocrit 37.7 %      MCV 91.1 fL      MCH 26.8 pg      MCHC 29.4 g/dL      RDW 16.4 %      RDW-SD 55.2 fl      MPV 9.9 fL      Platelets 119 10*3/mm3     Basic Metabolic Panel [[id number]]  (Abnormal) Collected:  09/11/19 0357    Specimen:  Blood Updated:  09/11/19 0554     Glucose 141 mg/dL      BUN 24 mg/dL      Creatinine 1.40 mg/dL      Sodium 142 mmol/L      Potassium 3.5 mmol/L      Chloride 107 mmol/L      CO2 22.0 mmol/L      Calcium 8.7 mg/dL      eGFR Non African Amer 36 mL/min/1.73      BUN/Creatinine Ratio 17.1     Anion Gap 13.0 mmol/L     Narrative:       GFR Normal >60  Chronic Kidney Disease <60  Kidney Failure <15    CBC (No Diff) [[id number]]  (Abnormal) Collected:  09/11/19 0357    Specimen:  Blood Updated:  09/11/19 0548     WBC 9.19 10*3/mm3      RBC 4.03 10*6/mm3      Hemoglobin 10.6 g/dL      Hematocrit 34.2 %      MCV " 84.9 fL      MCH 26.3 pg      MCHC 31.0 g/dL      RDW 16.4 %      RDW-SD 51.8 fl      MPV 10.7 fL      Platelets 131 10*3/mm3     Hemoglobin & Hematocrit, Blood [[id number]]  (Normal) Collected:  09/10/19 1640    Specimen:  Blood Updated:  09/10/19 1658     Hemoglobin 12.0 g/dL      Hematocrit 38.2 %     Basic Metabolic Panel [[id number]]  (Abnormal) Collected:  09/10/19 0604    Specimen:  Blood Updated:  09/10/19 0655     Glucose 136 mg/dL      BUN 26 mg/dL      Creatinine 1.53 mg/dL      Sodium 145 mmol/L      Potassium 4.0 mmol/L      Chloride 110 mmol/L      CO2 23.0 mmol/L      Calcium 8.4 mg/dL      eGFR Non African Amer 32 mL/min/1.73      BUN/Creatinine Ratio 17.0     Anion Gap 12.0 mmol/L     Narrative:       GFR Normal >60  Chronic Kidney Disease <60  Kidney Failure <15    CBC (No Diff) [[id number]]  (Abnormal) Collected:  09/10/19 0604    Specimen:  Blood Updated:  09/10/19 0644     WBC 8.60 10*3/mm3      RBC 2.90 10*6/mm3      Hemoglobin 7.6 g/dL      Hematocrit 25.9 %      MCV 89.3 fL      MCH 26.2 pg      MCHC 29.3 g/dL      RDW 14.8 %      RDW-SD 48.2 fl      MPV 10.9 fL      Platelets 149 10*3/mm3     Hemoglobin & Hematocrit, Blood [[id number]]  (Abnormal) Collected:  09/10/19 0131    Specimen:  Blood Updated:  09/10/19 0154     Hemoglobin 7.8 g/dL      Hematocrit 26.9 %     Hemoglobin & Hematocrit, Blood [[id number]]  (Abnormal) Collected:  09/09/19 2144    Specimen:  Blood Updated:  09/09/19 2216     Hemoglobin 9.0 g/dL      Hematocrit 30.6 %     CBC (No Diff) [[id number]]  (Abnormal) Collected:  09/09/19 2035    Specimen:  Blood Updated:  09/09/19 2104     WBC 8.81 10*3/mm3      RBC 3.38 10*6/mm3      Hemoglobin 8.9 g/dL      Hematocrit 30.1 %      MCV 89.1 fL      MCH 26.3 pg      MCHC 29.6 g/dL      RDW 14.7 %      RDW-SD 47.8 fl      MPV 10.6 fL      Platelets 165 10*3/mm3     POC Activated Clotting Time [[id number]]  (Abnormal) Collected:  09/09/19 1257    Specimen:  Blood Updated:  09/09/19 1936      Activated Clotting Time  246 Seconds      Comment: Serial Number: [id number]:  [id number]             Rads:  Imaging Results (last 72 hours)     Procedure Component Value Units Date/Time    CT Chest Hi Resolution [[id number]] Collected:  09/11/19 1624     Updated:  09/1934    Narrative:       EXAMINATION: CT CHEST HIGH-RESOLUTION - 09/11/2019     INDICATION: Interstitial lung disease.     TECHNIQUE: High-resolution CT protocol was utilized to evaluate for  underlying interstitial lung disease. Please note the patient was an ICU  patient and therefore could not lay prone for examination.     The radiation dose reduction device was turned on for each scan per the  ALARA (As Low as Reasonably Achievable) protocol.     COMPARISON: CT of the chest 12/29/2017,     FINDINGS: High-resolution CT protocol imaging demonstrates nonspecific  subpleural groundglass changes and bibasilar subpleural reticulations  most pronounced involving the bilateral lower lobes with associated mild  bronchial wall thickening. Scattered calcified and noncalcified  pulmonary nodules are suggested, although difficult to evaluate on this  high-resolution protocol CT. No dominant pulmonary mass is identified.  No pleural effusion or pneumothorax.     The thyroid does not demonstrate abnormality. No axillary or mediastinal  lymphadenopathy. Post surgical changes to the aorta are identified.  Heavy coronary arterial calcifications of the native vasculature. Mitral  annular calcifications are identified. Cholelithiasis is noted.  Visualized upper abdomen is otherwise unremarkable. Prominence of the  superior pole of the left kidney is noted and incompletely evaluated as  was previously identified on the CT of the chest from 12/29/2017 and  seen on the CT of the abdomen and pelvis performed 04/12/2018. Scattered  calcified mediastinal lymph nodes and hilar lymph nodes noted.  Postsurgical changes to the sternum identified. Degenerative changes  of  bilateral shoulders are noted.       Impression:          Overall findings indeterminate for usual interstitial pneumonia without  convincing CT evidence for honeycombing. There are mild subpleural  reticulations and mild bronchiolectasis involving the bilateral lower  lobes in a basilar predominant pattern. There are additional findings  suggestive of granulomatous disease throughout the mediastinum, hilum,  and lungs consistent with sequela of chronic granulomatous disease.     DICTATED:   09/11/2019  EDITED/ls :   09/11/2019        XR Chest 1 View [[id number]] Collected:  09/09/19 1624     Updated:  09/09/19 1811    Narrative:       EXAMINATION: XR CHEST 1 VW- 09/09/2019     INDICATION: SOA; N18.3-Chronic kidney disease, stage 3 (moderate);  I25.10-Atherosclerotic heart disease of native coronary artery without  angina pectoris; I20.0-Unstable angina      COMPARISON: 07/18/2019     FINDINGS: Portable chest reveals heart to be enlarged. Patient status  post median sternotomy.  Calcifications seen within the thoracic aorta. Mild increased markings  at lung bases. Calcified granuloma identified within the right lung  base. Degenerative changes seen within the spine. The upper lung fields  are clear.       Impression:       Heart is borderline enlarged with underlying chronic and  emphysematous changes seen in the lung fields bilaterally. Calcified  granuloma identified within the right lung base.     D:  09/09/2019  E:  09/09/2019     This report was finalized on 9/9/2019 6:07 PM by Dr. Linda Romano MD.               Assessment: Alzheimer's disease    Sleep deprivation psychosis.    Sundowning       Plan:    Exelon patch 4.6 mg daily.    Melatonin 5 mg at bedtime.    Seroquel 100 mg at bedtime.    The patient is unable to swallow she can have 2 mg of IV Haldol to repeat once nightly if agitated.    Comment:   Patient is unlikely to do well at home unless she gets some sleep in the next day or 2.    Think  she would be unmanageable at home in his current condition    Given the difficulty getting this patient is asleep but it I think it was in her best interest to wake her and examined her.    I discussed the patients findings and my recommendations with family, nursing staff and primary care team      Nicholas Hanna MD  09/12/19  3:47 PM

## 2019-09-12 NOTE — PROGRESS NOTES
"Clinical Nutrition Note      Patient Name: Lorna Lo  MRN: [id number]  Admission date: 9/9/2019      Multidisciplinary Rounds    Additional information obtained during MDR:  RN reports pt has orders for discharge; daughter insistent upon taking pt home but has changed her mind and wants placement. Pt is confused and agitated. CM manager will contact SNFs.    Current diet: Diet Regular; Cardiac    Pertinent medical data reviewed  No nutrition risk identified on nursing screen; MST score \"0\"    Intervention:  Plan of care and goals reviewed    Monitor:  RD to follow per protocol      Mariah Mcdonald MS,RD,LD  09/12/19 12:20 PM  Time: 20 mins       "

## 2019-09-12 NOTE — PROGRESS NOTES
"Continued Stay Note  [facility]     Patient Name: Lorna Lo  MRN: [id number]  Today's Date: 9/12/2019    Admit Date: 9/9/2019    Discharge Plan     Row Name 09/12/19 0930       Plan    Plan  Update    Patient/Family in Agreement with Plan  yes    Plan Comments  Met with daughter at . She states pt is \"confused\" she can not take her home today, she is interested in [facility] and [facility]. Referral given to Mary she is going to let me know if pt qualifies to go to SNF.    Mary states pt is confused, combative restraints she can not accept pt at [facility] or [facility].    Pt going home tomorrow, family will be staying with her 24/7 until she is better. [facility] will deliver BSC and wheelchair tomorrow. Spoke with Kamari with [facility] for PT/OT. Family will transport.    Final Discharge Disposition Code  03 - skilled nursing facility (SNF)        Discharge Codes    No documentation.       Expected Discharge Date and Time     Expected Discharge Date Expected Discharge Time    Sep 13, 2019             Amparo Huntley RN    "

## 2019-09-12 NOTE — PLAN OF CARE
Problem: Patient Care Overview  Goal: Plan of Care Review  Outcome: Ongoing (interventions implemented as appropriate)   09/12/19 0451 09/12/19 1600 09/12/19 1940   Coping/Psychosocial   Plan of Care Reviewed With --  family --    Plan of Care Review   Progress no change --  --    OTHER   Outcome Summary --  --  Pt remained agitated for majority of the day, neuro was consulted and seen by Dr. Hanna, new medications ordered, Dr. Hanna discussed pt condition with family, VSS, WCTM

## 2019-09-12 NOTE — PROGRESS NOTES
"Pulmonary/Critical Care Follow-up     LOS: 3 days   Patient Care Team:  Vanesa Correa DO as PCP - General (Internal Medicine)  Vanesa Correa DO as PCP - Claims Attributed    Chief Complaint: Acute blood loss anemia      Subjective    Initial history (from 9/9/2019 note):  84-year-old woman with a known history of coronary disease, remote CABG in 1990 presenting with progressive shortness of air and chest pain.    Left heart catheterization [on 9/9/19] revealed occluded LAD proximally, no disease circumflex, 70% ostial RCA, 70% mid RCA, 30% ostial PDA, 80% ramus intermedius, saphenous vein to RCA patent, LIMA to LAD, patent.  She underwent a stent to the ramus intermedius lesion.  Postprocedure she had a large right groin hematoma.  FemoStop was applied.  Right common femoral artery angiogram performed.  The common femoral artery, profundus femoris and superficial femoral arteries were all widely patent with good flow and no leak.  Patient had some flushing, hypotension with the angiogram.  She does have a history of a dye allergy.  In the ICU she developed nausea and vomiting.  She c/o some abdominal pain. She denies fever, chills    (End of copied text).    Interval History:   Patient was unable to perform pulmonary function testing yesterday due to agitation.  She did have a high-resolution CT scan done.  Her main problem currently is agitation and delirium.  She is not angry but is restless and \"picking\".  No fevers or chills.  No nausea or vomiting.  Hemodynamically she is stable.  She did not sleep at all overnight.  She was given Haldol and Seroquel.    History taken from: Patient    PMH/FH/Social History were reviewed and updated appropriately in the electronic medical record.     Review of Systems:    Review of 14 systems was completed with positives and pertinent negatives noted in the subjective section.  All other systems reviewed and are negative.         Objective     Vital Signs  Temp:  " [97.9 °F (36.6 °C)-98.8 °F (37.1 °C)] 98.8 °F (37.1 °C)  Heart Rate:  [] 79  Resp:  [16-24] 18  BP: (119-188)/() 154/74  09/11 0701 - 09/12 0700  In: 720 [P.O.:720]  Out: -   Body mass index is 31.88 kg/m².     Physical Exam:     Constitutional:    Moderate psychomotor agitation.  Not angry.   Head:   Normocephalic, without obvious abnormality, atraumatic   Eyes:           Lids and lashes normal, conjunctivae and sclerae normal.  No icterus, no pallor, corneas clear, PER   ENMT:  Ears appear intact with no abnormalities noted     No oral lesions, no thrush, oral mucosa moist   Neck:  No adenopathy, supple, trachea midline, no thyromegaly, no JVD   Lungs/Resp:    Normal effort, symmetric chest rise, no crepitus, clear to      auscultation bilaterally, no chest wall tenderness                Heart/CV:   Regular rhythm and normal rate, normal S1 and S2, grade 2 out of 6 right upper sternal border murmur.   Abdomen/GI:    Normal bowel sounds, no masses, no organomegaly, soft        non-tender, non-distended   :     Right groin hematoma, soft.   Extremities/MSK:  No clubbing or cyanosis.  No edema.  Normal tone.    No deformities.    Pulses:  Pulses palpable and equal bilaterally   Skin:  No bleeding, bruising or rash   Heme/Lymph:  No cervical or supraclavicular adenopathy.   Neurologic:      Psychiatric:    Moves all extremities with no obvious focal motor deficit.  Cranial nerves 2 - 12 grossly intact.  She is restless but pleasant.  Oriented x 1, normal affect.      Results Review:     I reviewed the patient's new clinical results.   Results from last 7 days   Lab Units 09/12/19  0742 09/11/19  0357 09/10/19  0604 09/09/19  0914   SODIUM mmol/L  --  142 145 141   POTASSIUM mmol/L 4.2 3.5 4.0 4.0   CHLORIDE mmol/L  --  107 110* 103   CO2 mmol/L  --  22.0 23.0 24.0   BUN mg/dL  --  24* 26* 25*   CREATININE mg/dL  --  1.40* 1.53* 1.46*   CALCIUM mg/dL  --  8.7 8.4* 9.6   BILIRUBIN mg/dL  --   --   --  0.6    ALK PHOS U/L  --   --   --  67   ALT (SGPT) U/L  --   --   --  17   AST (SGOT) U/L  --   --   --  26   GLUCOSE mg/dL  --  141* 136* 113*     Results from last 7 days   Lab Units 09/11/19  0924 09/11/19  0357 09/10/19  1640 09/10/19  0604   WBC 10*3/mm3 8.71 9.19  --  8.60   HEMOGLOBIN g/dL 11.1* 10.6* 12.0 7.6*   HEMATOCRIT % 37.7 34.2 38.2 25.9*   PLATELETS 10*3/mm3 119* 131*  --  149               I reviewed the patient's new imaging including images and reports.  Chest x-ray 9/9/2019: Borderline cardiomegaly with prior sternotomy wires, calcified aorta chronic changes.    High-resolution CT chest 9/11/2019:  IMPRESSION:     Overall findings indeterminate for usual interstitial pneumonia without  convincing CT evidence for honeycombing. There are mild subpleural  reticulations and mild bronchiolectasis involving the bilateral lower  lobes in a basilar predominant pattern. There are additional findings  suggestive of granulomatous disease throughout the mediastinum, hilum,  and lungs consistent with sequela of chronic granulomatous disease.    Medication Review:     amLODIPine 10 mg Oral Daily   aspirin 81 mg Oral Daily   atenolol 100 mg Oral Daily   clopidogrel 75 mg Oral Daily   famotidine 20 mg Oral BID   levothyroxine 88 mcg Oral Q AM   melatonin 5 mg Oral Nightly   QUEtiapine 100 mg Oral Nightly   rivastigmine 1 patch Transdermal Daily   sodium chloride 10 mL Intravenous Q12H          Assessment/Plan       Stage 3 chronic kidney disease (CMS/HCC)    PAF (paroxysmal atrial fibrillation) (CMS/HCC)    Coronary artery disease involving native coronary artery of native heart without angina pectoris    Unstable angina (CMS/HCC)    Hematoma of groin, right    Acute blood loss anemia    84-year-old female status post left heart catheterization with stent placement for coronary disease he developed large groin hematoma and acute blood loss anemia.  Her hemoglobin has now essentially stabilized.  Confusion is her main  issue currently.    I was asked by Dr. Cade to evaluate her shortness of breath.  He was concerned there is an underlying pulmonary issue.  The patient was unable to perform pulmonary function testing secondary to her agitation.  I did obtain a high-resolution CT scan of the chest which shows some nonspecific posterior fibrosis which is very mild and not consistent with idiopathic pulmonary fibrosis.  It was felt by the radiologist to be nonspecific.  I think the pattern may be consistent with chronic microaspiration.  The abnormalities do not appear to be severe enough to cause significant symptoms.    I consulted Dr. Hanna with neurology today due to the patient's persistent agitation.  Clearly she cannot go home and I am afraid that any medical treatment I give has equal potential to worsen her delirium.  He felt her issues were partially related to sleep deprivation psychosis.  I appreciate his assistance.    Plan:  1.  We will continue to monitor in the intensive care unit due to her delirium.  2.  Transfuse as needed.  3.  Continue monitoring hemoglobin and hematocrit.  4.  Continue thyroid replacement.  5.  Continue Pepcid.  6.  Consult neurology for delirium.  7.  High-resolution CT scan chest and PFTs ordered (HRCT done, unable to perform PFTs).  8.  Patient can follow-up in the office at some point once her delirium is resolved for full pulmonary function testing and follow-up if this is felt necessary (if her dyspnea is not significantly improved with her cardiac intervention).  9.  If able, will check urinalysis to evaluate for UTI as a cause of delirium.      Nito Lafleur MD  09/12/19  7:45 PM        *. Please note that portions of this note were completed with a voice recognition program. Efforts were made to edit the dictations, but occasionally words are mistranscribed.

## 2019-09-12 NOTE — PLAN OF CARE
Problem: Patient Care Overview  Goal: Plan of Care Review  Outcome: Ongoing (interventions implemented as appropriate)   09/12/19 0500   Coping/Psychosocial   Plan of Care Reviewed With patient   Plan of Care Review   Progress no change   OTHER   Outcome Summary Pt remains confused and restless. One dose of haldol given with little relief. Alert and oriented to self and time only. Pt remains in a.fib. VSS. Will continue to monitor.        Problem: Fall Risk (Adult)  Goal: Identify Related Risk Factors and Signs and Symptoms  Outcome: Ongoing (interventions implemented as appropriate)    Goal: Absence of Fall  Outcome: Ongoing (interventions implemented as appropriate)      Problem: Pain, Chronic (Adult)  Goal: Acceptable Pain/Comfort Level and Functional Ability  Outcome: Ongoing (interventions implemented as appropriate)      Problem: Cardiac: ACS (Acute Coronary Syndrome) (Adult)  Goal: Signs and Symptoms of Listed Potential Problems Will be Absent, Minimized or Managed (Cardiac: ACS)  Outcome: Ongoing (interventions implemented as appropriate)      Problem: Skin Injury Risk (Adult)  Goal: Identify Related Risk Factors and Signs and Symptoms  Outcome: Ongoing (interventions implemented as appropriate)    Goal: Skin Health and Integrity  Outcome: Ongoing (interventions implemented as appropriate)

## 2019-09-13 VITALS
SYSTOLIC BLOOD PRESSURE: 134 MMHG | RESPIRATION RATE: 18 BRPM | BODY MASS INDEX: 32.18 KG/M2 | OXYGEN SATURATION: 97 % | HEIGHT: 65 IN | WEIGHT: 193.12 LBS | HEART RATE: 64 BPM | DIASTOLIC BLOOD PRESSURE: 53 MMHG | TEMPERATURE: 98.5 F

## 2019-09-13 LAB
BASOPHILS # BLD AUTO: 0.04 10*3/MM3 (ref 0–0.2)
BASOPHILS NFR BLD AUTO: 0.4 % (ref 0–1.5)
BILIRUB UR QL STRIP: NEGATIVE
CLARITY UR: CLEAR
COLOR UR: YELLOW
DEPRECATED RDW RBC AUTO: 52.8 FL (ref 37–54)
EOSINOPHIL # BLD AUTO: 0.71 10*3/MM3 (ref 0–0.4)
EOSINOPHIL NFR BLD AUTO: 7.9 % (ref 0.3–6.2)
ERYTHROCYTE [DISTWIDTH] IN BLOOD BY AUTOMATED COUNT: 16.5 % (ref 12.3–15.4)
GLUCOSE UR STRIP-MCNC: NEGATIVE MG/DL
HCT VFR BLD AUTO: 40.5 % (ref 34–46.6)
HGB BLD-MCNC: 11.9 G/DL (ref 12–15.9)
HGB UR QL STRIP.AUTO: NEGATIVE
IMM GRANULOCYTES # BLD AUTO: 0.08 10*3/MM3 (ref 0–0.05)
IMM GRANULOCYTES NFR BLD AUTO: 0.9 % (ref 0–0.5)
KETONES UR QL STRIP: ABNORMAL
LEUKOCYTE ESTERASE UR QL STRIP.AUTO: NEGATIVE
LYMPHOCYTES # BLD AUTO: 1.53 10*3/MM3 (ref 0.7–3.1)
LYMPHOCYTES NFR BLD AUTO: 17.1 % (ref 19.6–45.3)
MCH RBC QN AUTO: 26.3 PG (ref 26.6–33)
MCHC RBC AUTO-ENTMCNC: 29.4 G/DL (ref 31.5–35.7)
MCV RBC AUTO: 89.6 FL (ref 79–97)
MONOCYTES # BLD AUTO: 1.14 10*3/MM3 (ref 0.1–0.9)
MONOCYTES NFR BLD AUTO: 12.8 % (ref 5–12)
NEUTROPHILS # BLD AUTO: 5.44 10*3/MM3 (ref 1.7–7)
NEUTROPHILS NFR BLD AUTO: 60.9 % (ref 42.7–76)
NITRITE UR QL STRIP: NEGATIVE
NRBC BLD AUTO-RTO: 0 /100 WBC (ref 0–0.2)
PH UR STRIP.AUTO: 5.5 [PH] (ref 5–8)
PLATELET # BLD AUTO: 151 10*3/MM3 (ref 140–450)
PMV BLD AUTO: 10.9 FL (ref 6–12)
PROT UR QL STRIP: ABNORMAL
RBC # BLD AUTO: 4.52 10*6/MM3 (ref 3.77–5.28)
SP GR UR STRIP: 1.02 (ref 1–1.03)
UROBILINOGEN UR QL STRIP: ABNORMAL
WBC NRBC COR # BLD: 8.94 10*3/MM3 (ref 3.4–10.8)

## 2019-09-13 PROCEDURE — 97530 THERAPEUTIC ACTIVITIES: CPT

## 2019-09-13 PROCEDURE — 99232 SBSQ HOSP IP/OBS MODERATE 35: CPT | Performed by: INTERNAL MEDICINE

## 2019-09-13 PROCEDURE — 99232 SBSQ HOSP IP/OBS MODERATE 35: CPT | Performed by: PHYSICIAN ASSISTANT

## 2019-09-13 PROCEDURE — 97110 THERAPEUTIC EXERCISES: CPT

## 2019-09-13 PROCEDURE — 99239 HOSP IP/OBS DSCHRG MGMT >30: CPT | Performed by: PHYSICIAN ASSISTANT

## 2019-09-13 PROCEDURE — 99232 SBSQ HOSP IP/OBS MODERATE 35: CPT | Performed by: PSYCHIATRY & NEUROLOGY

## 2019-09-13 PROCEDURE — 81003 URINALYSIS AUTO W/O SCOPE: CPT | Performed by: INTERNAL MEDICINE

## 2019-09-13 PROCEDURE — 85025 COMPLETE CBC W/AUTO DIFF WBC: CPT | Performed by: INTERNAL MEDICINE

## 2019-09-13 RX ORDER — DONEPEZIL HYDROCHLORIDE 5 MG/1
5 TABLET, FILM COATED ORAL NIGHTLY
Status: DISCONTINUED | OUTPATIENT
Start: 2019-09-13 | End: 2019-09-13 | Stop reason: HOSPADM

## 2019-09-13 RX ORDER — DONEPEZIL HYDROCHLORIDE 5 MG/1
5 TABLET, FILM COATED ORAL NIGHTLY
Qty: 30 TABLET | Refills: 0 | Status: SHIPPED | OUTPATIENT
Start: 2019-09-13 | End: 2019-10-13

## 2019-09-13 RX ADMIN — RIVASTIGMINE TRANSDERMAL SYSTEM 1 PATCH: 4.6 PATCH, EXTENDED RELEASE TRANSDERMAL at 08:47

## 2019-09-13 RX ADMIN — CLOPIDOGREL BISULFATE 75 MG: 75 TABLET ORAL at 08:46

## 2019-09-13 RX ADMIN — ATENOLOL 100 MG: 50 TABLET ORAL at 08:46

## 2019-09-13 RX ADMIN — ASPIRIN 81 MG: 81 TABLET, COATED ORAL at 08:46

## 2019-09-13 RX ADMIN — AMLODIPINE BESYLATE 10 MG: 10 TABLET ORAL at 08:46

## 2019-09-13 RX ADMIN — SODIUM CHLORIDE, PRESERVATIVE FREE 10 ML: 5 INJECTION INTRAVENOUS at 08:53

## 2019-09-13 RX ADMIN — LEVOTHYROXINE SODIUM 88 MCG: 88 TABLET ORAL at 06:21

## 2019-09-13 RX ADMIN — FAMOTIDINE 20 MG: 20 TABLET ORAL at 08:46

## 2019-09-13 NOTE — PROGRESS NOTES
Pulmonary/Critical Care Follow-up     LOS: 4 days   Patient Care Team:  Vanesa Correa DO as PCP - General (Internal Medicine)  Vanesa Correa DO as PCP - Claims Attributed    Chief Complaint: Acute blood loss anemia      Subjective    Initial history (from 9/9/2019 note):  84-year-old woman with a known history of coronary disease, remote CABG in 1990 presenting with progressive shortness of air and chest pain.    Left heart catheterization [on 9/9/19] revealed occluded LAD proximally, no disease circumflex, 70% ostial RCA, 70% mid RCA, 30% ostial PDA, 80% ramus intermedius, saphenous vein to RCA patent, LIMA to LAD, patent.  She underwent a stent to the ramus intermedius lesion.  Postprocedure she had a large right groin hematoma.  FemoStop was applied.  Right common femoral artery angiogram performed.  The common femoral artery, profundus femoris and superficial femoral arteries were all widely patent with good flow and no leak.  Patient had some flushing, hypotension with the angiogram.  She does have a history of a dye allergy.  In the ICU she developed nausea and vomiting.  She c/o some abdominal pain. She denies fever, chills    (End of copied text).    Interval History:   Patient got a good night sleep and is oriented and nonagitated today.  She is able to answer all questions appropriately.  No fevers or chills.  No nausea or vomiting.  She denies dyspnea.  She does report that she wakes up frequently at night with episodes of coughing and has gastroesophageal reflux.  She has fewer episodes of coughing if she avoids eating before going to bed.    History taken from: Patient    PMH/FH/Social History were reviewed and updated appropriately in the electronic medical record.     Review of Systems:    Review of 14 systems was completed with positives and pertinent negatives noted in the subjective section.  All other systems reviewed and are negative.         Objective     Vital Signs  Temp:  [98.1 °F  (36.7 °C)-99 °F (37.2 °C)] 98.5 °F (36.9 °C)  Heart Rate:  [56-79] 62  Resp:  [14-24] 18  BP: (108-169)/() 115/66  09/12 0701 - 09/13 0700  In: 100 [P.O.:100]  Out: 325 [Urine:325]  Body mass index is 32.14 kg/m².     Physical Exam:     Constitutional:    Awake and alert.  Nonagitated.  No acute distress   Head:   Normocephalic, without obvious abnormality, atraumatic   Eyes:           Lids and lashes normal, conjunctivae and sclerae normal.  No icterus, no pallor, corneas clear, PER   ENMT:  Ears appear intact with no abnormalities noted     No oral lesions, no thrush, oral mucosa moist   Neck:  No adenopathy, supple, trachea midline, no thyromegaly, no JVD   Lungs/Resp:    Normal effort, symmetric chest rise, no crepitus, very minimal posterior basilar rales, no chest wall tenderness                Heart/CV:   Regular rhythm and normal rate, normal S1 and S2, grade 2 out of 6 right upper sternal border murmur.   Abdomen/GI:    Normal bowel sounds, no masses, no organomegaly, soft        non-tender, non-distended   :     Right groin hematoma, soft.   Extremities/MSK:  No clubbing or cyanosis.  No edema.  Normal tone.    No deformities.    Pulses:  Pulses palpable and equal bilaterally   Skin:  No bleeding, bruising or rash   Heme/Lymph:  No cervical or supraclavicular adenopathy.   Neurologic:      Psychiatric:    Moves all extremities with no obvious focal motor deficit.  Cranial nerves 2 - 12 grossly intact.      Oriented x 3, normal affect.      Results Review:     I reviewed the patient's new clinical results.   Results from last 7 days   Lab Units 09/12/19  0742 09/11/19  0357 09/10/19  0604 09/09/19  0914   SODIUM mmol/L  --  142 145 141   POTASSIUM mmol/L 4.2 3.5 4.0 4.0   CHLORIDE mmol/L  --  107 110* 103   CO2 mmol/L  --  22.0 23.0 24.0   BUN mg/dL  --  24* 26* 25*   CREATININE mg/dL  --  1.40* 1.53* 1.46*   CALCIUM mg/dL  --  8.7 8.4* 9.6   BILIRUBIN mg/dL  --   --   --  0.6   ALK PHOS U/L  --    --   --  67   ALT (SGPT) U/L  --   --   --  17   AST (SGOT) U/L  --   --   --  26   GLUCOSE mg/dL  --  141* 136* 113*     Results from last 7 days   Lab Units 09/13/19  0403 09/11/19  0924 09/11/19  0357   WBC 10*3/mm3 8.94 8.71 9.19   HEMOGLOBIN g/dL 11.9* 11.1* 10.6*   HEMATOCRIT % 40.5 37.7 34.2   PLATELETS 10*3/mm3 151 119* 131*               I reviewed the patient's new imaging including images and reports.  Chest x-ray 9/9/2019: Borderline cardiomegaly with prior sternotomy wires, calcified aorta chronic changes.    High-resolution CT chest 9/11/2019:  IMPRESSION:     Overall findings indeterminate for usual interstitial pneumonia without  convincing CT evidence for honeycombing. There are mild subpleural  reticulations and mild bronchiolectasis involving the bilateral lower  lobes in a basilar predominant pattern. There are additional findings  suggestive of granulomatous disease throughout the mediastinum, hilum,  and lungs consistent with sequela of chronic granulomatous disease.    Medication Review:     amLODIPine 10 mg Oral Daily   aspirin 81 mg Oral Daily   atenolol 100 mg Oral Daily   clopidogrel 75 mg Oral Daily   famotidine 20 mg Oral BID   levothyroxine 88 mcg Oral Q AM   melatonin 5 mg Oral Nightly   QUEtiapine 100 mg Oral Nightly   rivaroxaban 20 mg Oral Daily With Dinner   rivastigmine 1 patch Transdermal Daily   sodium chloride 10 mL Intravenous Q12H          Assessment/Plan       Stage 3 chronic kidney disease (CMS/HCC)    PAF (paroxysmal atrial fibrillation) (CMS/HCC)    Coronary artery disease involving native coronary artery of native heart without angina pectoris    Unstable angina (CMS/HCC)    Hematoma of groin, right    Acute blood loss anemia    Delirium, acute    84-year-old female status post left heart catheterization with stent placement for coronary disease he developed large groin hematoma and acute blood loss anemia.  Her hemoglobin has now essentially stabilized.  Confusion is  her main issue currently.    I was asked by Dr. Cade to evaluate her shortness of breath.  He was concerned there is an underlying pulmonary issue.  The patient was unable to perform pulmonary function testing secondary to her agitation.  I did obtain a high-resolution CT scan of the chest which shows some nonspecific posterior fibrosis which is very mild and not consistent with idiopathic pulmonary fibrosis.  It was felt by the radiologist to be nonspecific.  I think the pattern may be consistent with chronic microaspiration.  Indeed, the patient endorses frequent episodes at night of waking up coughing consistent with episodes of aspiration.  She stated that the symptoms are improved if she does not eat before going to bed.  All in all, I think her minimal fibrotic changes are due to chronic microaspiration from reflux.      Patient's delirium is resolved today.  Urinalysis showed no evidence of urinary tract infection.    Plan:  1.  Okay for discharge from a pulmonary standpoint.  2.  Continue thyroid replacement.  3.  Continue Pepcid.  4.  I also discussed nonpharmacologic management strategies for gastroesophageal reflux disease. These include avoiding spicy foods, fatty foods, alcohol, and other foods that cause reflux symptoms. Avoiding eating or drinking within 4 hours of going to bed, and raising the head of the bed 6-8 inches on blocks.   5.  Patient can follow-up in the office at some point once her delirium is resolved for full pulmonary function testing and follow-up if this is felt necessary (if her dyspnea is not significantly improved with her cardiac intervention).      Nito Lafleur MD  09/13/19  11:17 AM        *. Please note that portions of this note were completed with a voice recognition program. Efforts were made to edit the dictations, but occasionally words are mistranscribed.

## 2019-09-13 NOTE — PLAN OF CARE
Problem: Patient Care Overview  Goal: Interprofessional Rounds/Family Conf  Outcome: Outcome(s) achieved Date Met: 09/13/19 09/13/19 0930   Interdisciplinary Rounds/Family Conf   Summary expecting discharge today. Home with family and home health services   Participants ;nursing;patient;respiratory therapy;physician       Problem: Fall Risk (Adult)  Goal: Identify Related Risk Factors and Signs and Symptoms  Outcome: Outcome(s) achieved Date Met: 09/13/19    Goal: Absence of Fall  Outcome: Outcome(s) achieved Date Met: 09/13/19      Problem: Pain, Chronic (Adult)  Goal: Identify Related Risk Factors and Signs and Symptoms  Outcome: Outcome(s) achieved Date Met: 09/13/19    Goal: Acceptable Pain/Comfort Level and Functional Ability  Outcome: Outcome(s) achieved Date Met: 09/13/19      Problem: Cardiac Catheterization (Diagnostic/Interventional) (Adult)  Goal: Signs and Symptoms of Listed Potential Problems Will be Absent, Minimized or Managed (Cardiac Catheterization)  Outcome: Outcome(s) achieved Date Met: 09/13/19    Goal: Anesthesia/Sedation Recovery  Outcome: Outcome(s) achieved Date Met: 09/13/19      Problem: Restraint, Nonbehavioral (Nonviolent)  Goal: Rationale and Justification  Outcome: Outcome(s) achieved Date Met: 09/13/19    Goal: Nonbehavioral (Nonviolent) Restraint: Preservation of Dignity and Wellbeing  Outcome: Outcome(s) achieved Date Met: 09/13/19      Problem: Skin Injury Risk (Adult)  Goal: Identify Related Risk Factors and Signs and Symptoms  Outcome: Outcome(s) achieved Date Met: 09/13/19    Goal: Skin Health and Integrity  Outcome: Outcome(s) achieved Date Met: 09/13/19

## 2019-09-13 NOTE — PROGRESS NOTES
"  [facility]  PROGRESS NOTE    Date of Admission: 9/9/2019  Length of Stay: 4  Primary Care Physician: Vanesa Correa DO    Chief Complaint: f/u CAD, right groin hematoma, confusion   Problem List:   1.  Coronary artery disease:   a.  Coronary artery bypass grafting, 1990.  b. Left heart catheterization 2010, at [facility], ID[facility].  Nonobstructive.    c. Increased  shortness of air, summer 2015.   d. [facility] 11/2015- patent LIMA to LAD and SVG to RCA. No myocardium in jeopardy. Normal LVEF   e. History of statin \"intolerances\".  f. [facility] 9/9/19: 2/2 patent grafts, high degree stenosis in proximal ramus intermedians, treated with PTCA and ZES  g. Echo 9/9/19: EF 80%, mild-mod LVH  2. Hypertension.   3. Paroxysmal atrial fibrillation, Winter 2017.  a. Chads-vasc=5, NOAC instituted by PCP.  4. Dyslipidemia.   5. Polymyositis.   6. Dependent edema.    7. CKD.   8. Chronic anemia, secondary to (CKD).   9. Osteopenia.    10. GERD.   11. History of DVT in 2009.   12. Cognitive impairment with confusion during ICU admission 09/2019  13. Chronic microaspiration, per Dr. Lafleur     Subjective      Patient seems much less confused this morning, talkative and alert. Not combative. Plan for home with family today    Objective   Vitals: /66 (BP Location: Right arm, Patient Position: Lying)   Pulse 62   Temp 98.5 °F (36.9 °C) (Axillary)   Resp 18   Ht 165.1 cm (65\")   Wt 87.6 kg (193 lb 2 oz)   LMP  (LMP Unknown)   SpO2 97%   BMI 32.14 kg/m²     Physical Exam:  GENERAL: Asleep,  in no acute distress.   HEENT: Normocephalic, no jugular venous distention  HEART: No discrete PMI is noted. Regular rhythm, normal rate, and 1/6 systolic murmur, no gallops, or rubs.   LUNGS: No wheezing, rales or rhonchi.  ABDOMEN: Soft, bowel sounds present  NEUROLOGIC: No focal abnormalities involving strength or sensation are noted.   EXTREMITIES: Right groin ecchymosis, no " bleeding    Results:  Results from last 7 days   Lab Units 09/13/19  0403 09/11/19  0924 09/11/19  0357   WBC 10*3/mm3 8.94 8.71 9.19   HEMOGLOBIN g/dL 11.9* 11.1* 10.6*   HEMATOCRIT % 40.5 37.7 34.2   PLATELETS 10*3/mm3 151 119* 131*     Results from last 7 days   Lab Units 09/12/19  0742 09/11/19  0357 09/10/19  0604 09/09/19  0914   SODIUM mmol/L  --  142 145 141   POTASSIUM mmol/L 4.2 3.5 4.0 4.0   CHLORIDE mmol/L  --  107 110* 103   CO2 mmol/L  --  22.0 23.0 24.0   BUN mg/dL  --  24* 26* 25*   CREATININE mg/dL  --  1.40* 1.53* 1.46*   GLUCOSE mg/dL  --  141* 136* 113*      Lab Results   Component Value Date    CHOL 218 (H) 09/09/2019    TRIG 140 09/09/2019    HDL 64 (H) 09/09/2019     (H) 09/09/2019    AST 26 09/09/2019    ALT 17 09/09/2019     Results from last 7 days   Lab Units 09/09/19  0914   HEMOGLOBIN A1C % 6.50*     Results from last 7 days   Lab Units 09/09/19  0914   CHOLESTEROL mg/dL 218*   TRIGLYCERIDES mg/dL 140   HDL CHOL mg/dL 64*   LDL CHOL mg/dL 126*       Intake/Output Summary (Last 24 hours) at 9/13/2019 0932  Last data filed at 9/12/2019 2000  Gross per 24 hour   Intake 0 ml   Output 325 ml   Net -325 ml     I personally reviewed the patient's EKG/Telemetry data    Current Medications:    amLODIPine 10 mg Oral Daily   aspirin 81 mg Oral Daily   atenolol 100 mg Oral Daily   clopidogrel 75 mg Oral Daily   famotidine 20 mg Oral BID   levothyroxine 88 mcg Oral Q AM   melatonin 5 mg Oral Nightly   QUEtiapine 100 mg Oral Nightly   rivaroxaban 20 mg Oral Daily With Dinner   rivastigmine 1 patch Transdermal Daily   sodium chloride 10 mL Intravenous Q12H          Assessment and Plan:   1.  Right groin hematoma              -  Developed post PCI.              -  Associated blood loss.  S/P 3units total PRBC.                -  stable and no evidence of pseduo by duplex   -  H& H remains stable      2.  Coronary Artery Disease              -  Dyspnea with PND              -  S/P LHC 9/9:  2/2  patent bypass grafts.  Very high degree stenosis in the proximal ramus intermediate ends artery that is associated with an abnormal RFR.  This was treated with PTCA and the placement of a zotarolimus eluding stent.              -  Continue DAPT, trial Livalo at time of DC.     3. Paroxysmal Atrial Fibrillation              -  CHADS2 Vasc =  5.              -  Xarelto held 48 hours prior to cath - holding for now. Will restart AM.              -  now in NSR this morning      4.  CKD              -  Slight increase in Cr post cath. She also received 20mg IV Lasix p blood.               -  Labs reviewed today - Cr 1.4 (baseline).      5.  Essential Hypertension              -  Stable throughout hospitalization.              -  Continue to monitor.     6.  Confusion              -  Possible sundowning               -  Will need to Follow up with PCP as outpatient.              -  Appreciate Dr. Hanna's input   - patient is improved this morning and plan for discharge home with family today       Electronically signed by Omayra Chen PA-C, 09/13/19, 9:39 AM.

## 2019-09-13 NOTE — PLAN OF CARE
Problem: Patient Care Overview  Goal: Plan of Care Review  Outcome: Ongoing (interventions implemented as appropriate)   09/13/19 9038   Coping/Psychosocial   Plan of Care Reviewed With patient   Plan of Care Review   Progress improving   OTHER   Outcome Summary Pt remains confused, but much more pleasant and cooperative. Alert and oriented to person and time only. After receiving seroquel and melatonin pt was able to relax and sleep between care. Plan today is to possibly discharge patient home with family. Vitals have been stable. Will continue to monitor.        Problem: Fall Risk (Adult)  Goal: Identify Related Risk Factors and Signs and Symptoms  Outcome: Ongoing (interventions implemented as appropriate)    Goal: Absence of Fall  Outcome: Ongoing (interventions implemented as appropriate)      Problem: Pain, Chronic (Adult)  Goal: Acceptable Pain/Comfort Level and Functional Ability  Outcome: Ongoing (interventions implemented as appropriate)      Problem: Cardiac: ACS (Acute Coronary Syndrome) (Adult)  Goal: Signs and Symptoms of Listed Potential Problems Will be Absent, Minimized or Managed (Cardiac: ACS)  Outcome: Ongoing (interventions implemented as appropriate)

## 2019-09-13 NOTE — PROGRESS NOTES
"Adult Nutrition  Assessment/PES    Patient Name:  Lorna Lo  YOB: 1934  MRN: [id number]  Admit Date:  9/9/2019    Assessment Date:  9/13/2019    Comments:      Reason for Assessment     Row Name 09/13/19 1320          Reason for Assessment    Reason For Assessment  other (see comments) MDR/15\"         Nutrition/Diet History     Row Name 09/13/19 1320          Nutrition/Diet History    Typical Food/Fluid Intake  NURSING REPORTS PLANS TO D/C HOME TODAY         Anthropometrics     Row Name 09/13/19 0800          Anthropometrics    Weight  87.6 kg (193 lb 2 oz)                         Problem/Interventions:                  Education/Evaluation     Row Name 09/13/19 1320          Monitor/Evaluation    Monitor  Per protocol           Electronically signed by:  Radha Allen RD  09/13/19 1:20 PM  "

## 2019-09-13 NOTE — THERAPY DISCHARGE NOTE
Patient Name: Lorna Lo  : 1934    MRN: [id number]                              Today's Date: 2019       Admit Date: 2019    Visit Dx:     ICD-10-CM ICD-9-CM   1. Stage 3 chronic kidney disease (CMS/[facility]) N18.3 585.3   2. Coronary artery disease involving native coronary artery of native heart without angina pectoris I25.10 414.01   3. Unstable angina (CMS/[facility]) I20.0 411.1   4. Impaired mobility and ADLs Z74.09 799.89     Patient Active Problem List   Diagnosis   • Coronary artery disease   • Essential hypertension   • Dyslipidemia   • Polymyositis (CMS/[facility])   • Dependent edema   • Stage 3 chronic kidney disease (CMS/[facility])   • Chronic anemia   • Osteopenia   • GERD (gastroesophageal reflux disease)   • PAF (paroxysmal atrial fibrillation) (CMS/[facility])   • Morbidly obese (CMS/[facility])   • Coronary artery disease involving native coronary artery of native heart without angina pectoris   • Unstable angina (CMS/[facility])   • Hematoma of groin, right   • Acute blood loss anemia   • Delirium, acute     Past Medical History:   Diagnosis Date   • Chronic anemia     Chronic anemia, secondary to (CKD).    • CKD (chronic kidney disease)    • Coronary artery disease    • Dependent edema    • Dermatitis    • DVT (deep venous thrombosis) (CMS/[facility]) 2009    History of DVT in .    • Dyslipidemia    • GERD (gastroesophageal reflux disease)    • Hyperlipidemia    • Hypertension    • Osteopenia    • Polymyositis (CMS/[facility])    • Renal disorder    • Thyroid disease    • Venous stasis      Past Surgical History:   Procedure Laterality Date   • ARTERIOGRAM Bilateral 2019    Procedure: ARTERIOGRAM;  Surgeon: Pablito Cade MD;  Location:  [facility];  Service: Cardiology   • CARDIAC CATHETERIZATION     • CARDIAC CATHETERIZATION N/A 2019    Procedure: Left Heart Cath;  Surgeon: Pablito Cade MD;  Location:  [facility];  Service: Cardiology   • CARDIAC SURGERY      Cardiac  Stents    • CORONARY ARTERY BYPASS GRAFT     • GASTRIC BYPASS       General Information     Row Name 09/13/19 1146          PT Evaluation Time/Intention    Document Type  therapy note (daily note)  -DM     Mode of Treatment  individual therapy;physical therapy  -DM       User Key  (r) = Recorded By, (t) = Taken By, (c) = Cosigned By    Initials Name Provider Type    Nanci Chiang, PT Physical Therapist        Mobility     Row Name 09/13/19 1146          Bed Mobility Assessment/Treatment    Comment (Bed Mobility)  UIC  -DM     Row Name 09/13/19 1146          Transfer Assessment/Treatment    Comment (Transfers)  decl. d/t just mobilizing w/OT  -DM     Row Name 09/13/19 1146          Gait/Stairs Assessment/Training    Comment (Gait/Stairs)  DECL. d/t just amb w/ OT, & preparing for d/c home  -DM       User Key  (r) = Recorded By, (t) = Taken By, (c) = Cosigned By    Initials Name Provider Type    Nanci Chiang, PT Physical Therapist        Obj/Interventions     Row Name 09/13/19 1146          Therapeutic Exercise    Upper Extremity Range of Motion (Therapeutic Exercise)  shoulder flexion/extension, bilateral;shoulder abduction/adduction, bilateral;elbow flexion/extension, bilateral  -DM     Lower Extremity (Therapeutic Exercise)  gluteal sets;hamstring sets, bilateral;heel slides, bilateral;marching while seated;LAQ (long arc quad), bilateral;quad sets, bilateral;SAQ (short arc quad), bilateral;SLR (straight leg raise), bilateral  -DM     Lower Extremity Range of Motion (Therapeutic Exercise)  hip internal/external rotation, bilateral;hip abduction/adduction, bilateral;ankle dorsiflexion/plantar flexion, bilateral abdom sets; pillow squeezes  -DM     Exercise Type (Therapeutic Exercise)  AAROM (active assistive range of motion);AROM (active range of motion);isometric contraction, static  -DM     Position (Therapeutic Exercise)  seated  -DM     Sets/Reps (Therapeutic Exercise)  1/10  -DM     Expected Outcome  (Therapeutic Exercise)  improve functional stability;improve functional tolerance, single extremity activity  -DM     Comment (Therapeutic Exercise)  rests btwn sets; ret to issue HEP/instruct  -DM     Row Name 09/13/19 1146          Static Sitting Balance    Level of [place] (Unsupported Sitting, Static Balance)  contact guard assist  -DM     Sitting Position (Unsupported Sitting, Static Balance)  sitting in chair  -DM     Time Able to Maintain Position (Unsupported Sitting, Static Balance)  4 to 5 minutes  -DM     Comment (Unsupported Sitting, Static Balance)  LE exer; TRUNK EXT; PLB  -DM     Row Name 09/13/19 1146          Dynamic Sitting Balance    Level of [place], Reaches Outside Midline (Sitting, Dynamic Balance)  minimal assist, 75% patient effort  -DM     Sitting Position, Reaches Outside Midline (Sitting, Dynamic Balance)  sitting in chair  -DM     Comment, Reaches Outside Midline (Sitting, Dynamic Balance)  RECIP SCOOTING/WS  -DM       User Key  (r) = Recorded By, (t) = Taken By, (c) = Cosigned By    Initials Name Provider Type    DM Nanci Grant, PT Physical Therapist        Goals/Plan     Row Name 09/13/19 1146          Bed Mobility Goal 1 (PT)    Activity/Assistive Device (Bed Mobility Goal 1, PT)  bed mobility activities, all  -DM     [place] Level/Cues Needed (Bed Mobility Goal 1, PT)  supervision required  -DM     Time Frame (Bed Mobility Goal 1, PT)  2 weeks  -DM     Progress/Outcomes (Bed Mobility Goal 1, PT)  goal not met  -DM     Row Name 09/13/19 1146          Transfer Goal 1 (PT)    Activity/Assistive Device (Transfer Goal 1, PT)  bed-to-chair/chair-to-bed;walker, rolling  -DM     [place] Level/Cues Needed (Transfer Goal 1, PT)  supervision required  -DM     Time Frame (Transfer Goal 1, PT)  2 weeks  -DM     Progress/Outcome (Transfer Goal 1, PT)  goal not met  -DM     Row Name 09/13/19 1146          Gait Training Goal 1 (PT)    Activity/Assistive Device (Gait Training  Goal 1, PT)  gait (walking locomotion);assistive device use;walker, rolling  -DM     [place] Level (Gait Training Goal 1, PT)  standby assist  -DM     Distance (Gait Goal 1, PT)  300  -DM     Time Frame (Gait Training Goal 1, PT)  2 weeks  -DM     Progress/Outcome (Gait Training Goal 1, PT)  goal not met  -DM     Row Name 09/13/19 1146          Patient Education Goal (PT)    Activity (Patient Education Goal, PT)  HEP exer  -DM     [place]/Cues/Accuracy (Memory Goal 2, PT)  demonstrates adequately;verbalizes understanding  -DM     Time Frame (Patient Education Goal, PT)  short term goal (STG);1 day  -DM     Progress/Outcome (Patient Education Goal, PT)  goal met  -DM       User Key  (r) = Recorded By, (t) = Taken By, (c) = Cosigned By    Initials Name Provider Type    Nanci Chiang, PT Physical Therapist        Clinical Impression     Row Name 09/13/19 1146          Pain Assessment    Additional Documentation  Pain Scale: FACES Pre/Post-Treatment (Group)  -DM     Row Name 09/13/19 1146          Pain Scale: Numbers Pre/Post-Treatment    Pain Location  back  -DM     Pain Intervention(s)  Repositioned;Rest  -DM     Row Name 09/13/19 1146          Pain Scale: FACES Pre/Post-Treatment    Pain: FACES Scale, Pretreatment  0-->no hurt  -DM     Pain: FACES Scale, Post-Treatment  2-->hurts little bit  -DM     Row Name 09/13/19 1146          Plan of Care Review    Plan of Care Reviewed With  patient  -DM     Row Name 09/13/19 1146          Vital Signs    Pre Systolic BP Rehab  147  -DM     Pre Treatment Diastolic BP  71  -DM     Post Systolic BP Rehab  154  -DM     Post Treatment Diastolic BP  68  -DM     Pretreatment Heart Rate (beats/min)  65  -DM     Intratreatment Heart Rate (beats/min)  69  -DM     Posttreatment Heart Rate (beats/min)  68  -DM     Pre SpO2 (%)  98  -DM     O2 Delivery Pre Treatment  room air  -DM     O2 Delivery Intra Treatment  room air  -DM     Post SpO2 (%)  97  -DM     O2 Delivery Post  Treatment  room air  -DM     Pre Patient Position  Sitting  -DM     Intra Patient Position  Sitting  -DM     Post Patient Position  Sitting  -DM     Row Name 09/13/19 1146          Positioning and Restraints    Pre-Treatment Position  sitting in chair/recliner  -DM     Post Treatment Position  chair  -DM     In Chair  notified nsg;reclined;call light within reach;encouraged to call for assist;legs elevated  -DM       User Key  (r) = Recorded By, (t) = Taken By, (c) = Cosigned By    Initials Name Provider Type    Nanci Chiang, PT Physical Therapist        Outcome Measures     Row Name 09/13/19 1146          How much help from another person do you currently need...    Turning from your back to your side while in flat bed without using bedrails?  3  -DM     Moving from lying on back to sitting on the side of a flat bed without bedrails?  3  -DM     Moving to and from a bed to a chair (including a wheelchair)?  3  -DM     Standing up from a chair using your arms (e.g., wheelchair, bedside chair)?  3  -DM     Climbing 3-5 steps with a railing?  2  -DM     To walk in hospital room?  3  -DM     AM-PAC 6 Clicks Score (PT)  17  -DM     Row Name 09/13/19 1146          Functional Assessment    Outcome Measure Options  AM-PAC 6 Clicks Basic Mobility (PT)  -DM       User Key  (r) = Recorded By, (t) = Taken By, (c) = Cosigned By    Initials Name Provider Type    Nanci Chiang, PT Physical Therapist        Physical Therapy Education     Title: PT OT SLP Therapies (In Progress)     Topic: Physical Therapy (Done)     Point: Mobility training (Done)     Learning Progress Summary           Patient CASEY Montero D, H, DU, VU by ALISSA at 9/13/2019 12:20 PM    CASEY Sher NR by NICHO at 9/11/2019  2:52 PM                   Point: Home exercise program (Done)     Learning Progress Summary           Patient CASEY Montero D, H, DU, VU by ALISSA at 9/13/2019 12:20 PM                   Point: Body mechanics (Done)     Learning Progress Summary            Patient Winston, CASEY,ANNA,H, DU,VU by DM at 9/13/2019 12:20 PM    Acceptance, E, NR by KR at 9/11/2019  2:52 PM                   Point: Precautions (Done)     Learning Progress Summary           Patient Winston, CASEY,D,H, DU,VU by DM at 9/13/2019 12:20 PM    Acceptance, E, NR by KR at 9/11/2019  2:52 PM                               User Key     Initials Effective Dates Name Provider Type Discipline    DM 06/19/15 -  Nanci Grant, PT Physical Therapist PT    KR 04/03/18 -  Donita Ulrich, PT Physical Therapist PT              PT Recommendation and Plan  Planned Therapy Interventions (PT Eval): balance training, bed mobility training, gait training, home exercise program, patient/family education, transfer training, strengthening  Outcome Summary/Treatment Plan (PT)  Anticipated Discharge Disposition (PT): home with 24/7 care, home with home health  Plan of Care Reviewed With: patient  Progress: improving  Outcome Summary: Able to perform sit bal. activ & ther ex all extrem + HEP instruction, but decl mobil. d/t just amb w/ OT, & Preparing for d/c; met HEP goal but not mobil goals; limited by low HGB (11.9), intermitt back pain & fatigue; Will have HHPT f/u & 24/7 care @ home     Time Calculation:   PT Charges     Row Name 09/13/19 1223             Time Calculation    Start Time  1146  -DM      PT Received On  09/13/19  -DM      PT Goal Re-Cert Due Date  09/21/19  -DM         Time Calculation- PT    Total Timed Code Minutes- PT  20 minute(s)  -DM         Timed Charges    37572 - PT Therapeutic Exercise Minutes  20  -DM        User Key  (r) = Recorded By, (t) = Taken By, (c) = Cosigned By    Initials Name Provider Type    DM Nanci Grant, PT Physical Therapist        Therapy Charges for Today     Code Description Service Date Service Provider Modifiers Qty    10267817018 HC PT THER PROC EA 15 MIN 9/13/2019 Nanci Grant, PT GP 1          PT G-Codes  Outcome Measure Options: AM-PAC 6 Clicks Basic Mobility  (PT)  AM-PAC 6 Clicks Score (PT): 17  AM-PAC 6 Clicks Score (OT): 16    PT Discharge Summary  Anticipated Discharge Disposition (PT): home with 24/7 care, home with home health    Nanci Grant, PT  9/13/2019

## 2019-09-13 NOTE — PROGRESS NOTES
Continued Stay Note  [facility]     Patient Name: Lorna Lo  MRN: [id number]  Today's Date: 9/13/2019    Admit Date: 9/9/2019    Discharge Plan     Row Name 09/13/19 1048       Plan    Plan Comments  Pts family has decided to take pt home with [facility]. This has been arranged.     Final Discharge Disposition Code  06 - home with home health care        Discharge Codes    No documentation.       Expected Discharge Date and Time     Expected Discharge Date Expected Discharge Time    Sep 13, 2019             Vanesa Andrews RN

## 2019-09-13 NOTE — PROGRESS NOTES
Neurology       Patient Care Team:  Vanesa Correa DO as PCP - General (Internal Medicine)  Vanesa Correa DO as PCP - Claims Attributed    Chief complaint: Delirium    History: Patient slept well last night with melatonin 5 and Seroquel 100.    She is now awake and alert and oriented.    She is ambulating with a walker and is ready to go home.      Past Medical History:   Diagnosis Date   • Chronic anemia     Chronic anemia, secondary to (CKD).    • CKD (chronic kidney disease)    • Coronary artery disease    • Dependent edema    • Dermatitis    • DVT (deep venous thrombosis) (CMS/[facility]) 2009    History of DVT in 2009.    • Dyslipidemia    • GERD (gastroesophageal reflux disease)    • Hyperlipidemia    • Hypertension    • Osteopenia    • Polymyositis (CMS/[facility])    • Renal disorder    • Thyroid disease    • Venous stasis        Vital Signs   Vitals:    09/13/19 0846 09/13/19 0900 09/13/19 1000 09/13/19 1100   BP: 115/66 142/72 132/54 147/71   BP Location: Right arm      Patient Position: Lying      Pulse: 62 62 64 68   Resp: 18      Temp:       TempSrc:       SpO2:  96% 95% 98%   Weight:       Height:           Physical Exam:   General: Pleasant alert and oriented              Neuro: Patient is oriented to person and place.  She cannot come up with Dr. Cade's name.    She has a September but can come up with a year.    Speech is articulate.    Coordination is normal and finger-nose.  Facial movement and sensation are normal.  Eye movements are full with normal fundi.    Reflexes are 1+ and equal bilaterally.    Motor testing shows normal  and no pronator drift.    Sensory testing is normal    Results Review:  Reviewed  Results from last 7 days   Lab Units 09/13/19  0403   WBC 10*3/mm3 8.94   HEMOGLOBIN g/dL 11.9*   HEMATOCRIT % 40.5   PLATELETS 10*3/mm3 151     Results from last 7 days   Lab Units 09/12/19  0742 09/11/19  0357 09/10/19  0604 09/09/19  0914   SODIUM mmol/L  --  142 145 141   POTASSIUM  mmol/L 4.2 3.5 4.0 4.0   CHLORIDE mmol/L  --  107 110* 103   CO2 mmol/L  --  22.0 23.0 24.0   BUN mg/dL  --  24* 26* 25*   CREATININE mg/dL  --  1.40* 1.53* 1.46*   CALCIUM mg/dL  --  8.7 8.4* 9.6   BILIRUBIN mg/dL  --   --   --  0.6   ALK PHOS U/L  --   --   --  67   ALT (SGPT) U/L  --   --   --  17   AST (SGOT) U/L  --   --   --  26   GLUCOSE mg/dL  --  141* 136* 113*       Imaging Results (last 24 hours)     Procedure Component Value Units Date/Time    CT Chest Hi Resolution [[id number]] Collected:  09/11/19 1624     Updated:  09/13/19 0756    Narrative:       EXAMINATION: CT CHEST HIGH-RESOLUTION - 09/11/2019     INDICATION: Interstitial lung disease.     TECHNIQUE: High-resolution CT protocol was utilized to evaluate for  underlying interstitial lung disease. Please note the patient was an ICU  patient and therefore could not lay prone for examination.     The radiation dose reduction device was turned on for each scan per the  ALARA (As Low as Reasonably Achievable) protocol.     COMPARISON: CT of the chest 12/29/2017,     FINDINGS: High-resolution CT protocol imaging demonstrates nonspecific  subpleural groundglass changes and bibasilar subpleural reticulations  most pronounced involving the bilateral lower lobes with associated mild  bronchial wall thickening. Scattered calcified and noncalcified  pulmonary nodules are suggested, although difficult to evaluate on this  high-resolution protocol CT. No dominant pulmonary mass is identified.  No pleural effusion or pneumothorax.     The thyroid does not demonstrate abnormality. No axillary or mediastinal  lymphadenopathy. Post surgical changes to the aorta are identified.  Heavy coronary arterial calcifications of the native vasculature. Mitral  annular calcifications are identified. Cholelithiasis is noted.  Visualized upper abdomen is otherwise unremarkable. Prominence of the  superior pole of the left kidney is noted and incompletely evaluated as  was  previously identified on the CT of the chest from 12/29/2017 and  seen on the CT of the abdomen and pelvis performed 04/12/2018. Scattered  calcified mediastinal lymph nodes and hilar lymph nodes noted.  Postsurgical changes to the sternum identified. Degenerative changes of  bilateral shoulders are noted.       Impression:          Overall findings indeterminate for usual interstitial pneumonia without  convincing CT evidence for honeycombing. There are mild subpleural  reticulations and mild bronchiolectasis involving the bilateral lower  lobes in a basilar predominant pattern. There are additional findings  suggestive of granulomatous disease throughout the mediastinum, hilum,  and lungs consistent with sequela of chronic granulomatous disease.     DICTATED:   09/11/2019  EDITED/ls :   09/11/2019      This report was finalized on 9/13/2019 7:53 AM by Dr. Durga Alfaro MD.             Assessment:  Sleep deprivation psychosis, resolved    Mild cognitive impairment versus early Alzheimer's disease    Plan:  Aricept 5 mg daily.    DC Exelon patch.    Melatonin 5 mg nightly.    Seroquel lower doses can be used as needed as an outpatient with follow-up provided by Dr. Cade her primary care    Outpatient evaluation with Dr. Dillon Triplett for memory loss    Comment:  Dramatic improvement but is still showing some mild signs of memory impairment.  She should improve significantly being in her normal environment         I discussed the patients findings and my recommendations with patient and nursing staff    Nicholas Hanna MD  09/13/19  12:07 PM

## 2019-09-13 NOTE — THERAPY TREATMENT NOTE
Acute Care - Occupational Therapy Treatment Note  [facility]     Patient Name: Lorna Lo  : 1934  MRN: [id number]  Today's Date: 2019  Onset of Illness/Injury or Date of Surgery: 19  Date of Referral to OT: 19  Referring Physician: MD Ciarra    Admit Date: 2019       ICD-10-CM ICD-9-CM   1. Stage 3 chronic kidney disease (CMS/[facility]) N18.3 585.3   2. Coronary artery disease involving native coronary artery of native heart without angina pectoris I25.10 414.01   3. Unstable angina (CMS/[facility]) I20.0 411.1   4. Impaired mobility and ADLs Z74.09 799.89     Patient Active Problem List   Diagnosis   • Coronary artery disease   • Essential hypertension   • Dyslipidemia   • Polymyositis (CMS/[facility])   • Dependent edema   • Stage 3 chronic kidney disease (CMS/[facility])   • Chronic anemia   • Osteopenia   • GERD (gastroesophageal reflux disease)   • PAF (paroxysmal atrial fibrillation) (CMS/[facility])   • Morbidly obese (CMS/[facility])   • Coronary artery disease involving native coronary artery of native heart without angina pectoris   • Unstable angina (CMS/[facility])   • Hematoma of groin, right   • Acute blood loss anemia   • Delirium, acute     Past Medical History:   Diagnosis Date   • Chronic anemia     Chronic anemia, secondary to (CKD).    • CKD (chronic kidney disease)    • Coronary artery disease    • Dependent edema    • Dermatitis    • DVT (deep venous thrombosis) (CMS/[facility]) 2009    History of DVT in .    • Dyslipidemia    • GERD (gastroesophageal reflux disease)    • Hyperlipidemia    • Hypertension    • Osteopenia    • Polymyositis (CMS/[facility])    • Renal disorder    • Thyroid disease    • Venous stasis      Past Surgical History:   Procedure Laterality Date   • ARTERIOGRAM Bilateral 2019    Procedure: ARTERIOGRAM;  Surgeon: Pablito Cade MD;  Location: [facility] INVASIVE LOCATION;  Service: Cardiology   • CARDIAC CATHETERIZATION     • CARDIAC CATHETERIZATION N/A 2019    Procedure:  Left Heart Cath;  Surgeon: Pablito Cade MD;  Location: [facility] CATH INVASIVE LOCATION;  Service: Cardiology   • CARDIAC SURGERY      Cardiac Stents    • CORONARY ARTERY BYPASS GRAFT     • GASTRIC BYPASS         Therapy Treatment    Rehabilitation Treatment Summary     Row Name 09/13/19 1045             Treatment Time/Intention    Discipline  occupational therapist  -CL      Document Type  therapy note (daily note)  -CL      Subjective Information  complains of;pain  -CL      Patient Effort  good  -CL      Existing Precautions/Restrictions  fall  -CL      Recorded by [CL] Marilee Reese OT 09/13/19 1140      Row Name 09/13/19 1045             Vital Signs    Pre Systolic BP Rehab  -- VSS  -CL      Recorded by [CL] Marilee Reese OT 09/13/19 1140      Row Name 09/13/19 1045             Cognitive Assessment/Intervention- PT/OT    Affect/Mental Status (Cognitive)  WFL  -CL      Orientation Status (Cognition)  oriented x 3  -CL      Follows Commands (Cognition)  follows one step commands;over 90% accuracy;repetition of directions required;verbal cues/prompting required  -CL      Cognitive Function (Cognitive)  safety deficit  -CL      Safety Deficit (Cognitive)  mild deficit;awareness of need for assistance;safety precautions awareness  -CL      Recorded by [CL] Marilee Reese OT 09/13/19 1140      Row Name 09/13/19 1045             Bed Mobility Assessment/Treatment    Bed Mobility Assessment/Treatment  supine-sit  -CL      Supine-Sit [place] (Bed Mobility)  minimum assist (75% patient effort);verbal cues  -CL      Assistive Device (Bed Mobility)  bed rails;draw sheet;head of bed elevated  -CL      Recorded by [CL] Marilee Reese OT 09/13/19 1140      Row Name 09/13/19 1045             Functional Mobility    Functional Mobility- Ind. Level  contact guard assist;1 person + 1 person to manage equipment;verbal cues required  -CL      Functional Mobility- Device  rolling walker  -CL      Functional Mobility-Distance  (Feet)  230  -CL      Recorded by [CL] Marilee Reese, OT 09/13/19 1140      Row Name 09/13/19 1045             Transfer Assessment/Treatment    Transfer Assessment/Treatment  sit-stand transfer;stand-sit transfer  -CL      Recorded by [CL] Marilee Reese, OT 09/13/19 1140      Row Name 09/13/19 1045             Sit-Stand Transfer    Sit-Stand [place] (Transfers)  minimum assist (75% patient effort);verbal cues  -CL      Assistive Device (Sit-Stand Transfers)  walker, front-wheeled  -CL      Recorded by [CL] Marilee Reese, OT 09/13/19 1140      Row Name 09/13/19 1045             Stand-Sit Transfer    Stand-Sit [place] (Transfers)  minimum assist (75% patient effort);verbal cues  -CL      Assistive Device (Stand-Sit Transfers)  walker, front-wheeled  -CL      Recorded by [CL] Marilee Reese, OT 09/13/19 1140      Row Name 09/13/19 1045             Lower Body Dressing Assessment/Training    Lower Body Dressing [place] Level  don;shoes/slippers;dependent (less than 25% patient effort)  -CL      Lower Body Dressing Position  supine  -CL      Recorded by [CL] Marilee Reese, OT 09/13/19 1140      Row Name 09/13/19 1045             Motor Skills Assessment/Interventions    Additional Documentation  Balance (Group);Therapeutic Exercise (Group)  -CL      Recorded by [CL] Marilee Reese, OT 09/13/19 1140      Row Name 09/13/19 1045             Therapeutic Exercise    46651 - OT Therapeutic Activity Minutes  15  -CL      Recorded by [CL] Marilee Reese OT 09/13/19 1140      Row Name 09/13/19 1045             Static Sitting Balance    Level of [place] (Unsupported Sitting, Static Balance)  contact guard assist  -CL      Sitting Position (Unsupported Sitting, Static Balance)  sitting on edge of bed  -CL      Recorded by [CL] Marilee Reese OT 09/13/19 1140      Row Name 09/13/19 1045             Static Standing Balance    Level of [place] (Supported Standing, Static Balance)  contact guard assist  -CL      Assistive  Device Utilized (Supported Standing, Static Balance)  walker, rolling  -CL      Recorded by [CL] Marilee Reese OT 09/13/19 1140      Row Name 09/13/19 1045             Positioning and Restraints    Pre-Treatment Position  in bed  -CL      Post Treatment Position  chair  -CL      In Chair  notified nsg;reclined;call light within reach;exit alarm on;encouraged to call for assist;waffle cushion;legs elevated;heels elevated  -CL      Recorded by [CL] Marilee Reese OT 09/13/19 1140      Row Name 09/13/19 1045             Pain Scale 2: FACES Pre/Post-Treatment    Pain 2: FACES Scale, Pretreatment  2-->hurts little bit  -CL      Pain 2: FACES Scale, Post-Treatment  2-->hurts little bit  -CL      Pain Location 2 - Side  Right  -CL      Pain Location 2  hip  -CL      Pre/Post Treatment Pain 2 Comment  tolerated  -CL      Pain Intervention(s) 2  Repositioned;Ambulation/increased activity  -CL      Recorded by [CL] Marilee Reese OT 09/13/19 1140      Row Name                Wound 09/09/19 1940 Right anterior;lateral groin Puncture    Wound - Properties Group Date first assessed: 09/09/19 [RL] Time first assessed: 1940 [RL] Present on Hospital Admission: N [RL] Side: Right [RL] Orientation: anterior;lateral [RL] Location: groin [RL] Primary Wound Type: Puncture [RL] Additional Comments: Cath site [RL] Recorded by:  [RL] Lauren Montano RN 09/10/19 0806    Row Name                Wound 09/09/19 2200 Right antecubital Puncture    Wound - Properties Group Date first assessed: 09/09/19 [RL] Time first assessed: 2200 [RL] Present on Hospital Admission: N [RL] Side: Right [RL] Location: antecubital [RL] Primary Wound Type: Puncture [RL] Additional Comments: Cath site [RL] Recorded by:  [RL] Lauren Montano RN 09/10/19 0807      User Key  (r) = Recorded By, (t) = Taken By, (c) = Cosigned By    Initials Name Effective Dates Discipline    RL Lauren Montano RN 06/16/16 -  Nurse    CL Marilee Reese OT 04/03/18 -  OT        Wound  09/09/19 1940 Right anterior;lateral groin Puncture (Active)   Dressing Appearance intact;no drainage 9/13/2019 10:00 AM   Base maroon/purple;other (see comments) 9/13/2019  8:00 AM   Drainage Amount none 9/13/2019 10:00 AM       Wound 09/09/19 2200 Right antecubital Puncture (Active)   Dressing Appearance dry;intact 9/13/2019 10:00 AM   Base maroon/purple 9/13/2019  8:00 AM   Drainage Amount none 9/13/2019 10:00 AM       Occupational Therapy Education     Title: PT OT SLP Therapies (In Progress)     Topic: Occupational Therapy (In Progress)     Point: ADL training (In Progress)     Description: Instruct learner(s) on proper safety adaptation and remediation techniques during self care or transfers.   Instruct in proper use of assistive devices.    Learning Progress Summary           Patient Acceptance, E, NR by CL at 9/13/2019 10:45 AM    Acceptance, E, NR by CL at 9/11/2019  2:29 PM   Family Acceptance, E, NR by CL at 9/11/2019  2:29 PM                   Point: Precautions (In Progress)     Description: Instruct learner(s) on prescribed precautions during self-care and functional transfers.    Learning Progress Summary           Patient Acceptance, E, NR by CL at 9/13/2019 10:45 AM    Acceptance, E, NR by CL at 9/11/2019  2:29 PM   Family Acceptance, E, NR by CL at 9/11/2019  2:29 PM                   Point: Body mechanics (In Progress)     Description: Instruct learner(s) on proper positioning and spine alignment during self-care, functional mobility activities and/or exercises.    Learning Progress Summary           Patient Acceptance, E, NR by CL at 9/13/2019 10:45 AM    Acceptance, E, NR by CL at 9/11/2019  2:29 PM   Family Acceptance, E, NR by CL at 9/11/2019  2:29 PM                               User Key     Initials Effective Dates Name Provider Type Discipline     04/03/18 -  Marilee Reese OT Occupational Therapist OT                OT Recommendation and Plan  Outcome Summary/Treatment Plan  (OT)  Anticipated Equipment Needs at Discharge (OT): (TBA further)  Anticipated Discharge Disposition (OT): home with 24/7 care, home with home health  Therapy Frequency (OT Eval): daily  Plan of Care Review  Plan of Care Reviewed With: patient  Plan of Care Reviewed With: patient  Outcome Summary: Pt demo improved safety awareness and command following this date. Pt Min A for bed mobility and t/fs, limited d/t c/o R hip pain. Recommend cont skilled IPOT POC. Recommend pt DC home w/ 24/7 assist and HH OT/PT services.   Outcome Measures     Row Name 09/13/19 1045 09/11/19 1429          How much help from another is currently needed...    Putting on and taking off regular lower body clothing?  2  -CL  2  -CL     Bathing (including washing, rinsing, and drying)  2  -CL  2  -CL     Toileting (which includes using toilet bed pan or urinal)  2  -CL  2  -CL     Putting on and taking off regular upper body clothing  3  -CL  3  -CL     Taking care of personal grooming (such as brushing teeth)  3  -CL  3  -CL     Eating meals  4  -CL  4  -CL     AM-PAC 6 Clicks Score (OT)  16  -CL  16  -CL        Functional Assessment    Outcome Measure Options  AM-PAC 6 Clicks Daily Activity (OT)  -CL  AM-PAC 6 Clicks Daily Activity (OT)  -CL       User Key  (r) = Recorded By, (t) = Taken By, (c) = Cosigned By    Initials Name Provider Type    Marilee Flaherty OT Occupational Therapist           Time Calculation:   Time Calculation- OT     Row Name 09/13/19 1045             Time Calculation- OT    OT Start Time  1045  -CL      OT Received On  09/13/19  -CL      OT Goal Re-Cert Due Date  09/21/19  -CL         Timed Charges    23291 - OT Therapeutic Activity Minutes  15  -CL        User Key  (r) = Recorded By, (t) = Taken By, (c) = Cosigned By    Initials Name Provider Type    Marilee Flaherty OT Occupational Therapist        Therapy Charges for Today     Code Description Service Date Service Provider Modifiers Qty    [id number]  OT  THERAPEUTIC ACT EA 15 MIN 9/13/2019 Marilee Reese, OT GO 1               Marilee Reese OT  9/13/2019

## 2019-09-13 NOTE — PLAN OF CARE
Problem: Patient Care Overview  Goal: Plan of Care Review  Outcome: Ongoing (interventions implemented as appropriate)   09/13/19 1045   Coping/Psychosocial   Plan of Care Reviewed With patient   Plan of Care Review   Progress improving   OTHER   Outcome Summary Pt demo improved safety awareness and command following this date. Pt Min A for bed mobility and t/fs, limited d/t c/o R hip pain. Recommend cont skilled IPOT POC. Recommend pt DC home w/ 24/7 assist and HH OT/PT services.

## 2019-09-14 ENCOUNTER — READMISSION MANAGEMENT (OUTPATIENT)
Dept: CALL CENTER | Facility: HOSPITAL | Age: 84
End: 2019-09-14

## 2019-09-14 NOTE — OUTREACH NOTE
Prep Survey      Responses   Facility patient discharged from?  [facility]   Is patient eligible?  Yes   Discharge diagnosis  Stage 3 Chronic Kidney Disease: stent/angiogram: chf   Does the patient have one of the following disease processes/diagnoses(primary or secondary)?  CHF   Does the patient have Home health ordered?  Yes   What is the Home health agency?   [facility]   Is there a DME ordered?  No   Prep survey completed?  Yes          Fadia Dumas RN

## 2019-09-16 ENCOUNTER — TRANSITIONAL CARE MANAGEMENT TELEPHONE ENCOUNTER (OUTPATIENT)
Dept: INTERNAL MEDICINE | Facility: CLINIC | Age: 84
End: 2019-09-16

## 2019-09-16 ENCOUNTER — READMISSION MANAGEMENT (OUTPATIENT)
Dept: CALL CENTER | Facility: HOSPITAL | Age: 84
End: 2019-09-16

## 2019-09-16 NOTE — OUTREACH NOTE
CHF Week 1 Survey      Responses   Facility patient discharged from?  [place]   Does the patient have one of the following disease processes/diagnoses(primary or secondary)?  CHF   Is there a successful TCM telephone encounter documented?  No   CHF Week 1 attempt successful?  Yes   Call start time  1142   Call end time  1148   Discharge diagnosis  Stage 3 Chronic Kidney Disease: stent/angiogram: chf   Is patient permission given to speak with other caregiver?  Yes   Person spoke with today (if not patient) and relationship  Granddaughter   Meds reviewed with patient/caregiver?  Yes   Is the patient having any side effects they believe may be caused by any medication additions or changes?  No   Does the patient have all medications ordered at discharge?  Yes   Is the patient taking all medications as directed (includes completed medication regime)?  Yes   Does the patient have a primary care provider?   Yes   Does the patient have an appointment with their PCP within 7 days of discharge?  Greater than 7 days   What is preventing the patient from scheduling follow up appointments within 7 days of discharge?  Earlier appointment not available   Nursing Interventions  Verified appointment date/time/provider, Educated patient on importance of making appointment   Has the patient kept scheduled appointments due by today?  N/A   What is the Home health agency?   [facility]   Has home health visited the patient within 72 hours of discharge?  Yes   Psychosocial issues?  No   Did the patient receive a copy of their discharge instructions?  Yes   Nursing interventions  Reviewed instructions with patient   What is the patient's perception of their health status since discharge?  Improving   Nursing interventions  Nurse provided patient education   Is the patient weighing daily?  Yes   Does the patient have scales?  Yes   Daily weight interventions  Education provided on importance of daily weight   Is the patient  able to teach back Heart Failure diet management?  Yes   Is the patient able to teach back Heart Failure Zones?  Yes   Is the patient able to teach back signs and symptoms of worsening condition? (i.e. weight gain, shortness of air, etc.)  Yes   Is the patient/caregiver able to teach back the hierarchy of who to call/visit for symptoms/problems? PCP, Specialist, Home health nurse, Urgent Care, ED, 911  Yes   Additional teach back comments  Raised CS is helping relieve shortness of breath, she is doing better overall.  Good today kenzie says.    CHF Week 1 call completed?  Yes          Fadia Dumas RN

## 2019-09-16 NOTE — OUTREACH NOTE
"TCM call completed.  See TCM flowsheet for details.  Does not have upcoming hospital follow up appointment, but they will call to schedule.   \"I am doing great today.\"  States femerol site is bruised, but no drainage and looks better than it did.  Is up and about.  Eating and drinking and no issues with bowels or bladder.  Family member is taking care of medicines, and she states no problems with any that she is aware of.  Denies any chest, jaw, arm pain, sob, dizziness, chills or fever.  States home health is supposed to contact her this afternoon.  Granddaughter is with her at present and also got on phone call.  She reports that she is doing great today also.  Does need hospital follow up appointment, however the son is going to take her so they will call the office for an appointment when he gets there today.  Denies any needs and appreciated the call.   "

## 2019-09-17 ENCOUNTER — DOCUMENTATION (OUTPATIENT)
Dept: CARDIAC REHAB | Facility: HOSPITAL | Age: 84
End: 2019-09-17

## 2019-09-19 ENCOUNTER — TELEPHONE (OUTPATIENT)
Dept: INTERNAL MEDICINE | Facility: CLINIC | Age: 84
End: 2019-09-19

## 2019-09-19 NOTE — TELEPHONE ENCOUNTER
Daughter called back and advised that they are unable to come in any sooner due to other appointments with other speciality doctors.

## 2019-09-24 ENCOUNTER — READMISSION MANAGEMENT (OUTPATIENT)
Dept: CALL CENTER | Facility: HOSPITAL | Age: 84
End: 2019-09-24

## 2019-09-24 NOTE — OUTREACH NOTE
CHF Week 2 Survey      Responses   Facility patient discharged from?  [facility]   Does the patient have one of the following disease processes/diagnoses(primary or secondary)?  CHF   Week 2 attempt successful?  Yes   Call start time  1410   Call end time  1414   Discharge diagnosis  Stage 3 Chronic Kidney Disease: stent/angiogram: chf   Meds reviewed with patient/caregiver?  Yes   Is the patient having any side effects they believe may be caused by any medication additions or changes?  No   Does the patient have all medications ordered at discharge?  Yes   Is the patient taking all medications as directed (includes completed medication regime)?  Yes   Does the patient have a primary care provider?   Yes   Does the patient have an appointment with their PCP within 7 days of discharge?  Greater than 7 days   Has the patient kept scheduled appointments due by today?  N/A   What is the Home health agency?   [facility]   Has home health visited the patient within 72 hours of discharge?  Yes   Psychosocial issues?  No   Did the patient receive a copy of their discharge instructions?  Yes   Nursing interventions  Reviewed instructions with patient   What is the patient's perception of their health status since discharge?  Improving   Nursing interventions  Nurse provided patient education   Is the patient weighing daily?  Yes   Does the patient have scales?  Yes   Daily weight interventions  Education provided on importance of daily weight   Is the patient able to teach back Heart Failure diet management?  Yes   Is the patient able to teach back Heart Failure Zones?  Yes   Is the patient able to teach back signs and symptoms of worsening condition? (i.e. weight gain, shortness of air, etc.)  Yes   Is the patient/caregiver able to teach back the hierarchy of who to call/visit for symptoms/problems? PCP, Specialist, Home health nurse, Urgent Care, ED, 911  Yes   Additional teach back comments  No shortness of breath  noted and says she is feeling good.  Encouraged daily weight after urinating and before getting dressed.   CHF Week 2 call completed?  Yes          Fadia Dumas RN

## 2019-09-28 LAB
ACT BLD: 147 SECONDS (ref 82–152)
ACT BLD: 169 SECONDS (ref 82–152)

## 2019-10-01 ENCOUNTER — READMISSION MANAGEMENT (OUTPATIENT)
Dept: CALL CENTER | Facility: HOSPITAL | Age: 84
End: 2019-10-01

## 2019-10-01 NOTE — OUTREACH NOTE
CHF Week 3 Survey      Responses   Facility patient discharged from?  [facility]   Does the patient have one of the following disease processes/diagnoses(primary or secondary)?  CHF   Week 3 attempt successful?  Yes   Call start time  1733   Call end time  1737   Discharge diagnosis  Stage 3 Chronic Kidney Disease: stent/angiogram: chf   Meds reviewed with patient/caregiver?  Yes   Is the patient having any side effects they believe may be caused by any medication additions or changes?  No   Does the patient have all medications ordered at discharge?  Yes   Is the patient taking all medications as directed (includes completed medication regime)?  Yes   Does the patient have a primary care provider?   Yes   Does the patient have an appointment with their PCP within 7 days of discharge?  Greater than 7 days   What is preventing the patient from scheduling follow up appointments within 7 days of discharge?  Earlier appointment not available   Has the patient kept scheduled appointments due by today?  N/A   What is the Home health agency?   [facility]   Has home health visited the patient within 72 hours of discharge?  Yes   Psychosocial issues?  No   Did the patient receive a copy of their discharge instructions?  Yes   Nursing interventions  Reviewed instructions with patient   What is the patient's perception of their health status since discharge?  Improving   Is the patient weighing daily?  Yes   Does the patient have scales?  Yes   Is the patient able to teach back Heart Failure diet management?  Yes   Is the patient able to teach back Heart Failure Zones?  Yes   Is the patient/caregiver able to teach back the hierarchy of who to call/visit for symptoms/problems? PCP, Specialist, Home health nurse, Urgent Care, ED, 911  Yes   CHF Week 3 call completed?  Yes          Chanell Oquendo RN

## 2019-10-02 ENCOUNTER — OFFICE VISIT (OUTPATIENT)
Dept: NEUROLOGY | Facility: CLINIC | Age: 84
End: 2019-10-02

## 2019-10-02 VITALS
OXYGEN SATURATION: 96 % | DIASTOLIC BLOOD PRESSURE: 68 MMHG | HEART RATE: 65 BPM | HEIGHT: 65 IN | SYSTOLIC BLOOD PRESSURE: 148 MMHG | BODY MASS INDEX: 34.16 KG/M2 | WEIGHT: 205 LBS

## 2019-10-02 DIAGNOSIS — R41.3 MEMORY LOSS: Primary | ICD-10-CM

## 2019-10-02 PROCEDURE — 99214 OFFICE O/P EST MOD 30 MIN: CPT | Performed by: PSYCHIATRY & NEUROLOGY

## 2019-10-02 NOTE — PROGRESS NOTES
"Lorna Lo    Subjective     CC: encephalopathy    History of Present Illness   Lorna Lo returns to clinic today with a history of delirium. Her family has noted forgetfulness and confusion in the setting of other intercurrent illness including CHF, UTI and recent hospitalization. Since discharge she has gradually improved and her family feels that she has returned to her baseline over the last couple days.    There are not other associated symptoms or modifying factors.    Prior evaluation has included screening blood work  and a head CT  which were unremarkable . She is currently taking donepezil.    I have reviewed and confirmed the past family, social and medical history as accurate on 10/2/19.     Review of Systems   Constitutional: Negative.        Objective     /68   Pulse 65   Ht 165.1 cm (65\")   Wt 93 kg (205 lb)   LMP  (LMP Unknown)   SpO2 96%   BMI 34.11 kg/m²      Neurologic Exam     Mental Status   Oriented to person, place, and time.   Registration: recalls 3 of 3 objects. Recall at 5 minutes: recalls 3 of 3 objects. Follows 3 step commands.   Attention: normal.   Speech: speech is normal   Level of consciousness: alert  Knowledge: good.   Able to name object. Able to read. Able to repeat. Able to write. Normal comprehension.     Cranial Nerves   Cranial nerves II through XII intact.     Motor Exam   Muscle bulk: normal  Overall muscle tone: normal    Strength   Strength 5/5 throughout.       MMSE=30      Assessment/Plan   Lorna was seen today for memory loss.    Diagnoses and all orders for this visit:    Memory loss          Lorna Lo returns to clinic today with a history of delirium. Her history and current examination are reassuring. At this point she would like to stop donepezil, which I think is reasonable, and follow-up in about 6 months to monitor for any significant change. She will then follow up in 6 months , or sooner if needed.       I spent " 25 minutes face to face with the patient and family. I   spent 15 minutes of this time counseling and discussing diagnosis, prognosis, diagnostic testing, current status and management.    As part of this visit I reviewed prior lab results, reviewed radiology results, obtained additional history from the family which is incorporated in the HPI and reviewed records from prior hospitalizations which is incorporated in the HPI.    Durga Triplett MD

## 2019-10-08 ENCOUNTER — READMISSION MANAGEMENT (OUTPATIENT)
Dept: CALL CENTER | Facility: HOSPITAL | Age: 84
End: 2019-10-08

## 2019-10-08 ENCOUNTER — OUTSIDE FACILITY SERVICE (OUTPATIENT)
Dept: PULMONOLOGY | Facility: CLINIC | Age: 84
End: 2019-10-08

## 2019-10-08 PROCEDURE — G0180 MD CERTIFICATION HHA PATIENT: HCPCS | Performed by: INTERNAL MEDICINE

## 2019-10-08 NOTE — OUTREACH NOTE
CHF Week 4 Survey      Responses   Facility patient discharged from?  [place]   Does the patient have one of the following disease processes/diagnoses(primary or secondary)?  CHF   Week 4 attempt successful?  Yes   Call start time  1546   Call end time  1555   Discharge diagnosis  Elective cardiac catheterization   Meds reviewed with patient/caregiver?  Yes   Is the patient having any side effects they believe may be caused by any medication additions or changes?  No   Is the patient taking all medications as directed (includes completed medication regime)?  Yes   Has the patient kept scheduled appointments due by today?  Yes   Is the patient still receiving Home Health Services?  N/A   Psychosocial issues?  No   What is the patient's perception of their health status since discharge?  Improving   Nursing interventions  Nurse provided patient education   Does the patient have scales?  Yes   Is the patient able to teach back Heart Failure diet management?  Yes   Is the patient able to teach back signs and symptoms of worsening condition? (i.e. weight gain, shortness of air, etc.)  Yes   Is the patient/caregiver able to teach back the hierarchy of who to call/visit for symptoms/problems? PCP, Specialist, Home health nurse, Urgent Care, ED, 911  Yes   Week 4 Call Completed?  Yes   Would the patient like one additional call?  No   Graduated  Yes          [facility] RN

## 2019-10-09 RX ORDER — RANITIDINE 150 MG/1
TABLET ORAL
Qty: 90 TABLET | Refills: 1 | Status: SHIPPED | OUTPATIENT
Start: 2019-10-09 | End: 2019-12-17 | Stop reason: SDUPTHER

## 2019-10-14 ENCOUNTER — OFFICE VISIT (OUTPATIENT)
Dept: INTERNAL MEDICINE | Facility: CLINIC | Age: 84
End: 2019-10-14

## 2019-10-14 VITALS
WEIGHT: 203 LBS | SYSTOLIC BLOOD PRESSURE: 140 MMHG | DIASTOLIC BLOOD PRESSURE: 70 MMHG | HEART RATE: 61 BPM | BODY MASS INDEX: 33.78 KG/M2 | OXYGEN SATURATION: 99 %

## 2019-10-14 DIAGNOSIS — N18.30 CONTROLLED TYPE 2 DIABETES MELLITUS WITH STAGE 3 CHRONIC KIDNEY DISEASE, WITHOUT LONG-TERM CURRENT USE OF INSULIN (HCC): ICD-10-CM

## 2019-10-14 DIAGNOSIS — I48.0 PAF (PAROXYSMAL ATRIAL FIBRILLATION) (HCC): ICD-10-CM

## 2019-10-14 DIAGNOSIS — K21.9 GASTROESOPHAGEAL REFLUX DISEASE, ESOPHAGITIS PRESENCE NOT SPECIFIED: ICD-10-CM

## 2019-10-14 DIAGNOSIS — N18.30 STAGE 3 CHRONIC KIDNEY DISEASE (HCC): ICD-10-CM

## 2019-10-14 DIAGNOSIS — I25.10 CORONARY ARTERY DISEASE INVOLVING NATIVE CORONARY ARTERY OF NATIVE HEART WITHOUT ANGINA PECTORIS: ICD-10-CM

## 2019-10-14 DIAGNOSIS — Z23 NEEDS FLU SHOT: Primary | ICD-10-CM

## 2019-10-14 DIAGNOSIS — I10 ESSENTIAL HYPERTENSION: ICD-10-CM

## 2019-10-14 DIAGNOSIS — E78.5 DYSLIPIDEMIA: ICD-10-CM

## 2019-10-14 DIAGNOSIS — E11.22 CONTROLLED TYPE 2 DIABETES MELLITUS WITH STAGE 3 CHRONIC KIDNEY DISEASE, WITHOUT LONG-TERM CURRENT USE OF INSULIN (HCC): ICD-10-CM

## 2019-10-14 PROBLEM — E11.29 CONTROLLED TYPE 2 DIABETES MELLITUS WITH KIDNEY COMPLICATION, WITHOUT LONG-TERM CURRENT USE OF INSULIN (HCC): Status: ACTIVE | Noted: 2019-10-14

## 2019-10-14 PROCEDURE — 99213 OFFICE O/P EST LOW 20 MIN: CPT | Performed by: INTERNAL MEDICINE

## 2019-10-14 PROCEDURE — G0008 ADMIN INFLUENZA VIRUS VAC: HCPCS | Performed by: INTERNAL MEDICINE

## 2019-10-14 PROCEDURE — 90653 IIV ADJUVANT VACCINE IM: CPT | Performed by: INTERNAL MEDICINE

## 2019-10-14 NOTE — PROGRESS NOTES
Subjective   Lorna Lo is a 85 y.o. female.     Heartburn   She reports no abdominal pain, no chest pain, no coughing, no nausea, no sore throat or no wheezing. Pertinent negatives include no fatigue.   Hypertension   Pertinent negatives include no chest pain, headaches, neck pain, palpitations or shortness of breath.   Chronic Kidney Disease   Pertinent negatives include no abdominal pain, arthralgias, chest pain, chills, congestion, coughing, diaphoresis, fatigue, fever, headaches, myalgias, nausea, neck pain, numbness, rash, sore throat or vomiting.   Headache    Pertinent negatives include no abdominal pain, back pain, coughing, dizziness, ear pain, eye pain, fever, nausea, neck pain, numbness, seizures, sinus pressure, sore throat or vomiting.      Here for blood pressure recheck and f/u on hlp, gerd, and CAD. HLP and gerd have been stable x last year. No fever or chills. Headaches better.A1c in hospital was 6.5.Low carb diet.  The following portions of the patient's history were reviewed and updated as appropriate: allergies, current medications, past family history, past medical history, past social history, past surgical history and problem list.    Review of Systems   Constitutional: Negative for activity change, appetite change, chills, diaphoresis, fatigue, fever and unexpected weight change.   HENT: Negative for congestion, ear discharge, ear pain, mouth sores, nosebleeds, sinus pressure, sneezing and sore throat.    Eyes: Negative for pain, discharge and itching.   Respiratory: Negative for cough, chest tightness, shortness of breath and wheezing.    Cardiovascular: Negative for chest pain, palpitations and leg swelling.   Gastrointestinal: Negative for abdominal pain, constipation, diarrhea, nausea and vomiting.   Endocrine: Negative for cold intolerance, heat intolerance, polydipsia and polyphagia.   Genitourinary: Negative for dysuria, flank pain, frequency, hematuria and urgency.    Musculoskeletal: Negative for arthralgias, back pain, gait problem, myalgias, neck pain and neck stiffness.   Skin: Negative for color change, pallor and rash.   Neurological: Negative for dizziness, seizures, speech difficulty, numbness and headaches.   Psychiatric/Behavioral: Negative for agitation, confusion, decreased concentration and sleep disturbance. The patient is not nervous/anxious.      /70   Pulse 61   Wt 92.1 kg (203 lb)   LMP  (LMP Unknown)   SpO2 99%   BMI 33.78 kg/m²     Objective   Physical Exam   Constitutional: She is oriented to person, place, and time. She appears well-developed.   HENT:   Head: Normocephalic.   Right Ear: External ear normal.   Left Ear: External ear normal.   Nose: Nose normal.   Mouth/Throat: Oropharynx is clear and moist.   Eyes: Conjunctivae are normal. Pupils are equal, round, and reactive to light.   Neck: No JVD present. No thyromegaly present.   Cardiovascular: Normal rate, regular rhythm and normal heart sounds. Exam reveals no friction rub.   No murmur heard.  Pulmonary/Chest: Effort normal and breath sounds normal. No respiratory distress. She has no wheezes. She has no rales.   Abdominal: Soft. Bowel sounds are normal. She exhibits no distension. There is no tenderness. There is no guarding.   Musculoskeletal: She exhibits no edema or tenderness.   Lymphadenopathy:     She has no cervical adenopathy.   Neurological: She is oriented to person, place, and time. She displays normal reflexes. No cranial nerve deficit.   Psychiatric: Her behavior is normal.   Nursing note and vitals reviewed.      Assessment/Plan   Lorna was seen today for hypertension.    Diagnoses and all orders for this visit:    Coronary artery disease involving native coronary artery of native heart without angina pectoris  stable  Essential hypertension  Home readings are 130's/60-71  Gastroesophageal reflux disease, esophagitis presence not  specified  stable  Dyslipidemia  stable    Dm Type II  A1c was 6.5 in hospital. Diet controlled

## 2019-11-15 ENCOUNTER — OFFICE VISIT (OUTPATIENT)
Dept: INTERNAL MEDICINE | Facility: CLINIC | Age: 84
End: 2019-11-15

## 2019-11-15 VITALS
DIASTOLIC BLOOD PRESSURE: 70 MMHG | HEIGHT: 65 IN | OXYGEN SATURATION: 97 % | HEART RATE: 68 BPM | WEIGHT: 205 LBS | BODY MASS INDEX: 34.16 KG/M2 | SYSTOLIC BLOOD PRESSURE: 136 MMHG

## 2019-11-15 DIAGNOSIS — Z01.818 PREOP EXAMINATION: ICD-10-CM

## 2019-11-15 DIAGNOSIS — E78.5 DYSLIPIDEMIA: ICD-10-CM

## 2019-11-15 DIAGNOSIS — I25.10 CORONARY ARTERY DISEASE INVOLVING NATIVE CORONARY ARTERY OF NATIVE HEART WITHOUT ANGINA PECTORIS: ICD-10-CM

## 2019-11-15 DIAGNOSIS — I48.0 PAF (PAROXYSMAL ATRIAL FIBRILLATION) (HCC): ICD-10-CM

## 2019-11-15 DIAGNOSIS — I10 ESSENTIAL HYPERTENSION: ICD-10-CM

## 2019-11-15 DIAGNOSIS — H25.11 AGE-RELATED NUCLEAR CATARACT, RIGHT EYE: ICD-10-CM

## 2019-11-15 DIAGNOSIS — E11.22 CONTROLLED TYPE 2 DIABETES MELLITUS WITH CHRONIC KIDNEY DISEASE, WITHOUT LONG-TERM CURRENT USE OF INSULIN, UNSPECIFIED CKD STAGE (HCC): ICD-10-CM

## 2019-11-15 DIAGNOSIS — R82.998 LEUKOCYTES IN URINE: Primary | ICD-10-CM

## 2019-11-15 DIAGNOSIS — K21.9 GASTROESOPHAGEAL REFLUX DISEASE, ESOPHAGITIS PRESENCE NOT SPECIFIED: ICD-10-CM

## 2019-11-15 LAB
ALBUMIN SERPL-MCNC: 3.8 G/DL (ref 3.5–5.2)
ALBUMIN/GLOB SERPL: 1.1 G/DL
ALP SERPL-CCNC: 65 U/L (ref 39–117)
ALT SERPL W P-5'-P-CCNC: 14 U/L (ref 1–33)
ANION GAP SERPL CALCULATED.3IONS-SCNC: 15.6 MMOL/L (ref 5–15)
AST SERPL-CCNC: 24 U/L (ref 1–32)
BASOPHILS # BLD AUTO: 0.06 10*3/MM3 (ref 0–0.2)
BASOPHILS NFR BLD AUTO: 0.8 % (ref 0–1.5)
BILIRUB BLD-MCNC: NEGATIVE MG/DL
BILIRUB SERPL-MCNC: 0.4 MG/DL (ref 0.2–1.2)
BUN BLD-MCNC: 18 MG/DL (ref 8–23)
BUN/CREAT SERPL: 14.5 (ref 7–25)
CALCIUM SPEC-SCNC: 9.4 MG/DL (ref 8.6–10.5)
CHLORIDE SERPL-SCNC: 102 MMOL/L (ref 98–107)
CLARITY, POC: CLEAR
CO2 SERPL-SCNC: 19.4 MMOL/L (ref 22–29)
COLOR UR: ABNORMAL
CREAT BLD-MCNC: 1.24 MG/DL (ref 0.57–1)
DEPRECATED RDW RBC AUTO: 53.2 FL (ref 37–54)
EOSINOPHIL # BLD AUTO: 0 10*3/MM3 (ref 0–0.4)
EOSINOPHIL NFR BLD AUTO: 0 % (ref 0.3–6.2)
ERYTHROCYTE [DISTWIDTH] IN BLOOD BY AUTOMATED COUNT: 16.1 % (ref 12.3–15.4)
GFR SERPL CREATININE-BSD FRML MDRD: 41 ML/MIN/1.73
GLOBULIN UR ELPH-MCNC: 3.5 GM/DL
GLUCOSE BLD-MCNC: 175 MG/DL (ref 65–99)
GLUCOSE UR STRIP-MCNC: NEGATIVE MG/DL
HCT VFR BLD AUTO: 38.2 % (ref 34–46.6)
HGB BLD-MCNC: 12 G/DL (ref 12–15.9)
IMM GRANULOCYTES # BLD AUTO: 0.03 10*3/MM3 (ref 0–0.05)
IMM GRANULOCYTES NFR BLD AUTO: 0.4 % (ref 0–0.5)
KETONES UR QL: NEGATIVE
LEUKOCYTE EST, POC: ABNORMAL
LYMPHOCYTES # BLD AUTO: 0.95 10*3/MM3 (ref 0.7–3.1)
LYMPHOCYTES NFR BLD AUTO: 12.9 % (ref 19.6–45.3)
MCH RBC QN AUTO: 28.2 PG (ref 26.6–33)
MCHC RBC AUTO-ENTMCNC: 31.4 G/DL (ref 31.5–35.7)
MCV RBC AUTO: 89.9 FL (ref 79–97)
MONOCYTES # BLD AUTO: 0.49 10*3/MM3 (ref 0.1–0.9)
MONOCYTES NFR BLD AUTO: 6.6 % (ref 5–12)
NEUTROPHILS # BLD AUTO: 5.84 10*3/MM3 (ref 1.7–7)
NEUTROPHILS NFR BLD AUTO: 79.3 % (ref 42.7–76)
NITRITE UR-MCNC: NEGATIVE MG/ML
NRBC BLD AUTO-RTO: 0 /100 WBC (ref 0–0.2)
PH UR: 6 [PH] (ref 5–8)
PLATELET # BLD AUTO: 196 10*3/MM3 (ref 140–450)
PMV BLD AUTO: 11.3 FL (ref 6–12)
POTASSIUM BLD-SCNC: 5 MMOL/L (ref 3.5–5.2)
PROT SERPL-MCNC: 7.3 G/DL (ref 6–8.5)
PROT UR STRIP-MCNC: NEGATIVE MG/DL
RBC # BLD AUTO: 4.25 10*6/MM3 (ref 3.77–5.28)
RBC # UR STRIP: NEGATIVE /UL
SODIUM BLD-SCNC: 137 MMOL/L (ref 136–145)
SP GR UR: 1.01 (ref 1–1.03)
UROBILINOGEN UR QL: NORMAL
WBC NRBC COR # BLD: 7.37 10*3/MM3 (ref 3.4–10.8)

## 2019-11-15 PROCEDURE — 99214 OFFICE O/P EST MOD 30 MIN: CPT | Performed by: INTERNAL MEDICINE

## 2019-11-15 PROCEDURE — 80053 COMPREHEN METABOLIC PANEL: CPT | Performed by: INTERNAL MEDICINE

## 2019-11-15 PROCEDURE — 81003 URINALYSIS AUTO W/O SCOPE: CPT | Performed by: INTERNAL MEDICINE

## 2019-11-15 PROCEDURE — 85025 COMPLETE CBC W/AUTO DIFF WBC: CPT | Performed by: INTERNAL MEDICINE

## 2019-11-15 PROCEDURE — 87086 URINE CULTURE/COLONY COUNT: CPT | Performed by: INTERNAL MEDICINE

## 2019-11-15 NOTE — PROGRESS NOTES
"Subjective   Lorna Lo is a 85 y.o. female.   Chief Complaint   Patient presents with   • Pre-op Exam     Cataract surgery       History of Present Illness   Here for preop cataract surgery. She did undergo recent stent placement in 9/2019. No CP or SOA. No HA. Is doing well with no complaints today.  The following portions of the patient's history were reviewed and updated as appropriate: allergies, current medications, past family history, past medical history, past social history, past surgical history and problem list.    Review of Systems   Constitutional: Negative for activity change, appetite change, chills, diaphoresis, fatigue, fever and unexpected weight change.   HENT: Negative for congestion, ear discharge, ear pain, mouth sores, nosebleeds, sinus pressure, sneezing and sore throat.    Eyes: Negative for pain, discharge and itching.   Respiratory: Negative for cough, chest tightness, shortness of breath and wheezing.    Cardiovascular: Negative for chest pain, palpitations and leg swelling.   Gastrointestinal: Negative for abdominal pain, constipation, diarrhea, nausea and vomiting.   Endocrine: Negative for cold intolerance, heat intolerance, polydipsia and polyphagia.   Genitourinary: Negative for dysuria, flank pain, frequency, hematuria and urgency.   Musculoskeletal: Negative for arthralgias, back pain, gait problem, myalgias, neck pain and neck stiffness.   Skin: Negative for color change, pallor and rash.   Neurological: Negative for seizures, speech difficulty, numbness and headaches.   Psychiatric/Behavioral: Negative for agitation, confusion, decreased concentration and sleep disturbance. The patient is not nervous/anxious.      /70   Pulse 68   Ht 165.1 cm (65\")   Wt 93 kg (205 lb)   LMP  (LMP Unknown)   SpO2 97%   BMI 34.11 kg/m²     Objective   Physical Exam   Constitutional: She is oriented to person, place, and time. She appears well-developed.   HENT:   Head: " Normocephalic.   Right Ear: External ear normal.   Left Ear: External ear normal.   Nose: Nose normal.   Mouth/Throat: Oropharynx is clear and moist.   Dentures, upper   Eyes: Conjunctivae are normal. Pupils are equal, round, and reactive to light.   Neck: No JVD present. No thyromegaly present.   Cardiovascular: Normal rate, regular rhythm and normal heart sounds. Exam reveals no friction rub.   No murmur heard.  Pulmonary/Chest: Effort normal and breath sounds normal. No respiratory distress. She has no wheezes. She has no rales.   Abdominal: Soft. Bowel sounds are normal. She exhibits no distension. There is no tenderness. There is no guarding.   Musculoskeletal: She exhibits no edema or tenderness.   Lymphadenopathy:     She has no cervical adenopathy.   Neurological: She is oriented to person, place, and time. She displays normal reflexes. No cranial nerve deficit.   Skin: No rash noted.   Psychiatric: Her behavior is normal.   Nursing note and vitals reviewed.      Assessment/Plan   Lorna was seen today for pre-op exam.    Diagnoses and all orders for this visit:    Preop examination  -     POC Urinalysis Dipstick, Automated  Medically stable for surgery, but will need okay by Dr. Cade  Age-related nuclear cataract, right eye  Awaiting surgery  Coronary artery disease involving native coronary artery of native heart without angina pectoris  [facility] 9/9/19 with high grade stenosis proximal ramus intermedians treated with PTCA and ZES on plavix  Essential hypertension  -     Comprehensive Metabolic Panel  -     CBC & Differential  -     CBC Auto Differential    PAF (paroxysmal atrial fibrillation) (CMS/[facility])  stable  Gastroesophageal reflux disease, esophagitis presence not specified  stable  Controlled type 2 diabetes mellitus with chronic kidney disease, without long-term current use of insulin, unspecified CKD stage (CMS/[facility])  stable  Dyslipidemia    stable

## 2019-11-17 LAB — BACTERIA SPEC AEROBE CULT: NORMAL

## 2019-11-19 ENCOUNTER — TELEPHONE (OUTPATIENT)
Dept: INTERNAL MEDICINE | Facility: CLINIC | Age: 84
End: 2019-11-19

## 2019-11-19 NOTE — TELEPHONE ENCOUNTER
Patient's daughter called to check on the status of the patient's lab results that she had performed last Friday (11/15/19) after seeing Vanesa Correa. The patient's daughter said that these results needed to be faxed to the patient's eye doctor, Dr. Multani, ASAP at [phone]. 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 If there are any questions or concerns please call the patient's daughter back at [phone].

## 2019-12-17 ENCOUNTER — OFFICE VISIT (OUTPATIENT)
Dept: CARDIOLOGY | Facility: CLINIC | Age: 84
End: 2019-12-17

## 2019-12-17 VITALS
HEART RATE: 64 BPM | HEIGHT: 65 IN | DIASTOLIC BLOOD PRESSURE: 75 MMHG | BODY MASS INDEX: 34.32 KG/M2 | SYSTOLIC BLOOD PRESSURE: 167 MMHG | WEIGHT: 206 LBS

## 2019-12-17 DIAGNOSIS — I48.0 PAF (PAROXYSMAL ATRIAL FIBRILLATION) (HCC): ICD-10-CM

## 2019-12-17 DIAGNOSIS — I10 ESSENTIAL HYPERTENSION: ICD-10-CM

## 2019-12-17 DIAGNOSIS — I25.10 CORONARY ARTERY DISEASE INVOLVING NATIVE CORONARY ARTERY OF NATIVE HEART WITHOUT ANGINA PECTORIS: Primary | ICD-10-CM

## 2019-12-17 DIAGNOSIS — E78.5 DYSLIPIDEMIA: ICD-10-CM

## 2019-12-17 PROCEDURE — 99214 OFFICE O/P EST MOD 30 MIN: CPT | Performed by: INTERNAL MEDICINE

## 2019-12-17 NOTE — PROGRESS NOTES
"  OFFICE FOLLOW UP     Date of Encounter:2019     Name: Lorna Lo  : 1934  Address: [address] [place], [place] [id number]    PCP: Vanesa Correa DO  [address] [facility] [address]  [facility] [address]    Lorna Lo is a 85 y.o. female.    Chief Complaint: Follow up of CAD, HTN, PAF    Problem List:   1.  Coronary artery disease:   a.  Coronary artery bypass grafting, .  b. Left heart catheterization , at [facility], IDB.  Nonobstructive.    c. Increased  shortness of air, summer 2015.   d. [facility] 2015- patent LIMA to LAD and SVG to RCA. No myocardium in jeopardy. Normal LVEF   e. History of statin \"intolerances\".  f. [facility] 19: 2/2 patent grafts, high degree stenosis in proximal ramus intermedians, treated with PTCA and ZES  1. Post-procedure hematoma with no pseudoaneurysm by duplex  g. Echo 19: EF 80%, mild-mod LVH  2. Hypertension.   3. Paroxysmal atrial fibrillation, Winter 2017.  a. Chads-vasc=5, NOAC instituted by PCP.  4. Dyslipidemia.   5. Polymyositis.   6. Dependent edema.    7. CKD.   8. Chronic anemia, secondary to (CKD).   9. Osteopenia.    10. GERD.   11. History of DVT in .   12. Cognitive impairment with acute delirium during ICU admission for hematoma 2019  13. Chronic microaspiration, per Dr. Lafleur      Allergies:  Allergies   Allergen Reactions   • Haldol [Haloperidol] Rash and Mental Status Change   • Lortab [Hydrocodone-Acetaminophen] Shortness Of Breath   • Methotrexate Derivatives      Multisystem toxicity, reported per PCP office notes   • Amoxicillin      Pt does not recall reaction, reported by daughter   • Azathioprine      Pt does not recall reaction, allergy reported per PCP office notes   • Cellcept [Mycophenolate]      Pt does not recall reaction, allergy reported per PCP office notes   • Contrast Dye Hives     INTRAVENOUS CONTRAST DYE.    • Fosamax [Alendronate]      Pt does not recall reaction, allergy reported per " PCP office notes   • Hydrochlorothiazide      Unknown reaction, allergy reported per PCP office notes   • Lisinopril Cough   • Methotrexate Unknown (See Comments)   • Simvastatin Other (See Comments)     Sleep disturbance, reported per PCP office notes   • Bactrim [Sulfamethoxazole-Trimethoprim] Rash   • Chlorthalidone Rash   • Ciprofloxacin Hcl Rash   • Sulfa Antibiotics Rash     Current Medications:  •  amLODIPine (NORVASC) 10 MG tablet, Take 1 tablet by mouth Daily  •  aspirin 81 MG tablet, Take 81 mg by mouth Daily.  •  atenolol (TENORMIN) 100 MG tablet, TAKE 1 TABLET BY MOUTH ONCE DAILY  •  cholecalciferol (VITAMIN D3) 1000 UNITS tablet, Take 1,000 Units by mouth Daily.  •  clopidogrel (PLAVIX) 75 MG tablet, Take 1 tablet by mouth Daily.  •  folic acid (FOLVITE) 1 MG tablet, Take 1 tablet by mouth Daily.  •  furosemide (LASIX) 20 MG tablet, Take 20 mg by mouth Daily.  •  levothyroxine (SYNTHROID, LEVOTHROID) 88 MCG tablet, Take 1 tablet by mouth Daily  •  predniSONE (DELTASONE) 5 MG tablet, 2 (Two) Times a Day As Needed  •  raNITIdine (ZANTAC) 150 MG tablet, Take 1 tablet by mouth 2 (Two) Times a Day.  •  XARELTO 20 MG tablet, TAKE 1 TABLET BY MOUTH ONCE DAILY  •  potassium chloride (K-DUR,KLOR-CON) 20 MEQ CR tablet, Take 20 mEq by mouth Daily. With Lasix     History of Present Illness:            Mrs. Lo presents for follow up today accompanied by her son. She denies any cardiac complaints since her discharge from hospital 3 months ago post PCI. She specifically denies chest pain or angina, exertional dyspnea, syncope or heart failure symptoms. She continues to live alone, ambulates with a walker, and denies any recent falls. She does not drive, and her daughter helps with her medicines and food. Home blood pressures are controlled, and she and her son report she saw her PCP last month and her blood pressure was 120's systolic.     The following portions of the patient's history were reviewed and updated  "as appropriate: allergies, current medications and problem list.    ROS: Pertinent positives as listed in the HPI.  All other systems reviewed and negative.    Objective:  Vitals:    12/17/19 1607 12/17/19 1612   BP: 176/79 167/75   BP Location: Right arm Right arm   Patient Position: Sitting Standing   Pulse: 58 64   Weight: 93.4 kg (206 lb)    Height: 165.1 cm (65\")      Physical Exam:  GENERAL: Alert, cooperative, in no acute distress.   HEENT: Normocephalic, no adenopathy, no jugular venous distention  HEART: No discrete PMI is noted. Regular rhythm, normal rate, and no murmurs, gallops, or rubs.   LUNGS: Clear to auscultation bilaterally. No wheezing, rales or ronchi.  ABDOMEN: Soft, bowel sounds present, non-tender   NEUROLOGIC: No focal abnormalities involving strength or sensation are noted.   EXTREMITIES: No clubbing, cyanosis, or edema noted. Ecchymosis noted BUE    Diagnostic Data:    Lab Results   Component Value Date    GLUCOSE 175 (H) 11/15/2019    BUN 18 11/15/2019    CREATININE 1.24 (H) 11/15/2019    EGFRIFNONA 41 (L) 11/15/2019    BCR 14.5 11/15/2019    K 5.0 11/15/2019    CO2 19.4 (L) 11/15/2019    CALCIUM 9.4 11/15/2019    ALBUMIN 3.80 11/15/2019    AST 24 11/15/2019    ALT 14 11/15/2019      Lab Results   Component Value Date    WBC 7.37 11/15/2019    HGB 12.0 11/15/2019    HCT 38.2 11/15/2019    MCV 89.9 11/15/2019     11/15/2019     Lab Results   Component Value Date    HGBA1C 6.50 (H) 09/09/2019      Procedures    Assessment and Plan:   1.  CAD: asymptomatic without angina. Continue Plavix and BB.   2.  HTN: elevated today, however historically controlled. We will not make any changes today, however have asked her to follow home blood pressures and keep a log to take to her next appointment with her PCP in January.   3.  HLD: intolerant to statins, including Livalo.  4.  PAF: asymptomatic, in regular rhythm today. Continue Xarelto for anticoagulation.     I will see Lorna Lo " back in 6 months or sooner on an as needed basis.      Scribed for Pablito Cade MD by Omayra Chen PA-C. 12/17/2019  5:41 PM

## 2019-12-18 RX ORDER — FOLIC ACID 1 MG/1
TABLET ORAL
Qty: 90 TABLET | Refills: 0 | Status: SHIPPED | OUTPATIENT
Start: 2019-12-18 | End: 2020-03-11

## 2019-12-18 RX ORDER — DONEPEZIL HYDROCHLORIDE 5 MG/1
TABLET, FILM COATED ORAL
Qty: 30 TABLET | Refills: 0 | OUTPATIENT
Start: 2019-12-18

## 2019-12-18 RX ORDER — LEVOTHYROXINE SODIUM 88 UG/1
TABLET ORAL
Qty: 90 TABLET | Refills: 0 | Status: SHIPPED | OUTPATIENT
Start: 2019-12-18 | End: 2020-03-11

## 2020-01-01 ENCOUNTER — OFFICE VISIT (OUTPATIENT)
Dept: CARDIOLOGY | Facility: CLINIC | Age: 85
End: 2020-01-01

## 2020-01-01 ENCOUNTER — TELEPHONE (OUTPATIENT)
Dept: INTERNAL MEDICINE | Facility: CLINIC | Age: 85
End: 2020-01-01

## 2020-01-01 ENCOUNTER — OFFICE VISIT (OUTPATIENT)
Dept: INTERNAL MEDICINE | Facility: CLINIC | Age: 85
End: 2020-01-01

## 2020-01-01 ENCOUNTER — LAB (OUTPATIENT)
Dept: LAB | Facility: HOSPITAL | Age: 85
End: 2020-01-01

## 2020-01-01 VITALS
HEIGHT: 64 IN | OXYGEN SATURATION: 98 % | SYSTOLIC BLOOD PRESSURE: 132 MMHG | DIASTOLIC BLOOD PRESSURE: 84 MMHG | BODY MASS INDEX: 33.29 KG/M2 | WEIGHT: 195 LBS | HEART RATE: 54 BPM | TEMPERATURE: 97.3 F

## 2020-01-01 VITALS
SYSTOLIC BLOOD PRESSURE: 132 MMHG | TEMPERATURE: 97.3 F | HEART RATE: 50 BPM | OXYGEN SATURATION: 98 % | BODY MASS INDEX: 32.1 KG/M2 | HEIGHT: 64 IN | DIASTOLIC BLOOD PRESSURE: 72 MMHG | WEIGHT: 188 LBS

## 2020-01-01 VITALS
TEMPERATURE: 98 F | SYSTOLIC BLOOD PRESSURE: 156 MMHG | DIASTOLIC BLOOD PRESSURE: 75 MMHG | BODY MASS INDEX: 32.91 KG/M2 | HEART RATE: 58 BPM | WEIGHT: 192.8 LBS | HEIGHT: 64 IN | OXYGEN SATURATION: 98 %

## 2020-01-01 VITALS
BODY MASS INDEX: 33.16 KG/M2 | HEART RATE: 61 BPM | TEMPERATURE: 97.6 F | OXYGEN SATURATION: 97 % | SYSTOLIC BLOOD PRESSURE: 136 MMHG | WEIGHT: 194.2 LBS | DIASTOLIC BLOOD PRESSURE: 78 MMHG | HEIGHT: 64 IN

## 2020-01-01 VITALS
SYSTOLIC BLOOD PRESSURE: 130 MMHG | OXYGEN SATURATION: 99 % | HEIGHT: 64 IN | WEIGHT: 193 LBS | BODY MASS INDEX: 32.95 KG/M2 | DIASTOLIC BLOOD PRESSURE: 80 MMHG | HEART RATE: 55 BPM

## 2020-01-01 VITALS
TEMPERATURE: 97 F | OXYGEN SATURATION: 97 % | WEIGHT: 196 LBS | HEIGHT: 64 IN | BODY MASS INDEX: 33.46 KG/M2 | DIASTOLIC BLOOD PRESSURE: 82 MMHG | HEART RATE: 60 BPM | SYSTOLIC BLOOD PRESSURE: 128 MMHG

## 2020-01-01 DIAGNOSIS — N39.0 ACUTE UTI: ICD-10-CM

## 2020-01-01 DIAGNOSIS — I10 ESSENTIAL HYPERTENSION: ICD-10-CM

## 2020-01-01 DIAGNOSIS — D64.9 NORMOCYTIC ANEMIA: ICD-10-CM

## 2020-01-01 DIAGNOSIS — R35.0 URINARY FREQUENCY: ICD-10-CM

## 2020-01-01 DIAGNOSIS — E11.22 CONTROLLED TYPE 2 DIABETES MELLITUS WITH STAGE 3 CHRONIC KIDNEY DISEASE, WITHOUT LONG-TERM CURRENT USE OF INSULIN (HCC): Primary | ICD-10-CM

## 2020-01-01 DIAGNOSIS — R60.0 BILATERAL LOWER EXTREMITY EDEMA: ICD-10-CM

## 2020-01-01 DIAGNOSIS — D64.9 NORMOCYTIC ANEMIA: Primary | ICD-10-CM

## 2020-01-01 DIAGNOSIS — R60.0 LOWER LEG EDEMA: Primary | ICD-10-CM

## 2020-01-01 DIAGNOSIS — I48.0 PAF (PAROXYSMAL ATRIAL FIBRILLATION) (HCC): ICD-10-CM

## 2020-01-01 DIAGNOSIS — E78.5 DYSLIPIDEMIA: ICD-10-CM

## 2020-01-01 DIAGNOSIS — I10 HYPERTENSION, UNSPECIFIED TYPE: ICD-10-CM

## 2020-01-01 DIAGNOSIS — Z86.2 HISTORY OF LEUKOCYTOSIS: ICD-10-CM

## 2020-01-01 DIAGNOSIS — N18.30 CONTROLLED TYPE 2 DIABETES MELLITUS WITH STAGE 3 CHRONIC KIDNEY DISEASE, WITHOUT LONG-TERM CURRENT USE OF INSULIN (HCC): Primary | ICD-10-CM

## 2020-01-01 DIAGNOSIS — N18.30 STAGE 3 CHRONIC KIDNEY DISEASE (HCC): ICD-10-CM

## 2020-01-01 DIAGNOSIS — R60.9 DEPENDENT EDEMA: ICD-10-CM

## 2020-01-01 DIAGNOSIS — R60.0 LOCAL EDEMA: ICD-10-CM

## 2020-01-01 DIAGNOSIS — N39.0 ACUTE UTI: Primary | ICD-10-CM

## 2020-01-01 DIAGNOSIS — I25.10 CORONARY ARTERY DISEASE INVOLVING NATIVE CORONARY ARTERY OF NATIVE HEART WITHOUT ANGINA PECTORIS: Primary | ICD-10-CM

## 2020-01-01 DIAGNOSIS — R35.0 URINARY FREQUENCY: Primary | ICD-10-CM

## 2020-01-01 DIAGNOSIS — K21.00 GASTROESOPHAGEAL REFLUX DISEASE WITH ESOPHAGITIS WITHOUT HEMORRHAGE: ICD-10-CM

## 2020-01-01 DIAGNOSIS — Z23 NEED FOR INFLUENZA VACCINATION: ICD-10-CM

## 2020-01-01 DIAGNOSIS — I25.10 CORONARY ARTERY DISEASE INVOLVING NATIVE CORONARY ARTERY OF NATIVE HEART WITHOUT ANGINA PECTORIS: ICD-10-CM

## 2020-01-01 DIAGNOSIS — R30.0 DYSURIA: Primary | ICD-10-CM

## 2020-01-01 DIAGNOSIS — K21.9 GASTROESOPHAGEAL REFLUX DISEASE, ESOPHAGITIS PRESENCE NOT SPECIFIED: ICD-10-CM

## 2020-01-01 LAB
ALBUMIN SERPL-MCNC: 3.7 G/DL (ref 3.5–5.2)
ALBUMIN SERPL-MCNC: 4.1 G/DL (ref 3.5–5.2)
ALBUMIN/GLOB SERPL: 1.2 G/DL
ALBUMIN/GLOB SERPL: 1.5 G/DL
ALP SERPL-CCNC: 60 U/L (ref 39–117)
ALP SERPL-CCNC: 96 U/L (ref 39–117)
ALT SERPL W P-5'-P-CCNC: 16 U/L (ref 1–33)
ALT SERPL W P-5'-P-CCNC: 23 U/L (ref 1–33)
ANION GAP SERPL CALCULATED.3IONS-SCNC: 13.5 MMOL/L (ref 5–15)
ANION GAP SERPL CALCULATED.3IONS-SCNC: 8.5 MMOL/L (ref 5–15)
AST SERPL-CCNC: 26 U/L (ref 1–32)
AST SERPL-CCNC: 27 U/L (ref 1–32)
BACTERIA SPEC AEROBE CULT: ABNORMAL
BACTERIA SPEC AEROBE CULT: ABNORMAL
BACTERIA SPEC AEROBE CULT: NORMAL
BASOPHILS # BLD AUTO: 0.03 10*3/MM3 (ref 0–0.2)
BASOPHILS # BLD AUTO: 0.03 10*3/MM3 (ref 0–0.2)
BASOPHILS # BLD AUTO: 0.06 10*3/MM3 (ref 0–0.2)
BASOPHILS # BLD AUTO: 0.07 10*3/MM3 (ref 0–0.2)
BASOPHILS # BLD AUTO: 0.09 10*3/MM3 (ref 0–0.2)
BASOPHILS NFR BLD AUTO: 0.3 % (ref 0–1.5)
BASOPHILS NFR BLD AUTO: 0.4 % (ref 0–1.5)
BASOPHILS NFR BLD AUTO: 0.6 % (ref 0–1.5)
BASOPHILS NFR BLD AUTO: 0.8 % (ref 0–1.5)
BASOPHILS NFR BLD AUTO: 0.9 % (ref 0–1.5)
BILIRUB BLD-MCNC: NEGATIVE MG/DL
BILIRUB SERPL-MCNC: 0.6 MG/DL (ref 0–1.2)
BILIRUB SERPL-MCNC: 0.7 MG/DL (ref 0–1.2)
BUN SERPL-MCNC: 17 MG/DL (ref 8–23)
BUN SERPL-MCNC: 23 MG/DL (ref 8–23)
BUN/CREAT SERPL: 14.3 (ref 7–25)
BUN/CREAT SERPL: 16 (ref 7–25)
CALCIUM SPEC-SCNC: 9.6 MG/DL (ref 8.6–10.5)
CALCIUM SPEC-SCNC: 9.7 MG/DL (ref 8.6–10.5)
CHLORIDE SERPL-SCNC: 103 MMOL/L (ref 98–107)
CHLORIDE SERPL-SCNC: 104 MMOL/L (ref 98–107)
CHOLEST SERPL-MCNC: 211 MG/DL (ref 0–200)
CHOLEST SERPL-MCNC: 220 MG/DL (ref 0–200)
CLARITY, POC: ABNORMAL
CO2 SERPL-SCNC: 23.5 MMOL/L (ref 22–29)
CO2 SERPL-SCNC: 23.5 MMOL/L (ref 22–29)
COLOR UR: ABNORMAL
COLOR UR: YELLOW
COLOR UR: YELLOW
CREAT SERPL-MCNC: 1.19 MG/DL (ref 0.57–1)
CREAT SERPL-MCNC: 1.44 MG/DL (ref 0.57–1)
DEPRECATED RDW RBC AUTO: 45.6 FL (ref 37–54)
DEPRECATED RDW RBC AUTO: 45.8 FL (ref 37–54)
DEPRECATED RDW RBC AUTO: 46.6 FL (ref 37–54)
DEPRECATED RDW RBC AUTO: 46.8 FL (ref 37–54)
DEPRECATED RDW RBC AUTO: 51.7 FL (ref 37–54)
EOSINOPHIL # BLD AUTO: 0.07 10*3/MM3 (ref 0–0.4)
EOSINOPHIL # BLD AUTO: 0.12 10*3/MM3 (ref 0–0.4)
EOSINOPHIL # BLD AUTO: 0.14 10*3/MM3 (ref 0–0.4)
EOSINOPHIL # BLD AUTO: 0.18 10*3/MM3 (ref 0–0.4)
EOSINOPHIL # BLD AUTO: 0.26 10*3/MM3 (ref 0–0.4)
EOSINOPHIL NFR BLD AUTO: 1 % (ref 0.3–6.2)
EOSINOPHIL NFR BLD AUTO: 1.2 % (ref 0.3–6.2)
EOSINOPHIL NFR BLD AUTO: 1.4 % (ref 0.3–6.2)
EOSINOPHIL NFR BLD AUTO: 2 % (ref 0.3–6.2)
EOSINOPHIL NFR BLD AUTO: 2.7 % (ref 0.3–6.2)
ERYTHROCYTE [DISTWIDTH] IN BLOOD BY AUTOMATED COUNT: 14.2 % (ref 12.3–15.4)
ERYTHROCYTE [DISTWIDTH] IN BLOOD BY AUTOMATED COUNT: 14.4 % (ref 12.3–15.4)
ERYTHROCYTE [DISTWIDTH] IN BLOOD BY AUTOMATED COUNT: 14.5 % (ref 12.3–15.4)
ERYTHROCYTE [DISTWIDTH] IN BLOOD BY AUTOMATED COUNT: 14.7 % (ref 12.3–15.4)
ERYTHROCYTE [DISTWIDTH] IN BLOOD BY AUTOMATED COUNT: 16.2 % (ref 12.3–15.4)
GFR SERPL CREATININE-BSD FRML MDRD: 35 ML/MIN/1.73
GFR SERPL CREATININE-BSD FRML MDRD: 43 ML/MIN/1.73
GLOBULIN UR ELPH-MCNC: 2.7 GM/DL
GLOBULIN UR ELPH-MCNC: 3.1 GM/DL
GLUCOSE SERPL-MCNC: 105 MG/DL (ref 65–99)
GLUCOSE SERPL-MCNC: 124 MG/DL (ref 65–99)
GLUCOSE UR STRIP-MCNC: NEGATIVE MG/DL
HBA1C MFR BLD: 6.1 %
HBA1C MFR BLD: 6.2 %
HCT VFR BLD AUTO: 34.3 % (ref 34–46.6)
HCT VFR BLD AUTO: 34.6 % (ref 34–46.6)
HCT VFR BLD AUTO: 35.1 % (ref 34–46.6)
HCT VFR BLD AUTO: 35.3 % (ref 34–46.6)
HCT VFR BLD AUTO: 36.1 % (ref 34–46.6)
HDLC SERPL-MCNC: 53 MG/DL (ref 40–60)
HDLC SERPL-MCNC: 72 MG/DL (ref 40–60)
HGB BLD-MCNC: 10.9 G/DL (ref 12–15.9)
HGB BLD-MCNC: 11.2 G/DL (ref 12–15.9)
HGB BLD-MCNC: 11.3 G/DL (ref 12–15.9)
HGB BLD-MCNC: 11.4 G/DL (ref 12–15.9)
HGB BLD-MCNC: 12 G/DL (ref 12–15.9)
IMM GRANULOCYTES # BLD AUTO: 0.06 10*3/MM3 (ref 0–0.05)
IMM GRANULOCYTES # BLD AUTO: 0.07 10*3/MM3 (ref 0–0.05)
IMM GRANULOCYTES # BLD AUTO: 0.08 10*3/MM3 (ref 0–0.05)
IMM GRANULOCYTES NFR BLD AUTO: 0.7 % (ref 0–0.5)
IMM GRANULOCYTES NFR BLD AUTO: 0.7 % (ref 0–0.5)
IMM GRANULOCYTES NFR BLD AUTO: 0.8 % (ref 0–0.5)
KETONES UR QL: NEGATIVE
LDLC SERPL CALC-MCNC: 126 MG/DL (ref 0–100)
LDLC SERPL CALC-MCNC: 136 MG/DL (ref 0–100)
LDLC/HDLC SERPL: 1.7 {RATIO}
LDLC/HDLC SERPL: 2.56 {RATIO}
LEUKOCYTE EST, POC: ABNORMAL
LYMPHOCYTES # BLD AUTO: 1.31 10*3/MM3 (ref 0.7–3.1)
LYMPHOCYTES # BLD AUTO: 1.32 10*3/MM3 (ref 0.7–3.1)
LYMPHOCYTES # BLD AUTO: 1.5 10*3/MM3 (ref 0.7–3.1)
LYMPHOCYTES # BLD AUTO: 1.67 10*3/MM3 (ref 0.7–3.1)
LYMPHOCYTES # BLD AUTO: 2.2 10*3/MM3 (ref 0.7–3.1)
LYMPHOCYTES NFR BLD AUTO: 14.4 % (ref 19.6–45.3)
LYMPHOCYTES NFR BLD AUTO: 14.5 % (ref 19.6–45.3)
LYMPHOCYTES NFR BLD AUTO: 17.6 % (ref 19.6–45.3)
LYMPHOCYTES NFR BLD AUTO: 18.1 % (ref 19.6–45.3)
LYMPHOCYTES NFR BLD AUTO: 22.5 % (ref 19.6–45.3)
MCH RBC QN AUTO: 28 PG (ref 26.6–33)
MCH RBC QN AUTO: 28.3 PG (ref 26.6–33)
MCH RBC QN AUTO: 28.4 PG (ref 26.6–33)
MCH RBC QN AUTO: 28.5 PG (ref 26.6–33)
MCH RBC QN AUTO: 29.2 PG (ref 26.6–33)
MCHC RBC AUTO-ENTMCNC: 31.8 G/DL (ref 31.5–35.7)
MCHC RBC AUTO-ENTMCNC: 32 G/DL (ref 31.5–35.7)
MCHC RBC AUTO-ENTMCNC: 32.4 G/DL (ref 31.5–35.7)
MCHC RBC AUTO-ENTMCNC: 32.5 G/DL (ref 31.5–35.7)
MCHC RBC AUTO-ENTMCNC: 33.2 G/DL (ref 31.5–35.7)
MCV RBC AUTO: 87.1 FL (ref 79–97)
MCV RBC AUTO: 87.8 FL (ref 79–97)
MCV RBC AUTO: 87.8 FL (ref 79–97)
MCV RBC AUTO: 88.2 FL (ref 79–97)
MCV RBC AUTO: 89.1 FL (ref 79–97)
MONOCYTES # BLD AUTO: 0.5 10*3/MM3 (ref 0.1–0.9)
MONOCYTES # BLD AUTO: 0.61 10*3/MM3 (ref 0.1–0.9)
MONOCYTES # BLD AUTO: 0.83 10*3/MM3 (ref 0.1–0.9)
MONOCYTES # BLD AUTO: 0.95 10*3/MM3 (ref 0.1–0.9)
MONOCYTES # BLD AUTO: 1.11 10*3/MM3 (ref 0.1–0.9)
MONOCYTES NFR BLD AUTO: 10 % (ref 5–12)
MONOCYTES NFR BLD AUTO: 11.3 % (ref 5–12)
MONOCYTES NFR BLD AUTO: 5.9 % (ref 5–12)
MONOCYTES NFR BLD AUTO: 6.8 % (ref 5–12)
MONOCYTES NFR BLD AUTO: 9.1 % (ref 5–12)
NEUTROPHILS NFR BLD AUTO: 5.32 10*3/MM3 (ref 1.7–7)
NEUTROPHILS NFR BLD AUTO: 6.24 10*3/MM3 (ref 1.7–7)
NEUTROPHILS NFR BLD AUTO: 6.45 10*3/MM3 (ref 1.7–7)
NEUTROPHILS NFR BLD AUTO: 6.63 10*3/MM3 (ref 1.7–7)
NEUTROPHILS NFR BLD AUTO: 63.8 % (ref 42.7–76)
NEUTROPHILS NFR BLD AUTO: 68 % (ref 42.7–76)
NEUTROPHILS NFR BLD AUTO: 72.9 % (ref 42.7–76)
NEUTROPHILS NFR BLD AUTO: 72.9 % (ref 42.7–76)
NEUTROPHILS NFR BLD AUTO: 77.1 % (ref 42.7–76)
NEUTROPHILS NFR BLD AUTO: 8 10*3/MM3 (ref 1.7–7)
NITRITE UR-MCNC: NEGATIVE MG/ML
NRBC BLD AUTO-RTO: 0 /100 WBC (ref 0–0.2)
PH UR: 6 [PH] (ref 5–8)
PLATELET # BLD AUTO: 188 10*3/MM3 (ref 140–450)
PLATELET # BLD AUTO: 191 10*3/MM3 (ref 140–450)
PLATELET # BLD AUTO: 203 10*3/MM3 (ref 140–450)
PLATELET # BLD AUTO: 225 10*3/MM3 (ref 140–450)
PLATELET # BLD AUTO: 248 10*3/MM3 (ref 140–450)
PMV BLD AUTO: 10.6 FL (ref 6–12)
PMV BLD AUTO: 11 FL (ref 6–12)
PMV BLD AUTO: 11.3 FL (ref 6–12)
POTASSIUM SERPL-SCNC: 4 MMOL/L (ref 3.5–5.2)
POTASSIUM SERPL-SCNC: 4.6 MMOL/L (ref 3.5–5.2)
PROT SERPL-MCNC: 6.8 G/DL (ref 6–8.5)
PROT SERPL-MCNC: 6.8 G/DL (ref 6–8.5)
PROT UR STRIP-MCNC: ABNORMAL MG/DL
PROT UR STRIP-MCNC: ABNORMAL MG/DL
PROT UR STRIP-MCNC: NEGATIVE MG/DL
RBC # BLD AUTO: 3.89 10*6/MM3 (ref 3.77–5.28)
RBC # BLD AUTO: 3.94 10*6/MM3 (ref 3.77–5.28)
RBC # BLD AUTO: 3.96 10*6/MM3 (ref 3.77–5.28)
RBC # BLD AUTO: 4.03 10*6/MM3 (ref 3.77–5.28)
RBC # BLD AUTO: 4.11 10*6/MM3 (ref 3.77–5.28)
RBC # UR STRIP: ABNORMAL /UL
SODIUM SERPL-SCNC: 136 MMOL/L (ref 136–145)
SODIUM SERPL-SCNC: 140 MMOL/L (ref 136–145)
SP GR UR: 1.01 (ref 1–1.03)
SP GR UR: 1.01 (ref 1–1.03)
SP GR UR: 1.02 (ref 1–1.03)
TRIGL SERPL-MCNC: 112 MG/DL (ref 0–150)
TRIGL SERPL-MCNC: 129 MG/DL (ref 0–150)
UROBILINOGEN UR QL: NORMAL
VLDLC SERPL-MCNC: 22 MG/DL (ref 5–40)
VLDLC SERPL-MCNC: 22.4 MG/DL (ref 5–40)
WBC # BLD AUTO: 10.36 10*3/MM3 (ref 3.4–10.8)
WBC # BLD AUTO: 7.3 10*3/MM3 (ref 3.4–10.8)
WBC # BLD AUTO: 9.09 10*3/MM3 (ref 3.4–10.8)
WBC # BLD AUTO: 9.5 10*3/MM3 (ref 3.4–10.8)
WBC # BLD AUTO: 9.79 10*3/MM3 (ref 3.4–10.8)

## 2020-01-01 PROCEDURE — 80053 COMPREHEN METABOLIC PANEL: CPT

## 2020-01-01 PROCEDURE — 81003 URINALYSIS AUTO W/O SCOPE: CPT | Performed by: INTERNAL MEDICINE

## 2020-01-01 PROCEDURE — 85025 COMPLETE CBC W/AUTO DIFF WBC: CPT

## 2020-01-01 PROCEDURE — 87088 URINE BACTERIA CULTURE: CPT | Performed by: INTERNAL MEDICINE

## 2020-01-01 PROCEDURE — 99213 OFFICE O/P EST LOW 20 MIN: CPT | Performed by: INTERNAL MEDICINE

## 2020-01-01 PROCEDURE — G0008 ADMIN INFLUENZA VIRUS VAC: HCPCS | Performed by: INTERNAL MEDICINE

## 2020-01-01 PROCEDURE — 85025 COMPLETE CBC W/AUTO DIFF WBC: CPT | Performed by: INTERNAL MEDICINE

## 2020-01-01 PROCEDURE — 80061 LIPID PANEL: CPT | Performed by: INTERNAL MEDICINE

## 2020-01-01 PROCEDURE — 99213 OFFICE O/P EST LOW 20 MIN: CPT | Performed by: NURSE PRACTITIONER

## 2020-01-01 PROCEDURE — 80053 COMPREHEN METABOLIC PANEL: CPT | Performed by: INTERNAL MEDICINE

## 2020-01-01 PROCEDURE — 99214 OFFICE O/P EST MOD 30 MIN: CPT | Performed by: INTERNAL MEDICINE

## 2020-01-01 PROCEDURE — 83036 HEMOGLOBIN GLYCOSYLATED A1C: CPT | Performed by: INTERNAL MEDICINE

## 2020-01-01 PROCEDURE — 80061 LIPID PANEL: CPT

## 2020-01-01 PROCEDURE — 81003 URINALYSIS AUTO W/O SCOPE: CPT | Performed by: NURSE PRACTITIONER

## 2020-01-01 PROCEDURE — 87086 URINE CULTURE/COLONY COUNT: CPT | Performed by: NURSE PRACTITIONER

## 2020-01-01 PROCEDURE — 87086 URINE CULTURE/COLONY COUNT: CPT | Performed by: INTERNAL MEDICINE

## 2020-01-01 PROCEDURE — 90694 VACC AIIV4 NO PRSRV 0.5ML IM: CPT | Performed by: INTERNAL MEDICINE

## 2020-01-01 PROCEDURE — 87186 SC STD MICRODIL/AGAR DIL: CPT | Performed by: INTERNAL MEDICINE

## 2020-01-01 RX ORDER — POTASSIUM CHLORIDE 20 MEQ/1
TABLET, EXTENDED RELEASE ORAL
COMMUNITY
Start: 2020-01-01 | End: 2020-01-01

## 2020-01-01 RX ORDER — LANOLIN ALCOHOL/MO/W.PET/CERES
325 CREAM (GRAM) TOPICAL
Qty: 30 TABLET | Refills: 0 | Status: SHIPPED | OUTPATIENT
Start: 2020-01-01 | End: 2020-01-01 | Stop reason: SDUPTHER

## 2020-01-01 RX ORDER — LANOLIN ALCOHOL/MO/W.PET/CERES
325 CREAM (GRAM) TOPICAL
Qty: 30 TABLET | Refills: 0 | Status: SHIPPED | OUTPATIENT
Start: 2020-01-01 | End: 2020-01-01

## 2020-01-01 RX ORDER — FUROSEMIDE 20 MG/1
TABLET ORAL
Qty: 30 TABLET | Refills: 2 | Status: SHIPPED | OUTPATIENT
Start: 2020-01-01 | End: 2021-01-01

## 2020-01-01 RX ORDER — FOLIC ACID 1 MG/1
TABLET ORAL
Qty: 90 TABLET | Refills: 0 | Status: SHIPPED | OUTPATIENT
Start: 2020-01-01 | End: 2020-01-01 | Stop reason: SDUPTHER

## 2020-01-01 RX ORDER — AMLODIPINE BESYLATE 10 MG/1
TABLET ORAL
Qty: 90 TABLET | Refills: 0 | Status: SHIPPED | OUTPATIENT
Start: 2020-01-01 | End: 2020-01-01 | Stop reason: SDUPTHER

## 2020-01-01 RX ORDER — FOLIC ACID 1 MG/1
1000 TABLET ORAL DAILY
Qty: 90 TABLET | Refills: 0 | Status: SHIPPED | OUTPATIENT
Start: 2020-01-01 | End: 2021-01-01

## 2020-01-01 RX ORDER — RIVAROXABAN 20 MG/1
TABLET, FILM COATED ORAL
Qty: 90 TABLET | Refills: 0 | Status: SHIPPED | OUTPATIENT
Start: 2020-01-01 | End: 2020-01-01

## 2020-01-01 RX ORDER — LEVOTHYROXINE SODIUM 88 UG/1
TABLET ORAL
Qty: 90 TABLET | Refills: 0 | Status: SHIPPED | OUTPATIENT
Start: 2020-01-01 | End: 2021-01-01

## 2020-01-01 RX ORDER — LEVOTHYROXINE SODIUM 88 UG/1
TABLET ORAL
Qty: 90 TABLET | Refills: 0 | Status: SHIPPED | OUTPATIENT
Start: 2020-01-01 | End: 2020-01-01

## 2020-01-01 RX ORDER — LANOLIN ALCOHOL/MO/W.PET/CERES
CREAM (GRAM) TOPICAL
Qty: 30 TABLET | Refills: 0 | Status: SHIPPED | OUTPATIENT
Start: 2020-01-01 | End: 2021-01-01

## 2020-01-01 RX ORDER — AMLODIPINE BESYLATE 10 MG/1
10 TABLET ORAL DAILY
Qty: 90 TABLET | Refills: 0 | Status: SHIPPED | OUTPATIENT
Start: 2020-01-01 | End: 2021-01-01

## 2020-01-01 RX ORDER — DOXYCYCLINE 100 MG/1
100 CAPSULE ORAL 2 TIMES DAILY
Qty: 20 CAPSULE | Refills: 0 | Status: SHIPPED | OUTPATIENT
Start: 2020-01-01 | End: 2020-01-01

## 2020-01-01 RX ORDER — RIVAROXABAN 20 MG/1
TABLET, FILM COATED ORAL
Qty: 90 TABLET | Refills: 0 | Status: SHIPPED | OUTPATIENT
Start: 2020-01-01 | End: 2021-01-01

## 2020-01-01 RX ORDER — ASPIRIN 81 MG/1
81 TABLET ORAL DAILY
COMMUNITY

## 2020-01-01 RX ORDER — FUROSEMIDE 20 MG/1
20 TABLET ORAL DAILY
COMMUNITY
Start: 2020-01-01 | End: 2020-01-01

## 2020-01-01 RX ORDER — ATENOLOL 100 MG/1
TABLET ORAL
Qty: 90 TABLET | Refills: 0 | Status: SHIPPED | OUTPATIENT
Start: 2020-01-01 | End: 2021-01-01

## 2020-01-01 RX ORDER — POTASSIUM CHLORIDE 20 MEQ/1
TABLET, EXTENDED RELEASE ORAL
Qty: 30 TABLET | Refills: 2 | Status: SHIPPED | OUTPATIENT
Start: 2020-01-01 | End: 2021-01-01

## 2020-01-01 RX ORDER — ATENOLOL 100 MG/1
TABLET ORAL
Qty: 90 TABLET | Refills: 0 | Status: SHIPPED | OUTPATIENT
Start: 2020-01-01 | End: 2020-01-01

## 2020-01-01 RX ORDER — FUROSEMIDE 20 MG/1
TABLET ORAL
Qty: 30 TABLET | Refills: 1 | Status: SHIPPED | OUTPATIENT
Start: 2020-01-01 | End: 2020-01-01

## 2020-01-01 RX ORDER — POTASSIUM CHLORIDE 20 MEQ/1
TABLET, EXTENDED RELEASE ORAL
Qty: 30 TABLET | Refills: 1 | Status: SHIPPED | OUTPATIENT
Start: 2020-01-01 | End: 2020-01-01

## 2020-01-01 RX ORDER — CEPHALEXIN 500 MG/1
500 CAPSULE ORAL 2 TIMES DAILY
Qty: 20 CAPSULE | Refills: 0 | Status: SHIPPED | OUTPATIENT
Start: 2020-01-01 | End: 2020-01-01

## 2020-01-05 ENCOUNTER — HOSPITAL ENCOUNTER (EMERGENCY)
Facility: HOSPITAL | Age: 85
Discharge: HOME | End: 2020-01-05
Attending: EMERGENCY MEDICINE | Admitting: EMERGENCY MEDICINE

## 2020-01-05 ENCOUNTER — APPOINTMENT (OUTPATIENT)
Dept: MRI IMAGING | Facility: HOSPITAL | Age: 85
End: 2020-01-05

## 2020-01-05 VITALS
SYSTOLIC BLOOD PRESSURE: 122 MMHG | DIASTOLIC BLOOD PRESSURE: 88 MMHG | RESPIRATION RATE: 18 BRPM | WEIGHT: 199 LBS | BODY MASS INDEX: 33.15 KG/M2 | HEART RATE: 75 BPM | HEIGHT: 65 IN | TEMPERATURE: 97.5 F | OXYGEN SATURATION: 96 %

## 2020-01-05 DIAGNOSIS — I10 ELEVATED BLOOD PRESSURE READING WITH DIAGNOSIS OF HYPERTENSION: ICD-10-CM

## 2020-01-05 DIAGNOSIS — N18.30 CKD (CHRONIC KIDNEY DISEASE) STAGE 3, GFR 30-59 ML/MIN (HCC): ICD-10-CM

## 2020-01-05 DIAGNOSIS — R77.8 ELEVATED TROPONIN: ICD-10-CM

## 2020-01-05 DIAGNOSIS — R22.31 LOCALIZED SWELLING ON RIGHT HAND: Primary | ICD-10-CM

## 2020-01-05 DIAGNOSIS — M33.20 POLYMYOSITIS (HCC): ICD-10-CM

## 2020-01-05 LAB
ALBUMIN SERPL-MCNC: 3.9 G/DL (ref 3.5–5.2)
ALBUMIN/GLOB SERPL: 1.3 G/DL
ALP SERPL-CCNC: 94 U/L (ref 39–117)
ALT SERPL W P-5'-P-CCNC: 14 U/L (ref 1–33)
ANION GAP SERPL CALCULATED.3IONS-SCNC: 13 MMOL/L (ref 5–15)
AST SERPL-CCNC: 21 U/L (ref 1–32)
BASOPHILS # BLD AUTO: 0.04 10*3/MM3 (ref 0–0.2)
BASOPHILS NFR BLD AUTO: 0.4 % (ref 0–1.5)
BILIRUB SERPL-MCNC: 1 MG/DL (ref 0.2–1.2)
BUN BLD-MCNC: 25 MG/DL (ref 8–23)
BUN/CREAT SERPL: 18.5 (ref 7–25)
CALCIUM SPEC-SCNC: 9.9 MG/DL (ref 8.6–10.5)
CHLORIDE SERPL-SCNC: 100 MMOL/L (ref 98–107)
CO2 SERPL-SCNC: 25 MMOL/L (ref 22–29)
CREAT BLD-MCNC: 1.35 MG/DL (ref 0.57–1)
CRP SERPL-MCNC: 7.2 MG/DL (ref 0–0.5)
D-LACTATE SERPL-SCNC: 1 MMOL/L (ref 0.5–2)
DEPRECATED RDW RBC AUTO: 48.2 FL (ref 37–54)
EOSINOPHIL # BLD AUTO: 0.11 10*3/MM3 (ref 0–0.4)
EOSINOPHIL NFR BLD AUTO: 1 % (ref 0.3–6.2)
ERYTHROCYTE [DISTWIDTH] IN BLOOD BY AUTOMATED COUNT: 14.3 % (ref 12.3–15.4)
ERYTHROCYTE [SEDIMENTATION RATE] IN BLOOD: 34 MM/HR (ref 0–30)
GFR SERPL CREATININE-BSD FRML MDRD: 37 ML/MIN/1.73
GLOBULIN UR ELPH-MCNC: 3 GM/DL
GLUCOSE BLD-MCNC: 115 MG/DL (ref 65–99)
HCT VFR BLD AUTO: 38.1 % (ref 34–46.6)
HGB BLD-MCNC: 12.1 G/DL (ref 12–15.9)
HOLD SPECIMEN: NORMAL
HOLD SPECIMEN: NORMAL
IMM GRANULOCYTES # BLD AUTO: 0.14 10*3/MM3 (ref 0–0.05)
IMM GRANULOCYTES NFR BLD AUTO: 1.3 % (ref 0–0.5)
LYMPHOCYTES # BLD AUTO: 1.48 10*3/MM3 (ref 0.7–3.1)
LYMPHOCYTES NFR BLD AUTO: 13.9 % (ref 19.6–45.3)
MCH RBC QN AUTO: 29.4 PG (ref 26.6–33)
MCHC RBC AUTO-ENTMCNC: 31.8 G/DL (ref 31.5–35.7)
MCV RBC AUTO: 92.7 FL (ref 79–97)
MONOCYTES # BLD AUTO: 1.4 10*3/MM3 (ref 0.1–0.9)
MONOCYTES NFR BLD AUTO: 13.1 % (ref 5–12)
NEUTROPHILS # BLD AUTO: 7.48 10*3/MM3 (ref 1.7–7)
NEUTROPHILS NFR BLD AUTO: 70.3 % (ref 42.7–76)
NRBC BLD AUTO-RTO: 0 /100 WBC (ref 0–0.2)
NT-PROBNP SERPL-MCNC: 3014 PG/ML (ref 5–1800)
PLATELET # BLD AUTO: 242 10*3/MM3 (ref 140–450)
PMV BLD AUTO: 9.8 FL (ref 6–12)
POTASSIUM BLD-SCNC: 3.9 MMOL/L (ref 3.5–5.2)
PROCALCITONIN SERPL-MCNC: 0.09 NG/ML (ref 0.1–0.25)
PROT SERPL-MCNC: 6.9 G/DL (ref 6–8.5)
RBC # BLD AUTO: 4.11 10*6/MM3 (ref 3.77–5.28)
SODIUM BLD-SCNC: 138 MMOL/L (ref 136–145)
TROPONIN T SERPL-MCNC: 0.03 NG/ML (ref 0–0.03)
TROPONIN T SERPL-MCNC: 0.05 NG/ML (ref 0–0.03)
WBC NRBC COR # BLD: 10.65 10*3/MM3 (ref 3.4–10.8)
WHOLE BLOOD HOLD SPECIMEN: NORMAL
WHOLE BLOOD HOLD SPECIMEN: NORMAL

## 2020-01-05 PROCEDURE — 99284 EMERGENCY DEPT VISIT MOD MDM: CPT

## 2020-01-05 PROCEDURE — 80053 COMPREHEN METABOLIC PANEL: CPT | Performed by: EMERGENCY MEDICINE

## 2020-01-05 PROCEDURE — 85025 COMPLETE CBC W/AUTO DIFF WBC: CPT | Performed by: EMERGENCY MEDICINE

## 2020-01-05 PROCEDURE — 87040 BLOOD CULTURE FOR BACTERIA: CPT | Performed by: EMERGENCY MEDICINE

## 2020-01-05 PROCEDURE — 85652 RBC SED RATE AUTOMATED: CPT | Performed by: EMERGENCY MEDICINE

## 2020-01-05 PROCEDURE — 93005 ELECTROCARDIOGRAM TRACING: CPT | Performed by: EMERGENCY MEDICINE

## 2020-01-05 PROCEDURE — 84484 ASSAY OF TROPONIN QUANT: CPT | Performed by: EMERGENCY MEDICINE

## 2020-01-05 PROCEDURE — 84145 PROCALCITONIN (PCT): CPT | Performed by: EMERGENCY MEDICINE

## 2020-01-05 PROCEDURE — 86140 C-REACTIVE PROTEIN: CPT | Performed by: EMERGENCY MEDICINE

## 2020-01-05 PROCEDURE — 73218 MRI UPPER EXTREMITY W/O DYE: CPT

## 2020-01-05 PROCEDURE — 83605 ASSAY OF LACTIC ACID: CPT | Performed by: EMERGENCY MEDICINE

## 2020-01-05 PROCEDURE — 83880 ASSAY OF NATRIURETIC PEPTIDE: CPT | Performed by: EMERGENCY MEDICINE

## 2020-01-05 RX ORDER — SODIUM CHLORIDE 0.9 % (FLUSH) 0.9 %
10 SYRINGE (ML) INJECTION AS NEEDED
Status: DISCONTINUED | OUTPATIENT
Start: 2020-01-05 | End: 2020-01-05 | Stop reason: HOSPADM

## 2020-01-05 RX ORDER — PREDNISONE 10 MG/1
TABLET ORAL
Qty: 35 TABLET | Refills: 0 | Status: SHIPPED | OUTPATIENT
Start: 2020-01-05 | End: 2020-01-01 | Stop reason: SDUPTHER

## 2020-01-05 NOTE — ED PROVIDER NOTES
Subjective   Lorna Lo is a 85 y.o. female who presents to the emergency department with complaints of right hand swelling that started 2 days ago, and worsened this morning. The patient is currently prescribed Plavix. The patient denies any other acute symptoms at this time.       History provided by:  Patient  Arm Swelling   Location:  Hand  Hand location:  Dorsum of R hand  Injury: no    Pain details:     Radiates to:  R wrist    Severity:  Mild    Onset quality:  Sudden    Duration:  2 days    Timing:  Constant    Progression:  Unchanged  Relieved by:  Nothing  Worsened by:  Nothing  Ineffective treatments:  None tried  Associated symptoms: swelling    Associated symptoms: no decreased range of motion, no fever and no muscle weakness        Review of Systems   Constitutional: Negative for fever.   Musculoskeletal:        Positive for right hand swelling.   All other systems reviewed and are negative.      Past Medical History:   Diagnosis Date   • Chronic anemia     Chronic anemia, secondary to (CKD).    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    • Dyslipidemia    • GERD (gastroesophageal reflux disease)    • Hyperlipidemia    • Hypertension    • Osteopenia    • Polymyositis (CMS/[facility])    • Renal disorder    • Thyroid disease    • Venous stasis        Allergies   Allergen Reactions   • Haldol [Haloperidol] Rash and Mental Status Change   • Lortab [Hydrocodone-Acetaminophen] Shortness Of Breath   • Methotrexate Derivatives      Multisystem toxicity, reported per PCP office notes   • Amoxicillin      Pt does not recall reaction, reported by daughter   • Azathioprine      Pt does not recall reaction, allergy reported per PCP office notes   •  Cellcept [Mycophenolate]      Pt does not recall reaction, allergy reported per PCP office notes   • Contrast Dye Hives     INTRAVENOUS CONTRAST DYE.    • Fosamax [Alendronate]      Pt does not recall reaction, allergy reported per PCP office notes   • Hydrochlorothiazide      Unknown reaction, allergy reported per PCP office notes   • Lisinopril Cough   • Methotrexate Unknown (See Comments)   • Simvastatin Other (See Comments)     Sleep disturbance, reported per PCP office notes   • Bactrim [Sulfamethoxazole-Trimethoprim] Rash   • Chlorthalidone Rash   • Ciprofloxacin Hcl Rash   • Sulfa Antibiotics Rash       Past Surgical History:   Procedure Laterality Date   • ARTERIOGRAM Bilateral 2019    Procedure: ARTERIOGRAM;  Surgeon: Pablito Cade MD;  Location:  [facility];  Service: Cardiology   • CARDIAC CATHETERIZATION     • CARDIAC CATHETERIZATION N/A 2019    Procedure: Left Heart Cath;  Surgeon: Pablito Cade MD;  Location:  [facility];  Service: Cardiology   • CARDIAC SURGERY      Cardiac Stents    • CORONARY ARTERY BYPASS GRAFT     • GASTRIC BYPASS         Family History   Problem Relation Age of Onset   • Heart attack Father        Social History     Socioeconomic History   • Marital status:      Spouse name: Not on file   • Number of children: Not on file   • Years of education: Not on file   • Highest education level: Not on file   Tobacco Use   • Smoking status: Former Smoker     Types: Cigarettes     Last attempt to quit: 1960     Years since quittin.0   • Smokeless tobacco: Never Used   • Tobacco comment: tried it in highschool   Substance and Sexual Activity   • Alcohol use: No   • Drug use: No   • Sexual activity: Defer         Objective   Physical Exam   Constitutional: She is oriented to person, place, and time. She appears well-developed and well-nourished. No distress.   HENT:   Head: Normocephalic and atraumatic.   Eyes: Conjunctivae are  normal. No scleral icterus.   Neck: Normal range of motion. Neck supple.   Cardiovascular: Normal rate, regular rhythm, normal heart sounds and intact distal pulses.   No murmur heard.  Pulmonary/Chest: Effort normal and breath sounds normal. No respiratory distress. She has no wheezes. She has no rales.   Abdominal: Soft. There is no tenderness. There is no guarding.   Musculoskeletal:   The patient has 1+ bilateral lower extremity pitting edema. The patient has swelling and tenderness to the wrist and dorsal aspect of the right hand.    Neurological: She is alert and oriented to person, place, and time.   Skin: Skin is warm and dry. She is not diaphoretic.   Psychiatric: She has a normal mood and affect. Her behavior is normal.   Nursing note and vitals reviewed.      Procedures         ED Course  ED Course as of Jan 05 1427   Sun Jan 05, 2020   0935 Sed Rate(!): 34 [RS]   0935 WBC: 10.65 [RS]   1004 C-Reactive Protein(!): 7.20 [RS]   1004 proBNP(!): 3,014.0 [RS]   1240 I updated the patient and family on the findings of the MRI and labs thus far.  No clear evidence of any acute surgical or significant abnormality of the right hand.  Nonspecific dorsal soft tissue swelling.  Likely related to the patient's history of autoimmune disorders.  We will plan to recheck the patient's cardiac lab and then dispo from there.    [RS]   1332 Interval improvement in the patient's troponin.  We will discharge her home to follow-up with her doctor soon as possible with return to the ER for any concerns. I had a discussion with the patient/family regarding diagnosis, diagnostic results, treatment plan, and medications.  The patient/family indicated understanding of these instructions.  I spent adequate time at the bedside proceeding discharge necessary to personally discuss the aftercare instructions, giving patient education, providing explanations of the results of our evaluations/findings, and my decision making to assure that  the patient/family understand the plan of care.  Time was allotted to answer questions at that time and throughout the ED course.  Emphasis was placed on timely follow-up after discharge.  I also discussed the potential for the development of an acute emergent condition requiring further evaluation, admission, or even surgical intervention. I discussed that we found nothing during the visit today indicating the need for further workup, admission, or the presence of an unstable medical condition.  I encouraged the patient to return to the emergency department immediately for ANY concerns, worsening, new complaints, or if symptoms persist and unable to seek follow-up in a timely fashion.  The patient/family expressed understanding and agreement with this plan.    Troponin T: 0.028 [RS]      ED Course User Index  [RS] Sascha Wade MD     Recent Results (from the past 24 hour(s))   Light Blue Top    Collection Time: 01/05/20  9:01 AM   Result Value Ref Range    Extra Tube hold for add-on    Green Top (Gel)    Collection Time: 01/05/20  9:01 AM   Result Value Ref Range    Extra Tube Hold for add-ons.    Lavender Top    Collection Time: 01/05/20  9:01 AM   Result Value Ref Range    Extra Tube hold for add-on    Gold Top - SST    Collection Time: 01/05/20  9:01 AM   Result Value Ref Range    Extra Tube Hold for add-ons.    Comprehensive Metabolic Panel    Collection Time: 01/05/20  9:01 AM   Result Value Ref Range    Glucose 115 (H) 65 - 99 mg/dL    BUN 25 (H) 8 - 23 mg/dL    Creatinine 1.35 (H) 0.57 - 1.00 mg/dL    Sodium 138 136 - 145 mmol/L    Potassium 3.9 3.5 - 5.2 mmol/L    Chloride 100 98 - 107 mmol/L    CO2 25.0 22.0 - 29.0 mmol/L    Calcium 9.9 8.6 - 10.5 mg/dL    Total Protein 6.9 6.0 - 8.5 g/dL    Albumin 3.90 3.50 - 5.20 g/dL    ALT (SGPT) 14 1 - 33 U/L    AST (SGOT) 21 1 - 32 U/L    Alkaline Phosphatase 94 39 - 117 U/L    Total Bilirubin 1.0 0.2 - 1.2 mg/dL    eGFR Non  Amer 37 (L) >60  mL/min/1.73    Globulin 3.0 gm/dL    A/G Ratio 1.3 g/dL    BUN/Creatinine Ratio 18.5 7.0 - 25.0    Anion Gap 13.0 5.0 - 15.0 mmol/L   Procalcitonin    Collection Time: 01/05/20  9:01 AM   Result Value Ref Range    Procalcitonin 0.09 (L) 0.10 - 0.25 ng/mL   Sedimentation Rate    Collection Time: 01/05/20  9:01 AM   Result Value Ref Range    Sed Rate 34 (H) 0 - 30 mm/hr   C-reactive Protein    Collection Time: 01/05/20  9:01 AM   Result Value Ref Range    C-Reactive Protein 7.20 (H) 0.00 - 0.50 mg/dL   BNP    Collection Time: 01/05/20  9:01 AM   Result Value Ref Range    proBNP 3,014.0 (H) 5.0-1,800.0 pg/mL   Troponin    Collection Time: 01/05/20  9:01 AM   Result Value Ref Range    Troponin T 0.048 (C) 0.000 - 0.030 ng/mL   CBC Auto Differential    Collection Time: 01/05/20  9:01 AM   Result Value Ref Range    WBC 10.65 3.40 - 10.80 10*3/mm3    RBC 4.11 3.77 - 5.28 10*6/mm3    Hemoglobin 12.1 12.0 - 15.9 g/dL    Hematocrit 38.1 34.0 - 46.6 %    MCV 92.7 79.0 - 97.0 fL    MCH 29.4 26.6 - 33.0 pg    MCHC 31.8 31.5 - 35.7 g/dL    RDW 14.3 12.3 - 15.4 %    RDW-SD 48.2 37.0 - 54.0 fl    MPV 9.8 6.0 - 12.0 fL    Platelets 242 140 - 450 10*3/mm3    Neutrophil % 70.3 42.7 - 76.0 %    Lymphocyte % 13.9 (L) 19.6 - 45.3 %    Monocyte % 13.1 (H) 5.0 - 12.0 %    Eosinophil % 1.0 0.3 - 6.2 %    Basophil % 0.4 0.0 - 1.5 %    Immature Grans % 1.3 (H) 0.0 - 0.5 %    Neutrophils, Absolute 7.48 (H) 1.70 - 7.00 10*3/mm3    Lymphocytes, Absolute 1.48 0.70 - 3.10 10*3/mm3    Monocytes, Absolute 1.40 (H) 0.10 - 0.90 10*3/mm3    Eosinophils, Absolute 0.11 0.00 - 0.40 10*3/mm3    Basophils, Absolute 0.04 0.00 - 0.20 10*3/mm3    Immature Grans, Absolute 0.14 (H) 0.00 - 0.05 10*3/mm3    nRBC 0.0 0.0 - 0.2 /100 WBC   Lactic Acid, Plasma    Collection Time: 01/05/20  9:22 AM   Result Value Ref Range    Lactate 1.0 0.5 - 2.0 mmol/L   Troponin    Collection Time: 01/05/20 12:54 PM   Result Value Ref Range    Troponin T 0.028 0.000 - 0.030 ng/mL      Note: In addition to lab results from this visit, the labs listed above may include labs taken at another facility or during a different encounter within the last 24 hours. Please correlate lab times with ED admission and discharge times for further clarification of the services performed during this visit.    MRI Hand Right Without Contrast   Preliminary Result   Nonspecific dorsal soft tissue swelling of the hand. No   evidence of inflammatory collection, acute or healing bony trauma.                 Vitals:    01/05/20 1053 01/05/20 1056 01/05/20 1100 01/05/20 1334   BP:   122/88    BP Location:       Patient Position:       Pulse:  74  75   Resp:  18  18   Temp:       TempSrc:       SpO2: 95%  96%    Weight:       Height:         Medications   sodium chloride 0.9 % flush 10 mL (has no administration in time range)   sodium chloride 0.9 % flush 10 mL (has no administration in time range)     ECG/EMG Results (last 24 hours)     ** No results found for the last 24 hours. **        ECG 12 Lead                             MDM  Number of Diagnoses or Management Options  CKD (chronic kidney disease) stage 3, GFR 30-59 ml/min (CMS/HCC):   Elevated blood pressure reading with diagnosis of hypertension:   Elevated troponin:   Localized swelling on right hand:   Polymyositis (CMS/HCC):      Amount and/or Complexity of Data Reviewed  Clinical lab tests: reviewed  Tests in the radiology section of CPT®: reviewed  Independent visualization of images, tracings, or specimens: yes        Final diagnoses:   Localized swelling on right hand   Polymyositis (CMS/HCC)   CKD (chronic kidney disease) stage 3, GFR 30-59 ml/min (CMS/HCC)   Elevated troponin   Elevated blood pressure reading with diagnosis of hypertension       Documentation assistance provided by abran Melendez.  Information recorded by the scribe was done at my direction and has been verified and validated by me.     Beena Melendez  01/05/20 7386        Sascha Wade MD  01/05/20 4994

## 2020-01-08 ENCOUNTER — TELEPHONE (OUTPATIENT)
Dept: INTERNAL MEDICINE | Facility: CLINIC | Age: 85
End: 2020-01-08

## 2020-01-08 NOTE — TELEPHONE ENCOUNTER
Pharmacy called to See if pt could be approved to get capsules instead of tablets of the  raNITIdine (ZANTAC) 150 MG tablet for a quantity of twice a day.       Zulema bernal     Contact [place]: [phone]

## 2020-01-10 LAB
BACTERIA SPEC AEROBE CULT: NORMAL
BACTERIA SPEC AEROBE CULT: NORMAL

## 2020-01-12 ENCOUNTER — HOSPITAL ENCOUNTER (EMERGENCY)
Facility: HOSPITAL | Age: 85
Discharge: HOME | End: 2020-01-12
Attending: EMERGENCY MEDICINE | Admitting: EMERGENCY MEDICINE

## 2020-01-12 VITALS
HEART RATE: 89 BPM | HEIGHT: 65 IN | SYSTOLIC BLOOD PRESSURE: 142 MMHG | RESPIRATION RATE: 16 BRPM | TEMPERATURE: 97.6 F | OXYGEN SATURATION: 98 % | WEIGHT: 200 LBS | DIASTOLIC BLOOD PRESSURE: 96 MMHG | BODY MASS INDEX: 33.32 KG/M2

## 2020-01-12 DIAGNOSIS — R04.0 ACUTE ANTERIOR EPISTAXIS: Primary | ICD-10-CM

## 2020-01-12 PROCEDURE — 99283 EMERGENCY DEPT VISIT LOW MDM: CPT

## 2020-01-12 RX ORDER — IBUPROFEN 200 MG
200 TABLET ORAL EVERY 6 HOURS PRN
COMMUNITY

## 2020-01-13 ENCOUNTER — TELEPHONE (OUTPATIENT)
Dept: CARDIOLOGY | Facility: CLINIC | Age: 85
End: 2020-01-13

## 2020-01-13 NOTE — DISCHARGE INSTRUCTIONS
Apply pressure to soft tissue portion of nose if bleeding reoccurs.     Follow-up with PCP for recheck.    Return to ER with further concerns.     Continue medication as prescribed.

## 2020-01-13 NOTE — TELEPHONE ENCOUNTER
Pt was seen in ER yesterday for nose bleed that lasted over 2 hours. Pt on Xarelto and Plavix (RM 9/9/19). Daughter wondering if she can stop Plavix. Advised she needs Plavix for at least 6 months post stenting (3/9/20). She is also bruising very easily. Age a factor and we discussed this. She was also seen for inflammation in her hand. She is taking ibuprofen for this. Cautioned use of NSAIDS with NOAC and plavix. She understands this. Will follow up as needed. She will change back to aspirin 81 mg daily and discontinue plavix on or after 3/9/20. ASIM

## 2020-01-13 NOTE — ED PROVIDER NOTES
Subjective   Lorna Lo is an 85 y.o female who presents to the ED with complaints of epistaxis. The patient reports she had a sudden onset of epistaxis approximately 2 hours before arrival. She denies any recent cough, sneezing, rhinorrhea, congestion, nausea, and vomiting. Additionally, her daughter reports the patient was recently placed on Plavix on top of her Xarelto after having a stent placed by Dr. Cade, cardiology. She has a past medical history of CAD, CKD, DVT, diabetes mellitus, dyslipidemia, GERD, hyperlipidemia, hypertension, polymyositis, and thyroid disease. There are no other acute symptoms at this time.      History provided by:  Patient and relative  Nose Bleed   Location:  Bilateral  Severity:  Moderate  Duration:  2 hours  Timing:  Constant  Progression:  Unchanged  Chronicity:  New  Associated symptoms: no congestion, no cough and no sneezing    Risk factors: change in medication        Review of Systems   HENT: Positive for nosebleeds. Negative for congestion, rhinorrhea and sneezing.    Respiratory: Negative for cough.    Gastrointestinal: Negative for nausea and vomiting.   All other systems reviewed and are negative.      Past Medical History:   Diagnosis Date   • Chronic anemia     Chronic anemia, secondary to (CKD).    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) 10/14/2019   • Coronary artery disease    • Dependent edema    • Dermatitis    • DVT (deep venous thrombosis) (CMS/[facility]) 2009    History of DVT in 2009.    • Dyslipidemia    • GERD (gastroesophageal reflux disease)    • Hyperlipidemia    • Hypertension    • Osteopenia    • Polymyositis (CMS/[facility])    • Renal disorder    • Thyroid disease    • Venous stasis        Allergies   Allergen Reactions   • Haldol [Haloperidol] Rash and Mental Status Change   • Lortab [Hydrocodone-Acetaminophen] Shortness Of Breath   • Methotrexate  Derivatives      Multisystem toxicity, reported per PCP office notes   • Amoxicillin      Pt does not recall reaction, reported by daughter   • Azathioprine      Pt does not recall reaction, allergy reported per PCP office notes   • Cellcept [Mycophenolate]      Pt does not recall reaction, allergy reported per PCP office notes   • Contrast Dye Hives     INTRAVENOUS CONTRAST DYE.    • Fosamax [Alendronate]      Pt does not recall reaction, allergy reported per PCP office notes   • Hydrochlorothiazide      Unknown reaction, allergy reported per PCP office notes   • Lisinopril Cough   • Methotrexate Unknown (See Comments)   • Simvastatin Other (See Comments)     Sleep disturbance, reported per PCP office notes   • Bactrim [Sulfamethoxazole-Trimethoprim] Rash   • Chlorthalidone Rash   • Ciprofloxacin Hcl Rash   • Sulfa Antibiotics Rash       Past Surgical History:   Procedure Laterality Date   • ARTERIOGRAM Bilateral 2019    Procedure: ARTERIOGRAM;  Surgeon: Pablito Cade MD;  Location:  [facility];  Service: Cardiology   • CARDIAC CATHETERIZATION     • CARDIAC CATHETERIZATION N/A 2019    Procedure: Left Heart Cath;  Surgeon: Pablito Cade MD;  Location:  [facility];  Service: Cardiology   • CARDIAC SURGERY      Cardiac Stents    • CORONARY ARTERY BYPASS GRAFT     • GASTRIC BYPASS         Family History   Problem Relation Age of Onset   • Heart attack Father        Social History     Socioeconomic History   • Marital status:      Spouse name: Not on file   • Number of children: Not on file   • Years of education: Not on file   • Highest education level: Not on file   Tobacco Use   • Smoking status: Former Smoker     Types: Cigarettes     Last attempt to quit: 1960     Years since quittin.0   • Smokeless tobacco: Never Used   • Tobacco comment: tried it in highschool   Substance and Sexual Activity   • Alcohol use: No   • Drug use: No   • Sexual activity:  Defer         Objective   Physical Exam   Constitutional: She is oriented to person, place, and time. She appears well-developed and well-nourished. No distress.   HENT:   Head: Normocephalic.   Nose: Nose normal.   Dried blood in posterior oropharynx but no active bleeding. Some dried blood with tissue packed into the naris bilaterally but no active bleeding.   Eyes: Conjunctivae are normal. No scleral icterus.   Neck: Normal range of motion. Neck supple.   Cardiovascular: Normal rate, regular rhythm and normal heart sounds.   No murmur heard.  Pulmonary/Chest: Effort normal and breath sounds normal. No respiratory distress.   Abdominal: Soft. There is no tenderness.   Musculoskeletal: Normal range of motion. She exhibits no edema.   Neurological: She is alert and oriented to person, place, and time.   Skin: Skin is warm and dry.   Psychiatric: She has a normal mood and affect. Her behavior is normal.   Nursing note and vitals reviewed.      Procedures         ED Course     No results found for this or any previous visit (from the past 24 hour(s)).  Note: In addition to lab results from this visit, the labs listed above may include labs taken at another facility or during a different encounter within the last 24 hours. Please correlate lab times with ED admission and discharge times for further clarification of the services performed during this visit.    No orders to display     Vitals:    01/12/20 2120 01/12/20 2130 01/12/20 2145 01/12/20 2151   BP: 142/96   142/96   BP Location:    Left arm   Patient Position:    Lying   Pulse:    89   Resp:    16   Temp:       TempSrc:       SpO2: 97% 96% 98% 98%   Weight:       Height:         Medications - No data to display  ECG/EMG Results (last 24 hours)     ** No results found for the last 24 hours. **        No orders to display                    MDM  Number of Diagnoses or Management Options  Acute anterior epistaxis: new and requires workup  Diagnosis management  comments: Patient presents with complaint of nosebleed.    Upon my evaluation the bleeding has resolved.    Packing that was placed by a family friend was removed and bleeding remained resolved.    The patient will be advised to continue medications as prescribed.    Follow-up with primary care physician for recheck within the next week.       Amount and/or Complexity of Data Reviewed  Obtain history from someone other than the patient: yes  Review and summarize past medical records: yes  Independent visualization of images, tracings, or specimens: yes        Final diagnoses:   Acute anterior epistaxis       Documentation assistance provided by abran Childs.  Information recorded by the scribe was done at my direction and has been verified and validated by me.     Michael Childs  01/12/20 0320       Camila Soler MD  01/12/20 [id number]

## 2020-01-14 ENCOUNTER — OFFICE VISIT (OUTPATIENT)
Dept: INTERNAL MEDICINE | Facility: CLINIC | Age: 85
End: 2020-01-14

## 2020-01-14 ENCOUNTER — APPOINTMENT (OUTPATIENT)
Dept: LAB | Facility: HOSPITAL | Age: 85
End: 2020-01-14

## 2020-01-14 VITALS
BODY MASS INDEX: 31.92 KG/M2 | SYSTOLIC BLOOD PRESSURE: 112 MMHG | RESPIRATION RATE: 20 BRPM | HEIGHT: 65 IN | DIASTOLIC BLOOD PRESSURE: 74 MMHG | TEMPERATURE: 97.6 F | WEIGHT: 191.6 LBS | HEART RATE: 75 BPM | OXYGEN SATURATION: 96 %

## 2020-01-14 DIAGNOSIS — E11.22 CONTROLLED TYPE 2 DIABETES MELLITUS WITH CHRONIC KIDNEY DISEASE, WITHOUT LONG-TERM CURRENT USE OF INSULIN, UNSPECIFIED CKD STAGE (HCC): Primary | ICD-10-CM

## 2020-01-14 DIAGNOSIS — N18.30 STAGE 3 CHRONIC KIDNEY DISEASE (HCC): ICD-10-CM

## 2020-01-14 DIAGNOSIS — M79.89 SWELLING OF RIGHT HAND: ICD-10-CM

## 2020-01-14 DIAGNOSIS — I10 ESSENTIAL HYPERTENSION: ICD-10-CM

## 2020-01-14 DIAGNOSIS — I25.10 CORONARY ARTERY DISEASE INVOLVING NATIVE CORONARY ARTERY OF NATIVE HEART WITHOUT ANGINA PECTORIS: ICD-10-CM

## 2020-01-14 DIAGNOSIS — E78.5 DYSLIPIDEMIA: ICD-10-CM

## 2020-01-14 DIAGNOSIS — K21.9 GASTROESOPHAGEAL REFLUX DISEASE, ESOPHAGITIS PRESENCE NOT SPECIFIED: ICD-10-CM

## 2020-01-14 LAB
ALBUMIN SERPL-MCNC: 3.5 G/DL (ref 3.5–5.2)
ALBUMIN/GLOB SERPL: 1 G/DL
ALP SERPL-CCNC: 105 U/L (ref 39–117)
ALT SERPL W P-5'-P-CCNC: 18 U/L (ref 1–33)
ANION GAP SERPL CALCULATED.3IONS-SCNC: 17.3 MMOL/L (ref 5–15)
AST SERPL-CCNC: 21 U/L (ref 1–32)
BASOPHILS # BLD AUTO: 0.04 10*3/MM3 (ref 0–0.2)
BASOPHILS NFR BLD AUTO: 0.3 % (ref 0–1.5)
BILIRUB SERPL-MCNC: 0.6 MG/DL (ref 0.2–1.2)
BUN BLD-MCNC: 21 MG/DL (ref 8–23)
BUN/CREAT SERPL: 15.8 (ref 7–25)
CALCIUM SPEC-SCNC: 9.5 MG/DL (ref 8.6–10.5)
CHLORIDE SERPL-SCNC: 101 MMOL/L (ref 98–107)
CHOLEST SERPL-MCNC: 191 MG/DL (ref 0–200)
CHROMATIN AB SERPL-ACNC: 10.6 IU/ML (ref 0–14)
CO2 SERPL-SCNC: 22.7 MMOL/L (ref 22–29)
CREAT BLD-MCNC: 1.33 MG/DL (ref 0.57–1)
DEPRECATED RDW RBC AUTO: 43.1 FL (ref 37–54)
EOSINOPHIL # BLD AUTO: 0.07 10*3/MM3 (ref 0–0.4)
EOSINOPHIL NFR BLD AUTO: 0.5 % (ref 0.3–6.2)
ERYTHROCYTE [DISTWIDTH] IN BLOOD BY AUTOMATED COUNT: 14 % (ref 12.3–15.4)
ERYTHROCYTE [SEDIMENTATION RATE] IN BLOOD: 45 MM/HR (ref 0–30)
EXPIRATION DATE: NORMAL
GFR SERPL CREATININE-BSD FRML MDRD: 38 ML/MIN/1.73
GLOBULIN UR ELPH-MCNC: 3.5 GM/DL
GLUCOSE BLD-MCNC: 102 MG/DL (ref 65–99)
HBA1C MFR BLD: 6.1 %
HCT VFR BLD AUTO: 34.4 % (ref 34–46.6)
HDLC SERPL-MCNC: 53 MG/DL (ref 40–60)
HGB BLD-MCNC: 11.7 G/DL (ref 12–15.9)
IMM GRANULOCYTES # BLD AUTO: 0.24 10*3/MM3 (ref 0–0.05)
IMM GRANULOCYTES NFR BLD AUTO: 1.8 % (ref 0–0.5)
LDLC SERPL CALC-MCNC: 116 MG/DL (ref 0–100)
LDLC/HDLC SERPL: 2.18 {RATIO}
LYMPHOCYTES # BLD AUTO: 1.18 10*3/MM3 (ref 0.7–3.1)
LYMPHOCYTES NFR BLD AUTO: 9 % (ref 19.6–45.3)
Lab: NORMAL
MCH RBC QN AUTO: 29 PG (ref 26.6–33)
MCHC RBC AUTO-ENTMCNC: 34 G/DL (ref 31.5–35.7)
MCV RBC AUTO: 85.4 FL (ref 79–97)
MONOCYTES # BLD AUTO: 1.01 10*3/MM3 (ref 0.1–0.9)
MONOCYTES NFR BLD AUTO: 7.7 % (ref 5–12)
NEUTROPHILS # BLD AUTO: 10.6 10*3/MM3 (ref 1.7–7)
NEUTROPHILS NFR BLD AUTO: 80.7 % (ref 42.7–76)
NRBC BLD AUTO-RTO: 0 /100 WBC (ref 0–0.2)
PLATELET # BLD AUTO: 247 10*3/MM3 (ref 140–450)
PMV BLD AUTO: 10.1 FL (ref 6–12)
POTASSIUM BLD-SCNC: 3.9 MMOL/L (ref 3.5–5.2)
PROT SERPL-MCNC: 7 G/DL (ref 6–8.5)
RBC # BLD AUTO: 4.03 10*6/MM3 (ref 3.77–5.28)
SODIUM BLD-SCNC: 141 MMOL/L (ref 136–145)
TRIGL SERPL-MCNC: 112 MG/DL (ref 0–150)
URATE SERPL-MCNC: 7.6 MG/DL (ref 2.4–5.7)
VLDLC SERPL-MCNC: 22.4 MG/DL (ref 5–40)
WBC NRBC COR # BLD: 13.14 10*3/MM3 (ref 3.4–10.8)

## 2020-01-14 PROCEDURE — 85025 COMPLETE CBC W/AUTO DIFF WBC: CPT | Performed by: INTERNAL MEDICINE

## 2020-01-14 PROCEDURE — 83036 HEMOGLOBIN GLYCOSYLATED A1C: CPT | Performed by: INTERNAL MEDICINE

## 2020-01-14 PROCEDURE — 80053 COMPREHEN METABOLIC PANEL: CPT | Performed by: INTERNAL MEDICINE

## 2020-01-14 PROCEDURE — 80061 LIPID PANEL: CPT | Performed by: INTERNAL MEDICINE

## 2020-01-14 PROCEDURE — 99214 OFFICE O/P EST MOD 30 MIN: CPT | Performed by: INTERNAL MEDICINE

## 2020-01-14 PROCEDURE — 84550 ASSAY OF BLOOD/URIC ACID: CPT | Performed by: INTERNAL MEDICINE

## 2020-01-14 PROCEDURE — 86431 RHEUMATOID FACTOR QUANT: CPT | Performed by: INTERNAL MEDICINE

## 2020-01-14 PROCEDURE — 85652 RBC SED RATE AUTOMATED: CPT | Performed by: INTERNAL MEDICINE

## 2020-01-14 NOTE — PROGRESS NOTES
Subjective   Lorna Lo is a 85 y.o. female.     Hypertension   Pertinent negatives include no chest pain, headaches, neck pain, palpitations or shortness of breath.   Heartburn   She reports no abdominal pain, no chest pain, no coughing, no nausea, no sore throat or no wheezing. Pertinent negatives include no fatigue.   Headache    Pertinent negatives include no abdominal pain, back pain, coughing, dizziness, ear pain, eye pain, fever, nausea, neck pain, numbness, seizures, sinus pressure, sore throat or vomiting.   Chronic Kidney Disease   Pertinent negatives include no abdominal pain, arthralgias, chest pain, chills, congestion, coughing, diaphoresis, fatigue, fever, headaches, myalgias, nausea, neck pain, numbness, rash, sore throat or vomiting.      Here for blood pressure recheck and f/u on hlp, gerd, and CAD. HLP and gerd have been stable x last year. No fever or chills. Seen in Ed for nosebleeds and swollen right hand. Had Fall Sat before xmas. Went down flat on her back when getting out of  a chair. MRi showed nonspecific dorsal swelling of right hand. Sed rate up.   The following portions of the patient's history were reviewed and updated as appropriate: allergies, current medications, past family history, past medical history, past social history, past surgical history and problem list.    Review of Systems   Constitutional: Negative for activity change, appetite change, chills, diaphoresis, fatigue, fever and unexpected weight change.   HENT: Negative for congestion, ear discharge, ear pain, mouth sores, nosebleeds, sinus pressure, sneezing and sore throat.    Eyes: Negative for pain, discharge and itching.   Respiratory: Negative for cough, chest tightness, shortness of breath and wheezing.    Cardiovascular: Negative for chest pain, palpitations and leg swelling.   Gastrointestinal: Negative for abdominal pain, constipation, diarrhea, nausea and vomiting.   Endocrine: Negative for cold  "intolerance, heat intolerance, polydipsia and polyphagia.   Genitourinary: Negative for dysuria, flank pain, frequency, hematuria and urgency.   Musculoskeletal: Negative for arthralgias, back pain, gait problem, myalgias, neck pain and neck stiffness.   Skin: Negative for color change, pallor and rash.   Neurological: Negative for dizziness, seizures, speech difficulty, numbness and headaches.   Psychiatric/Behavioral: Negative for agitation, confusion, decreased concentration and sleep disturbance. The patient is not nervous/anxious.      /74   Pulse 75   Temp 97.6 °F (36.4 °C) (Temporal)   Resp 20   Ht 165.1 cm (65\")   Wt 86.9 kg (191 lb 9.6 oz)   LMP  (LMP Unknown)   SpO2 96%   BMI 31.88 kg/m²     Objective   Physical Exam   Constitutional: She is oriented to person, place, and time. She appears well-developed.   HENT:   Head: Normocephalic.   Right Ear: External ear normal.   Left Ear: External ear normal.   Nose: Nose normal.   Mouth/Throat: Oropharynx is clear and moist.   Eyes: Pupils are equal, round, and reactive to light. Conjunctivae are normal.   Neck: No JVD present. No thyromegaly present.   Cardiovascular: Normal rate, regular rhythm and normal heart sounds. Exam reveals no friction rub.   No murmur heard.  Pulmonary/Chest: Effort normal and breath sounds normal. No respiratory distress. She has no wheezes. She has no rales.   Abdominal: Soft. Bowel sounds are normal. She exhibits no distension. There is no tenderness. There is no guarding.   Musculoskeletal: She exhibits no edema or tenderness.   Lymphadenopathy:     She has no cervical adenopathy.   Neurological: She is oriented to person, place, and time. She displays normal reflexes. No cranial nerve deficit.   Skin:        Psychiatric: Her behavior is normal.   Nursing note and vitals reviewed.      Assessment/Plan   Lorna was seen today for hypertension.    Diagnoses and all orders for this visit:    Coronary artery disease " involving native coronary artery of native heart without angina pectoris  stable  Essential hypertension  stable  Gastroesophageal reflux disease, esophagitis presence not specified  stable  Dyslipidemia  stable    Dm Type II  A1c was 6.1  Diet controlled  Right hand pain and swelling  Uric acid, RF, sed, hand specialist ref

## 2020-02-11 ENCOUNTER — OFFICE VISIT (OUTPATIENT)
Dept: INTERNAL MEDICINE | Facility: CLINIC | Age: 85
End: 2020-02-11

## 2020-02-11 ENCOUNTER — HOSPITAL ENCOUNTER (OUTPATIENT)
Dept: GENERAL RADIOLOGY | Facility: HOSPITAL | Age: 85
Discharge: HOME | End: 2020-02-11
Admitting: INTERNAL MEDICINE

## 2020-02-11 VITALS
DIASTOLIC BLOOD PRESSURE: 70 MMHG | SYSTOLIC BLOOD PRESSURE: 118 MMHG | BODY MASS INDEX: 31.65 KG/M2 | WEIGHT: 190.2 LBS | HEART RATE: 84 BPM | OXYGEN SATURATION: 99 %

## 2020-02-11 DIAGNOSIS — M54.50 LOW BACK PAIN OF OVER 3 MONTHS DURATION: Primary | ICD-10-CM

## 2020-02-11 DIAGNOSIS — M54.50 LOW BACK PAIN OF OVER 3 MONTHS DURATION: ICD-10-CM

## 2020-02-11 PROCEDURE — 96372 THER/PROPH/DIAG INJ SC/IM: CPT | Performed by: INTERNAL MEDICINE

## 2020-02-11 PROCEDURE — 72100 X-RAY EXAM L-S SPINE 2/3 VWS: CPT

## 2020-02-11 PROCEDURE — 99213 OFFICE O/P EST LOW 20 MIN: CPT | Performed by: INTERNAL MEDICINE

## 2020-02-11 RX ORDER — KETOROLAC TROMETHAMINE 30 MG/ML
30 INJECTION, SOLUTION INTRAMUSCULAR; INTRAVENOUS ONCE
Status: COMPLETED | OUTPATIENT
Start: 2020-02-11 | End: 2020-02-11

## 2020-02-11 RX ADMIN — KETOROLAC TROMETHAMINE 30 MG: 30 INJECTION, SOLUTION INTRAMUSCULAR; INTRAVENOUS at 09:04

## 2020-02-11 NOTE — PROGRESS NOTES
Subjective   Lorna Lo is a 85 y.o. female.   Chief Complaint   Patient presents with   • Back Pain     Same Day       History of Present Illness   Fell at xmas and landed on bottom against a chair. Had back since. She keep thinking it would get better but is getting worst. Can't walk down [address] without using a walker. Pain is making it hard to sleep.   The following portions of the patient's history were reviewed and updated as appropriate: allergies, current medications, past family history, past medical history, past social history, past surgical history and problem list.    Review of Systems   Constitutional: Negative for activity change, appetite change, chills, diaphoresis, fatigue, fever and unexpected weight change.   HENT: Negative for congestion, ear discharge, ear pain, mouth sores, nosebleeds, sinus pressure, sneezing and sore throat.    Eyes: Negative for pain, discharge and itching.   Respiratory: Negative for cough, chest tightness, shortness of breath and wheezing.    Cardiovascular: Negative for chest pain, palpitations and leg swelling.   Gastrointestinal: Negative for abdominal pain, constipation, diarrhea, nausea and vomiting.   Endocrine: Negative for cold intolerance, heat intolerance, polydipsia and polyphagia.   Genitourinary: Negative for dysuria, flank pain, frequency, hematuria and urgency.   Musculoskeletal: Positive for back pain. Negative for arthralgias, gait problem, myalgias, neck pain and neck stiffness.   Skin: Negative for color change, pallor and rash.   Neurological: Negative for seizures, speech difficulty, numbness and headaches.   Psychiatric/Behavioral: Negative for agitation, confusion, decreased concentration and sleep disturbance. The patient is not nervous/anxious.      /70   Pulse 84   Wt 86.3 kg (190 lb 3.2 oz)   LMP  (LMP Unknown)   SpO2 99%   Breastfeeding No   BMI 31.65 kg/m²     Objective   Physical Exam   Constitutional: She appears  well-developed.   HENT:   Head: Normocephalic.   Right Ear: External ear normal.   Left Ear: External ear normal.   Nose: Nose normal.   Mouth/Throat: Oropharynx is clear and moist.   Eyes: Pupils are equal, round, and reactive to light. Conjunctivae are normal.   Neck: No JVD present. No thyromegaly present.   Cardiovascular: Normal rate, regular rhythm and normal heart sounds. Exam reveals no friction rub.   No murmur heard.  Pulmonary/Chest: Effort normal and breath sounds normal. No respiratory distress. She has no wheezes. She has no rales.   Abdominal: She exhibits no distension. There is no tenderness. There is no guarding.   Musculoskeletal: She exhibits no edema or tenderness.   Lymphadenopathy:     She has no cervical adenopathy.   Neurological: She displays normal reflexes. No cranial nerve deficit.   Skin: No rash noted.   Psychiatric: Her behavior is normal.   Nursing note and vitals reviewed.      Assessment/Plan   There are no diagnoses linked to this encounter.

## 2020-02-12 ENCOUNTER — TELEPHONE (OUTPATIENT)
Dept: INTERNAL MEDICINE | Facility: CLINIC | Age: 85
End: 2020-02-12

## 2020-02-12 DIAGNOSIS — S32.010A COMPRESSION FRACTURE OF L1 LUMBAR VERTEBRA, CLOSED, INITIAL ENCOUNTER (HCC): Primary | ICD-10-CM

## 2020-02-18 ENCOUNTER — HOSPITAL ENCOUNTER (OUTPATIENT)
Dept: MRI IMAGING | Facility: HOSPITAL | Age: 85
Discharge: HOME | End: 2020-02-18
Admitting: INTERNAL MEDICINE

## 2020-02-18 DIAGNOSIS — S32.010A COMPRESSION FRACTURE OF L1 LUMBAR VERTEBRA, CLOSED, INITIAL ENCOUNTER (HCC): ICD-10-CM

## 2020-02-18 PROCEDURE — 72148 MRI LUMBAR SPINE W/O DYE: CPT

## 2020-02-21 ENCOUNTER — TELEPHONE (OUTPATIENT)
Dept: INTERNAL MEDICINE | Facility: CLINIC | Age: 85
End: 2020-02-21

## 2020-02-24 DIAGNOSIS — S32.000A LUMBAR COMPRESSION FRACTURE, CLOSED, INITIAL ENCOUNTER (HCC): Primary | ICD-10-CM

## 2020-02-28 ENCOUNTER — OFFICE VISIT (OUTPATIENT)
Dept: NEUROSURGERY | Facility: CLINIC | Age: 85
End: 2020-02-28

## 2020-02-28 VITALS — TEMPERATURE: 97.6 F | WEIGHT: 193 LBS | BODY MASS INDEX: 32.15 KG/M2 | HEIGHT: 65 IN

## 2020-02-28 DIAGNOSIS — M80.88XA OTHER OSTEOPOROSIS WITH CURRENT PATHOLOGICAL FRACTURE, VERTEBRA(E), INITIAL ENCOUNTER FOR FRACTURE (HCC): Primary | ICD-10-CM

## 2020-02-28 PROCEDURE — 99203 OFFICE O/P NEW LOW 30 MIN: CPT | Performed by: NEUROLOGICAL SURGERY

## 2020-02-28 RX ORDER — TRAMADOL HYDROCHLORIDE 50 MG/1
50 TABLET ORAL EVERY 6 HOURS PRN
Qty: 45 TABLET | Refills: 0 | Status: SHIPPED | OUTPATIENT
Start: 2020-02-28 | End: 2020-05-14

## 2020-02-28 NOTE — PROGRESS NOTES
Patient: Lorna Lo  : 1934    Primary Care Provider: Vanesa Correa DO    Requesting Provider: As above        History    Chief Complaint: Low back pain.    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  She landed on her back.  She has had bothersome low back pain at the waist level since that time.  She denies leg pain, numbness, weakness.  She denies bowel or bladder difficulties but is wearing Depends.  Advil has not helped.  She is better when sitting straight up.  She is worse with standing and walking.    Review of Systems   Constitutional: Negative for activity change, appetite change, chills, diaphoresis, fatigue, fever and unexpected weight change.   HENT: Negative for congestion, dental problem, drooling, ear discharge, ear pain, facial swelling, hearing loss, mouth sores, nosebleeds, postnasal drip, rhinorrhea, sinus pressure, sinus pain, sneezing, sore throat, tinnitus, trouble swallowing and voice change.    Eyes: Negative for photophobia, pain, discharge, redness, itching and visual disturbance.   Respiratory: Negative for apnea, cough, choking, chest tightness, shortness of breath, wheezing and stridor.    Cardiovascular: Positive for palpitations. Negative for chest pain and leg swelling.   Gastrointestinal: Negative for abdominal distention, abdominal pain, anal bleeding, blood in stool, constipation, diarrhea, nausea, rectal pain and vomiting.   Endocrine: Negative for cold intolerance, heat intolerance, polydipsia, polyphagia and polyuria.   Genitourinary: Negative for decreased urine volume, difficulty urinating, dyspareunia, dysuria, enuresis, flank pain, frequency, genital sores, hematuria, menstrual problem, pelvic pain, urgency, vaginal bleeding, vaginal discharge and vaginal pain.   Musculoskeletal: Positive for back pain. Negative for arthralgias, gait  "problem, joint swelling, myalgias, neck pain and neck stiffness.   Skin: Negative for color change, pallor, rash and wound.   Allergic/Immunologic: Negative for environmental allergies, food allergies and immunocompromised state.   Neurological: Negative for dizziness, tremors, seizures, syncope, facial asymmetry, speech difficulty, weakness, light-headedness, numbness and headaches.   Hematological: Negative for adenopathy. Does not bruise/bleed easily.   Psychiatric/Behavioral: Negative for agitation, behavioral problems, confusion, decreased concentration, dysphoric mood, hallucinations, self-injury, sleep disturbance and suicidal ideas. The patient is not nervous/anxious and is not hyperactive.        The patient's past medical history, past surgical history, family history, and social history have been reviewed at length in the electronic medical record.    Physical Exam:   Temp 97.6 °F (36.4 °C) (Temporal)   Ht 165.1 cm (65\")   Wt 87.5 kg (193 lb)   LMP  (LMP Unknown)   BMI 32.12 kg/m²   CONSTITUTIONAL: Patient is well-nourished, pleasant and appears stated age.  He harbors some mild memory disturbance and confusion.  CV: Heart regular rate and rhythm without murmur, rub, or gallop.  PULMONARY: Lungs are clear to ascultation.  MUSCULOSKELETAL:  Straight leg raising is negative.  Aurelio's Sign is negative.  ROM in back is limited in all directions.  Tenderness in the back to palpation is not observed.  NEUROLOGICAL:  Orientation, memory, attention span, language function, and cognition have been examined and are intact.  Strength is intact in the lower extremities to direct testing.  Muscle tone is normal throughout.  Station and gait are stooped and she does require a fair amount of assistance.  Sensation is intact to light touch testing throughout.  Deep tendon reflexes are difficult to elicit throughout.  Coordination is intact.      Medical Decision Making    Data Review:   More the lumbar spine " demonstrates a compression fracture at L2 with a burst component that causes moderate canal narrowing.  There is some diffuse degenerative disc disease with generous grade stenosis at L3-4 and L4-5.    Plain films of her lumbar spine demonstrate pronounced osteoporosis    Diagnosis:   1.  L2 compression fracture.  2.  Lumbar stenosis.    Treatment Options:   The patient is adamantly opposed to any sort of surgical intervention.  If she gets better without surgery then that is perfectly reasonable particularly in light of her medical comorbidities.  I have ordered a thoracolumbar brace for her and I prescribed tramadol.  She will follow-up in our clinic in several weeks to check on her progress.  If surgery becomes necessary she may require more than simple kyphoplasty.       Diagnosis Plan   1. Other osteoporosis with current pathological fracture, vertebra(e), initial encounter for fracture (CMS/[facility])         Scribed for Noah Stanley MD by Etta Maldonado CMA on 02/28/2020 at 11:56 AM      I, Dr. Stanley, personally performed the services described in the documentation, as scribed in my presence, and it is both accurate and complete.

## 2020-03-02 ENCOUNTER — DOCUMENTATION (OUTPATIENT)
Dept: NEUROSURGERY | Facility: CLINIC | Age: 85
End: 2020-03-02

## 2020-03-02 NOTE — PROGRESS NOTES
"I have ordered a custom anterior-opening Flexfoam TLSO for the patient because she is obese (5'5\", 197 lbs).  The brace is for spinal fracture stabilization, comfort and pain control.    Awa Mccauley PA-C  03/02/2020  12:04 PM    "

## 2020-03-11 RX ORDER — FOLIC ACID 1 MG/1
TABLET ORAL
Qty: 90 TABLET | Refills: 0 | Status: SHIPPED | OUTPATIENT
Start: 2020-03-11 | End: 2020-06-10

## 2020-03-11 RX ORDER — LEVOTHYROXINE SODIUM 88 UG/1
TABLET ORAL
Qty: 90 TABLET | Refills: 0 | Status: SHIPPED | OUTPATIENT
Start: 2020-03-11 | End: 2020-06-03

## 2020-03-16 DIAGNOSIS — I10 HYPERTENSION, UNSPECIFIED TYPE: ICD-10-CM

## 2020-03-16 RX ORDER — ATENOLOL 100 MG/1
TABLET ORAL
Qty: 90 TABLET | Refills: 0 | Status: SHIPPED | OUTPATIENT
Start: 2020-03-16 | End: 2020-06-10

## 2020-03-31 ENCOUNTER — TELEPHONE (OUTPATIENT)
Dept: NEUROSURGERY | Facility: CLINIC | Age: 85
End: 2020-03-31

## 2020-04-01 DIAGNOSIS — I10 HYPERTENSION, UNSPECIFIED TYPE: ICD-10-CM

## 2020-04-01 RX ORDER — AMLODIPINE BESYLATE 10 MG/1
TABLET ORAL
Qty: 90 TABLET | Refills: 0 | Status: SHIPPED | OUTPATIENT
Start: 2020-04-01 | End: 2020-07-01

## 2020-04-24 ENCOUNTER — NURSE TRIAGE (OUTPATIENT)
Dept: CALL CENTER | Facility: HOSPITAL | Age: 85
End: 2020-04-24

## 2020-04-24 ENCOUNTER — TELEPHONE (OUTPATIENT)
Dept: INTERNAL MEDICINE | Facility: CLINIC | Age: 85
End: 2020-04-24

## 2020-04-24 NOTE — TELEPHONE ENCOUNTER
Spoke to patient and advised to go to the ED due to  her hx of blood clots and heart issues and her being SOA, her right leg is swollen, red and warm to touch,  Elida called her son who is on his way to check on her, they are keeping the apt on Monday/    PATIENT DECLINED TO GO TO ER, SHE IS KEEPING HER APT ON MONDAY

## 2020-04-24 NOTE — TELEPHONE ENCOUNTER
"[facility] transferred call. Patient is having some shortness of breath. States it started a couple months ago. States intermittent with exertion. Her children are aware. Takes blood thinner and is on fluid pill. \"I don't know\", patient very poor historian. Transferred to office backline.     Reason for Disposition  • [1] MILD longstanding difficulty breathing AND [2]  SAME as normal    Additional Information  • Negative: [1] Breathing stopped AND [2] hasn't returned  • Negative: Choking on something  • Negative: Severe difficulty breathing (e.g., struggling for each breath, speaks in single words)  • Negative: Bluish (or gray) lips or face now  • Negative: Difficult to awaken or acting confused (e.g., disoriented, slurred speech)  • Negative: Passed out (i.e., lost consciousness, collapsed and was not responding)  • Negative: Wheezing started suddenly after medicine, an allergic food or bee sting  • Negative: Stridor  • Negative: Slow, shallow and weak breathing  • Negative: Sounds like a life-threatening emergency to the triager  • Negative: Chest pain  • Negative: [1] Wheezing (high pitched whistling sound) AND [2] previous asthma attacks or use of asthma medicines  • Negative: [1] Difficulty breathing AND [2] only present when coughing  • Negative: [1] Difficulty breathing AND [2] only from stuffy or runny nose  • Negative: [1] MODERATE difficulty breathing (e.g., speaks in phrases, SOB even at rest, pulse 100-120) AND [2] NEW-onset or WORSE than normal  • Negative: Wheezing can be heard across the room  • Negative: Drooling or spitting out saliva (because can't swallow)  • Negative: History of prior \"blood clot\" in leg or lungs (i.e., deep vein thrombosis, pulmonary embolism)  • Negative: History of inherited increased risk of blood clots (e.g., Factor 5 Leiden, Anti-thrombin 3, Protein C or Protein S deficiency, Prothrombin mutation)  • Negative: Recent illness requiring prolonged bedrest (i.e., immobilization)  • " "Negative: Hip or leg fracture in past 2 months (e.g., had cast on leg or ankle)  • Negative: Major surgery in the past month  • Negative: Recent long-distance travel with prolonged time in car, bus, plane, or train (i.e., within past 2 weeks; 6 or  more hours duration)  • Negative: Extra heart beats OR irregular heart beating   (i.e., \"palpitations\")  • Negative: Fever > 103 F (39.4 C)  • Negative: [1] Fever > 101 F (38.3 C) AND [2] age > 60  • Negative: [1] Fever > 100.0 F (37.8 C) AND [2] bedridden (e.g., nursing home patient, CVA, chronic illness, recovering from surgery)  • Negative: [1] Fever > 100.0 F (37.8 C) AND [2] diabetes mellitus or weak immune system (e.g., HIV positive, cancer chemo, splenectomy, organ transplant, chronic steroids)  • Negative: [1] Periods where breathing stops and then resumes normally AND [2] bedridden (e.g., nursing home patient, CVA)  • Negative: Pregnant or postpartum (< 1 month since delivery)  • Negative: Patient sounds very sick or weak to the triager  • Negative: [1] MILD difficulty breathing (e.g., minimal/no SOB at rest, SOB with walking, pulse <100) AND [2] NEW-onset or WORSE than normal  • Negative: [1] Longstanding difficulty breathing (e.g., CHF, COPD, emphysema) AND [2] WORSE than normal  • Negative: [1] Longstanding difficulty breathing AND [2] not responding to usual therapy  • Negative: [1] Continuous (nonstop) coughing AND [2] keeps from working or sleeping  • Negative: [1] MODERATE longstanding difficulty breathing (e.g., speaks in phrases, SOB even at rest, pulse 100-120) AND [2] SAME as normal    Answer Assessment - Initial Assessment Questions  1. RESPIRATORY STATUS: \"Describe your breathing?\" (e.g., wheezing, shortness of breath, unable to speak, severe coughing)       Short of breath with exertion  2. ONSET: \"When did this breathing problem begin?\"       \"couple months ago\"  3. PATTERN \"Does the difficult breathing come and go, or has it been constant since it " "started?\"       intermittent  4. SEVERITY: \"How bad is your breathing?\" (e.g., mild, moderate, severe)     - MILD: No SOB at rest, mild SOB with walking, speaks normally in sentences, can lay down, no retractions, pulse < 100.     - MODERATE: SOB at rest, SOB with minimal exertion and prefers to sit, cannot lie down flat, speaks in phrases, mild retractions, audible wheezing, pulse 100-120.     - SEVERE: Very SOB at rest, speaks in single words, struggling to breathe, sitting hunched forward, retractions, pulse > 120       mild  5. RECURRENT SYMPTOM: \"Have you had difficulty breathing before?\" If so, ask: \"When was the last time?\" and \"What happened that time?\"       See note  6. CARDIAC HISTORY: \"Do you have any history of heart disease?\" (e.g., heart attack, angina, bypass surgery, angioplasty)       \"heart\" \"I don't know\"  7. LUNG HISTORY: \"Do you have any history of lung disease?\"  (e.g., pulmonary embolus, asthma, emphysema)      \"I don't know\"  8. CAUSE: \"What do you think is causing the breathing problem?\"       unknown  9. OTHER SYMPTOMS: \"Do you have any other symptoms? (e.g., dizziness, runny nose, cough, chest pain, fever)      no  10. PREGNANCY: \"Is there any chance you are pregnant?\" \"When was your last menstrual period?\"        n/a  11. TRAVEL: \"Have you traveled out of the country in the last month?\" (e.g., travel history, exposures)        no    Protocols used: BREATHING DIFFICULTY-ADULT-AH      "

## 2020-04-24 NOTE — TELEPHONE ENCOUNTER
Patient's right leg is swollen from a previous surgery. She stated she starts panting when walking and is out of breath. I did set her up with an office visit on Monday she's going to talk with her children to see if she can come in or if we need to do a video visit.    Barbara spoke with her further to get more information     You can reach patient at [phone]

## 2020-05-14 ENCOUNTER — OFFICE VISIT (OUTPATIENT)
Dept: INTERNAL MEDICINE | Facility: CLINIC | Age: 85
End: 2020-05-14

## 2020-05-14 VITALS
OXYGEN SATURATION: 98 % | HEIGHT: 65 IN | SYSTOLIC BLOOD PRESSURE: 140 MMHG | HEART RATE: 53 BPM | TEMPERATURE: 98.2 F | BODY MASS INDEX: 32.89 KG/M2 | DIASTOLIC BLOOD PRESSURE: 82 MMHG | WEIGHT: 197.4 LBS

## 2020-05-14 DIAGNOSIS — E11.22 CONTROLLED TYPE 2 DIABETES MELLITUS WITH STAGE 3 CHRONIC KIDNEY DISEASE, WITHOUT LONG-TERM CURRENT USE OF INSULIN (HCC): ICD-10-CM

## 2020-05-14 DIAGNOSIS — I10 ESSENTIAL HYPERTENSION: Primary | ICD-10-CM

## 2020-05-14 DIAGNOSIS — R60.0 LOCAL EDEMA: ICD-10-CM

## 2020-05-14 DIAGNOSIS — N18.30 STAGE 3 CHRONIC KIDNEY DISEASE (HCC): ICD-10-CM

## 2020-05-14 DIAGNOSIS — K21.9 GASTROESOPHAGEAL REFLUX DISEASE, ESOPHAGITIS PRESENCE NOT SPECIFIED: ICD-10-CM

## 2020-05-14 DIAGNOSIS — I48.0 PAF (PAROXYSMAL ATRIAL FIBRILLATION) (HCC): ICD-10-CM

## 2020-05-14 DIAGNOSIS — E78.5 DYSLIPIDEMIA: ICD-10-CM

## 2020-05-14 DIAGNOSIS — N18.30 CONTROLLED TYPE 2 DIABETES MELLITUS WITH STAGE 3 CHRONIC KIDNEY DISEASE, WITHOUT LONG-TERM CURRENT USE OF INSULIN (HCC): ICD-10-CM

## 2020-05-14 PROBLEM — M81.0 OSTEOPOROSIS: Status: ACTIVE | Noted: 2020-05-14

## 2020-05-14 LAB
GLUCOSE BLDC GLUCOMTR-MCNC: 144 MG/DL (ref 70–130)
HBA1C MFR BLD: 6.4 %

## 2020-05-14 PROCEDURE — 99213 OFFICE O/P EST LOW 20 MIN: CPT | Performed by: INTERNAL MEDICINE

## 2020-05-14 PROCEDURE — 83036 HEMOGLOBIN GLYCOSYLATED A1C: CPT | Performed by: INTERNAL MEDICINE

## 2020-05-14 PROCEDURE — 82947 ASSAY GLUCOSE BLOOD QUANT: CPT | Performed by: INTERNAL MEDICINE

## 2020-05-14 RX ORDER — POTASSIUM CHLORIDE 20 MEQ/1
20 TABLET, EXTENDED RELEASE ORAL DAILY
Qty: 30 TABLET | Refills: 2 | Status: SHIPPED | OUTPATIENT
Start: 2020-05-14 | End: 2020-01-01

## 2020-05-14 RX ORDER — FUROSEMIDE 20 MG/1
20 TABLET ORAL DAILY
Qty: 30 TABLET | Refills: 2 | Status: SHIPPED | OUTPATIENT
Start: 2020-05-14 | End: 2020-01-01

## 2020-05-14 NOTE — PROGRESS NOTES
Subjective   Lorna Lo is a 85 y.o. female.     Hypertension   Pertinent negatives include no chest pain, headaches, neck pain, palpitations or shortness of breath.   Heartburn   She reports no abdominal pain, no chest pain, no coughing, no nausea, no sore throat or no wheezing. Pertinent negatives include no fatigue.   Headache    Pertinent negatives include no abdominal pain, back pain, coughing, dizziness, ear pain, eye pain, fever, nausea, neck pain, numbness, seizures, sinus pressure, sore throat or vomiting.   Chronic Kidney Disease   Pertinent negatives include no abdominal pain, arthralgias, chest pain, chills, congestion, coughing, diaphoresis, fatigue, fever, headaches, myalgias, nausea, neck pain, numbness, rash, sore throat or vomiting.      Here for blood pressure recheck and f/u on hlp, gerd, and CAD. HLP and gerd have been stable x last year. No fever or chills. Diabetes has been diet controlled.   The following portions of the patient's history were reviewed and updated as appropriate: allergies, current medications, past family history, past medical history, past social history, past surgical history and problem list.    Review of Systems   Constitutional: Negative for activity change, appetite change, chills, diaphoresis, fatigue, fever and unexpected weight change.   HENT: Negative for congestion, ear discharge, ear pain, mouth sores, nosebleeds, sinus pressure, sneezing and sore throat.    Eyes: Negative for pain, discharge and itching.   Respiratory: Negative for cough, chest tightness, shortness of breath and wheezing.    Cardiovascular: Negative for chest pain, palpitations and leg swelling.   Gastrointestinal: Negative for abdominal pain, constipation, diarrhea, nausea and vomiting.   Endocrine: Negative for cold intolerance, heat intolerance, polydipsia and polyphagia.   Genitourinary: Negative for dysuria, flank pain, frequency, hematuria and urgency.   Musculoskeletal: Negative  "for arthralgias, back pain, gait problem, myalgias, neck pain and neck stiffness.   Skin: Negative for color change, pallor and rash.   Neurological: Negative for dizziness, seizures, speech difficulty, numbness and headaches.   Psychiatric/Behavioral: Negative for agitation, confusion, decreased concentration and sleep disturbance. The patient is not nervous/anxious.      /82   Pulse 53   Temp 98.2 °F (36.8 °C)   Ht 165.1 cm (65\")   Wt 89.5 kg (197 lb 6.4 oz)   LMP  (LMP Unknown)   SpO2 98%   BMI 32.85 kg/m²     Objective   Physical Exam   Constitutional: She is oriented to person, place, and time. She appears well-developed.   HENT:   Head: Normocephalic.   Right Ear: External ear normal.   Left Ear: External ear normal.   Nose: Nose normal.   Mouth/Throat: Oropharynx is clear and moist.   Eyes: Pupils are equal, round, and reactive to light. Conjunctivae are normal.   Neck: No JVD present. No thyromegaly present.   Cardiovascular: Normal rate, regular rhythm and normal heart sounds. Exam reveals no friction rub.   No murmur heard.  Pulmonary/Chest: Effort normal and breath sounds normal. No respiratory distress. She has no wheezes. She has no rales.   Abdominal: Soft. Bowel sounds are normal. She exhibits no distension. There is no tenderness. There is no guarding.   Musculoskeletal: She exhibits no edema or tenderness.   Lymphadenopathy:     She has no cervical adenopathy.   Neurological: She is oriented to person, place, and time. She displays normal reflexes. No cranial nerve deficit.   Psychiatric: Her behavior is normal.   Nursing note and vitals reviewed.      Assessment/Plan   Lorna was seen today for hypertension.    Diagnoses and all orders for this visit:    Coronary artery disease involving native coronary artery of native heart without angina pectoris  stable  Essential hypertension  stable  Gastroesophageal reflux disease, esophagitis presence not " specified  stable  Dyslipidemia  stable    Dm Type II  A1c was 6.4

## 2020-05-18 ENCOUNTER — TELEPHONE (OUTPATIENT)
Dept: NEUROSURGERY | Facility: CLINIC | Age: 85
End: 2020-05-18

## 2020-05-18 NOTE — TELEPHONE ENCOUNTER
Per Dr. Stanley note, they did not want to pursue surgery and we were just going to have her f/u in clinic to monitor progress. See if they want to have a  video or telephone visit to allow us to check progress. If so, can set up with Apro or SO.

## 2020-05-18 NOTE — TELEPHONE ENCOUNTER
Pt son Dwaine Grewal called and said he does not want to bring the patient out in this for her appt. He asked if it could be rescheduled to Sept. He said it is not urgent. It is rescheduled for Sept. 8   Saw that she has a fracture.  Please advise

## 2020-05-19 ENCOUNTER — TELEPHONE (OUTPATIENT)
Dept: NEUROSURGERY | Facility: CLINIC | Age: 85
End: 2020-05-19

## 2020-05-19 NOTE — TELEPHONE ENCOUNTER
This patient was supposed to have a 1 month follow up in March, which was rescheduled due to covid-19. The [facility] has now rescheduled her out until September. Should we call the patient and see if she wants to do a televisit or video visit?

## 2020-05-19 NOTE — TELEPHONE ENCOUNTER
Looks like Bernadette spoke with the pt's son yesterday and they have elected to simply wait to follow up in person. Pt does not have a way to do a video visit and her son believes a telephone visit would confuse her. They noted they would call our office sooner if they need us.

## 2020-06-03 RX ORDER — RIVAROXABAN 20 MG/1
TABLET, FILM COATED ORAL
Qty: 30 TABLET | Refills: 0 | OUTPATIENT
Start: 2020-06-03

## 2020-06-03 RX ORDER — LEVOTHYROXINE SODIUM 88 UG/1
TABLET ORAL
Qty: 90 TABLET | Refills: 0 | Status: SHIPPED | OUTPATIENT
Start: 2020-06-03 | End: 2020-01-01

## 2020-06-10 DIAGNOSIS — I10 HYPERTENSION, UNSPECIFIED TYPE: ICD-10-CM

## 2020-06-10 RX ORDER — FOLIC ACID 1 MG/1
TABLET ORAL
Qty: 90 TABLET | Refills: 0 | Status: SHIPPED | OUTPATIENT
Start: 2020-06-10 | End: 2020-01-01

## 2020-06-10 RX ORDER — ATENOLOL 100 MG/1
TABLET ORAL
Qty: 90 TABLET | Refills: 0 | Status: SHIPPED | OUTPATIENT
Start: 2020-06-10 | End: 2020-01-01

## 2020-07-01 DIAGNOSIS — I10 HYPERTENSION, UNSPECIFIED TYPE: ICD-10-CM

## 2020-07-01 RX ORDER — AMLODIPINE BESYLATE 10 MG/1
TABLET ORAL
Qty: 90 TABLET | Refills: 0 | Status: SHIPPED | OUTPATIENT
Start: 2020-07-01 | End: 2020-01-01

## 2020-07-28 ENCOUNTER — TELEPHONE (OUTPATIENT)
Dept: INTERNAL MEDICINE | Facility: CLINIC | Age: 85
End: 2020-07-28

## 2020-07-28 NOTE — TELEPHONE ENCOUNTER
Pt's daughter called to request a medication for the pt's leg called     triamcinolon 0.1% cream    Pharmacy: [facility]  PH: [phone]  FAX: [phone]

## 2020-08-18 NOTE — PROGRESS NOTES
"  OFFICE FOLLOW UP     Date of Encounter:2020     Name: Lorna Lo  : 1934  Address: [address] [place] [address]    PCP: Vanesa Correa DO  [facility] [address]    Lorna Lo is a 85 y.o. female.    Chief Complaint: Follow up of CAD, PAF, HTN    Problem List:   1.  Coronary artery disease:   a.  Coronary artery bypass grafting, .  b. Left heart catheterization , at [facility], IDB.  Nonobstructive.    c. Increased  shortness of air, summer 2015.   d. [facility] 2015- patent LIMA to LAD and SVG to RCA. No myocardium in jeopardy. Normal LVEF   e. History of statin \"intolerances\".  f. [facility] for [place] 19: 2/2 patent grafts, high degree stenosis in proximal ramus intermedians, treated with PTCA and ZES  1. Post-procedure hematoma with no pseudoaneurysm by duplex  g. Echo 19: EF 80%, mild-mod LVH; trivial gradient across trileaflet and somewhat calcified aortic valve; no AS or AI; calcification of mitral valve, which is functionally normal   2. Hypertension.   3. Paroxysmal atrial fibrillation, Winter 2017.  a. Chads-vasc=5, NOAC instituted by PCP.  4. Dyslipidemia.   5. Polymyositis.   6. Dependent edema.    7. CKD.   8. Chronic anemia, secondary to (CKD).   9. Osteopenia.    10. GERD.   11. History of DVT in .   12. Cognitive impairment with acute delirium during ICU admission for hematoma 2019  13. Chronic microaspiration, per Dr. Lafleur   14. Hand nodules     Allergies:  Allergies   Allergen Reactions   • Haldol [Haloperidol] Rash and Mental Status Change   • Lortab [Hydrocodone-Acetaminophen] Shortness Of Breath   • Methotrexate Derivatives      Multisystem toxicity, reported per PCP office notes   • Amoxicillin      Pt does not recall reaction, reported by daughter   • Azathioprine      Pt does not recall reaction, allergy reported per PCP office notes   • Cellcept [Mycophenolate]      Pt does not recall reaction, allergy reported " per PCP office notes   • Contrast Dye Hives     INTRAVENOUS CONTRAST DYE.    • Fosamax [Alendronate]      Pt does not recall reaction, allergy reported per PCP office notes   • Hydrochlorothiazide      Unknown reaction, allergy reported per PCP office notes   • Lisinopril Cough   • Methotrexate Unknown (See Comments)   • Simvastatin Other (See Comments)     Sleep disturbance, reported per PCP office notes   • Bactrim [Sulfamethoxazole-Trimethoprim] Rash   • Chlorthalidone Rash   • Ciprofloxacin Hcl Rash   • Sulfa Antibiotics Rash     Current Medications:  •  amLODIPine (NORVASC) 10 MG tablet, Take 1 tablet by mouth once daily (Patient taking differently: 10 mg Daily)  •  aspirin 81 MG EC tablet, Take 81 mg by mouth Daily  •  atenolol (TENORMIN) 100 MG tablet, Take 1 tablet by mouth once daily  •  cholecalciferol (VITAMIN D3) 1000 UNITS tablet, Take 2,000 Units by mouth Daily.  •  folic acid (FOLVITE) 1 MG tablet, Take 1 tablet by mouth once daily  •  ibuprofen (ADVIL,MOTRIN) 200 MG tablet, Take 200 mg by mouth Every 6 (Six) Hours As Needed for Mild Pain   •  levothyroxine (SYNTHROID, LEVOTHROID) 88 MCG tablet, Take 1 tablet by mouth once daily  •  predniSONE (DELTASONE) 10 MG tablet, 40mg PO daily for 5 days, then 20mg PO daily for 5 days, then 10mg PO daily for 5 days  •  predniSONE (DELTASONE) 5 MG tablet, 5 mg 2 (Two) Times a Day.  •  raNITIdine (ZANTAC) 150 MG tablet, Take 1 tablet by mouth 2 (Two) Times a Day.  •  rivaroxaban (Xarelto) 20 MG tablet, Take 1 tablet by mouth Daily.  •  triamcinolone (KENALOG) 0.1 % ointment, Apply  topically to the appropriate area as directed 2 (Two) Times a Day    History of Present Illness:           Mrs. Lo returns for follow up today. She is sedentary in a wheelchair.  She denies any chest pain or symptoms to suggest angina. She has had some nodules on her right hand come up in the past few months that no one has been able to explain. She also reports LE edema with some  "clear fluid drainage from her right lower leg. She has been wearing compression socks which seem to help with the swelling. She denies orthopnea or PND. She reports she is taking a diuretic however this is not listed on our medication list today.     The following portions of the patient's history were reviewed and updated as appropriate: allergies, current medications and problem list.    ROS: Pertinent positives as listed in the HPI.  All other systems reviewed and negative.    Objective:  Vitals:    08/18/20 1512   BP: 156/75   BP Location: Right arm   Patient Position: Sitting   Pulse: 58   Temp: 98 °F (36.7 °C)   SpO2: 98%   Weight: 87.5 kg (192 lb 12.8 oz)   Height: 162.6 cm (64\")     Physical Exam:  GENERAL: Alert, cooperative, in no acute distress.   HEENT: Normocephalic, no adenopathy, no jugular venous distention  HEART: No discrete PMI is noted. Regular rhythm, normal rate, and 2/6 systolic murmur, no gallops, or rubs.   LUNGS: Clear to auscultation bilaterally. No wheezing, rales or ronchi.  ABDOMEN: Soft, bowel sounds present, non-tender   NEUROLOGIC: No focal abnormalities involving strength or sensation are noted.   EXTREMITIES: No clubbing, cyanosis. Bilateral LE wraps in place; 1-2+BLE edema, with clear drainage from RLE. NO erythema or warmth.     Diagnostic Data:    Lab Results   Component Value Date    GLUCOSE 102 (H) 01/14/2020    CALCIUM 9.5 01/14/2020     01/14/2020    K 3.9 01/14/2020    CO2 22.7 01/14/2020     01/14/2020    BUN 21 01/14/2020    CREATININE 1.33 (H) 01/14/2020    EGFRIFNONA 38 (L) 01/14/2020    BCR 15.8 01/14/2020    ANIONGAP 17.3 (H) 01/14/2020     Lab Results   Component Value Date    HGBA1C 6.4 05/14/2020     Lab Results   Component Value Date    CHOL 191 01/14/2020    CHLPL 213 (H) 11/04/2015    TRIG 112 01/14/2020    HDL 53 01/14/2020     (H) 01/14/2020     Procedures    Assessment and Plan:   1. CAD: asymptomatic without angina or anginal equivalent. "   2. PAF: in regular rhythm today, continue anticoagulation with Xarelto. Patient assistance forms filled out today.   3. Right hand nodules: we are unsure as to the etiology of these nodules. They are not painful or mobile. We will refer to rheumatology for further evaluation.  4. Follow up in 6 months, sooner if needed.     Scribed for Pablito Cade MD by Omayra Chen PA-C. 8/18/2020  16:27

## 2020-09-11 NOTE — TELEPHONE ENCOUNTER
Please advise on UA results in chart for treatment.  Patient has an appointment on Monday.  Sent urine for culture.

## 2020-09-11 NOTE — TELEPHONE ENCOUNTER
Left message for patient to return call to clinic regarding urine results.  Medication sent to pharmacy.

## 2020-09-14 NOTE — PROGRESS NOTES
Subjective   Lorna Lo is a 85 y.o. female.     Hypertension  Pertinent negatives include no chest pain, headaches, neck pain, palpitations or shortness of breath.   Heartburn  She reports no abdominal pain, no chest pain, no coughing, no nausea, no sore throat or no wheezing. Pertinent negatives include no fatigue.   Headache   Associated symptoms include insomnia. Pertinent negatives include no abdominal pain, back pain, coughing, dizziness, ear pain, eye pain, fever, nausea, neck pain, numbness, seizures, sinus pressure, sore throat or vomiting.   Chronic Kidney Disease  Pertinent negatives include no abdominal pain, arthralgias, chest pain, chills, congestion, coughing, diaphoresis, fatigue, fever, headaches, myalgias, nausea, neck pain, numbness, rash, sore throat or vomiting.   Diabetes  Pertinent negatives for hypoglycemia include no confusion, dizziness, headaches, nervousness/anxiousness, pallor, seizures or speech difficulty. Pertinent negatives for diabetes include no chest pain, no fatigue, no polydipsia and no polyphagia.   Urinary Tract Infection   Pertinent negatives include no chills, flank pain, frequency, hematuria, nausea, urgency or vomiting.   Insomnia  Pertinent negatives include no abdominal pain, arthralgias, chest pain, chills, congestion, coughing, diaphoresis, fatigue, fever, headaches, myalgias, nausea, neck pain, numbness, rash, sore throat or vomiting.      Here for f/u on htnand f/u on hlp, gerd, and CAD. HLP and gerd have been stable x last year. No fever or chills. Diabetes has been diet controlled.   The following portions of the patient's history were reviewed and updated as appropriate: allergies, current medications, past family history, past medical history, past social history, past surgical history and problem list.    Review of Systems   Constitutional: Negative for activity change, appetite change, chills, diaphoresis, fatigue, fever and unexpected weight change.  "  HENT: Negative for congestion, ear discharge, ear pain, mouth sores, nosebleeds, sinus pressure, sneezing and sore throat.    Eyes: Negative for pain, discharge and itching.   Respiratory: Negative for cough, chest tightness, shortness of breath and wheezing.    Cardiovascular: Negative for chest pain, palpitations and leg swelling.   Gastrointestinal: Negative for abdominal pain, constipation, diarrhea, nausea and vomiting.   Endocrine: Negative for cold intolerance, heat intolerance, polydipsia and polyphagia.   Genitourinary: Negative for dysuria, flank pain, frequency, hematuria and urgency.   Musculoskeletal: Negative for arthralgias, back pain, gait problem, myalgias, neck pain and neck stiffness.   Skin: Negative for color change, pallor and rash.   Neurological: Negative for dizziness, seizures, speech difficulty, numbness and headaches.   Psychiatric/Behavioral: Negative for agitation, confusion, decreased concentration and sleep disturbance. The patient has insomnia. The patient is not nervous/anxious.      /78   Pulse 61   Temp 97.6 °F (36.4 °C)   Ht 162.6 cm (64\")   Wt 88.1 kg (194 lb 3.2 oz)   LMP  (LMP Unknown)   SpO2 97%   BMI 33.33 kg/m²     Objective   Physical Exam   Constitutional: She is oriented to person, place, and time. She appears well-developed.   HENT:   Head: Normocephalic.   Right Ear: External ear normal.   Left Ear: External ear normal.   Nose: Nose normal.   Eyes: Pupils are equal, round, and reactive to light. Conjunctivae are normal.   Neck: No JVD present. No thyromegaly present.   Cardiovascular: Normal rate, regular rhythm and normal heart sounds. Exam reveals no friction rub.   No murmur heard.  Pulmonary/Chest: Effort normal and breath sounds normal. No respiratory distress. She has no wheezes. She has no rales.   Abdominal: Soft. Bowel sounds are normal. She exhibits no distension. There is no abdominal tenderness. There is no guarding.   Musculoskeletal: " Swelling (b/l LE edema) present. No tenderness.   Lymphadenopathy:     She has no cervical adenopathy.   Neurological: She is oriented to person, place, and time. She displays normal reflexes. No cranial nerve deficit.   Psychiatric: Her behavior is normal.   Nursing note and vitals reviewed.      Assessment/Plan   Lorna was seen today for hypertension.    Diagnoses and all orders for this visit:    Coronary artery disease involving native coronary artery of native heart without angina pectoris  stable  Essential hypertension  Stable cmp lipids  Gastroesophageal reflux disease, esophagitis presence not specified  stable  Dyslipidemia  stable    Dm Type II  A1c was 6.2  Hx leukocytosis  Note is on prednisone    Lower extremity edema b/l  My boots

## 2020-09-15 NOTE — TELEPHONE ENCOUNTER
----- Message from Vanesa Correa DO sent at 9/15/2020  8:19 AM EDT -----  Is anemic. Fesol 325 mg po qd 30 no refills. Needs recheck cbc in 1 week to make sure stable. Order in chart.

## 2020-09-15 NOTE — TELEPHONE ENCOUNTER
Called and explained labs to patient's son.  Advised of new RX and directions.  Instructed patient to return in 1 week for repeat labs.  Patient's son verbalized understanding.

## 2020-09-22 NOTE — TELEPHONE ENCOUNTER
Patient's daughter Amirah requesting call back, she would like to know when the patient is supposed to come in to get her iron re-checked.     Call back number is [phone]

## 2020-10-12 NOTE — TELEPHONE ENCOUNTER
Patient's daughter Amirah Regalado requesting refill on the Iron    Send to:  [address] [place] [address] - [phone]  - [phone] FX    The patient needs it filled by Wednesday.  Call back number [phone]

## 2020-10-13 NOTE — TELEPHONE ENCOUNTER
Caller: Amirah Ortiz    Relationship: Emergency Contact    Best call back number: [phone]    What medication are you requesting: antibiotics    What are your current symptoms: not making sense, out of her head    How long have you been experiencing symptoms: 2 days ago    Have you had these symptoms before:    [x] Yes  [] No    Have you been treated for these symptoms before:   [x] Yes  [] No    If a prescription is needed, what is your preferred pharmacy and phone number: [address] [phone] [facility] [phone]      Additional notes:   Patient's daughter Amirah Regalado is requesting medication to help with a UTI. Patient is not making sense and this is her typical behavior when she has a UTI

## 2020-10-14 NOTE — PROGRESS NOTES
"Subjective   Lorna Lo is a 86 y.o. female.   Chief Complaint   Patient presents with   • Altered Mental Status       History of Present Illness   Confusion with painful urination. Last time had UTI. No fever or chills. No nausea or vomiting. No HA.    Hx anemia and needs repeat cbc.  The following portions of the patient's history were reviewed and updated as appropriate: allergies, current medications, past family history, past medical history, past social history, past surgical history and problem list.    Review of Systems   Constitutional: Negative for activity change, appetite change, chills, diaphoresis, fatigue, fever and unexpected weight change.   HENT: Negative for congestion, ear discharge, ear pain, mouth sores, nosebleeds, sinus pressure, sneezing and sore throat.    Eyes: Negative for pain, discharge and itching.   Respiratory: Negative for cough, chest tightness, shortness of breath and wheezing.    Cardiovascular: Negative for chest pain, palpitations and leg swelling.   Gastrointestinal: Negative for abdominal pain, constipation, diarrhea, nausea and vomiting.   Endocrine: Negative for cold intolerance, heat intolerance, polydipsia and polyphagia.   Genitourinary: Positive for frequency. Negative for dysuria, flank pain, hematuria and urgency.   Musculoskeletal: Negative for arthralgias, back pain, gait problem, myalgias, neck pain and neck stiffness.   Skin: Negative for color change, pallor and rash.   Neurological: Negative for seizures, speech difficulty, numbness and headaches.   Psychiatric/Behavioral: Negative for agitation, confusion, decreased concentration and sleep disturbance. The patient is not nervous/anxious.      /82   Pulse 60   Temp 97 °F (36.1 °C)   Ht 162.6 cm (64\")   Wt 88.9 kg (196 lb)   LMP  (LMP Unknown)   SpO2 97%   BMI 33.64 kg/m²     Objective   Physical Exam  Vitals signs and nursing note reviewed.   Constitutional:       Appearance: Normal " appearance. She is well-developed.   HENT:      Head: Normocephalic.      Right Ear: Tympanic membrane and external ear normal.      Left Ear: Tympanic membrane and external ear normal.      Nose: Nose normal.      Mouth/Throat:      Pharynx: No posterior oropharyngeal erythema.   Eyes:      Conjunctiva/sclera: Conjunctivae normal.      Pupils: Pupils are equal, round, and reactive to light.   Neck:      Thyroid: No thyromegaly.      Vascular: No JVD.   Cardiovascular:      Rate and Rhythm: Normal rate and regular rhythm.      Heart sounds: Normal heart sounds. No murmur. No friction rub.   Pulmonary:      Effort: No respiratory distress.      Breath sounds: No wheezing or rales.   Abdominal:      General: There is no distension.      Tenderness: There is no abdominal tenderness.   Musculoskeletal:         General: No tenderness.   Lymphadenopathy:      Cervical: No cervical adenopathy.   Skin:     Findings: No rash.   Neurological:      General: No focal deficit present.      Mental Status: She is alert and oriented to person, place, and time.      Cranial Nerves: No cranial nerve deficit.      Deep Tendon Reflexes: Reflexes normal.   Psychiatric:         Behavior: Behavior normal.         Assessment/Plan   Diagnoses and all orders for this visit:    1. Urinary frequency (Primary)  -     POCT urinalysis dipstick, automated  -     Urine Culture - , Urine, Clean Catch; Future    2. Acute UTI  -     doxycycline (MONODOX) 100 MG capsule; Take 1 capsule by mouth 2 (Two) Times a Day.  Dispense: 20 capsule; Refill: 0    3. Normocytic anemia  -     CBC & Differential; Future    4. Need for influenza vaccination  -     Fluad Quad >65 years

## 2020-10-15 NOTE — TELEPHONE ENCOUNTER
----- Message from Vanesa Correa, DO sent at 10/15/2020 12:12 PM EDT -----  Less than 10,000 colonies (small number ). See how she is doing today

## 2020-10-19 NOTE — TELEPHONE ENCOUNTER
Patient states that she had some questions regarding her recent labwork.  She can be reached at [phone]

## 2020-10-22 NOTE — TELEPHONE ENCOUNTER
PATIENTS DAUGHTER CALLED AND SAID PATIENT MESSED UP ON TAKING HER ANTIBOTICS FOR A UTI; PATIENT WAS ONLY TAKING ONE A DAY, BUT CHARLEE DAO, DAUGHTER STARTED HER BACK ON 2 YESTERDAY; PLEASE CALL AND ADVISE WHAT SHE SHOULD DO    CHARLEE DAO: [phone]

## 2020-11-06 NOTE — PROGRESS NOTES
Chief Complaint   Patient presents with   • Urinary Tract Infection       History of Present Illness    Lorna Lo is a 86 y.o. female who presents today for UTI.  Daughter is primary historian.  Urinary Tract Infection   This is a recurrent problem. The current episode started 1 to 4 weeks ago (last seen for UTI on 10/14/20). The problem occurs every urination. The quality of the pain is described as aching. The patient is experiencing no pain. There has been no fever. Associated symptoms include flank pain, frequency, hesitancy and urgency. Pertinent negatives include no chills, discharge, hematuria, nausea, sweats or vomiting. Associated symptoms comments: Back pain, auditory hallucinations, mood changes, confusion. She has tried increased fluids for the symptoms. The treatment provided mild relief. Her past medical history is significant for recurrent UTIs.   Previously prescribed 2 rounds of doxycycline without relief. Allergies to Cipro and Bactrim.    Review of Systems  Review of Systems   Constitutional: Negative for appetite change, chills, diaphoresis, fatigue and fever.   Respiratory: Negative for cough and shortness of breath.    Cardiovascular: Negative for chest pain and palpitations.   Gastrointestinal: Negative for abdominal distention, abdominal pain, diarrhea, nausea and vomiting.   Genitourinary: Positive for dysuria, flank pain, frequency, hesitancy and urgency. Negative for decreased urine volume, difficulty urinating and hematuria.   Musculoskeletal: Negative for myalgias.   Skin: Negative for color change and rash.   Neurological: Positive for confusion. Negative for dizziness, light-headedness and headache.   Psychiatric/Behavioral: Positive for agitation and behavioral problems. Negative for sleep disturbance.       [facility]    The following portions of the patient's history were reviewed and updated as appropriate: allergies, current medications, past family history, past medical  history, past social history, past surgical history and problem list.     Social History     Tobacco Use   • Smoking status: Former Smoker     Types: Cigarettes     Quit date: 1960     Years since quittin.8   • Smokeless tobacco: Never Used   • Tobacco comment: tried it in highschool   Substance Use Topics   • Alcohol use: No       Past Medical History:   Diagnosis Date   • Chronic anemia     Chronic anemia, secondary to (CKD).    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    • Dyslipidemia    • GERD (gastroesophageal reflux disease)    • Hyperlipidemia    • Hypertension    • Osteopenia    • Polymyositis (CMS/[facility])    • Renal disorder    • Thyroid disease    • Venous stasis        Past Surgical History:   Procedure Laterality Date   • ARTERIOGRAM Bilateral 2019    Procedure: ARTERIOGRAM;  Surgeon: Pablito Cade MD;  Location:  [facility]VAS[facility];  Service: Cardiology   • CARDIAC CATHETERIZATION     • CARDIAC CATHETERIZATION N/A 2019    Procedure: Left Heart Cath;  Surgeon: Pablito Cade MD;  Location:  [facility]ATH INVASIVE LOCATION;  Service: Cardiology   • CARDIAC SURGERY      Cardiac Stents    • CORONARY ARTERY BYPASS GRAFT     • GASTRIC BYPASS         Family History   Problem Relation Age of Onset   • Heart attack Father        Allergies   Allergen Reactions   • Haldol [Haloperidol] Rash and Mental Status Change   • Lortab [Hydrocodone-Acetaminophen] Shortness Of Breath   • Methotrexate Derivatives      Multisystem toxicity, reported per PCP office notes   • Amoxicillin      Pt does not recall reaction, reported by daughter   • Azathioprine      Pt does not recall reaction, allergy reported per PCP office notes   • Cellcept  [Mycophenolate]      Pt does not recall reaction, allergy reported per PCP office notes   • Contrast Dye Hives     INTRAVENOUS CONTRAST DYE.    • Fosamax [Alendronate]      Pt does not recall reaction, allergy reported per PCP office notes   • Hydrochlorothiazide      Unknown reaction, allergy reported per PCP office notes   • Lisinopril Cough   • Methotrexate Unknown (See Comments)   • Simvastatin Other (See Comments)     Sleep disturbance, reported per PCP office notes   • Bactrim [Sulfamethoxazole-Trimethoprim] Rash   • Chlorthalidone Rash   • Ciprofloxacin Hcl Rash   • Sulfa Antibiotics Rash         Current Outpatient Medications:   •  amLODIPine (NORVASC) 10 MG tablet, Take 1 tablet by mouth once daily, Disp: 90 tablet, Rfl: 0  •  aspirin 81 MG EC tablet, Take 81 mg by mouth Daily., Disp: , Rfl:   •  atenolol (TENORMIN) 100 MG tablet, Take 1 tablet by mouth once daily, Disp: 90 tablet, Rfl: 0  •  cholecalciferol (VITAMIN D3) 1000 UNITS tablet, Take 2,000 Units by mouth Daily., Disp: , Rfl:   •  doxycycline (MONODOX) 100 MG capsule, Take 1 capsule by mouth 2 (Two) Times a Day., Disp: 20 capsule, Rfl: 0  •  folic acid (FOLVITE) 1 MG tablet, Take 1 tablet by mouth once daily, Disp: 90 tablet, Rfl: 0  •  furosemide (LASIX) 20 MG tablet, Take 1 tablet by mouth once daily, Disp: 30 tablet, Rfl: 2  •  ibuprofen (ADVIL,MOTRIN) 200 MG tablet, Take 200 mg by mouth Every 6 (Six) Hours As Needed for Mild Pain ., Disp: , Rfl:   •  levothyroxine (SYNTHROID, LEVOTHROID) 88 MCG tablet, Take 1 tablet by mouth once daily, Disp: 90 tablet, Rfl: 0  •  potassium chloride (K-DUR,KLOR-CON) 20 MEQ CR tablet, TAKE 1 TABLET BY MOUTH ONCE DAILY WITH  LASIX, Disp: 30 tablet, Rfl: 2  •  predniSONE (DELTASONE) 5 MG tablet, 5 mg 2 (Two) Times a Day., Disp: , Rfl: 3  •  triamcinolone (KENALOG) 0.1 % ointment, Apply  topically to the appropriate area as directed 2 (Two) Times a Day., Disp: 453.6 g, Rfl: 1  •  XARELTO 20 MG tablet, Take 1  "tablet by mouth once daily, Disp: 90 tablet, Rfl: 0  •  cephalexin (KEFLEX) 500 MG capsule, Take 1 capsule by mouth 2 (Two) Times a Day for 10 days., Disp: 20 capsule, Rfl: 0    Vitals:  Vitals:    11/06/20 1443   BP: 132/84   Pulse: 54   Temp: 97.3 °F (36.3 °C)   SpO2: 98%   Weight: 88.5 kg (195 lb)   Height: 162.6 cm (64\")   PainSc:   4       Physical Exam  Physical Exam  Vitals signs and nursing note reviewed.   Constitutional:       Appearance: Normal appearance. She is well-developed. She is not ill-appearing or toxic-appearing.   Cardiovascular:      Rate and Rhythm: Normal rate. Rhythm irregular.      Heart sounds: Murmur present.   Pulmonary:      Effort: Pulmonary effort is normal. No respiratory distress.      Breath sounds: Normal breath sounds. No decreased breath sounds, wheezing, rhonchi or rales.   Abdominal:      Tenderness: There is no right CVA tenderness or left CVA tenderness.   Skin:     General: Skin is warm and dry.   Neurological:      Mental Status: She is alert.   Psychiatric:         Mood and Affect: Mood and affect normal.         Behavior: Behavior normal. Behavior is cooperative.         Labs  Results for orders placed or performed in visit on 11/06/20   POCT urinalysis dipstick, automated    Specimen: Urine   Result Value Ref Range    Color Dark Yellow Yellow, Straw, Dark Yellow, Tia    Clarity, UA Cloudy (A) Clear    Specific Gravity  1.020 1.005 - 1.030    pH, Urine 6.0 5.0 - 8.0    Leukocytes 500 Lopez/ul (A) Negative    Nitrite, UA Negative Negative    Protein, POC Trace (A) Negative mg/dL    Glucose, UA Negative Negative, 1000 mg/dL (3+) mg/dL    Ketones, UA Negative Negative    Urobilinogen, UA Normal Normal    Bilirubin Negative Negative    Blood, UA 2+ (A) Negative       Assessment/Plan    Diagnoses and all orders for this visit:    1. Acute UTI (Primary)  -     Urine Culture - Urine, Urine, Clean Catch; Future  -     cephalexin (KEFLEX) 500 MG capsule; Take 1 capsule by mouth " 2 (Two) Times a Day for 10 days.  Dispense: 20 capsule; Refill: 0    2. Urinary frequency  -     POCT urinalysis dipstick, automated    Will notify of urine culture results when received and treat accordingly.  Patient started on antibiotics empirically given presenting symptoms and lab results in office, as well as recent history of recurrent UTI.  Patient and daughter advised in adequate water intake, avoidance of excessive caffeine, and use of acetaminophen as needed.  Advised to follow-up for new, worsening, or persistent symptoms.      Plan of care reviewed with patient at conclusion of today's visit. Patient education was provided regarding diagnosis, management, and prescribed or recommended OTC medications. Patient verbalized understanding and agreement with plan of care.     Follow-Up  Return if symptoms worsen or fail to improve.      Electronically Signed By:  RAFA Silvestre      EMR Dragon/Transcription Disclaimer:  Please note that portions of this encounter note were completed using electronic transcription/translation of spoken language to printed text.  The electronic transcription/translation of spoken language may permit erroneous, or at times, nonsensical words or phrases to be inadvertently transcribed.  Although I have reviewed the note for such errors, some may still exist in this documentation.

## 2020-11-06 NOTE — TELEPHONE ENCOUNTER
PT DAUGHTER CALLED TO REQUEST TO SEE IF SHE COULD DROP BY AND  A SPECIMEN CUP AND/OR OF PT NEEDS TO COME IN FOR APPT, STATED PT HAS UTI, PT HAS CONFUSION GOING ON.    PLEASE ADVISE.  CALL BACK:[phone]      [address] [facility]

## 2020-12-02 NOTE — TELEPHONE ENCOUNTER
Last Office Visit: 10/14/20  Next Office Visit:12/14/20    Labs completed in past 6 months? yes      Last Refill Date:09/02/20  Quantity:90  Refills:0

## 2020-12-14 NOTE — PROGRESS NOTES
Subjective   Lorna Lo is a 86 y.o. female.     Hypertension  Pertinent negatives include no chest pain, headaches, neck pain, palpitations or shortness of breath.   Heartburn  She reports no abdominal pain, no chest pain, no coughing, no nausea, no sore throat or no wheezing. Pertinent negatives include no fatigue.   Headache   Pertinent negatives include no abdominal pain, back pain, coughing, dizziness, ear pain, eye pain, fever, nausea, neck pain, numbness, seizures, sinus pressure, sore throat or vomiting.   Chronic Kidney Disease  Pertinent negatives include no abdominal pain, arthralgias, chest pain, chills, congestion, coughing, diaphoresis, fatigue, fever, headaches, myalgias, nausea, neck pain, numbness, rash, sore throat or vomiting.   Diabetes  Pertinent negatives for hypoglycemia include no confusion, dizziness, headaches, nervousness/anxiousness, pallor, seizures or speech difficulty. Pertinent negatives for diabetes include no chest pain, no fatigue, no polydipsia and no polyphagia.      Here for blood pressure recheck and f/u on hlp, gerd, and CAD. HLP and gerd have been stable x last year. No fever or chills. Diabetes has been diet controlled.   The following portions of the patient's history were reviewed and updated as appropriate: allergies, current medications, past family history, past medical history, past social history, past surgical history and problem list.    Review of Systems   Constitutional: Negative for activity change, appetite change, chills, diaphoresis, fatigue, fever and unexpected weight change.   HENT: Negative for congestion, ear discharge, ear pain, mouth sores, nosebleeds, sinus pressure, sneezing and sore throat.    Eyes: Negative for pain, discharge and itching.   Respiratory: Negative for cough, chest tightness, shortness of breath and wheezing.    Cardiovascular: Negative for chest pain, palpitations and leg swelling.   Gastrointestinal: Negative for abdominal  "pain, constipation, diarrhea, nausea and vomiting.   Endocrine: Negative for cold intolerance, heat intolerance, polydipsia and polyphagia.   Genitourinary: Negative for dysuria, flank pain, frequency, hematuria and urgency.   Musculoskeletal: Negative for arthralgias, back pain, gait problem, myalgias, neck pain and neck stiffness.   Skin: Negative for color change, pallor and rash.   Neurological: Negative for dizziness, seizures, speech difficulty, numbness and headaches.   Psychiatric/Behavioral: Negative for agitation, confusion, decreased concentration and sleep disturbance. The patient is not nervous/anxious.      /80   Pulse 55   Ht 162.6 cm (64\")   Wt 87.5 kg (193 lb)   LMP  (LMP Unknown)   SpO2 99%   BMI 33.13 kg/m²     Objective   Physical Exam   Constitutional: She is oriented to person, place, and time. She appears well-developed.   HENT:   Head: Normocephalic.   Right Ear: External ear normal.   Left Ear: External ear normal.   Nose: Nose normal.   Eyes: Pupils are equal, round, and reactive to light. Conjunctivae are normal.   Neck: No JVD present. No thyromegaly present.   Cardiovascular: Normal rate, regular rhythm and normal heart sounds. Exam reveals no friction rub.   No murmur heard.  Pulmonary/Chest: Effort normal and breath sounds normal. No respiratory distress. She has no wheezes. She has no rales.   Abdominal: Soft. Bowel sounds are normal. She exhibits no distension. There is no abdominal tenderness. There is no guarding.   Musculoskeletal: No tenderness.   Lymphadenopathy:     She has no cervical adenopathy.   Neurological: She is oriented to person, place, and time. She displays normal reflexes. No cranial nerve deficit.   Psychiatric: Her behavior is normal.   Nursing note and vitals reviewed.      Assessment/Plan   Lorna was seen today for hypertension.    Diagnoses and all orders for this visit:    Coronary artery disease involving native coronary artery of native heart " without angina pectoris  Stable on ASA  Essential hypertension  Stable on norvasx  Gastroesophageal reflux disease, esophagitis presence not specified  Stable with dietary changes  Dyslipidemia  Stable Not tolerate statin    Dm Type II  A1c was 6.1

## 2020-12-16 NOTE — TELEPHONE ENCOUNTER
Last Office Visit: 12/14/20  Next Office Visit:03/15/21    Labs completed in past 6 months? yes  Labs completed in past year? yes    Last Refill Date:discontinued on 10/14/20  Quantity:  Refills:    Please advise

## 2020-12-29 NOTE — PROGRESS NOTES
Chief Complaint   Patient presents with   • Lower Extremity Issue     Pt needs legs wrapped       History of Present Illness  86 y.o.female presents for lower extremity issue with some sores on her right leg.  Patient would like to have her leg wrapped as before.  She has had increased swelling edema in both legs over the last few days.  Her right leg started to drain clear fluid drains into her shoe.  Not much redness.  No fever.  Left leg does not have any sores or drainage.  When her leg gets like this she comes in and gets an ren wrap and normally helps quickly. On lasix.  Patient is accompanied by her son to today's appointment.  Review of Systems   Constitutional: Negative for chills and fever.   Respiratory: Negative for cough and shortness of breath.    Cardiovascular: Positive for leg swelling. Negative for chest pain and palpitations.   Genitourinary: Negative for difficulty urinating.   Skin: Positive for skin lesions.   Neurological: Negative for weakness and light-headedness.       [facility]  The following portions of the patient's history were reviewed and updated as appropriate: allergies, current medications, past family history, past medical history, past social history, past surgical history and problem list.     Past Medical History:   Diagnosis Date   • Chronic anemia     Chronic anemia, secondary to (CKD).    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    • Dyslipidemia    • GERD (gastroesophageal reflux disease)    • Hyperlipidemia    • Hypertension    • Osteopenia    • Polymyositis (CMS/[facility])    • Renal disorder    • Thyroid disease    • Venous stasis       Past Surgical History:   Procedure Laterality Date   • ARTERIOGRAM Bilateral 9/9/2019     Procedure: ARTERIOGRAM;  Surgeon: Pablito Cade MD;  Location:  [facility];  Service: Cardiology   • CARDIAC CATHETERIZATION     • CARDIAC CATHETERIZATION N/A 2019    Procedure: Left Heart Cath;  Surgeon: Pablito Cade MD;  Location:  [facility];  Service: Cardiology   • CARDIAC SURGERY      Cardiac Stents    • CORONARY ARTERY BYPASS GRAFT     • GASTRIC BYPASS        Allergies   Allergen Reactions   • Haldol [Haloperidol] Rash and Mental Status Change   • Lortab [Hydrocodone-Acetaminophen] Shortness Of Breath   • Methotrexate Derivatives      Multisystem toxicity, reported per PCP office notes   • Amoxicillin      Pt does not recall reaction, reported by daughter   • Azathioprine      Pt does not recall reaction, allergy reported per PCP office notes   • Cellcept [Mycophenolate]      Pt does not recall reaction, allergy reported per PCP office notes   • Contrast Dye Hives     INTRAVENOUS CONTRAST DYE.    • Fosamax [Alendronate]      Pt does not recall reaction, allergy reported per PCP office notes   • Hydrochlorothiazide      Unknown reaction, allergy reported per PCP office notes   • Lisinopril Cough   • Methotrexate Unknown (See Comments)   • Simvastatin Other (See Comments)     Sleep disturbance, reported per PCP office notes   • Bactrim [Sulfamethoxazole-Trimethoprim] Rash   • Chlorthalidone Rash   • Ciprofloxacin Hcl Rash   • Sulfa Antibiotics Rash      Social History     Socioeconomic History   • Marital status:    Tobacco Use   • Smoking status: Former Smoker     Types: Cigarettes     Quit date: 1960     Years since quittin.0   • Smokeless tobacco: Never Used   • Tobacco comment: tried it in highschool   Substance and Sexual Activity   • Alcohol use: No   • Drug use: No   • Sexual activity: Defer     Family History   Problem Relation Age of Onset   • Heart attack Father             Current Outpatient Medications:   •  amLODIPine (NORVASC) 10 MG  "tablet, Take 1 tablet by mouth Daily., Disp: 90 tablet, Rfl: 0  •  aspirin 81 MG EC tablet, Take 81 mg by mouth Daily., Disp: , Rfl:   •  atenolol (TENORMIN) 100 MG tablet, Take 1 tablet by mouth once daily, Disp: 90 tablet, Rfl: 0  •  cholecalciferol (VITAMIN D3) 1000 UNITS tablet, Take 2,000 Units by mouth Daily., Disp: , Rfl:   •  Euthyrox 88 MCG tablet, Take 1 tablet by mouth once daily, Disp: 90 tablet, Rfl: 0  •  ferrous sulfate 325 (65 FE) MG EC tablet, Take 1 tablet by mouth once daily with breakfast, Disp: 30 tablet, Rfl: 0  •  folic acid (FOLVITE) 1 MG tablet, Take 1 tablet by mouth Daily., Disp: 90 tablet, Rfl: 0  •  furosemide (LASIX) 20 MG tablet, Take 1 tablet by mouth once daily, Disp: 30 tablet, Rfl: 2  •  ibuprofen (ADVIL,MOTRIN) 200 MG tablet, Take 200 mg by mouth Every 6 (Six) Hours As Needed for Mild Pain ., Disp: , Rfl:   •  potassium chloride (K-DUR,KLOR-CON) 20 MEQ CR tablet, TAKE 1 TABLET BY MOUTH ONCE DAILY WITH  LASIX, Disp: 30 tablet, Rfl: 2  •  predniSONE (DELTASONE) 5 MG tablet, 5 mg 2 (Two) Times a Day., Disp: , Rfl: 3  •  triamcinolone (KENALOG) 0.1 % ointment, Apply  topically to the appropriate area as directed 2 (Two) Times a Day., Disp: 453.6 g, Rfl: 1  •  Xarelto 20 MG tablet, Take 1 tablet by mouth once daily, Disp: 90 tablet, Rfl: 0    VITALS:  /72   Pulse 50   Temp 97.3 °F (36.3 °C)   Ht 162.6 cm (64\")   Wt 85.3 kg (188 lb)   LMP  (LMP Unknown)   SpO2 98%   Breastfeeding No   BMI 32.27 kg/m²     Physical Exam  HENT:      Head: Normocephalic.   Cardiovascular:      Rate and Rhythm: Normal rate and regular rhythm.      Heart sounds: Normal heart sounds.   Pulmonary:      Effort: Pulmonary effort is normal. No respiratory distress.      Comments: Fine crackles in bilateral post bases  Musculoskeletal:      Right lower le+ Edema present.      Left lower le+ Edema present.   Skin:     General: Skin is warm and dry.      Comments: Right lower leg lower shin area " with 2 scabs and surrounding blisters clear fluid drainage minimal erythema no streaking. Left lower leg with edema no blisters or break in skin   Neurological:      Mental Status: She is alert.         LABS  No new labs      ASSESSMENT/PLAN  Diagnoses and all orders for this visit:    1. Lower leg edema (Primary)  Comments:  bilateral edema with right leg with draining blisters; does not look infected. applied rne boot wrap to RLE. Provided pt with dressing supplies. Will change in a few days and can reapply if needed. Educated pt and family member on ss of infection such as redness, warmth, streaking, purulent drainage, confusion, lethargy or fever. If occurs return for earlier follow up or seek emergency care.  Cont lasix. Elevate legs when sitting. Cut back on sodium in diet.      I discussed the patients findings and my recommendations with patient.  Patient was encouraged to keep me informed of any acute changes, lack of improvement, or any new concerning symptoms.  Patient voiced understanding of all instructions and denied further questions.      FOLLOW-UP  Return if symptoms worsen or fail to improve.    Electronically signed by:    RAFA Pak  12/29/2020    EMR Dragon/Transcription Disclaimer:  Much of this encounter note is an electronic transcription/translation of spoken language to printed text.  The electronic translation of spoken language may permit erroneous, or at times, nonsensical words or phrases to be inadvertently transcribed.  Although I have reviewed the note for such errors, some may still exist

## 2021-01-01 ENCOUNTER — LAB (OUTPATIENT)
Dept: LAB | Facility: HOSPITAL | Age: 86
End: 2021-01-01

## 2021-01-01 ENCOUNTER — TELEPHONE (OUTPATIENT)
Dept: INTERNAL MEDICINE | Facility: CLINIC | Age: 86
End: 2021-01-01

## 2021-01-01 ENCOUNTER — HOSPITAL ENCOUNTER (OUTPATIENT)
Dept: PHYSICAL THERAPY | Facility: HOSPITAL | Age: 86
Setting detail: THERAPIES SERIES
Discharge: HOME | End: 2021-04-23

## 2021-01-01 ENCOUNTER — OFFICE VISIT (OUTPATIENT)
Dept: INTERNAL MEDICINE | Facility: CLINIC | Age: 86
End: 2021-01-01

## 2021-01-01 ENCOUNTER — DOCUMENTATION (OUTPATIENT)
Dept: PHYSICAL THERAPY | Facility: HOSPITAL | Age: 86
End: 2021-01-01

## 2021-01-01 ENCOUNTER — HOSPITAL ENCOUNTER (OUTPATIENT)
Dept: PHYSICAL THERAPY | Facility: HOSPITAL | Age: 86
Setting detail: THERAPIES SERIES
Discharge: HOME | End: 2021-03-25

## 2021-01-01 ENCOUNTER — OFFICE VISIT (OUTPATIENT)
Dept: CARDIOLOGY | Facility: CLINIC | Age: 86
End: 2021-01-01

## 2021-01-01 ENCOUNTER — HOSPITAL ENCOUNTER (OUTPATIENT)
Dept: PHYSICAL THERAPY | Facility: HOSPITAL | Age: 86
Setting detail: THERAPIES SERIES
Discharge: HOME | End: 2021-04-01

## 2021-01-01 ENCOUNTER — CLINICAL SUPPORT (OUTPATIENT)
Dept: INTERNAL MEDICINE | Facility: CLINIC | Age: 86
End: 2021-01-01

## 2021-01-01 ENCOUNTER — HOSPITAL ENCOUNTER (OUTPATIENT)
Dept: PHYSICAL THERAPY | Facility: HOSPITAL | Age: 86
Setting detail: THERAPIES SERIES
Discharge: HOME | End: 2021-03-18

## 2021-01-01 ENCOUNTER — HOSPITAL ENCOUNTER (OUTPATIENT)
Dept: PHYSICAL THERAPY | Facility: HOSPITAL | Age: 86
Setting detail: THERAPIES SERIES
Discharge: HOME | End: 2021-04-08

## 2021-01-01 ENCOUNTER — HOSPITAL ENCOUNTER (OUTPATIENT)
Dept: PHYSICAL THERAPY | Facility: HOSPITAL | Age: 86
Setting detail: THERAPIES SERIES
Discharge: HOME | End: 2021-04-15

## 2021-01-01 ENCOUNTER — HOSPITAL ENCOUNTER (OUTPATIENT)
Dept: PHYSICAL THERAPY | Facility: HOSPITAL | Age: 86
Setting detail: THERAPIES SERIES
Discharge: HOME | End: 2021-05-07

## 2021-01-01 ENCOUNTER — HOSPITAL ENCOUNTER (OUTPATIENT)
Dept: PHYSICAL THERAPY | Facility: HOSPITAL | Age: 86
Setting detail: THERAPIES SERIES
Discharge: HOME | End: 2021-04-29

## 2021-01-01 VITALS
BODY MASS INDEX: 29.87 KG/M2 | TEMPERATURE: 96.8 F | OXYGEN SATURATION: 97 % | HEIGHT: 64 IN | DIASTOLIC BLOOD PRESSURE: 64 MMHG | HEART RATE: 60 BPM | RESPIRATION RATE: 16 BRPM | SYSTOLIC BLOOD PRESSURE: 124 MMHG

## 2021-01-01 VITALS
SYSTOLIC BLOOD PRESSURE: 146 MMHG | HEART RATE: 64 BPM | OXYGEN SATURATION: 95 % | TEMPERATURE: 97.6 F | DIASTOLIC BLOOD PRESSURE: 98 MMHG

## 2021-01-01 VITALS
RESPIRATION RATE: 18 BRPM | HEART RATE: 54 BPM | OXYGEN SATURATION: 98 % | HEIGHT: 64 IN | TEMPERATURE: 96.8 F | SYSTOLIC BLOOD PRESSURE: 130 MMHG | DIASTOLIC BLOOD PRESSURE: 62 MMHG | BODY MASS INDEX: 29.87 KG/M2

## 2021-01-01 VITALS
DIASTOLIC BLOOD PRESSURE: 80 MMHG | BODY MASS INDEX: 30.21 KG/M2 | HEIGHT: 64 IN | HEART RATE: 65 BPM | TEMPERATURE: 97.5 F | OXYGEN SATURATION: 97 % | SYSTOLIC BLOOD PRESSURE: 124 MMHG

## 2021-01-01 VITALS
TEMPERATURE: 97.1 F | OXYGEN SATURATION: 98 % | WEIGHT: 176 LBS | HEART RATE: 69 BPM | BODY MASS INDEX: 30.05 KG/M2 | SYSTOLIC BLOOD PRESSURE: 126 MMHG | DIASTOLIC BLOOD PRESSURE: 78 MMHG | HEIGHT: 64 IN

## 2021-01-01 VITALS
SYSTOLIC BLOOD PRESSURE: 120 MMHG | DIASTOLIC BLOOD PRESSURE: 70 MMHG | OXYGEN SATURATION: 98 % | HEIGHT: 64 IN | BODY MASS INDEX: 29.71 KG/M2 | HEART RATE: 49 BPM | HEIGHT: 64 IN | HEART RATE: 63 BPM | OXYGEN SATURATION: 96 % | WEIGHT: 174 LBS | BODY MASS INDEX: 30.22 KG/M2 | DIASTOLIC BLOOD PRESSURE: 58 MMHG | TEMPERATURE: 96.9 F | SYSTOLIC BLOOD PRESSURE: 145 MMHG | WEIGHT: 177 LBS

## 2021-01-01 VITALS
OXYGEN SATURATION: 98 % | WEIGHT: 187 LBS | BODY MASS INDEX: 31.92 KG/M2 | DIASTOLIC BLOOD PRESSURE: 70 MMHG | HEART RATE: 68 BPM | HEIGHT: 64 IN | TEMPERATURE: 97.1 F | SYSTOLIC BLOOD PRESSURE: 128 MMHG

## 2021-01-01 DIAGNOSIS — R30.0 DYSURIA: Primary | ICD-10-CM

## 2021-01-01 DIAGNOSIS — S81.801D OPEN WOUND OF RIGHT LOWER EXTREMITY, SUBSEQUENT ENCOUNTER: Primary | ICD-10-CM

## 2021-01-01 DIAGNOSIS — M33.20 POLYMYOSITIS (HCC): ICD-10-CM

## 2021-01-01 DIAGNOSIS — D64.9 CHRONIC ANEMIA: ICD-10-CM

## 2021-01-01 DIAGNOSIS — I48.0 PAF (PAROXYSMAL ATRIAL FIBRILLATION) (HCC): ICD-10-CM

## 2021-01-01 DIAGNOSIS — I25.10 CORONARY ARTERY DISEASE INVOLVING NATIVE CORONARY ARTERY OF NATIVE HEART WITHOUT ANGINA PECTORIS: ICD-10-CM

## 2021-01-01 DIAGNOSIS — N18.30 STAGE 3 CHRONIC KIDNEY DISEASE, UNSPECIFIED WHETHER STAGE 3A OR 3B CKD (HCC): ICD-10-CM

## 2021-01-01 DIAGNOSIS — I10 ESSENTIAL HYPERTENSION: ICD-10-CM

## 2021-01-01 DIAGNOSIS — S90.512A ABRASION OF LEFT ANKLE, INITIAL ENCOUNTER: ICD-10-CM

## 2021-01-01 DIAGNOSIS — D64.9 NORMOCYTIC ANEMIA: Primary | ICD-10-CM

## 2021-01-01 DIAGNOSIS — E03.9 HYPOTHYROIDISM (ACQUIRED): ICD-10-CM

## 2021-01-01 DIAGNOSIS — R41.0 CONFUSION: ICD-10-CM

## 2021-01-01 DIAGNOSIS — G47.09 OTHER INSOMNIA: Primary | ICD-10-CM

## 2021-01-01 DIAGNOSIS — I10 HYPERTENSION, UNSPECIFIED TYPE: ICD-10-CM

## 2021-01-01 DIAGNOSIS — K21.00 GASTROESOPHAGEAL REFLUX DISEASE WITH ESOPHAGITIS WITHOUT HEMORRHAGE: ICD-10-CM

## 2021-01-01 DIAGNOSIS — R60.0 EDEMA OF BOTH LEGS: ICD-10-CM

## 2021-01-01 DIAGNOSIS — I25.10 CORONARY ARTERY DISEASE INVOLVING NATIVE CORONARY ARTERY OF NATIVE HEART WITHOUT ANGINA PECTORIS: Primary | ICD-10-CM

## 2021-01-01 DIAGNOSIS — E78.5 DYSLIPIDEMIA: ICD-10-CM

## 2021-01-01 DIAGNOSIS — R30.0 DYSURIA: ICD-10-CM

## 2021-01-01 DIAGNOSIS — N30.01 ACUTE CYSTITIS WITH HEMATURIA: ICD-10-CM

## 2021-01-01 DIAGNOSIS — E78.5 DYSLIPIDEMIA: Primary | ICD-10-CM

## 2021-01-01 DIAGNOSIS — E66.01 MORBIDLY OBESE (HCC): ICD-10-CM

## 2021-01-01 DIAGNOSIS — M25.511 CHRONIC RIGHT SHOULDER PAIN: ICD-10-CM

## 2021-01-01 DIAGNOSIS — E11.21 CONTROLLED TYPE 2 DIABETES MELLITUS WITH DIABETIC NEPHROPATHY, WITHOUT LONG-TERM CURRENT USE OF INSULIN (HCC): ICD-10-CM

## 2021-01-01 DIAGNOSIS — K42.9 UMBILICAL HERNIA WITHOUT OBSTRUCTION AND WITHOUT GANGRENE: ICD-10-CM

## 2021-01-01 DIAGNOSIS — E03.9 HYPOTHYROIDISM (ACQUIRED): Primary | ICD-10-CM

## 2021-01-01 DIAGNOSIS — E11.22 CONTROLLED TYPE 2 DIABETES MELLITUS WITH CHRONIC KIDNEY DISEASE, WITHOUT LONG-TERM CURRENT USE OF INSULIN, UNSPECIFIED CKD STAGE (HCC): ICD-10-CM

## 2021-01-01 DIAGNOSIS — M1A.9XX1 TOPHUS OF HAND DUE TO GOUT: ICD-10-CM

## 2021-01-01 DIAGNOSIS — D64.9 NORMOCYTIC ANEMIA: ICD-10-CM

## 2021-01-01 DIAGNOSIS — R35.0 URINARY FREQUENCY: Primary | ICD-10-CM

## 2021-01-01 DIAGNOSIS — Z78.0 POSTMENOPAUSAL: ICD-10-CM

## 2021-01-01 DIAGNOSIS — R41.3 MEMORY LOSS: ICD-10-CM

## 2021-01-01 DIAGNOSIS — N30.01 ACUTE CYSTITIS WITH HEMATURIA: Primary | ICD-10-CM

## 2021-01-01 DIAGNOSIS — G89.29 CHRONIC RIGHT SHOULDER PAIN: ICD-10-CM

## 2021-01-01 DIAGNOSIS — N30.00 ACUTE CYSTITIS WITHOUT HEMATURIA: ICD-10-CM

## 2021-01-01 DIAGNOSIS — R82.90 ABNORMAL URINE: ICD-10-CM

## 2021-01-01 DIAGNOSIS — Z00.00 MEDICARE ANNUAL WELLNESS VISIT, SUBSEQUENT: ICD-10-CM

## 2021-01-01 DIAGNOSIS — L97.901 ULCER OF LOWER EXTREMITY, LIMITED TO BREAKDOWN OF SKIN, UNSPECIFIED LATERALITY (HCC): ICD-10-CM

## 2021-01-01 DIAGNOSIS — R82.90 ABNORMAL URINE FINDING: Primary | ICD-10-CM

## 2021-01-01 LAB
ALBUMIN SERPL-MCNC: 3.6 G/DL (ref 3.5–5.2)
ALBUMIN SERPL-MCNC: 3.8 G/DL (ref 3.5–5.2)
ALBUMIN UR-MCNC: 2.1 MG/DL
ALBUMIN/GLOB SERPL: 1.5 G/DL
ALBUMIN/GLOB SERPL: 1.5 G/DL
ALP SERPL-CCNC: 105 U/L (ref 39–117)
ALP SERPL-CCNC: 70 U/L (ref 39–117)
ALT SERPL W P-5'-P-CCNC: 16 U/L (ref 1–33)
ALT SERPL W P-5'-P-CCNC: 16 U/L (ref 1–33)
ANION GAP SERPL CALCULATED.3IONS-SCNC: 10 MMOL/L (ref 5–15)
ANION GAP SERPL CALCULATED.3IONS-SCNC: 11.3 MMOL/L (ref 5–15)
AST SERPL-CCNC: 19 U/L (ref 1–32)
AST SERPL-CCNC: 23 U/L (ref 1–32)
BACTERIA SPEC AEROBE CULT: ABNORMAL
BACTERIA SPEC AEROBE CULT: NO GROWTH
BACTERIA UR QL AUTO: ABNORMAL /HPF
BACTERIA UR QL AUTO: ABNORMAL /HPF
BASOPHILS # BLD AUTO: 0.04 10*3/MM3 (ref 0–0.2)
BASOPHILS # BLD AUTO: 0.04 10*3/MM3 (ref 0–0.2)
BASOPHILS # BLD AUTO: 0.06 10*3/MM3 (ref 0–0.2)
BASOPHILS # BLD AUTO: 0.07 10*3/MM3 (ref 0–0.2)
BASOPHILS NFR BLD AUTO: 0.5 % (ref 0–1.5)
BASOPHILS NFR BLD AUTO: 0.6 % (ref 0–1.5)
BASOPHILS NFR BLD AUTO: 0.7 % (ref 0–1.5)
BASOPHILS NFR BLD AUTO: 0.8 % (ref 0–1.5)
BILIRUB BLD-MCNC: ABNORMAL MG/DL
BILIRUB BLD-MCNC: NEGATIVE MG/DL
BILIRUB SERPL-MCNC: 0.5 MG/DL (ref 0–1.2)
BILIRUB SERPL-MCNC: 0.7 MG/DL (ref 0–1.2)
BILIRUB UR QL STRIP: NEGATIVE
BILIRUB UR QL STRIP: NEGATIVE
BUN SERPL-MCNC: 13 MG/DL (ref 8–23)
BUN SERPL-MCNC: 17 MG/DL (ref 8–23)
BUN/CREAT SERPL: 11.8 (ref 7–25)
BUN/CREAT SERPL: 13.8 (ref 7–25)
CALCIUM SPEC-SCNC: 8.9 MG/DL (ref 8.6–10.5)
CALCIUM SPEC-SCNC: 9.2 MG/DL (ref 8.6–10.5)
CHLORIDE SERPL-SCNC: 101 MMOL/L (ref 98–107)
CHLORIDE SERPL-SCNC: 103 MMOL/L (ref 98–107)
CHOLEST SERPL-MCNC: 210 MG/DL (ref 0–200)
CHOLEST SERPL-MCNC: 230 MG/DL (ref 0–200)
CLARITY UR: CLEAR
CLARITY UR: CLEAR
CLARITY, POC: ABNORMAL
CLARITY, POC: ABNORMAL
CLARITY, POC: CLEAR
CO2 SERPL-SCNC: 24 MMOL/L (ref 22–29)
CO2 SERPL-SCNC: 25.7 MMOL/L (ref 22–29)
COLOR UR: ABNORMAL
COLOR UR: YELLOW
CREAT SERPL-MCNC: 1.1 MG/DL (ref 0.57–1)
CREAT SERPL-MCNC: 1.23 MG/DL (ref 0.57–1)
CREAT UR-MCNC: 55.5 MG/DL
DEPRECATED RDW RBC AUTO: 45.2 FL (ref 37–54)
DEPRECATED RDW RBC AUTO: 45.4 FL (ref 37–54)
DEPRECATED RDW RBC AUTO: 47.8 FL (ref 37–54)
DEPRECATED RDW RBC AUTO: 48.9 FL (ref 37–54)
EOSINOPHIL # BLD AUTO: 0.02 10*3/MM3 (ref 0–0.4)
EOSINOPHIL # BLD AUTO: 0.13 10*3/MM3 (ref 0–0.4)
EOSINOPHIL # BLD AUTO: 0.16 10*3/MM3 (ref 0–0.4)
EOSINOPHIL # BLD AUTO: 0.17 10*3/MM3 (ref 0–0.4)
EOSINOPHIL NFR BLD AUTO: 0.3 % (ref 0.3–6.2)
EOSINOPHIL NFR BLD AUTO: 1.4 % (ref 0.3–6.2)
EOSINOPHIL NFR BLD AUTO: 1.9 % (ref 0.3–6.2)
EOSINOPHIL NFR BLD AUTO: 2 % (ref 0.3–6.2)
ERYTHROCYTE [DISTWIDTH] IN BLOOD BY AUTOMATED COUNT: 14.4 % (ref 12.3–15.4)
ERYTHROCYTE [DISTWIDTH] IN BLOOD BY AUTOMATED COUNT: 14.9 % (ref 12.3–15.4)
ERYTHROCYTE [DISTWIDTH] IN BLOOD BY AUTOMATED COUNT: 15 % (ref 12.3–15.4)
ERYTHROCYTE [DISTWIDTH] IN BLOOD BY AUTOMATED COUNT: 16.2 % (ref 12.3–15.4)
GFR SERPL CREATININE-BSD FRML MDRD: 41 ML/MIN/1.73
GFR SERPL CREATININE-BSD FRML MDRD: 47 ML/MIN/1.73
GLOBULIN UR ELPH-MCNC: 2.4 GM/DL
GLOBULIN UR ELPH-MCNC: 2.6 GM/DL
GLUCOSE SERPL-MCNC: 106 MG/DL (ref 65–99)
GLUCOSE SERPL-MCNC: 78 MG/DL (ref 65–99)
GLUCOSE UR STRIP-MCNC: NEGATIVE MG/DL
HBA1C MFR BLD: 5.6 % (ref 4.8–5.6)
HCT VFR BLD AUTO: 32 % (ref 34–46.6)
HCT VFR BLD AUTO: 32.4 % (ref 34–46.6)
HCT VFR BLD AUTO: 34.7 % (ref 34–46.6)
HCT VFR BLD AUTO: 35.7 % (ref 34–46.6)
HDLC SERPL-MCNC: 72 MG/DL (ref 40–60)
HDLC SERPL-MCNC: 80 MG/DL (ref 40–60)
HGB BLD-MCNC: 10.4 G/DL (ref 12–15.9)
HGB BLD-MCNC: 10.5 G/DL (ref 12–15.9)
HGB BLD-MCNC: 11.1 G/DL (ref 12–15.9)
HGB BLD-MCNC: 11.2 G/DL (ref 12–15.9)
HGB UR QL STRIP.AUTO: ABNORMAL
HGB UR QL STRIP.AUTO: ABNORMAL
HYALINE CASTS UR QL AUTO: ABNORMAL /LPF
HYALINE CASTS UR QL AUTO: ABNORMAL /LPF
IMM GRANULOCYTES # BLD AUTO: 0.07 10*3/MM3 (ref 0–0.05)
IMM GRANULOCYTES # BLD AUTO: 0.08 10*3/MM3 (ref 0–0.05)
IMM GRANULOCYTES # BLD AUTO: 0.08 10*3/MM3 (ref 0–0.05)
IMM GRANULOCYTES # BLD AUTO: 0.11 10*3/MM3 (ref 0–0.05)
IMM GRANULOCYTES NFR BLD AUTO: 0.8 % (ref 0–0.5)
IMM GRANULOCYTES NFR BLD AUTO: 0.9 % (ref 0–0.5)
IMM GRANULOCYTES NFR BLD AUTO: 1.1 % (ref 0–0.5)
IMM GRANULOCYTES NFR BLD AUTO: 1.4 % (ref 0–0.5)
KETONES UR QL STRIP: NEGATIVE
KETONES UR QL STRIP: NEGATIVE
KETONES UR QL: ABNORMAL
KETONES UR QL: NEGATIVE
LDLC SERPL CALC-MCNC: 119 MG/DL (ref 0–100)
LDLC SERPL CALC-MCNC: 130 MG/DL (ref 0–100)
LDLC/HDLC SERPL: 1.58 {RATIO}
LDLC/HDLC SERPL: 1.61 {RATIO}
LEUKOCYTE EST, POC: ABNORMAL
LEUKOCYTE ESTERASE UR QL STRIP.AUTO: ABNORMAL
LEUKOCYTE ESTERASE UR QL STRIP.AUTO: ABNORMAL
LYMPHOCYTES # BLD AUTO: 0.98 10*3/MM3 (ref 0.7–3.1)
LYMPHOCYTES # BLD AUTO: 1.56 10*3/MM3 (ref 0.7–3.1)
LYMPHOCYTES # BLD AUTO: 1.69 10*3/MM3 (ref 0.7–3.1)
LYMPHOCYTES # BLD AUTO: 1.79 10*3/MM3 (ref 0.7–3.1)
LYMPHOCYTES NFR BLD AUTO: 13.6 % (ref 19.6–45.3)
LYMPHOCYTES NFR BLD AUTO: 18.6 % (ref 19.6–45.3)
LYMPHOCYTES NFR BLD AUTO: 19.6 % (ref 19.6–45.3)
LYMPHOCYTES NFR BLD AUTO: 20.4 % (ref 19.6–45.3)
MCH RBC QN AUTO: 27.2 PG (ref 26.6–33)
MCH RBC QN AUTO: 27.2 PG (ref 26.6–33)
MCH RBC QN AUTO: 27.7 PG (ref 26.6–33)
MCH RBC QN AUTO: 27.9 PG (ref 26.6–33)
MCHC RBC AUTO-ENTMCNC: 31.4 G/DL (ref 31.5–35.7)
MCHC RBC AUTO-ENTMCNC: 32 G/DL (ref 31.5–35.7)
MCHC RBC AUTO-ENTMCNC: 32.4 G/DL (ref 31.5–35.7)
MCHC RBC AUTO-ENTMCNC: 32.5 G/DL (ref 31.5–35.7)
MCV RBC AUTO: 83.8 FL (ref 79–97)
MCV RBC AUTO: 83.9 FL (ref 79–97)
MCV RBC AUTO: 86.5 FL (ref 79–97)
MCV RBC AUTO: 89 FL (ref 79–97)
MICROALBUMIN/CREAT UR: 37.8 MG/G
MONOCYTES # BLD AUTO: 0.48 10*3/MM3 (ref 0.1–0.9)
MONOCYTES # BLD AUTO: 0.89 10*3/MM3 (ref 0.1–0.9)
MONOCYTES # BLD AUTO: 0.93 10*3/MM3 (ref 0.1–0.9)
MONOCYTES # BLD AUTO: 1.08 10*3/MM3 (ref 0.1–0.9)
MONOCYTES NFR BLD AUTO: 10.3 % (ref 5–12)
MONOCYTES NFR BLD AUTO: 11.2 % (ref 5–12)
MONOCYTES NFR BLD AUTO: 12.3 % (ref 5–12)
MONOCYTES NFR BLD AUTO: 6.6 % (ref 5–12)
NEUTROPHILS NFR BLD AUTO: 5.19 10*3/MM3 (ref 1.7–7)
NEUTROPHILS NFR BLD AUTO: 5.59 10*3/MM3 (ref 1.7–7)
NEUTROPHILS NFR BLD AUTO: 5.62 10*3/MM3 (ref 1.7–7)
NEUTROPHILS NFR BLD AUTO: 6.18 10*3/MM3 (ref 1.7–7)
NEUTROPHILS NFR BLD AUTO: 63.8 % (ref 42.7–76)
NEUTROPHILS NFR BLD AUTO: 65.3 % (ref 42.7–76)
NEUTROPHILS NFR BLD AUTO: 68.1 % (ref 42.7–76)
NEUTROPHILS NFR BLD AUTO: 77.8 % (ref 42.7–76)
NITRITE UR QL STRIP: NEGATIVE
NITRITE UR QL STRIP: NEGATIVE
NITRITE UR-MCNC: NEGATIVE MG/ML
NRBC BLD AUTO-RTO: 0 /100 WBC (ref 0–0.2)
PH UR STRIP.AUTO: 6 [PH] (ref 5–8)
PH UR STRIP.AUTO: 6.5 [PH] (ref 5–8)
PH UR: 5.5 [PH] (ref 5–8)
PH UR: 6 [PH] (ref 5–8)
PH UR: 7.5 [PH] (ref 5–8)
PLATELET # BLD AUTO: 188 10*3/MM3 (ref 140–450)
PLATELET # BLD AUTO: 197 10*3/MM3 (ref 140–450)
PLATELET # BLD AUTO: 202 10*3/MM3 (ref 140–450)
PLATELET # BLD AUTO: 223 10*3/MM3 (ref 140–450)
PMV BLD AUTO: 10.2 FL (ref 6–12)
PMV BLD AUTO: 10.8 FL (ref 6–12)
PMV BLD AUTO: 9.9 FL (ref 6–12)
PMV BLD AUTO: 9.9 FL (ref 6–12)
POTASSIUM SERPL-SCNC: 4.1 MMOL/L (ref 3.5–5.2)
POTASSIUM SERPL-SCNC: 4.1 MMOL/L (ref 3.5–5.2)
PROT SERPL-MCNC: 6 G/DL (ref 6–8.5)
PROT SERPL-MCNC: 6.4 G/DL (ref 6–8.5)
PROT UR QL STRIP: NEGATIVE
PROT UR QL STRIP: NEGATIVE
PROT UR STRIP-MCNC: ABNORMAL MG/DL
PROT UR STRIP-MCNC: NEGATIVE MG/DL
RBC # BLD AUTO: 3.82 10*6/MM3 (ref 3.77–5.28)
RBC # BLD AUTO: 3.86 10*6/MM3 (ref 3.77–5.28)
RBC # BLD AUTO: 4.01 10*6/MM3 (ref 3.77–5.28)
RBC # BLD AUTO: 4.01 10*6/MM3 (ref 3.77–5.28)
RBC # UR STRIP: ABNORMAL /UL
RBC # UR STRIP: ABNORMAL /UL
RBC # UR STRIP: NEGATIVE /UL
RBC # UR: ABNORMAL /HPF
RBC # UR: ABNORMAL /HPF
REF LAB TEST METHOD: ABNORMAL
REF LAB TEST METHOD: ABNORMAL
SODIUM SERPL-SCNC: 137 MMOL/L (ref 136–145)
SODIUM SERPL-SCNC: 138 MMOL/L (ref 136–145)
SP GR UR STRIP: 1.01 (ref 1–1.03)
SP GR UR STRIP: <=1.005 (ref 1–1.03)
SP GR UR: 1.01 (ref 1–1.03)
SP GR UR: 1.01 (ref 1–1.03)
SP GR UR: 1.02 (ref 1–1.03)
SQUAMOUS #/AREA URNS HPF: ABNORMAL /HPF
SQUAMOUS #/AREA URNS HPF: ABNORMAL /HPF
TRIGL SERPL-MCNC: 111 MG/DL (ref 0–150)
TRIGL SERPL-MCNC: 117 MG/DL (ref 0–150)
TSH SERPL DL<=0.05 MIU/L-ACNC: 1.71 UIU/ML (ref 0.27–4.2)
UROBILINOGEN UR QL STRIP: ABNORMAL
UROBILINOGEN UR QL STRIP: ABNORMAL
UROBILINOGEN UR QL: NORMAL
VLDLC SERPL-MCNC: 19 MG/DL (ref 5–40)
VLDLC SERPL-MCNC: 20 MG/DL (ref 5–40)
WBC # BLD AUTO: 7.22 10*3/MM3 (ref 3.4–10.8)
WBC # BLD AUTO: 7.95 10*3/MM3 (ref 3.4–10.8)
WBC # BLD AUTO: 8.77 10*3/MM3 (ref 3.4–10.8)
WBC # BLD AUTO: 9.07 10*3/MM3 (ref 3.4–10.8)
WBC UR QL AUTO: ABNORMAL /HPF
WBC UR QL AUTO: ABNORMAL /HPF

## 2021-01-01 PROCEDURE — 97597 DBRDMT OPN WND 1ST 20 CM/<: CPT

## 2021-01-01 PROCEDURE — 80061 LIPID PANEL: CPT

## 2021-01-01 PROCEDURE — 87186 SC STD MICRODIL/AGAR DIL: CPT | Performed by: NURSE PRACTITIONER

## 2021-01-01 PROCEDURE — 85025 COMPLETE CBC W/AUTO DIFF WBC: CPT

## 2021-01-01 PROCEDURE — 81003 URINALYSIS AUTO W/O SCOPE: CPT | Performed by: NURSE PRACTITIONER

## 2021-01-01 PROCEDURE — 87086 URINE CULTURE/COLONY COUNT: CPT

## 2021-01-01 PROCEDURE — 83036 HEMOGLOBIN GLYCOSYLATED A1C: CPT

## 2021-01-01 PROCEDURE — 87086 URINE CULTURE/COLONY COUNT: CPT | Performed by: INTERNAL MEDICINE

## 2021-01-01 PROCEDURE — 80053 COMPREHEN METABOLIC PANEL: CPT

## 2021-01-01 PROCEDURE — 87088 URINE BACTERIA CULTURE: CPT | Performed by: INTERNAL MEDICINE

## 2021-01-01 PROCEDURE — 87186 SC STD MICRODIL/AGAR DIL: CPT | Performed by: INTERNAL MEDICINE

## 2021-01-01 PROCEDURE — 29581 APPL MULTLAYER CMPRN SYS LEG: CPT

## 2021-01-01 PROCEDURE — 81003 URINALYSIS AUTO W/O SCOPE: CPT | Performed by: INTERNAL MEDICINE

## 2021-01-01 PROCEDURE — 87077 CULTURE AEROBIC IDENTIFY: CPT | Performed by: NURSE PRACTITIONER

## 2021-01-01 PROCEDURE — 99213 OFFICE O/P EST LOW 20 MIN: CPT | Performed by: NURSE PRACTITIONER

## 2021-01-01 PROCEDURE — 99214 OFFICE O/P EST MOD 30 MIN: CPT | Performed by: INTERNAL MEDICINE

## 2021-01-01 PROCEDURE — 87186 SC STD MICRODIL/AGAR DIL: CPT

## 2021-01-01 PROCEDURE — 87088 URINE BACTERIA CULTURE: CPT

## 2021-01-01 PROCEDURE — 87086 URINE CULTURE/COLONY COUNT: CPT | Performed by: NURSE PRACTITIONER

## 2021-01-01 PROCEDURE — 97597 DBRDMT OPN WND 1ST 20 CM/<: CPT | Performed by: PHYSICAL THERAPIST

## 2021-01-01 PROCEDURE — 87077 CULTURE AEROBIC IDENTIFY: CPT

## 2021-01-01 PROCEDURE — 82570 ASSAY OF URINE CREATININE: CPT | Performed by: INTERNAL MEDICINE

## 2021-01-01 PROCEDURE — 81001 URINALYSIS AUTO W/SCOPE: CPT

## 2021-01-01 PROCEDURE — 29581 APPL MULTLAYER CMPRN SYS LEG: CPT | Performed by: PHYSICAL THERAPIST

## 2021-01-01 PROCEDURE — 82043 UR ALBUMIN QUANTITATIVE: CPT | Performed by: INTERNAL MEDICINE

## 2021-01-01 PROCEDURE — G0439 PPPS, SUBSEQ VISIT: HCPCS | Performed by: INTERNAL MEDICINE

## 2021-01-01 PROCEDURE — 97162 PT EVAL MOD COMPLEX 30 MIN: CPT | Performed by: PHYSICAL THERAPIST

## 2021-01-01 PROCEDURE — 87077 CULTURE AEROBIC IDENTIFY: CPT | Performed by: INTERNAL MEDICINE

## 2021-01-01 PROCEDURE — 84443 ASSAY THYROID STIM HORMONE: CPT

## 2021-01-01 RX ORDER — POTASSIUM CHLORIDE 20 MEQ/1
TABLET, EXTENDED RELEASE ORAL
Qty: 30 TABLET | Refills: 0 | Status: SHIPPED | OUTPATIENT
Start: 2021-01-01 | End: 2021-01-01

## 2021-01-01 RX ORDER — AMLODIPINE BESYLATE 10 MG/1
TABLET ORAL
Qty: 90 TABLET | Refills: 0 | Status: SHIPPED | OUTPATIENT
Start: 2021-01-01

## 2021-01-01 RX ORDER — LANOLIN ALCOHOL/MO/W.PET/CERES
CREAM (GRAM) TOPICAL
Qty: 90 TABLET | Refills: 0 | Status: SHIPPED | OUTPATIENT
Start: 2021-01-01 | End: 2021-01-01

## 2021-01-01 RX ORDER — NITROFURANTOIN 25; 75 MG/1; MG/1
100 CAPSULE ORAL 2 TIMES DAILY
Qty: 14 CAPSULE | Refills: 0 | Status: SHIPPED | OUTPATIENT
Start: 2021-01-01 | End: 2021-01-01

## 2021-01-01 RX ORDER — RIVAROXABAN 20 MG/1
TABLET, FILM COATED ORAL
Qty: 90 TABLET | Refills: 0 | Status: SHIPPED | OUTPATIENT
Start: 2021-01-01

## 2021-01-01 RX ORDER — AMLODIPINE BESYLATE 10 MG/1
TABLET ORAL
Qty: 90 TABLET | Refills: 0 | Status: SHIPPED | OUTPATIENT
Start: 2021-01-01 | End: 2021-01-01

## 2021-01-01 RX ORDER — FOLIC ACID 1 MG/1
TABLET ORAL
Qty: 90 TABLET | Refills: 0 | Status: SHIPPED | OUTPATIENT
Start: 2021-01-01

## 2021-01-01 RX ORDER — NITROFURANTOIN 25; 75 MG/1; MG/1
100 CAPSULE ORAL 2 TIMES DAILY
Qty: 14 CAPSULE | Refills: 0 | Status: SHIPPED | OUTPATIENT
Start: 2021-01-01

## 2021-01-01 RX ORDER — ATENOLOL 100 MG/1
TABLET ORAL
Qty: 90 TABLET | Refills: 0 | Status: SHIPPED | OUTPATIENT
Start: 2021-01-01 | End: 2021-01-01

## 2021-01-01 RX ORDER — QUETIAPINE FUMARATE 25 MG/1
25 TABLET, FILM COATED ORAL NIGHTLY
Qty: 30 TABLET | Refills: 0 | Status: SHIPPED | OUTPATIENT
Start: 2021-01-01 | End: 2021-01-01

## 2021-01-01 RX ORDER — NITROFURANTOIN 25; 75 MG/1; MG/1
100 CAPSULE ORAL EVERY 12 HOURS SCHEDULED
Qty: 10 CAPSULE | Refills: 0 | Status: SHIPPED | OUTPATIENT
Start: 2021-01-01 | End: 2021-01-01

## 2021-01-01 RX ORDER — FUROSEMIDE 20 MG/1
TABLET ORAL
Qty: 30 TABLET | Refills: 0 | Status: SHIPPED | OUTPATIENT
Start: 2021-01-01 | End: 2021-01-01

## 2021-01-01 RX ORDER — ATENOLOL 100 MG/1
TABLET ORAL
Qty: 90 TABLET | Refills: 0 | Status: SHIPPED | OUTPATIENT
Start: 2021-01-01

## 2021-01-01 RX ORDER — CEPHALEXIN 500 MG/1
500 CAPSULE ORAL 2 TIMES DAILY
Qty: 14 CAPSULE | Refills: 0 | Status: SHIPPED | OUTPATIENT
Start: 2021-01-01 | End: 2021-01-01

## 2021-01-01 RX ORDER — LEVOTHYROXINE SODIUM 88 UG/1
TABLET ORAL
Qty: 90 TABLET | Refills: 0 | Status: SHIPPED | OUTPATIENT
Start: 2021-01-01

## 2021-01-01 RX ORDER — LANOLIN ALCOHOL/MO/W.PET/CERES
CREAM (GRAM) TOPICAL
Qty: 30 TABLET | Refills: 0 | Status: SHIPPED | OUTPATIENT
Start: 2021-01-01 | End: 2021-01-01

## 2021-01-01 RX ORDER — FOLIC ACID 1 MG/1
TABLET ORAL
Qty: 90 TABLET | Refills: 0 | Status: SHIPPED | OUTPATIENT
Start: 2021-01-01 | End: 2021-01-01

## 2021-01-01 RX ORDER — LEVOTHYROXINE SODIUM 88 UG/1
TABLET ORAL
Qty: 90 TABLET | Refills: 0 | Status: SHIPPED | OUTPATIENT
Start: 2021-01-01 | End: 2021-01-01

## 2021-01-01 RX ORDER — RIVAROXABAN 20 MG/1
TABLET, FILM COATED ORAL
Qty: 90 TABLET | Refills: 0 | Status: SHIPPED | OUTPATIENT
Start: 2021-01-01 | End: 2021-01-01

## 2021-01-06 NOTE — TELEPHONE ENCOUNTER
Last Office Visit: 12/14/20  Next Office Visit:3/15/21    Labs completed in past 6 months? yes  Labs completed in past year? yes    Last Refill Date:12/16/20  Quantity:30  Refills:0    Pharmacy:

## 2021-01-25 NOTE — PROGRESS NOTES
"Subjective   Lorna Lo is a 86 y.o. female.   Chief Complaint   Patient presents with   • Mass     stomach X 2 weeks   • Altered Mental Status       History of Present Illness   Family over the weekend noticed change in mental status. Keep wanting to go home and was already there. Seems to have improved today No fever or chills. No HA. No fall.   Place on \"belly button for few months. No pain associated with it. No nausea or vomiting. No diarrhea. Sore on left ankle x 5 days. No drainage. No fever or chill.s   The following portions of the patient's history were reviewed and updated as appropriate: allergies, current medications, past family history, past medical history, past social history, past surgical history and problem list.  .[facility]  Past Surgical History:   Procedure Laterality Date   • ARTERIOGRAM Bilateral 2019    Procedure: ARTERIOGRAM;  Surgeon: Pablito Cade MD;  Location:  [email or web address] CATH INVASIVE LOCATION;  Service: Cardiology   • CARDIAC CATHETERIZATION     • CARDIAC CATHETERIZATION N/A 2019    Procedure: Left Heart Cath;  Surgeon: Pablito Cade MD;  Location:  [email or web address] CATH INVASIVE LOCATION;  Service: Cardiology   • CARDIAC SURGERY      Cardiac Stents    • CORONARY ARTERY BYPASS GRAFT     • GASTRIC BYPASS       Family History   Problem Relation Age of Onset   • Heart attack Father      Social History     Socioeconomic History   • Marital status:      Spouse name: Not on file   • Number of children: Not on file   • Years of education: Not on file   • Highest education level: Not on file   Tobacco Use   • Smoking status: Former Smoker     Types: Cigarettes     Quit date: 1960     Years since quittin.1   • Smokeless tobacco: Never Used   • Tobacco comment: tried it in highschool   Substance and Sexual Activity   • Alcohol use: No   • Drug use: No   • Sexual activity: Defer     Current Outpatient Medications on File Prior to Visit   Medication Sig Dispense Refill   • " amLODIPine (NORVASC) 10 MG tablet Take 1 tablet by mouth Daily. 90 tablet 0   • aspirin 81 MG EC tablet Take 81 mg by mouth Daily.     • atenolol (TENORMIN) 100 MG tablet Take 1 tablet by mouth once daily 90 tablet 0   • cholecalciferol (VITAMIN D3) 1000 UNITS tablet Take 2,000 Units by mouth Daily.     • Euthyrox 88 MCG tablet Take 1 tablet by mouth once daily 90 tablet 0   • ferrous sulfate 325 (65 FE) MG EC tablet Take 1 tablet by mouth once daily with breakfast 30 tablet 0   • folic acid (FOLVITE) 1 MG tablet Take 1 tablet by mouth Daily. 90 tablet 0   • furosemide (LASIX) 20 MG tablet Take 1 tablet by mouth once daily 30 tablet 2   • ibuprofen (ADVIL,MOTRIN) 200 MG tablet Take 200 mg by mouth Every 6 (Six) Hours As Needed for Mild Pain .     • potassium chloride (K-DUR,KLOR-CON) 20 MEQ CR tablet TAKE 1 TABLET BY MOUTH ONCE DAILY WITH  LASIX 30 tablet 2   • predniSONE (DELTASONE) 5 MG tablet 5 mg 2 (Two) Times a Day.  3   • triamcinolone (KENALOG) 0.1 % ointment Apply  topically to the appropriate area as directed 2 (Two) Times a Day. 453.6 g 1   • Xarelto 20 MG tablet Take 1 tablet by mouth once daily 90 tablet 0     No current facility-administered medications on file prior to visit.        Review of Systems   Constitutional: Negative for activity change, appetite change, chills, diaphoresis, fatigue, fever and unexpected weight change.   HENT: Negative for congestion, ear discharge, ear pain, mouth sores, nosebleeds, sinus pressure, sneezing and sore throat.    Eyes: Negative for pain, discharge and itching.   Respiratory: Negative for cough, chest tightness, shortness of breath and wheezing.    Cardiovascular: Negative for chest pain, palpitations and leg swelling.   Gastrointestinal: Negative for abdominal pain, constipation, diarrhea, nausea and vomiting.        Place on belly button   Endocrine: Negative for cold intolerance, heat intolerance, polydipsia and polyphagia.   Genitourinary: Negative for  "dysuria, flank pain, frequency, hematuria and urgency.   Musculoskeletal: Negative for arthralgias, back pain, gait problem, myalgias, neck pain and neck stiffness.   Skin: Negative for color change, pallor and rash.        Sore on left ankle   Neurological: Negative for seizures, speech difficulty, numbness and headaches.        Confusion   Psychiatric/Behavioral: Positive for sleep disturbance. Negative for agitation, confusion and decreased concentration. The patient is not nervous/anxious.    /70   Pulse 68   Temp 97.1 °F (36.2 °C)   Ht 162.6 cm (64\")   Wt 84.8 kg (187 lb)   LMP  (LMP Unknown)   SpO2 98%   BMI 32.10 kg/m²       Objective   Physical Exam  Vitals signs and nursing note reviewed.   Constitutional:       Appearance: She is well-developed.   HENT:      Head: Normocephalic.      Right Ear: External ear normal.      Left Ear: External ear normal.      Nose: Nose normal.   Eyes:      Conjunctiva/sclera: Conjunctivae normal.      Pupils: Pupils are equal, round, and reactive to light.   Neck:      Thyroid: No thyromegaly.      Vascular: No JVD.   Cardiovascular:      Rate and Rhythm: Normal rate and regular rhythm.      Heart sounds: Normal heart sounds. No murmur. No friction rub.   Pulmonary:      Effort: No respiratory distress.      Breath sounds: No wheezing or rales.   Abdominal:      General: There is no distension.      Tenderness: There is no abdominal tenderness.   Musculoskeletal:         General: No tenderness.        Feet:    Lymphadenopathy:      Cervical: No cervical adenopathy.   Skin:     Findings: No rash.   Neurological:      Mental Status: She is alert.      Cranial Nerves: No cranial nerve deficit.      Deep Tendon Reflexes: Reflexes normal.      Comments: Oriented to person and place today   Psychiatric:         Behavior: Behavior normal.         Assessment/Plan   Diagnoses and all orders for this visit:    1. Other insomnia (Primary)  -     QUEtiapine (SEROquel) 25 MG " tablet; Take 1 tablet by mouth Every Night.  Dispense: 30 tablet; Refill: 0  Try a low dose seroquel to see if this helps.   2. Confusion  Family reports better today. No HA or weakness. She is taking a UA home and will bring back to r/o UTI  3. Umbilical hernia without obstruction and without gangrene  Is reducible. No pain. Will monitor.   4. Abrasion of left ankle  Prescribed bactroban tid

## 2021-01-26 NOTE — TELEPHONE ENCOUNTER
Just got prelim back small amount of bacteria sent script in but won't have sensitivity back for 24-48 hrs

## 2021-01-26 NOTE — TELEPHONE ENCOUNTER
Caller: Amirah Ortiz    Relationship: Emergency Contact    Best call back number: [phone]    Caller requesting test results: DAUGHTER    What test was performed: URINALYSIS    When was the test performed: YESTERDAY 01/25/21    Where was the test performed: OFFICE    Additional notes: PLEASE CALL ASAP WITH RESULTS. WOULD LIKE TO START MEDICATION IF NEEDED.

## 2021-01-27 NOTE — TELEPHONE ENCOUNTER
Called and spoke with patient's daughter regarding results.  Patient started on medicine yesterday.  Informed daughter we would call back if we needed to change medication.

## 2021-02-01 NOTE — TELEPHONE ENCOUNTER
Caller: Amirah Ortiz    Relationship: Emergency Contact    Best call back number: [phone]    Caller requesting test results: URINALYSIS    What test was performed: URINALYSIS CHECKING FOR A UTI    When was the test performed: 1/25/21    Where was the test performed: [facility]    Additional notes: PATIENT DAUGHTER CALLED TO CHECK IF THE LAB RESULTS IS BACK FROM THE URINALYSIS THAT WAS RAN ON HER MOTHER. SHE STATES THAT THE PATIENT IS NEARING THE LAST PILL, AND SHE IS NOT FEELING WELL EMOTIONALLY OR MENTALLY. PATIENT DAUGHTER IS CONCERNED THAT HER MOTHER IS NOT GETTING WELL AND WILL POSSIBLY NEED A DIFFERENT MEDICATION FOR THE SYMPTOMS. PATIENT IS ALSO NOT SLEEPING WELL IN THE NIGHT.

## 2021-02-01 NOTE — TELEPHONE ENCOUNTER
Called and informed that macrobid would have treated infection.  Advised if she was still having issues, to call to make an appointment to be seen.  States they will call back if no improvement.

## 2021-03-05 NOTE — TELEPHONE ENCOUNTER
Last Office Visit: 01/25/21  Next Office Visit: 03/15/21    Labs completed in past 6 months? yes  Labs completed in past year? yes    Last Refill Date:   Quantity:  Refills:    Pharmacy:

## 2021-03-15 PROBLEM — D62 ACUTE BLOOD LOSS ANEMIA: Status: RESOLVED | Noted: 2019-09-10 | Resolved: 2021-01-01

## 2021-03-15 PROBLEM — M81.0 OSTEOPOROSIS: Status: RESOLVED | Noted: 2020-05-14 | Resolved: 2021-01-01

## 2021-03-15 PROBLEM — S30.1XXA HEMATOMA OF GROIN: Status: RESOLVED | Noted: 2019-09-09 | Resolved: 2021-01-01

## 2021-03-15 PROBLEM — R41.0 DELIRIUM, ACUTE: Status: RESOLVED | Noted: 2019-09-12 | Resolved: 2021-01-01

## 2021-03-15 NOTE — PROGRESS NOTES
The ABCs of the Annual Wellness Visit  Subsequent Medicare Wellness Visit    Chief Complaint   Patient presents with   • Medicare Wellness-subsequent       Subjective   History of Present Illness:  Lorna Lo is a 86 y.o. female who presents for a Subsequent Medicare Wellness Visit.    HEALTH RISK ASSESSMENT    Recent Hospitalizations:  No hospitalization(s) within the last year.    Current Medical Providers:  Patient Care Team:  Vanesa Correa DO as PCP - General (Internal Medicine)    Smoking Status:  Social History     Tobacco Use   Smoking Status Former Smoker   • Types: Cigarettes   • Quit date:    • Years since quittin.2   Smokeless Tobacco Never Used   Tobacco Comment    tried it in highschool       Alcohol Consumption:  Social History     Substance and Sexual Activity   Alcohol Use No       Depression Screen:   PHQ-2/PHQ-9 Depression Screening 3/15/2021   Little interest or pleasure in doing things 0   Feeling down, depressed, or hopeless 0   Total Score 0       Fall Risk Screen:  NOREEN Fall Risk Assessment was completed, and patient is at LOW risk for falls.Assessment completed on:3/15/2021    Health Habits and Functional and Cognitive Screening:  Functional & Cognitive Status 3/15/2021   Do you have difficulty preparing food and eating? No   Do you have difficulty bathing yourself, getting dressed or grooming yourself? No   Do you have difficulty using the toilet? No   Do you have difficulty moving around from place to place? No   Do you have trouble with steps or getting out of a bed or a chair? Yes   Current Diet Well Balanced Diet        Current Diet Comment -   Dental Exam Not up to date   Eye Exam Not up to date   Exercise (times per week) 3 times per week   Current Exercise Activities Include Walking   Do you need help using the phone?  No   Are you deaf or do you have serious difficulty hearing?  Yes   Do you need help with transportation? No   Do you need help shopping? No    Do you need help preparing meals?  No   Do you need help with housework?  No   Do you need help with laundry? No   Do you need help taking your medications? No   Do you need help managing money? No   Do you ever drive or ride in a car without wearing a seat belt? No   Have you felt unusual stress, anger or loneliness in the last month? No   Who do you live with? Alone   If you need help, do you have trouble finding someone available to you? No   Have you been bothered in the last four weeks by sexual problems? No   Do you have difficulty concentrating, remembering or making decisions? No         Does the patient have evidence of cognitive impairment? No    Asprin use counseling:Taking ASA appropriately as indicated    Age-appropriate Screening Schedule:  Refer to the list below for future screening recommendations based on patient's age, sex and/or medical conditions. Orders for these recommended tests are listed in the plan section. The patient has been provided with a written plan.    Health Maintenance   Topic Date Due   • ZOSTER VACCINE (1 of 2) Never done   • DXA SCAN  02/13/2021   • TDAP/TD VACCINES (1 - Tdap) 04/21/2021 (Originally 10/7/1953)   • DIABETIC EYE EXAM  06/29/2021 (Originally 1/1/2021)   • HEMOGLOBIN A1C  06/14/2021   • LIPID PANEL  12/14/2021   • URINE MICROALBUMIN  03/15/2022   • INFLUENZA VACCINE  Completed          The following portions of the patient's history were reviewed and updated as appropriate: allergies, current medications, past family history, past medical history, past social history, past surgical history and problem list.    Outpatient Medications Prior to Visit   Medication Sig Dispense Refill   • amLODIPine (NORVASC) 10 MG tablet Take 1 tablet by mouth once daily 90 tablet 0   • aspirin 81 MG EC tablet Take 81 mg by mouth Daily.     • atenolol (TENORMIN) 100 MG tablet Take 1 tablet by mouth once daily 90 tablet 0   • cholecalciferol (VITAMIN D3) 1000 UNITS tablet Take 2,000  Units by mouth Daily.     • Euthyrox 88 MCG tablet Take 1 tablet by mouth once daily 90 tablet 0   • ferrous sulfate 325 (65 FE) MG EC tablet Take 1 tablet by mouth once daily with breakfast 90 tablet 0   • folic acid (FOLVITE) 1 MG tablet Take 1 tablet by mouth once daily 90 tablet 0   • furosemide (LASIX) 20 MG tablet Take 1 tablet by mouth once daily 30 tablet 2   • ibuprofen (ADVIL,MOTRIN) 200 MG tablet Take 200 mg by mouth Every 6 (Six) Hours As Needed for Mild Pain .     • mupirocin (Bactroban) 2 % ointment Apply  topically to the appropriate area as directed 3 (Three) Times a Day. 30 g 0   • potassium chloride (K-DUR,KLOR-CON) 20 MEQ CR tablet TAKE 1 TABLET BY MOUTH ONCE DAILY WITH  LASIX 30 tablet 2   • predniSONE (DELTASONE) 5 MG tablet 5 mg 2 (Two) Times a Day.  3   • QUEtiapine (SEROquel) 25 MG tablet Take 1 tablet by mouth Every Night. 30 tablet 0   • triamcinolone (KENALOG) 0.1 % ointment Apply  topically to the appropriate area as directed 2 (Two) Times a Day. 453.6 g 1   • Xarelto 20 MG tablet Take 1 tablet by mouth once daily 90 tablet 0   • nitrofurantoin, macrocrystal-monohydrate, (Macrobid) 100 MG capsule Take 1 capsule by mouth 2 (Two) Times a Day. 14 capsule 0     No facility-administered medications prior to visit.       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 Patient does not have an advance directive, information provided.    Review of Systems   Constitutional: Negative for activity change, appetite change, chills, diaphoresis, fatigue, fever and unexpected weight change.   HENT: Negative for congestion, ear discharge, ear pain, mouth sores, nosebleeds, sinus pressure, sneezing and sore throat.    Eyes: Negative for pain, discharge and itching.   Respiratory: Negative for cough, chest tightness, shortness of breath and wheezing.    Cardiovascular: Negative for chest pain, palpitations and leg swelling.   Gastrointestinal: Negative for abdominal pain, constipation, diarrhea, nausea and vomiting.   Endocrine: Negative for cold intolerance, heat intolerance, polydipsia and polyphagia.   Genitourinary: Negative for dysuria, flank pain, frequency, hematuria and urgency.   Musculoskeletal: Positive for arthralgias. Negative for back pain, gait problem, myalgias, neck pain and neck stiffness.   Skin: Negative for color change, pallor and rash.   Neurological: Negative for seizures, speech difficulty, numbness and headaches.   Psychiatric/Behavioral: Negative for agitation, confusion, decreased concentration and sleep disturbance. The patient is not nervous/anxious.        Compared to one year ago, the patient feels her physical health is the same.  Compared to one year ago, the patient feels her mental health is the same.    Reviewed chart for potential of high risk medication in the elderly: yes  Reviewed chart for potential of harmful drug interactions in the elderly:yes    Objective         Vitals:    03/15/21 1111   BP: 120/70   Pulse: 63   Temp: 96.9 °F (36.1 °C)   SpO2: 98%   Weight: 80.3 kg (177 lb)   Height: 162.6 cm (64\")   PainSc: 0-No pain       Body mass index is 30.38 kg/m².  Discussed the patient's BMI with her. The BMI is above average; BMI management plan is completed.    Physical Exam  Skin:                     Assessment/Plan   Medicare Risks and " Personalized Health Plan  CMS Preventative Services Quick Reference  Obesity/Overweight   Osteoprorosis Risk    The above risks/problems have been discussed with the patient.  Pertinent information has been shared with the patient in the After Visit Summary.  Follow up plans and orders are seen below in the Assessment/Plan Section.    Diagnoses and all orders for this visit:    1. Medicare annual wellness visit, subsequent  Done today  2. Postmenopausal  -     DEXA Bone Density Axial; Future    3. Coronary artery disease involving native coronary artery of native heart without angina pectoris  On ASA, statin intolerant  4. Dyslipidemia  -     Lipid Panel; Future  Statin intolerant  5. Essential hypertension  -     Comprehensive Metabolic Panel; Future  -     CBC & Differential; Future  Stable on norvasc 10 mg po qd and atenolol 100 m gpo qd  6. PAF (paroxysmal atrial fibrillation) (CMS/[facility])  On xarelto 20 mg po qd  7. Controlled type 2 diabetes mellitus with chronic kidney disease, without long-term current use of insulin, unspecified CKD stage (CMS/[facility])  -     Microalbumin / Creatinine Urine Ratio - Urine, Clean Catch  Controlled with low carb diet  8. Morbidly obese (CMS/[facility])  On low carb diet  9. Gastroesophageal reflux disease with esophagitis without hemorrhage  Stable with dietary changes  10. Stage 3 chronic kidney disease, unspecified whether stage 3a or 3b CKD (CMS/[facility])  stable  11. Chronic anemia  -     CBC & Differential; Future    12. Polymyositis (CMS/[facility])  On prednisone 5 mg po bid  13. Memory loss  stable  14. Ulcer of lower extremity, limited to breakdown of skin, unspecified laterality (CMS/[facility])  -     Ambulatory Referral to Wound Clinic  15. Acquire hypothyroid  On Euthyrox 88 mcg po qd. tsh    Follow Up:  Return in about 3 months (around 6/15/2021).     An After Visit Summary and PPPS were given to the patient.

## 2021-03-18 NOTE — THERAPY WOUND CARE TREATMENT
Outpatient Rehabilitation - Wound/Debridement Initial Eval   [place]     Patient Name: Lorna Lo  : 1934  MRN: [id number]  Today's Date: 3/18/2021                  Admit Date: 3/18/2021    Visit Dx:    ICD-10-CM ICD-9-CM   1. Open wound of right lower extremity, subsequent encounter  S81.801D V58.89     891.0   2. Edema of both legs  R60.0 782.3       Patient Active Problem List   Diagnosis   • Coronary artery disease   • Essential hypertension   • Dyslipidemia   • Polymyositis (CMS/[facility])   • Stage 3 chronic kidney disease (CMS/[facility])   • Chronic anemia   • Osteopenia   • GERD (gastroesophageal reflux disease)   • PAF (paroxysmal atrial fibrillation) (CMS/[facility])   • Morbidly obese (CMS/[facility])   • Coronary artery disease involving native coronary artery of native heart without angina pectoris   • Unstable angina (CMS/[facility])   • Memory loss   • 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    • Dyslipidemia    • GERD (gastroesophageal reflux disease)    • Hematoma of groin, right 2019   • Hyperlipidemia    • Hypertension    • Osteopenia    • Osteoporosis 2020   • Polymyositis (CMS/[facility])    • Renal disorder    • Thyroid disease    • Venous stasis         Past Surgical History:   Procedure Laterality Date   • ARTERIOGRAM Bilateral 2019    Procedure: ARTERIOGRAM;  Surgeon: Pablito Cade MD;  Location: [facility] INVASIVE LOCATION;  Service: Cardiology  "  • CARDIAC CATHETERIZATION     • CARDIAC CATHETERIZATION N/A 9/9/2019    Procedure: Left Heart Cath;  Surgeon: Pablito Cade MD;  Location: [facility] CATH INVASIVE LOCATION;  Service: Cardiology   • CARDIAC SURGERY      Cardiac Stents    • CORONARY ARTERY BYPASS GRAFT     • GASTRIC BYPASS       RLE      RLE wound       LLE medial discoloration       LLE lateral discoloration       Patient History     Row Name 03/18/21 1030             History    Chief Complaint  Swelling;Ulcer, wound or other skin conditions  -MW      Date Current Problem(s) Began  03/11/21  -MW      Brief Description of Current Complaint  Pt and son report pt \"bumped\" her leg and then it turned into a big fluid blister. The blister broke after 2-3 days. It seems to have gotten larger since then. It had stopped weeping but is now weeping more. Pt presents to PT wound care for assistance with wound healing; she has continued wearing the compressogrips from her previous PT wound care episode.   -MW      Previous treatment for THIS PROBLEM  Medication  -MW      Patient/Caregiver Goals  Heal wound;Decrease swelling;Know what to do to help the symptoms  -MW      Patient seeing anyone else for problem(s)?  PCP  -MW      How has patient tried to help current problem?  Keeping it covered, using compression, taking antibx   -MW         Pain     Pain Location  Leg  -MW      Pain at Present  1  -MW      Difficulties with ADL's?  self care/ bathing   -MW         Fall Risk Assessment    Any falls in the past year:  Unspecified  -MW         Services    Are you currently receiving Home Health services  No  -MW         Daily Activities    Primary Language  English  -MW      How does patient learn best?  Demonstration  -MW      Teaching needs identified  Management of Condition  -MW      Patient is concerned about/has problems with  Difficulty with self care (i.e. bathing, dressing, toileting:  -MW      Barriers to learning  Cognitive pt repeated herself several " times during session   -MW      Pt Participated in POC and Goals  Yes  -MW         Safety    Are you being hurt, hit, or frightened by anyone at home or in your life?  No  -MW      Are you being neglected by a caregiver  No  -MW      Have you had any of the following issues with  N/A  -MW        User Key  (r) = Recorded By, (t) = Taken By, (c) = Cosigned By    Initials Name Provider Type    Meenakshi Smith PT Physical Therapist          EVALUATION  PT Ortho     Row Name 03/18/21 1030       Subjective Comments    Subjective Comments  Pt wondering where she can get more compressogrip; daughter has been all around town and can't find it.   -MW       Subjective Pain    Able to rate subjective pain?  yes  -MW    Pre-Treatment Pain Level  1  -MW    Post-Treatment Pain Level  1  -MW       Posture/Observations    Posture/Observations Comments  Pt remained seated in w/c   -MW       General ROM    GENERAL ROM COMMENTS  Mild limitations due to soft tissue swelling knees and ankles   -MW       Transfers    Comment (Transfers)  pt remained seated in w/c   -MW       Gait/Stairs (Locomotion)    Comment (Gait/Stairs)  son reports pt is amb at home, using cane   -MW      User Key  (r) = Recorded By, (t) = Taken By, (c) = Cosigned By    Initials Name Provider Type    Meenakshi Smith PT Physical Therapist        LDA Wound     Row Name 03/18/21 1030             Wound 03/18/21 1030 Right medial leg Traumatic    Wound - Properties Group Placement Date: 03/18/21  -MW Placement Time: 1030  -MW Present on Hospital Admission: Y  -MW Side: Right  -MW Orientation: medial  -MW Location: leg  -MW Primary Wound Type: Traumatic  -MW    Wound Image  Images linked: 2  -MW      Dressing Appearance  intact;moist drainage;copious drainage  -MW      Base  moist;yellow;slough;pink;red;subcutaneous  -MW      Periwound  intact;pink;swelling;moist  -MW      Periwound Temperature  warm  -MW      Periwound Skin Turgor  firm  -MW      Edges   open;irregular  -MW      Wound Length (cm)  4.6 cm  -MW      Wound Width (cm)  6.3 cm  -MW      Wound Depth (cm)  0.1 cm  -MW      Drainage Characteristics/Odor  serosanguineous;serous  -MW      Drainage Amount  moderate  -MW      Care, Wound  cleansed with;wound cleanser;debrided  -MW      Dressing Care  dressing changed;abdominal pad;other (see comments);multi-layer wrap ABD pad, cast padding to secure, MLW   -MW      Periwound Care  cleansed with pH balanced cleanser;barrier ointment applied z guard to periwound skin   -MW      Retired Wound - Properties Group Date first assessed: 03/18/21  -MW Time first assessed: 1030  -MW Present on Hospital Admission: Y  -MW Side: Right  -MW Location: leg  -MW Primary Wound Type: Traumatic  -MW      User Key  (r) = Recorded By, (t) = Taken By, (c) = Cosigned By    Initials Name Provider Type    MW Meenakshi Silva, PT Physical Therapist        Lymphedema     Row Name 03/18/21 1030             Lymphedema Edema Assessment    Ptting Edema Category  By severity  -MW      Pitting Edema  Moderate;Severe  -MW      [place]ump  bilateral:;positive partially contained by shoes   -MW         Skin Changes/Observations    Location/Assessment  Lower Extremity  -MW      Lower Extremity Conditions  bilateral:;clean;scaly;right:;weeping;inflamed;left:;intact  -MW      Lower Extremity Color/Pigment  bilateral:;hyperpigmented;right:;erythema;blanchable  -MW         Lymphedema Pulses/Capillary Refill    Lymphedema Pulses/Capillary Refill  lower extremity pulses;capillary refill  -MW      Dorsalis Pedis Pulse  right:;left:;+2 normal  -MW      Posterior Tibialis Pulse  right:;+1 diminished;left:;not tested  -MW      Capillary Refill  lower extremity capillary refill  -MW      Lower Extremity Capillary Refill  right:;left:;less than 3 seconds  -MW         Lymphedema Measurements    Measurement Type(s)  Quick Girth  -MW      Quick Girth Areas  Lower extremities  -MW         LLE Quick Girth (cm)     Met-heads  22.2 cm  -MW      Mid foot  23.7 cm  -MW      Smallest ankle  20.6 cm  -MW      Largest calf  33 cm  -MW      Tib tuberosity  36.5 cm  -MW         RLE Quick Girth (cm)    Met-heads  23 cm  -MW      Mid foot  23 cm  -MW      Smallest ankle  21 cm  -MW      Largest calf  34.4 cm  -MW      Tib tuberosity  37.3 cm  -MW      Other 1  24.7 cm 10 cm prox to ankle   -MW      RLE Quick Girth Total  163.4  -MW         Compression/Skin Care    Compression/Skin Care  skin care;wrapping location;bandaging  -MW      Skin Care  washed/dried;moisturizing lotion applied  -MW      Wrapping Location  lower extremity  -MW      Wrapping Location LE  bilateral:;foot to knee  -MW      Wrapping Comments  pt prefers to not wrap feet  -MW      Bandage Layers  cotton elastic stocking- single layer (comment size);cotton elastic stocking- double layer (comment size)  -MW      Bandaging Comments  BLE: compressogrips size 5 ankle to knee, doubled on lower leg. RLE also with ABD and cast padding in place.   -MW      Compression/Skin Care Comments  Issued additional compressogrips to wash /wear.   -MW        User Key  (r) = Recorded By, (t) = Taken By, (c) = Cosigned By    Initials Name Provider Type    MW Meenakshi Silva, PT Physical Therapist          WOUND DEBRIDEMENT  Total area of Debridement: ~20 cm2   Debridement Site 1  Location- Site 1: RLE   Selective Debridement- Site 1: Wound Surface <20cmsq  Instruments- Site 1: tweezers, other (comment) (gauze )  Excised Tissue Description- Site 1: minimum, moderate, slough  Bleeding- Site 1: none             Therapy Education     Row Name 03/18/21 1037             Therapy Education    Education Details  Keep dressing dry and in place. Change daily or prn leakage. Cont compressogrips use daily. Elevate legs as much as possible. Use z guard around wound, ABD over, secure with cast padding. Cont prescription topical to intact dry skin areas, not in or on wound. Call is issues or run out  of supplies.   -MW      Given  Bandaging/dressing change;Edema management  -MW      Program  New;Reinforced  -MW      How Provided  Verbal;Demonstration;Written written info for purchase of size 4 compressogrips   -MW      Provided to  Patient;Caregiver Son   -MW      Level of Understanding  Teach back education performed;Verbalized  -MW      20953 - PT Self Care/Mgmt Minutes  5  -MW        User Key  (r) = Recorded By, (t) = Taken By, (c) = Cosigned By    Initials Name Provider Type    Meenakshi Smith, PT Physical Therapist          Recommendation and Plan  PT Assessment/Plan     Row Name 03/18/21 1030          PT Assessment    Functional Limitations  Performance in self-care ADL;Other (comment) wound dressings   -MW     Impairments  Integumentary integrity;Impaired venous circulation;Edema  -MW     Assessment Comments  Pt presents with new open wound Rt medial lower leg from known trauma. This progressed to a blister and has since burst and is currently draining serous fluid. Pt has been on antibx with reported improvement in skin redness. Pt with h/o lower leg swelling and wears compressogrip as support socks; pt has been reusing supplies from previous PT wound care episode, but these are very worn out and likely not providing adequate support any more as legs present with firm, 2+ pittting edema inspite of appearing a reasonable size. Pt lives alone and perfers to complete her own dressing changes, but pt's son plans to assist pt with dressing changes as well as a neighbor is available to assist (will have daily assistance if needed). Expect this wound will improve slowly with proper dressing management and gentle gradient compression. If closure becomes delayed, she may benefit from MIST to the wound. Cont PT POC.  -MW     Rehab Potential  Fair  -MW     Patient/caregiver participated in establishment of treatment plan and goals  Yes  -MW     Patient would benefit from skilled therapy intervention  Yes  -MW         PT Plan    PT Frequency  2x/week;1x/week family prefers weekly   -MW     Predicted Duration of Therapy Intervention (PT)  20 visits   -MW     Planned CPT's?  PT EVAL MOD COMPLELITY: 11326;PT SELF CARE/HOME MGMT/TRAIN EA 15: 91026;PT JUAN DEBRIDE OPEN WOUND UP TO 20 CM: 31925;PT NONSELECT DEBRIDE 15 MIN: 45732;PT MULTI LAYER COMP SYS LE;PT UNNA BOOT: 99474;PT NLFU MIST: 12127  -MW     Physical Therapy Interventions (Optional Details)  wound care;bandaging;patient/family education  -     PT Plan Comments  Debridment, dressing management, MLW vs unnaboots.   -       User Key  (r) = Recorded By, (t) = Taken By, (c) = Cosigned By    Initials Name Provider Type    Meenakshi Smith, PT Physical Therapist            Goals  PT OP Goals     Row Name 21 1030          PT Short Term Goals    STG Date to Achieve  04/15/21  -     STG 1  Pt / caregiver able to verbalize s/s of infx and when to seek urgent care.   -MW     STG 2  Pt/ caregiver independent with clean home dressing changs to manage drainage and promote wound closure.   -MW     STG 3  RLE wound area to decrease at least 50% to demonstrate wound healing.   -     STG 4  BLE circumferential measurements to decrease 1-2 cm to promote improved skin integrity.   -        Long Term Goals    LTG 1  RLE wound area to decrease at least 90% to demonstrate wound healing.   -     LTG 2  Pt to report obtaining her own supply of compressogrips or well fitting compression socks for long term management of LE swelling.   -        Time Calculation    PT Goal Re-Cert Due Date  21  -       User Key  (r) = Recorded By, (t) = Taken By, (c) = Cosigned By    Initials Name Provider Type    Meenakshi Smith, PT Physical Therapist          Time Calculation: Start Time: 1030  Total Timed Code Minutes- PT: 5 minute(s)  Therapy Charges for Today     Code Description Service Date Service Provider Modifiers Qty    59354482004  PT EVAL MOD COMPLEXITY 4  3/18/2021 Meenakshi Silva, PT GP 1    59002168316 HC JUAN DEBRIDE OPEN WOUND UP TO 20CM 3/18/2021 Meenakshi Silva, PT GP 1    [id number] HC PT MULTI LAYER COMP SYS BELOW KNEE 3/18/2021 Meenakshi Silva, PT GP 1                Meenakshi Silva, PT  3/18/2021

## 2021-03-25 NOTE — THERAPY WOUND CARE TREATMENT
Outpatient Rehabilitation - Wound/Debridement Treatment Note   [place]     Patient Name: Lorna Lo  : 1934  MRN: [id number]  Today's Date: 3/25/2021                 Admit Date: 3/25/2021    Visit Dx:    ICD-10-CM ICD-9-CM   1. Open wound of right lower extremity, subsequent encounter  S81.801D V58.89     891.0   2. Edema of both legs  R60.0 782.3       Patient Active Problem List   Diagnosis   • Coronary artery disease   • Essential hypertension   • Dyslipidemia   • Polymyositis (CMS/[facility])   • Stage 3 chronic kidney disease (CMS/[facility])   • Chronic anemia   • Osteopenia   • GERD (gastroesophageal reflux disease)   • PAF (paroxysmal atrial fibrillation) (CMS/[facility])   • Morbidly obese (CMS/[facility])   • Coronary artery disease involving native coronary artery of native heart without angina pectoris   • Unstable angina (CMS/[facility])   • Memory loss   • 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    • CKD (chronic kidney disease)    • Controlled type 2 diabetes mellitus with kidney complication, without long-term current use of insulin (CMS/[facility]) 10/14/2019   • Coronary artery disease    • Delirium, acute 2019   • Dependent edema    • Dermatitis    • DVT (deep venous thrombosis) (CMS/[facility]) 2009    History of DVT in .    • Dyslipidemia    • GERD (gastroesophageal reflux disease)    • Hematoma of groin, right 2019   • Hyperlipidemia    • Hypertension    • Osteopenia    • Osteoporosis 2020   • Polymyositis (CMS/[facility])    • Renal disorder    • Thyroid disease    • Venous stasis         Past Surgical History:   Procedure Laterality Date   • ARTERIOGRAM Bilateral 2019    Procedure: ARTERIOGRAM;  Surgeon: Pablito Cade MD;  Location: [facility] INVASIVE LOCATION;  Service: Cardiology    • CARDIAC CATHETERIZATION     • CARDIAC CATHETERIZATION N/A 9/9/2019    Procedure: Left Heart Cath;  Surgeon: Pablito Cade MD;  Location: [facility] CATH INVASIVE LOCATION;  Service: Cardiology   • CARDIAC SURGERY      Cardiac Stents    • CORONARY ARTERY BYPASS GRAFT     • GASTRIC BYPASS           EVALUATION  PT Ortho     Row Name 03/25/21 0945       Subjective Comments    Subjective Comments  Patient states size 4 compressogrips were too tight on LE's so she replaced with size 5. States she is changing dressing daily due to exudate.  -MP       Subjective Pain    Able to rate subjective pain?  yes  -MP    Pre-Treatment Pain Level  1  -MP    Post-Treatment Pain Level  1  -MP       Transfers    Comment (Transfers)  Pt remained seated in w/c   -MP      User Key  (r) = Recorded By, (t) = Taken By, (c) = Cosigned By    Initials Name Provider Type    MP Keven Varghese PT Physical Therapist          LDA Wound     Row Name 03/25/21 1000             Wound 03/18/21 1030 Right medial leg Traumatic    Wound - Properties Group Placement Date: 03/18/21  -MW Placement Time: 1030  -MW Present on Hospital Admission: Y  -MW Side: Right  -MW Orientation: medial  -MW Location: leg  -MW Primary Wound Type: Traumatic  -MW    Dressing Appearance  intact;copious drainage;other (see comments) abd pad saturated   -MP      Base  moist;yellow;slough;pink;red  -MP      Periwound  intact;pink;swelling;moist  -MP      Periwound Temperature  warm  -MP      Periwound Skin Turgor  firm  -MP      Edges  open;irregular  -MP      Drainage Characteristics/Odor  serosanguineous;serous  -MP      Drainage Amount  moderate;other (see comments) actively weeping during session  -MP      Care, Wound  cleansed with;wound cleanser;debrided  -MP      Dressing Care  dressing changed;antimicrobial agent applied;abdominal pad;multi-layer wrap Sorbact, Qwick, ABD, cast padding, MLW  -MP      Periwound Care  cleansed with pH balanced cleanser;barrier ointment  applied;other (see comments) Z guard  -MP      Retired Wound - Properties Group Date first assessed: 03/18/21  -MW Time first assessed: 1030  -MW Present on Hospital Admission: Y  -MW Side: Right  -MW Location: leg  -MW Primary Wound Type: Traumatic  -MW      User Key  (r) = Recorded By, (t) = Taken By, (c) = Cosigned By    Initials Name Provider Type    MW Meenakshi Silva, PT Physical Therapist    MP Keven Varghese, PT Physical Therapist        Lymphedema     Row Name 03/25/21 0945             Posture/Observations    Posture/Observations Comments  Pt remained seated in w/c   -MP         Lymphedema Edema Assessment    Ptting Edema Category  By severity  -MP      Pitting Edema  Moderate;Severe  -MP      [place] Hump  --  -MP         Skin Changes/Observations    Location/Assessment  Lower Extremity  -MP      Lower Extremity Conditions  bilateral:;clean;scaly;right:;weeping;inflamed;left:;intact  -MP      Lower Extremity Color/Pigment  bilateral:;hyperpigmented;right:;erythema;blanchable  -MP         Lymphedema Pulses/Capillary Refill    Lymphedema Pulses/Capillary Refill  --  -MP      Dorsalis Pedis Pulse  --  -MP      Posterior Tibialis Pulse  --  -MP      Capillary Refill  lower extremity capillary refill  -MP      Lower Extremity Capillary Refill  right:;left:;less than 3 seconds  -MP         Lymphedema Measurements    Measurement Type(s)  --  -MP      Quick Girth Areas  --  -MP         Compression/Skin Care    Compression/Skin Care  skin care;wrapping location;bandaging  -MP      Skin Care  washed/dried;moisturizing lotion applied  -MP      Wrapping Location  lower extremity  -MP      Wrapping Location LE  bilateral:;foot to knee  -MP      Wrapping Comments  pt prefers to not wrap feet  -MP      Bandage Layers  cotton elastic stocking- single layer (comment size);cotton elastic stocking- double layer (comment size)  -MP      Bandaging Comments  Sorbact, Qwick, ABD, cast padding to R LE. Size 5 compressogrip  doubled and overlapping for gradient compression to B LE's.  -MP        User Key  (r) = Recorded By, (t) = Taken By, (c) = Cosigned By    Initials Name Provider Type    Keven Lockhart, PT Physical Therapist          WOUND DEBRIDEMENT  Total area of Debridement: 5 cm2  Debridement Site 1  Location- Site 1: RLE   Selective Debridement- Site 1: Wound Surface <20cmsq  Instruments- Site 1: tweezers  Excised Tissue Description- Site 1: minimum, slough  Bleeding- Site 1: none             Therapy Education     Row Name 03/25/21 4301             Therapy Education    Education Details  Patient educated on addition of sorbact and Qwick to dressing management. PT recommend pt to wear looser fitting shoes due to potential for skin breakdown with current, tight fitting shoes.  -MP      Given  Bandaging/dressing change;Edema management  -MP      Program  New;Reinforced  -MP      How Provided  Verbal;Demonstration;Written written info for purchase of size 4 compressogrips   -MP      Provided to  Patient;Caregiver Son   -MP      Level of Understanding  Teach back education performed;Verbalized  -MP        User Key  (r) = Recorded By, (t) = Taken By, (c) = Cosigned By    Initials Name Provider Type    Keven Lockhart, PT Physical Therapist          Recommendation and Plan  PT Assessment/Plan     Row Name 03/25/21 4713          PT Assessment    Functional Limitations  Performance in self-care ADL;Other (comment) wound dressings   -MP     Impairments  Integumentary integrity;Impaired venous circulation;Edema  -MP     Assessment Comments  Patient presents with improved periwound erythema. Upon removal, ABD was completely saturated with serous fluid. PT added qwick for additional exudate management and sorbact to open wound for antibacterial property. Size 5 compressogrip donned to B LE's to promote increased venous return. May consider use of MIST therapy if healing becomes delayed. Patient would continue to benefit from skilled PT  wound care to promote increased wound healing potential.  -MP     Rehab Potential  Fair  -MP     Patient/caregiver participated in establishment of treatment plan and goals  Yes  -MP     Patient would benefit from skilled therapy intervention  Yes  -MP        PT Plan    Physical Therapy Interventions (Optional Details)  patient/family education;wound care  -MP     PT Plan Comments  debridement, education, MLW vs unna boots  -MP       User Key  (r) = Recorded By, (t) = Taken By, (c) = Cosigned By    Initials Name Provider Type    Keven Lockhart PT Physical Therapist          Goals  PT OP Goals     Row Name 03/25/21 0945          Time Calculation    PT Goal Re-Cert Due Date  06/18/21  -MP       User Key  (r) = Recorded By, (t) = Taken By, (c) = Cosigned By    Initials Name Provider Type    Keven Lockhart PT Physical Therapist          PT Goal Re-Cert Due Date: 06/18/21            Time Calculation: Start Time: 0945  Therapy Charges for Today     Code Description Service Date Service Provider Modifiers Qty    97369881632 HC JUAN DEBRIDE OPEN WOUND UP TO 20CM 3/25/2021 Keven Varghese, PT GP 1    [id number]  PT MULTI LAYER COMP SYS BELOW KNEE 3/25/2021 Keven Varghese PT GP 1                  Keven Varghese PT  3/25/2021

## 2021-04-01 NOTE — TELEPHONE ENCOUNTER
Last Office Visit:  03/15/21  Next Office Visit: 04/26/21    Labs completed in past 6 months? yes  Labs completed in past year? yes    Last Refill Date: 10/28/20  Quantity:30  Refills:2    Pharmacy:

## 2021-04-01 NOTE — THERAPY WOUND CARE TREATMENT
Outpatient Rehabilitation - Wound/Debridement Treatment Note   [place]     Patient Name: Lorna Lo  : 1934  MRN: [id number]  Today's Date: 2021                 Admit Date: 2021    Visit Dx:    ICD-10-CM ICD-9-CM   1. Open wound of right lower extremity, subsequent encounter  S81.801D V58.89     891.0   2. Edema of both legs  R60.0 782.3       Patient Active Problem List   Diagnosis   • Coronary artery disease   • Essential hypertension   • Dyslipidemia   • Polymyositis (CMS/[facility])   • Stage 3 chronic kidney disease (CMS/[facility])   • Chronic anemia   • Osteopenia   • GERD (gastroesophageal reflux disease)   • PAF (paroxysmal atrial fibrillation) (CMS/[facility])   • Morbidly obese (CMS/[facility])   • Coronary artery disease involving native coronary artery of native heart without angina pectoris   • Unstable angina (CMS/[facility])   • Memory loss   • 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    • Dyslipidemia    • GERD (gastroesophageal reflux disease)    • Hematoma of groin, right 2019   • Hyperlipidemia    • Hypertension    • Osteopenia    • Osteoporosis 2020   • Polymyositis (CMS/[facility])    • Renal disorder    • Thyroid disease    • Venous stasis         Past Surgical History:   Procedure Laterality Date   • ARTERIOGRAM Bilateral 2019    Procedure: ARTERIOGRAM;  Surgeon: Pablito Cade MD;  Location: [facility] INVASIVE LOCATION;  Service: Cardiology    • CARDIAC CATHETERIZATION     • CARDIAC CATHETERIZATION N/A 9/9/2019    Procedure: Left Heart Cath;  Surgeon: Pablito Cade MD;  Location: [facility] CATH INVASIVE LOCATION;  Service: Cardiology   • CARDIAC SURGERY      Cardiac Stents    • CORONARY ARTERY BYPASS GRAFT     • GASTRIC BYPASS           EVALUATION  PT Ortho     Row Name 04/01/21 0945       Subjective Comments    Subjective Comments  Wound weeping   -MW       Subjective Pain    Able to rate subjective pain?  yes  -MW    Pre-Treatment Pain Level  0  -MW    Post-Treatment Pain Level  0  -MW       Transfers    Comment (Transfers)  Pt remained seated in w/c   -MW       Gait/Stairs (Locomotion)    Comment (Gait/Stairs)  to/ from dept via w/c with son  -MW      User Key  (r) = Recorded By, (t) = Taken By, (c) = Cosigned By    Initials Name Provider Type    Meenakshi Smith PT Physical Therapist          LDA Wound     Row Name 04/01/21 0945             Wound 03/18/21 1030 Right medial leg Traumatic    Wound - Properties Group Placement Date: 03/18/21  -MW Placement Time: 1030  -MW Present on Hospital Admission: Y  -MW Side: Right  -MW Orientation: medial  -MW Location: leg  -MW Primary Wound Type: Traumatic  -MW    Dressing Appearance  intact;moist drainage;copious drainage  -MW      Base  moist;yellow;slough;pink;red  -MW      Periwound  intact;pink;swelling;moist  -MW      Periwound Temperature  warm  -MW      Periwound Skin Turgor  firm  -MW      Edges  open;irregular  -MW      Drainage Characteristics/Odor  serosanguineous;serous  -MW      Drainage Amount  large;copious actively weeping during session  -MW      Care, Wound  cleansed with;wound cleanser;debrided  -MW      Dressing Care  dressing changed;collagen;antimicrobial agent applied;other (see comments);multi-layer wrap Jitendra, cutimed sorbact, qwick (1/4), optilock 1/2,   -MW      Periwound Care  cleansed with pH balanced cleanser;barrier ointment applied z guard   -MW      Retired Wound -  Properties Group Date first assessed: 03/18/21  -MW Time first assessed: 1030  -MW Present on Hospital Admission: Y  -MW Side: Right  -MW Location: leg  -MW Primary Wound Type: Traumatic  -MW      User Key  (r) = Recorded By, (t) = Taken By, (c) = Cosigned By    Initials Name Provider Type    Meenakshi Smith, PT Physical Therapist        Lymphedema     Row Name 04/01/21 0945             Posture/Observations    Posture/Observations Comments  --  -MW         Lymphedema Edema Assessment    Ptting Edema Category  By severity  -MW      Pitting Edema  Moderate;Mild  -MW         Skin Changes/Observations    Location/Assessment  Lower Extremity  -MW      Lower Extremity Conditions  bilateral:;clean;right:;weeping;inflamed;left:;intact  -MW      Lower Extremity Color/Pigment  bilateral:;hyperpigmented;right:;erythema;blanchable  -MW         Lymphedema Pulses/Capillary Refill    Capillary Refill  lower extremity capillary refill  -MW      Lower Extremity Capillary Refill  right:;left:;less than 3 seconds  -MW         Compression/Skin Care    Compression/Skin Care  skin care;wrapping location;bandaging  -MW      Skin Care  washed/dried;moisturizing lotion applied  -MW      Wrapping Location  lower extremity  -MW      Wrapping Location LE  bilateral:;foot to knee  -MW      Wrapping Comments  pt prefers to not wrap feet  -MW      Bandage Layers  cotton elastic stocking- single layer (comment size);cotton elastic stocking- double layer (comment size)  -MW      Bandaging Comments  Jasmyne, Sorbact, Qwick 1/4, Optilock 1/2, cast padding to R LE. Size 5 compressogrip doubled and overlapping for gradient compression  -MW      Compression/Skin Care Comments  LLE compressogrip size 4. Issued additional supplies for home dressing changes  -MW        User Key  (r) = Recorded By, (t) = Taken By, (c) = Cosigned By    Initials Name Provider Type    Meenakshi Smith, VANESA Physical Therapist          WOUND DEBRIDEMENT  Total area of  Debridement: ~6 cm2  Debridement Site 1  Location- Site 1: RLE   Selective Debridement- Site 1: Wound Surface <20cmsq  Instruments- Site 1: tweezers  Excised Tissue Description- Site 1: maximum, slough, other (comment) (thick biofilm from entire wound surface )  Bleeding- Site 1: seeping, scant             Therapy Education     Row Name 04/01/21 0945             Therapy Education    Education Details  Cont elevation. Daily dressing changes: Jasmyne, cutimed, qwick, optiflock, cast padding, compressogrip.   -MW      Given  Bandaging/dressing change;Edema management  -MW      Program  Reinforced;Modified  -MW      How Provided  Verbal;Demonstration;Written written info for home dressing changes   -MW      Provided to  Patient;Caregiver Son   -MW      Level of Understanding  Teach back education performed;Verbalized  -MW        User Key  (r) = Recorded By, (t) = Taken By, (c) = Cosigned By    Initials Name Provider Type    MW Meenakshi Silva, PT Physical Therapist          Recommendation and Plan  PT Assessment/Plan     Row Name 04/01/21 2357          PT Assessment    Functional Limitations  Performance in self-care ADL;Other (comment) wound dressings   -MW     Impairments  Integumentary integrity;Impaired venous circulation;Edema  -MW     Assessment Comments  RLE wound with copious drainage but PT able to debride full layer of thick biofilm. Added collagen as contact layer to promote granulation tissue formation and/ or epithelialiazation. Cont home dressing changes daily.   -MW     Rehab Potential  Fair  -MW     Patient/caregiver participated in establishment of treatment plan and goals  Yes  -MW     Patient would benefit from skilled therapy intervention  Yes  -MW        PT Plan    PT Frequency  1x/week  -MW     Physical Therapy Interventions (Optional Details)  wound care;bandaging;patient/family education  -MW     PT Plan Comments  debridement, education, MLW   -MW       User Key  (r) = Recorded By, (t) = Taken  By, (c) = Cosigned By    Initials Name Provider Type    MW Meenakshi Silva, PT Physical Therapist          Goals                   Time Calculation: Start Time: 0945  Therapy Charges for Today     Code Description Service Date Service Provider Modifiers Qty    29489557391 HC JUAN DEBRIDE OPEN WOUND UP TO 20CM 4/1/2021 Meenakshi Silva, PT GP 1    25606471431 HC PT MULTI LAYER COMP SYS BELOW KNEE 4/1/2021 Meenakshi Silva, PT GP 1                  Meenakshi Silva, PT  4/1/2021

## 2021-04-06 NOTE — TELEPHONE ENCOUNTER
Patient's daughter called again, regarding her mother's UTI and if she can stop by to  the specimen cup, states she always does that and drops it off at the lab afterwards, also needs an order placed, please advise. She has an hour to come get and get it dropped off before she has to go back to work.   Can she get a call back?

## 2021-04-06 NOTE — TELEPHONE ENCOUNTER
PT DAUGHTER CALLED STATED THAT PATIENT BELIEVES THAT SHE HAS UTI AND REQUEST TO COME BY AND  A SPECIMEN CUP.    PLEASE ADVISE.  CALL BACK:[phone]

## 2021-04-08 NOTE — THERAPY WOUND CARE TREATMENT
Outpatient Rehabilitation - Wound/Debridement Treatment Note   [place]     Patient Name: Lorna Lo  : 1934  MRN: [id number]  Today's Date: 2021                 Admit Date: 2021    Visit Dx:    ICD-10-CM ICD-9-CM   1. Open wound of right lower extremity, subsequent encounter  S81.801D V58.89     891.0   2. Edema of both legs  R60.0 782.3       Patient Active Problem List   Diagnosis   • Coronary artery disease   • Essential hypertension   • Dyslipidemia   • Polymyositis (CMS/[facility])   • Stage 3 chronic kidney disease (CMS/[facility])   • Chronic anemia   • Osteopenia   • GERD (gastroesophageal reflux disease)   • PAF (paroxysmal atrial fibrillation) (CMS/[facility])   • Morbidly obese (CMS/[facility])   • Coronary artery disease involving native coronary artery of native heart without angina pectoris   • Unstable angina (CMS/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)        Past Medical History:   Diagnosis Date   • Acute blood loss anemia 9/10/2019   • Chronic anemia     Chronic anemia, secondary to (CKD).    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) 10/14/2019   • Coronary artery disease    • Delirium, acute 2019   • Dependent edema    • Dermatitis    • DVT (deep venous thrombosis) (CMS/[facility]) 2009    History of DVT in .    • Dyslipidemia    • GERD (gastroesophageal reflux disease)    • Hematoma of groin, right 2019   • Hyperlipidemia    • Hypertension    • Osteopenia    • Osteoporosis 2020   • Polymyositis (CMS/[facility])    • Renal disorder    • Thyroid disease    • Venous stasis         Past Surgical History:   Procedure Laterality Date   • ARTERIOGRAM Bilateral 2019    Procedure: ARTERIOGRAM;  Surgeon: Pablito Cade MD;  Location: [facility] INVASIVE LOCATION;  Service: Cardiology    • CARDIAC CATHETERIZATION     • CARDIAC CATHETERIZATION N/A 9/9/2019    Procedure: Left Heart Cath;  Surgeon: Pablito Cade MD;  Location: [facility] CATH INVASIVE LOCATION;  Service: Cardiology   • CARDIAC SURGERY      Cardiac Stents    • CORONARY ARTERY BYPASS GRAFT     • GASTRIC BYPASS           EVALUATION  PT Ortho     Row Name 04/08/21 0935       Subjective Comments    Subjective Comments  Pt reports she thinks the weeping is less.  -       Subjective Pain    Able to rate subjective pain?  yes  -MC    Pre-Treatment Pain Level  0  -MC    Post-Treatment Pain Level  0  -MC       Transfers    Comment (Transfers)  Remained seated in w/c. To/from dept with son.  -      User Key  (r) = Recorded By, (t) = Taken By, (c) = Cosigned By    Initials Name Provider Type    Alis Skelton PT Physical Therapist          [facility] Wound     Row Name 04/08/21 0935             Wound 03/18/21 1030 Right medial leg Traumatic    Wound - Properties Group Placement Date: 03/18/21  -MW Placement Time: 1030  -MW Present on Hospital Admission: Y  -MW Side: Right  -MW Orientation: medial  - Location: leg  -MW Primary Wound Type: Traumatic  -MW    Dressing Appearance  intact;moist drainage  -      Base  moist;pink;red;epithelialization new epithelial budding and epithelialization around edge  -      Periwound  intact;pink;swelling;moist  -      Periwound Temperature  warm  -      Periwound Skin Turgor  firm  -      Edges  open;irregular  -      Wound Length (cm)  2.9 cm  -      Wound Width (cm)  4.1 cm  -      Wound Depth (cm)  0.1 cm  -      Drainage Characteristics/Odor  serous  -      Drainage Amount  large;copious active weeping from one anterior area  -      Care, Wound  cleansed with;wound cleanser;debrided  -      Dressing Care  dressing applied;silver impregnated;collagen;gauze, antimicrobial;other (see comments);multi-layer wrap comfort, sorbact, qwick, optilock (1/2), cast padding, ML  -       Periwound Care  cleansed with pH balanced cleanser;barrier ointment applied zguard  -      Retired Wound - Properties Group Date first assessed: 03/18/21  -MW Time first assessed: 1030  -MW Present on Hospital Admission: Y  -MW Side: Right  -MW Location: leg  -MW Primary Wound Type: Traumatic  -MW      User Key  (r) = Recorded By, (t) = Taken By, (c) = Cosigned By    Initials Name Provider Type    Meenakshi Smith, PT Physical Therapist    Alis Skelton, PT Physical Therapist        Lymphedema     Row Name 04/08/21 0935             Lymphedema Edema Assessment    Ptting Edema Category  By severity  -      Pitting Edema  Mild  -         Skin Changes/Observations    Lower Extremity Conditions  bilateral:;clean;right:;weeping;inflamed;left:;intact  -      Lower Extremity Color/Pigment  bilateral:;hyperpigmented;right:;erythema;blanchable  -         Lymphedema Pulses/Capillary Refill    Lower Extremity Capillary Refill  right:;left:;less than 3 seconds  -         Compression/Skin Care    Compression/Skin Care  skin care;wrapping location;bandaging  -      Skin Care  washed/dried;moisturizing lotion applied  -      Wrapping Location  lower extremity  -      Wrapping Location LE  bilateral:;foot to knee  -      Wrapping Comments  pt declines compression to feet  -      Bandage Layers  padding/fluff layer;cotton elastic stocking- double layer (comment size)  -      Bandaging Comments  RLE: wound dressings, cast padding, size 5 compressogrip doubled distally to create gradient compression. LLE: Size 4 compressogrip doubled distally to create gradient compression.  -        User Key  (r) = Recorded By, (t) = Taken By, (c) = Cosigned By    Initials Name Provider Type    Alis Skelton, PT Physical Therapist          WOUND DEBRIDEMENT  Total area of Debridement: 6 cm2  Debridement Site 1  Location- Site 1: RLE   Selective Debridement- Site 1: Wound Surface <20cmsq  Instruments- Site  1: tweezers  Excised Tissue Description- Site 1: maximum, slough (biofilm)  Bleeding- Site 1: none             Therapy Education     Row Name 04/08/21 0935             Therapy Education    Given  Bandaging/dressing change;Edema management  -      Program  Reinforced  -      How Provided  Verbal;Demonstration  -      Provided to  Patient;Caregiver son  -      Level of Understanding  Verbalized  -        User Key  (r) = Recorded By, (t) = Taken By, (c) = Cosigned By    Initials Name Provider Type    Alis Skelton PT Physical Therapist          Recommendation and Plan  PT Assessment/Plan     Row Name 04/08/21 0935          PT Assessment    Functional Limitations  Performance in self-care ADL;Other (comment) wound, edema management  -     Impairments  Integumentary integrity;Impaired venous circulation;Edema  -     Assessment Comments  Pt with notable improvement to the RLE wound, with decreased dimensions and new epithelialization both around the edges and budding in the central wound area. Pt still with pinpoint area of continual weeping in the anterior portion of the wound. Pt's BLE edema appears improved, and is well-managed with MLW to improve venous return and maintain limb girth reduction. Pt will continue to benefit from the current POC to promote healing.  -     Rehab Potential  Fair  -     Patient/caregiver participated in establishment of treatment plan and goals  Yes  -     Patient would benefit from skilled therapy intervention  Yes  -        PT Plan    PT Frequency  1x/week  -     Physical Therapy Interventions (Optional Details)  wound care;patient/family education  -       User Key  (r) = Recorded By, (t) = Taken By, (c) = Cosigned By    Initials Name Provider Type    Alis Skelton PT Physical Therapist          Goals  PT OP Goals     Row Name 04/08/21 0935          Time Calculation    PT Goal Re-Cert Due Date  06/18/21  -       User Key  (r) = Recorded By,  (t) = Taken By, (c) = Cosigned By    Initials Name Provider Type    Alis Skelton, PT Physical Therapist          PT Goal Re-Cert Due Date: 06/18/21            Time Calculation: Start Time: 0935  Therapy Charges for Today     Code Description Service Date Service Provider Modifiers Qty    75274515181 HC PT MULTI LAYER COMP SYS BELOW KNEE 4/8/2021 Alis Johnson, PT GP 1    16726778925 HC JUAN DEBRIDE OPEN WOUND UP TO 20CM 4/8/2021 Alis Johnson, PT GP 1                  Alis Johnson, PT  4/8/2021

## 2021-04-15 NOTE — THERAPY PROGRESS REPORT/RE-CERT
Outpatient Rehabilitation - Wound/Debridement Progress Note   [place]     Patient Name: Lorna Lo  : 1934  MRN: [id number]  Today's Date: 4/15/2021                 Admit Date: 4/15/2021    Visit Dx:    ICD-10-CM ICD-9-CM   1. Open wound of right lower extremity, subsequent encounter  S81.801D V58.89     891.0   2. Edema of both legs  R60.0 782.3       Patient Active Problem List   Diagnosis   • Coronary artery disease   • Essential hypertension   • Dyslipidemia   • Polymyositis (CMS/[facility])   • Stage 3 chronic kidney disease (CMS/[facility])   • Chronic anemia   • Osteopenia   • GERD (gastroesophageal reflux disease)   • PAF (paroxysmal atrial fibrillation) (CMS/[facility])   • Morbidly obese (CMS/[facility])   • Coronary artery disease involving native coronary artery of native heart without angina pectoris   • Unstable angina (CMS/[facility])   • Memory loss   • 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    • Dyslipidemia    • GERD (gastroesophageal reflux disease)    • Hematoma of groin, right 2019   • Hyperlipidemia    • Hypertension    • Osteopenia    • Osteoporosis 2020   • Polymyositis (CMS/[facility])    • Renal disorder    • Thyroid disease    • Venous stasis         Past Surgical History:   Procedure Laterality Date   • ARTERIOGRAM Bilateral 2019    Procedure: ARTERIOGRAM;  Surgeon: Pablito Cade MD;  Location: [facility] INVASIVE LOCATION;  Service: Cardiology    • CARDIAC CATHETERIZATION     • CARDIAC CATHETERIZATION N/A 9/9/2019    Procedure: Left Heart Cath;  Surgeon: Pablito Cade MD;  Location: [facility] CATH INVASIVE LOCATION;  Service: Cardiology   • CARDIAC SURGERY      Cardiac Stents    • CORONARY ARTERY BYPASS GRAFT     • GASTRIC BYPASS           EVALUATION  PT Ortho     Row Name 04/15/21 0945       Subjective Comments    Subjective Comments  No complaints or changes  -       Subjective Pain    Able to rate subjective pain?  yes  -MC    Pre-Treatment Pain Level  0  -MC    Post-Treatment Pain Level  0  -MC       Transfers    Comment (Transfers)  remained seated in w/c for tx, to/from dept with son  -      User Key  (r) = Recorded By, (t) = Taken By, (c) = Cosigned By    Initials Name Provider Type     Alis Johnson PT Physical Therapist          [facility] Wound     Row Name 04/15/21 0945             Wound 03/18/21 1030 Right medial leg Traumatic    Wound - Properties Group Placement Date: 03/18/21  -MW Placement Time: 1030  -MW Present on Hospital Admission: Y  -MW Side: Right  -MW Orientation: medial  -MW Location: leg  -MW Primary Wound Type: Traumatic  -MW    Wound Image  Images linked: 1  -MC      Dressing Appearance  intact;moist drainage  -      Base  moist;pink;red;epithelialization new epithelial budding and epithelialization around edge  -      Periwound  intact;pink;swelling;moist  -      Periwound Temperature  warm  -      Periwound Skin Turgor  firm  -      Edges  open;irregular  -      Wound Length (cm)  1.5 cm  -      Wound Width (cm)  3.3 cm  -      Wound Depth (cm)  0.1 cm  -      Drainage Characteristics/Odor  serous  -      Drainage Amount  large;copious active weeping from one anterior area  -      Care, Wound  cleansed with;wound cleanser;debrided  -      Dressing Care  dressing applied;silver impregnated;collagen;gauze, antimicrobial;other (see comments);multi-layer wrap comfort, sorbact, qwick, cast padding, MLW   -      Periwound Care  cleansed with pH balanced cleanser;barrier ointment applied zguard  -      Retired Wound - Properties Group Date first assessed: 03/18/21  -MW Time first assessed: 1030  -MW Present on Hospital Admission: Y  -MW Side: Right  -MW Location: leg  -MW Primary Wound Type: Traumatic  -MW      User Key  (r) = Recorded By, (t) = Taken By, (c) = Cosigned By    Initials Name Provider Type    Meenakshi Smith, PT Physical Therapist    Alis Skelton, PT Physical Therapist        Lymphedema     Row Name 04/15/21 0945             Lymphedema Edema Assessment    Ptting Edema Category  By severity  -      Pitting Edema  Mild  -         Skin Changes/Observations    Lower Extremity Conditions  bilateral:;clean;right:;weeping;inflamed;left:;intact  -      Lower Extremity Color/Pigment  bilateral:;hyperpigmented;right:;erythema;blanchable  -         Lymphedema Pulses/Capillary Refill    Lower Extremity Capillary Refill  right:;left:;less than 3 seconds  -         Compression/Skin Care    Compression/Skin Care  skin care;wrapping location;bandaging  -      Skin Care  washed/dried;moisturizing lotion applied  -      Wrapping Location  lower extremity  -      Wrapping Location LE  bilateral:;foot to knee  -      Wrapping Comments  pt declines compression to feet  -      Bandage Layers  padding/fluff layer;cotton elastic stocking- double layer (comment size)  -      Bandaging Comments  RLE: wound dressings, cast padding, size 5 compressogrip doubled distally to create gradient compression. LLE: Size 4 compressogrip doubled distally to create gradient compression  -        User Key  (r) = Recorded By, (t) = Taken By, (c) = Cosigned By    Initials Name Provider Type    Alis Skelton, PT Physical Therapist          WOUND DEBRIDEMENT  Total area of Debridement: 3 cm2  Debridement Site 1  Location- Site 1: RLE   Selective Debridement- Site 1: Wound Surface  <20cmsq  Instruments- Site 1: tweezers  Excised Tissue Description- Site 1: maximum, slough  Bleeding- Site 1: scant, held pressure, 1 minute             Therapy Education     Row Name 04/15/21 2245             Therapy Education    Education Details  Continue current POC  -      Given  Bandaging/dressing change;Edema management  -      Program  Reinforced  -      How Provided  Verbal;Demonstration  -MC      Provided to  Patient;Caregiver son  -      Level of Understanding  Verbalized  -        User Key  (r) = Recorded By, (t) = Taken By, (c) = Cosigned By    Initials Name Provider Type    Alis Skelton, PT Physical Therapist          Recommendation and Plan  PT Assessment/Plan     Row Name 04/15/21 0945          PT Assessment    Functional Limitations  Performance in self-care ADL;Other (comment) wound mgmt  -     Impairments  Integumentary integrity;Impaired venous circulation;Edema  -     Assessment Comments  Pt again with notable improvement to RLE wound, with improved dimensions and extensive new epithelialization. Pt still with pinpoint area of very active weeping during session today. Pt still with mild edema to both LEs, but continues to decline compression to her feet. Pt will continue to benefit from skilled PT wound care to promote healing. Current POC and goals remain appropriate.  -     Rehab Potential  Fair  -     Patient/caregiver participated in establishment of treatment plan and goals  Yes  -     Patient would benefit from skilled therapy intervention  Yes  -MC        PT Plan    PT Frequency  1x/week  -     Predicted Duration of Therapy Intervention (PT)  6 visits  -     Planned CPT's?  PT SELF CARE/MGMT/TRAIN 15 MIN: 79806;PT NONSELECT DEBRIDE 15 MIN: 72397;PT JUAN DEBRIDE OPEN WOUND UP TO 20 CM: 70926;PT NLFU MIST: 92887;PT UNNA BOOT: 14525;PT MULTI LAYER COMP SYS LE;PT THER SUPP EA 15 MIN  -     Physical Therapy Interventions (Optional Details)  wound  care;patient/family education  -     PT Plan Comments  debridement, MLW, education  -       User Key  (r) = Recorded By, (t) = Taken By, (c) = Cosigned By    Initials Name Provider Type    Alis Skelton, PT Physical Therapist          Goals  PT OP Goals     Row Name 04/15/21 0945          PT Short Term Goals    STG Date to Achieve  04/15/21  -     STG 1  Pt / caregiver able to verbalize s/s of infx and when to seek urgent care.   -     STG 1 Progress  Met  -     STG 2  Pt/ caregiver independent with clean home dressing changs to manage drainage and promote wound closure.   -     STG 2 Progress  Met  -     STG 3  RLE wound area to decrease at least 50% to demonstrate wound healing.   -     STG 3 Progress  Met  -     STG 4  BLE circumferential measurements to decrease 1-2 cm to promote improved skin integrity.   -     STG 4 Progress  Ongoing  -        Long Term Goals    LTG 1  RLE wound area to decrease at least 90% to demonstrate wound healing.   -     LTG 1 Progress  Ongoing  -     LTG 2  Pt to report obtaining her own supply of compressogrips or well fitting compression socks for long term management of LE swelling.   -     LTG 2 Progress  Ongoing  -        Time Calculation    PT Goal Re-Cert Due Date  06/18/21  -       User Key  (r) = Recorded By, (t) = Taken By, (c) = Cosigned By    Initials Name Provider Type    Alis Skelton PT Physical Therapist          PT Goal Re-Cert Due Date: 06/18/21  PT Short Term Goals  STG Date to Achieve: 04/15/21  STG 1: Pt / caregiver able to verbalize s/s of infx and when to seek urgent care.   STG 1 Progress: Met  STG 2: Pt/ caregiver independent with clean home dressing changs to manage drainage and promote wound closure.   STG 2 Progress: Met  STG 3: RLE wound area to decrease at least 50% to demonstrate wound healing.   STG 3 Progress: Met  STG 4: BLE circumferential measurements to decrease 1-2 cm to promote improved skin  integrity.   STG 4 Progress: Ongoing  Long Term Goals  LTG 1: RLE wound area to decrease at least 90% to demonstrate wound healing.   LTG 1 Progress: Ongoing  LTG 2: Pt to report obtaining her own supply of compressogrips or well fitting compression socks for long term management of LE swelling.   LTG 2 Progress: Ongoing      Time Calculation: Start Time: 0945  Therapy Charges for Today     Code Description Service Date Service Provider Modifiers Qty    10095874313 HC JUAN DEBRIDE OPEN WOUND UP TO 20CM 4/15/2021 Alis Johnson, PT GP 1    41035995644 HC PT MULTI LAYER COMP SYS BELOW KNEE 4/15/2021 Alis Johnson, PT GP 1                  Alis Johnson, PT  4/15/2021

## 2021-04-23 NOTE — THERAPY WOUND CARE TREATMENT
Outpatient Rehabilitation - Wound/Debridement Treatment Note   [place]     Patient Name: Lorna Lo  : 1934  MRN: [id number]  Today's Date: 2021                 Admit Date: 2021    Visit Dx:    ICD-10-CM ICD-9-CM   1. Open wound of right lower extremity, subsequent encounter  S81.801D V58.89     891.0   2. Edema of both legs  R60.0 782.3   RLE wound:      Patient Active Problem List   Diagnosis   • Coronary artery disease   • Essential hypertension   • Dyslipidemia   • Polymyositis (CMS/[facility])   • Stage 3 chronic kidney disease (CMS/[facility])   • Chronic anemia   • Osteopenia   • GERD (gastroesophageal reflux disease)   • PAF (paroxysmal atrial fibrillation) (CMS/[facility])   • Morbidly obese (CMS/[facility])   • Coronary artery disease involving native coronary artery of native heart without angina pectoris   • Unstable angina (CMS/[facility])   • Memory loss   • 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    • CKD (chronic kidney disease)    • Controlled type 2 diabetes mellitus with kidney complication, without long-term current use of insulin (CMS/[facility]) 10/14/2019   • Coronary artery disease    • Delirium, acute 2019   • Dependent edema    • Dermatitis    • DVT (deep venous thrombosis) (CMS/[facility]) 2009    History of DVT in .    • Dyslipidemia    • GERD (gastroesophageal reflux disease)    • Hematoma of groin, right 2019   • Hyperlipidemia    • Hypertension    • Osteopenia    • Osteoporosis 2020   • Polymyositis (CMS/[facility])    • Renal disorder    • Thyroid disease    • Venous stasis         Past Surgical History:   Procedure Laterality Date   • ARTERIOGRAM Bilateral 2019    Procedure: ARTERIOGRAM;  Surgeon: Pablito Cade MD;  Location: [facility] INVASIVE LOCATION;  Service:  Cardiology   • CARDIAC CATHETERIZATION     • CARDIAC CATHETERIZATION N/A 9/9/2019    Procedure: Left Heart Cath;  Surgeon: Pablito Cade MD;  Location: [facility] CATH INVASIVE LOCATION;  Service: Cardiology   • CARDIAC SURGERY      Cardiac Stents    • CORONARY ARTERY BYPASS GRAFT     • GASTRIC BYPASS           EVALUATION  PT Ortho     Row Name 04/23/21 1030       Subjective Comments    Subjective Comments  Pt without complaints, family reports he thinks the right leg has been draining more.  In discussion during tx, reports pt has to be reminded to elevate her legs.  -JM       Subjective Pain    Able to rate subjective pain?  yes  -JM    Pre-Treatment Pain Level  0  -JM    Post-Treatment Pain Level  0  -JM       Transfers    Comment (Transfers)  remained seated in transport chair for tx  -JM      User Key  (r) = Recorded By, (t) = Taken By, (c) = Cosigned By    Initials Name Provider Type    Dayami Gallo, PT Physical Therapist          [facility] Wound     Row Name 04/23/21 1030             Wound 03/18/21 1030 Right medial leg Traumatic    Wound - Properties Group Placement Date: 03/18/21  -MW Placement Time: 1030  -MW Present on Hospital Admission: Y  -MW Side: Right  -MW Orientation: medial  -MW Location: leg  -MW Primary Wound Type: Traumatic  -MW    Wound Image  Images linked: 1  -JM      Dressing Appearance  intact;moist drainage  -JM      Base  moist;pink;red;epithelialization skin bridges throughout  -JM      Periwound  intact;pink;swelling;moist  -JM      Periwound Temperature  warm  -JM      Periwound Skin Turgor  firm  -JM      Edges  open;irregular  -JM      Wound Length (cm)  1.9 cm  -JM      Wound Width (cm)  3 cm  -JM      Wound Depth (cm)  0.1 cm  -JM      Drainage Characteristics/Odor  serous  -JM      Drainage Amount  large;copious active weeping from one anterior area  -JM      Care, Wound  cleansed with;wound cleanser;debrided  -JM      Dressing Care  dressing applied;collagen;antimicrobial  agent applied;multi-layer wrap comfort, sorbact, qwick, optilock, cast padding, MLW  -      Periwound Care  barrier ointment applied;cleansed with pH balanced cleanser;dry periwound area maintained z-guard  -      Retired Wound - Properties Group Date first assessed: 03/18/21  -MW Time first assessed: 1030  -MW Present on Hospital Admission: Y  -MW Side: Right  -MW Location: leg  -MW Primary Wound Type: Traumatic  -MW      User Key  (r) = Recorded By, (t) = Taken By, (c) = Cosigned By    Initials Name Provider Type    Meenakshi Smith, PT Physical Therapist    Dayami Gallo, PT Physical Therapist        Lymphedema     Row Name 04/23/21 1030             Lymphedema Edema Assessment    Ptting Edema Category  By severity  -      Pitting Edema  Mild;Moderate moderate in dorsums of feet  -         Skin Changes/Observations    Lower Extremity Conditions  bilateral:;clean;right:;weeping;inflamed;left:;intact  -      Lower Extremity Color/Pigment  bilateral:;hyperpigmented;right:;blanchable  -      Skin Observations Comment  fragile pinpoint open areas medial ankles  -         Lymphedema Pulses/Capillary Refill    Lower Extremity Capillary Refill  right:;left:;less than 3 seconds  -         Compression/Skin Care    Compression/Skin Care  skin care;wrapping location;bandaging  -      Skin Care  washed/dried;moisturizing lotion applied  -      Wrapping Location  lower extremity  -      Wrapping Location LE  bilateral:;foot to knee  -      Wrapping Comments  size 4 compressogrip to LLE doubled MH to ankle; size 4&5 doubled/overlapping MH to prox calf RLE  -      Bandage Layers  padding/fluff layer;cotton elastic stocking- double layer (comment size)  -        User Key  (r) = Recorded By, (t) = Taken By, (c) = Cosigned By    Initials Name Provider Type    Dayami Gallo, PT Physical Therapist          WOUND DEBRIDEMENT  Total area of Debridement: 3cmsq  Debridement Site 1  Location-  Site 1: RLE   Selective Debridement- Site 1: Wound Surface <20cmsq  Instruments- Site 1: tweezers  Excised Tissue Description- Site 1: moderate, slough, other (comment) (hypertrophic crusts)  Bleeding- Site 1: scant             Therapy Education     Row Name 04/23/21 1030             Therapy Education    Education Details  Reinforced need for leg elevation, also need for compression from MH to prox calf.  Addition of size 4 doubled on R foot/ankle, continue size 4 only LLE but double on foot/ankle.  Continue dressing changes at least daily or PRN for saturation  -JM      Given  Bandaging/dressing change;Edema management  -      Program  Reinforced  -ALENA      How Provided  Verbal;Demonstration  -      Provided to  Patient;Caregiver son  -      Level of Understanding  Verbalized  -        User Key  (r) = Recorded By, (t) = Taken By, (c) = Cosigned By    Initials Name Provider Type    Dayami Gallo, PT Physical Therapist          Recommendation and Plan  PT Assessment/Plan     Row Name 04/23/21 1030          PT Assessment    Functional Limitations  Performance in self-care ADL;Other (comment) wound mgmt  -     Impairments  Integumentary integrity;Impaired venous circulation;Edema  -     Assessment Comments  Continued reepithelialization of RLE wound, still with active weeping from remaining open areas, and slight weeping from pinpoint open areas of medial ankles.  Pt with moderate pitting of feet, and was agreeable with compression to foot/ankle today.  PT added size 4 to R foot and extended size 4 on LLE to MH and doubled distally for full MLW.  Pt making good progress, should have less drainage once RLE wound is closed.  -        PT Plan    PT Frequency  1x/week  -     Physical Therapy Interventions (Optional Details)  patient/family education;wound care  -     PT Plan Comments  debridement, MLW  -       User Key  (r) = Recorded By, (t) = Taken By, (c) = Cosigned By    Initials Name  Provider Type    Dayami Gallo, PT Physical Therapist          Goals  PT OP Goals     Row Name 04/23/21 1030          Time Calculation    PT Goal Re-Cert Due Date  06/18/21  -ALENA       User Key  (r) = Recorded By, (t) = Taken By, (c) = Cosigned By    Initials Name Provider Type    Dayami Gallo, PT Physical Therapist          PT Goal Re-Cert Due Date: 06/18/21            Time Calculation: Start Time: 1030  Time Calculation- PT  Start Time: 1030  PT Received On: 04/23/21  PT Goal Re-Cert Due Date: 06/18/21  Untimed Charges  Wound Care: 43453 Multilayer comp below knee, 00001 Selective debridement  95262-Znhfgztgbl comp below knee: 30  63247-Mmxgwjsvw debridement: 10  Total Minutes  Untimed Charges Total Minutes: 40   Total Minutes: 40  Therapy Charges for Today     Code Description Service Date Service Provider Modifiers Qty    05880991326 HC JUAN DEBRIDE OPEN WOUND UP TO 20CM 4/23/2021 Dayami Baca, PT GP 1    [id number] HC PT MULTI LAYER COMP SYS BELOW KNEE 4/23/2021 Dayami Baca, PT GP 1                  Dayami Baca, PT  4/23/2021

## 2021-04-26 NOTE — PROGRESS NOTES
Subjective   Lorna Lo is a 86 y.o. female.   Chief Complaint   Patient presents with   • Anemia   • Hypertension   • Hyperlipidemia       History of Present Illness   Here for f/u on chronic problems: anemia, htn, and hlp. Anemia has been improving with Fesol but she stopped 1 week ago because it caused loose stools. After stopping it, stools are normal. No blood in stools. HTN controlled with atenolol and norvasc. No Cp or SOA.  HLP and did not tolerate statins.   The following portions of the patient's history were reviewed and updated as appropriate: allergies, current medications, past family history, past medical history, past social history, past surgical history and problem list.  Past Medical History:   Diagnosis Date   • Acute blood loss anemia 9/10/2019   • Chronic anemia     Chronic anemia, secondary to (CKD).    • CKD (chronic kidney disease)    • Controlled type 2 diabetes mellitus with kidney complication, without long-term current use of insulin (CMS/[facility]) 10/14/2019   • Coronary artery disease    • Delirium, acute 9/12/2019   • Dependent edema    • Dermatitis    • DVT (deep venous thrombosis) (CMS/[facility]) 2009    History of DVT in 2009.    • Dyslipidemia    • GERD (gastroesophageal reflux disease)    • Hematoma of groin, right 9/9/2019   • Hyperlipidemia    • Hypertension    • Osteopenia    • Osteoporosis 5/14/2020   • Polymyositis (CMS/[facility])    • Renal disorder    • Thyroid disease    • Venous stasis      Past Surgical History:   Procedure Laterality Date   • ARTERIOGRAM Bilateral 9/9/2019    Procedure: ARTERIOGRAM;  Surgeon: Pablito Cade MD;  Location: [facility] CATH INVASIVE LOCATION;  Service: Cardiology   • CARDIAC CATHETERIZATION     • CARDIAC CATHETERIZATION N/A 9/9/2019    Procedure: Left Heart Cath;  Surgeon: Pablito Cade MD;  Location:  [facility] CATH INVASIVE LOCATION;  Service: Cardiology   • CARDIAC SURGERY      Cardiac Stents    • CORONARY ARTERY BYPASS GRAFT     • GASTRIC  BYPASS       Family History   Problem Relation Age of Onset   • Heart attack Father      Past Surgical History:   Procedure Laterality Date   • ARTERIOGRAM Bilateral 9/9/2019    Procedure: ARTERIOGRAM;  Surgeon: Pablito Cade MD;  Location:  [facility] CATH INVASIVE LOCATION;  Service: Cardiology   • CARDIAC CATHETERIZATION     • CARDIAC CATHETERIZATION N/A 9/9/2019    Procedure: Left Heart Cath;  Surgeon: Pablito Cade MD;  Location:  [facility] CATH INVASIVE LOCATION;  Service: Cardiology   • CARDIAC SURGERY      Cardiac Stents    • CORONARY ARTERY BYPASS GRAFT     • GASTRIC BYPASS       .  Current Outpatient Medications on File Prior to Visit   Medication Sig Dispense Refill   • amLODIPine (NORVASC) 10 MG tablet Take 1 tablet by mouth once daily 90 tablet 0   • aspirin 81 MG EC tablet Take 81 mg by mouth Daily.     • atenolol (TENORMIN) 100 MG tablet Take 1 tablet by mouth once daily 90 tablet 0   • cholecalciferol (VITAMIN D3) 1000 UNITS tablet Take 2,000 Units by mouth Daily.     • Euthyrox 88 MCG tablet Take 1 tablet by mouth once daily 90 tablet 0   • folic acid (FOLVITE) 1 MG tablet Take 1 tablet by mouth once daily 90 tablet 0   • furosemide (LASIX) 20 MG tablet Take 1 tablet by mouth once daily 30 tablet 0   • ibuprofen (ADVIL,MOTRIN) 200 MG tablet Take 200 mg by mouth Every 6 (Six) Hours As Needed for Mild Pain .     • mupirocin (Bactroban) 2 % ointment Apply  topically to the appropriate area as directed 3 (Three) Times a Day. 30 g 0   • potassium chloride (K-DUR,KLOR-CON) 20 MEQ CR tablet TAKE 1 TABLET  BY MOUTH ONCE DAILY WITH LASIX 30 tablet 0   • predniSONE (DELTASONE) 5 MG tablet 5 mg 2 (Two) Times a Day.  3   • QUEtiapine (SEROquel) 25 MG tablet Take 1 tablet by mouth Every Night. 30 tablet 0   • triamcinolone (KENALOG) 0.1 % ointment Apply  topically to the appropriate area as directed 2 (Two) Times a Day. 453.6 g 1   • Xarelto 20 MG tablet Take 1 tablet by mouth once daily 90 tablet 0   • ferrous  "sulfate 325 (65 FE) MG EC tablet Take 1 tablet by mouth once daily with breakfast 90 tablet 0   • [DISCONTINUED] nitrofurantoin, macrocrystal-monohydrate, (Macrobid) 100 MG capsule Take 1 capsule by mouth 2 (Two) Times a Day. 14 capsule 0     No current facility-administered medications on file prior to visit.       Review of Systems   Constitutional: Negative for appetite change, chills, diaphoresis and fatigue.   HENT: Negative for congestion, ear discharge, mouth sores and sinus pressure.    Respiratory: Negative for cough, chest tightness, shortness of breath and wheezing.    Cardiovascular: Positive for leg swelling. Negative for chest pain and palpitations.   Gastrointestinal: Negative for abdominal pain, constipation and vomiting.   Endocrine: Negative for cold intolerance and heat intolerance.   Genitourinary: Negative for dysuria, flank pain, frequency and hematuria.   Musculoskeletal: Positive for arthralgias. Negative for back pain, gait problem and myalgias.   Neurological: Negative for numbness and headaches.   Psychiatric/Behavioral: Negative for agitation, confusion, decreased concentration and sleep disturbance. The patient is not nervous/anxious.      /78   Pulse 69   Temp 97.1 °F (36.2 °C)   Ht 162.6 cm (64\")   Wt 79.8 kg (176 lb)   LMP  (LMP Unknown)   SpO2 98%   BMI 30.21 kg/m²     Objective   Physical Exam  Vitals and nursing note reviewed.   Eyes:      General: No scleral icterus.  Cardiovascular:      Rate and Rhythm: Normal rate and regular rhythm.      Pulses: Normal pulses.      Heart sounds: No murmur heard.     Pulmonary:      Effort: No respiratory distress.      Breath sounds: No wheezing, rhonchi or rales.   Abdominal:      Palpations: Abdomen is soft.      Tenderness: There is no abdominal tenderness.   Musculoskeletal:      Right lower leg: Edema (pedal edema) present.      Left lower leg: Left lower leg edema: pedal edema.   Neurological:      General: No focal deficit " present.      Mental Status: She is alert and oriented to person, place, and time.   Psychiatric:         Mood and Affect: Mood normal.         Behavior: Behavior normal.         Assessment/Plan   Diagnoses and all orders for this visit:    1. Chronic anemia  -     CBC & Differential; Future  Has been on Fesol 325 mg po qd but stopped 1 week ago secondary to causing her loose stools.   2. Essential hypertension  Controlled with atenolol 100 mg po qd and norvasc 10 mg po qd.   3. Dyslipidemia  Does not tolerate statins. Cause  Her severe weakness and pian. Continue low carb diet.

## 2021-04-27 NOTE — TELEPHONE ENCOUNTER
----- Message from Vanesa Correa, DO sent at 4/27/2021  9:22 AM EDT -----  Call and let know anemia a little worst. Have her take 2 chewable flinestones and stop by in 2 weeks for repeat cbc.  Likely needs repeatt EGD and colonoscopy, would her son be okay with this.

## 2021-04-29 NOTE — THERAPY WOUND CARE TREATMENT
Outpatient Rehabilitation - Wound/Debridement Treatment Note   Aleena     Patient Name: Lorna Lo  : 1934  MRN: [id number]  Today's Date: 2021             Medial RLE      R 2nd toe      L 3rd toe      Admit Date: 2021    Visit Dx:    ICD-10-CM ICD-9-CM   1. Open wound of right lower extremity, subsequent encounter  S81.801D V58.89     891.0   2. Edema of both legs  R60.0 782.3       Patient Active Problem List   Diagnosis   • Coronary artery disease   • Essential hypertension   • Dyslipidemia   • Polymyositis (CMS/[facility])   • Stage 3 chronic kidney disease (CMS/[facility])   • Chronic anemia   • Osteopenia   • GERD (gastroesophageal reflux disease)   • PAF (paroxysmal atrial fibrillation) (CMS/[facility])   • Morbidly obese (CMS/[facility])   • Coronary artery disease involving native coronary artery of native heart without angina pectoris   • Unstable angina (CMS/[facility])   • Memory loss   • 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    • CKD (chronic kidney disease)    • Controlled type 2 diabetes mellitus with kidney complication, without long-term current use of insulin (CMS/[facility]) 10/14/2019   • Coronary artery disease    • Delirium, acute 2019   • Dependent edema    • Dermatitis    • DVT (deep venous thrombosis) (CMS/[facility]) 2009    History of DVT in .    • Dyslipidemia    • GERD (gastroesophageal reflux disease)    • Hematoma of groin, right 2019   • Hyperlipidemia    • Hypertension    • Osteopenia    • Osteoporosis 2020   • Polymyositis (CMS/[facility])    • Renal disorder    • Thyroid disease    • Venous stasis         Past Surgical History:   Procedure Laterality Date   • ARTERIOGRAM Bilateral 2019    Procedure: ARTERIOGRAM;  Surgeon: Pablito Cade MD;  Location: [facility]  CATH INVASIVE LOCATION;  Service: Cardiology   • CARDIAC CATHETERIZATION     • CARDIAC CATHETERIZATION N/A 9/9/2019    Procedure: Left Heart Cath;  Surgeon: Pablito Cade MD;  Location: [facility] CATH INVASIVE LOCATION;  Service: Cardiology   • CARDIAC SURGERY      Cardiac Stents    • CORONARY ARTERY BYPASS GRAFT     • GASTRIC BYPASS           EVALUATION  PT Ortho     Row Name 04/29/21 0930       Subjective Pain    Able to rate subjective pain?  yes  -MC    Pre-Treatment Pain Level  0  -MC    Post-Treatment Pain Level  0  -MC       Transfers    Comment (Transfers)  remained seated in w/c for tx. To/from dept with son.  -      User Key  (r) = Recorded By, (t) = Taken By, (c) = Cosigned By    Initials Name Provider Type    Alis Skelton PT Physical Therapist          [facility] Wound     Row Name 04/29/21 0930             Wound 03/18/21 1030 Right medial leg Traumatic    Wound - Properties Group Placement Date: 03/18/21  -MW Placement Time: 1030  -MW Present on Hospital Admission: Y  -MW Side: Right  -MW Orientation: medial  -MW Location: leg  -MW Primary Wound Type: Traumatic  -MW    Wound Image  Images linked: 1  -MC      Dressing Appearance  intact;moist drainage  -      Base  epithelialization;closed/resurfaced;clean;dry  -      Periwound  intact;pink;swelling  -      Periwound Temperature  warm  -      Periwound Skin Turgor  firm  -      Drainage Amount  none  -      Care, Wound  cleansed with;wound cleanser;debrided  -      Dressing Care  multi-layer wrap  -      Periwound Care  cleansed with pH balanced cleanser  -      Retired Wound - Properties Group Date first assessed: 03/18/21  -MW Time first assessed: 1030  -MW Present on Hospital Admission: Y  -MW Side: Right  -MW Location: leg  -MW Primary Wound Type: Traumatic  -MW       Wound 04/29/21 0930 Right distal second toe Blisters    Wound - Properties Group Placement Date: 04/29/21  -MC Placement Time: 0930  -MC Present on Hospital  Admission: N  -MC Side: Right  -MC Orientation: distal  -MC Location: second toe  -MC Primary Wound Type: Blisters  -MC    Wound Image  Images linked: 1  -MC      Dressing Appearance  open to air  -MC      Base  moist;red;pink  -MC      Periwound  intact;dry;pink  -MC      Periwound Temperature  warm  -MC      Periwound Skin Turgor  firm  -MC      Edges  irregular;open  -MC      Wound Length (cm)  0.7 cm  -MC      Wound Width (cm)  1.1 cm  -MC      Wound Depth (cm)  0.1 cm  -MC      Drainage Characteristics/Odor  serosanguineous  -MC      Drainage Amount  small  -MC      Care, Wound  cleansed with;wound cleanser;debrided  -MC      Dressing Care  dressing applied;silver impregnated;collagen;low-adherent;foam comfort, mepilex Ag, primafix tape  -MC      Periwound Care  cleansed with pH balanced cleanser;dry periwound area maintained  -MC      Retired Wound - Properties Group Date first assessed: 04/29/21  - Time first assessed: 0930  - Present on Hospital Admission: N  -MC Side: Right  -MC Location: second toe  -MC Primary Wound Type: Blisters  -MC       Wound 04/29/21 0930 Left distal third toe Traumatic    Wound - Properties Group Placement Date: 04/29/21  - Placement Time: 0930  - Present on Hospital Admission: N  -MC Side: Left  -MC Orientation: distal  -MC Location: third toe  -MC Primary Wound Type: Traumatic  -MC    Wound Image  Images linked: 1  -MC      Dressing Appearance  open to air  -MC      Base  moist;pink;red nail bed, after loose nail removed  -MC      Periwound  intact;moist;pink;redness  -MC      Periwound Temperature  cool  -MC      Periwound Skin Turgor  firm  -MC      Edges  irregular  -MC      Drainage Characteristics/Odor  serosanguineous  -MC      Drainage Amount  small  -MC      Care, Wound  cleansed with;wound cleanser;debrided  -MC      Dressing Care  dressing applied;silver impregnated;low-adherent;foam mepilex Ag, primafix tape  -MC      Periwound Care  cleansed with pH balanced  cleanser;dry periwound area maintained  -      Retired Wound - Properties Group Date first assessed: 04/29/21  - Time first assessed: 0930  - Present on Hospital Admission: N  - Side: Left  - Location: third toe  - Primary Wound Type: Traumatic  -      User Key  (r) = Recorded By, (t) = Taken By, (c) = Cosigned By    Initials Name Provider Type    Meenakshi Smith, PT Physical Therapist    Alis Skelton PT Physical Therapist        Lymphedema     Row Name 04/29/21 0930             Subjective Comments    Subjective Comments  No complaints. Pt reports her toes swelled slightly after compressogrip placed over feet last time, so she moved it. Pt not aware of issues with her toes noted today.  -         Lymphedema Edema Assessment    Ptting Edema Category  By severity  -      Pitting Edema  Mild;Moderate mild/trace LLE, mild/mod RLE  -      Edema Assessment Comment  compressogrip only from ankle to mid-calf on RLE  -         Skin Changes/Observations    Lower Extremity Conditions  bilateral:;clean;dry;shiny  -      Lower Extremity Color/Pigment  bilateral:;hyperpigmented  -         Lymphedema Pulses/Capillary Refill    Lower Extremity Capillary Refill  right:;left:;less than 3 seconds  -         Compression/Skin Care    Compression/Skin Care  skin care;wrapping location;bandaging  -      Skin Care  washed/dried;moisturizing lotion applied  -      Wrapping Location  lower extremity  -      Wrapping Location LE  bilateral:;foot to knee  -      Wrapping Comments  size 4 compressogrip to LLE doubled MH to ankle; size 4&5 doubled/overlapping MH to prox calf RLE  -      Bandage Layers  cotton elastic stocking- double layer (comment size)  -        User Key  (r) = Recorded By, (t) = Taken By, (c) = Cosigned By    Initials Name Provider Type    Alis Skelton PT Physical Therapist          WOUND DEBRIDEMENT  Total area of Debridement: 6 cm2  Debridement Site  1  Location- Site 1: RLE   Selective Debridement- Site 1: Wound Surface <20cmsq  Instruments- Site 1: tweezers, scissors  Excised Tissue Description- Site 1: moderate, other (comment) (crusting over intact skin)  Bleeding- Site 1: scant, held pressure, 1 minute   Debridement Site 2  Location- Site 2: R toe  Selective Debridement- Site 2: Wound Surface <20cmsq  Instruments- Site 2: tweezers, scissors  Excised Tissue Description- Site 2: minimum, slough, other (comment) (blistered nonviable skin)   Debridement Site 3  Location- Site 3: L 3rd toe  Selective Debridement- Site 3: Wound Surface <20cmsq  Instruments- Site 3: tweezers  Excised Tissue Description- Site 3: other (comment) (entire nail and min periwound blistered skin removed)  Bleeding- Site 3: none     Therapy Education     Row Name 04/29/21 5919             Therapy Education    Education Details  No dressing needed for medial RLE. Dress R toe with comfort, mepilex Ag, and primafix tape. Dress L toe nail area with mepilex Ag and primafix tape. Continue with MLW as previously instructed, to include feet. Explained rationale for MLW placement.  -MC      Given  Bandaging/dressing change;Edema management  -MC      Program  Progressed;Modified  -MC      How Provided  Verbal;Demonstration  -MC      Provided to  Patient;Caregiver son  -MC      Level of Understanding  Verbalized  -MC        User Key  (r) = Recorded By, (t) = Taken By, (c) = Cosigned By    Initials Name Provider Type    Alis Skelton, PT Physical Therapist          Recommendation and Plan  PT Assessment/Plan     Row Name 04/29/21 5865          PT Assessment    Functional Limitations  Performance in self-care ADL;Other (comment) wound mgmt  -MC     Impairments  Integumentary integrity;Impaired venous circulation;Edema  -MC     Assessment Comments  RLE fully epithelialized today with no weeping noted. MLW only partially in place, with pitting edema in the foot and proximal calf. Of note, pt with  new wound to the R 2nd toe d/t blistering. The base is clean and red after debridement. Pt with bloody drainage around the L 3rd toe, pt unaware. Upon inspection, the entire nail was lifted except for a very small area of lateral skin. Entire nail removed to prevent buildup of drainage or bacterial growth under the loose nail. Pt still with notable edema that will benefit from MLW. Pt will continue to benefit from skilled PT wound care to promote healing.  -     Rehab Potential  Fair  -     Patient/caregiver participated in establishment of treatment plan and goals  Yes  -     Patient would benefit from skilled therapy intervention  Yes  -        PT Plan    PT Frequency  1x/week  -     Physical Therapy Interventions (Optional Details)  wound care;patient/family education  -     PT Plan Comments  debridement, MLW. Monitor toes for s/sx of infection  -       User Key  (r) = Recorded By, (t) = Taken By, (c) = Cosigned By    Initials Name Provider Type     Alis Johnson, PT Physical Therapist          Goals  PT OP Goals     Row Name 04/29/21 0930          Time Calculation    PT Goal Re-Cert Due Date  06/18/21  -       User Key  (r) = Recorded By, (t) = Taken By, (c) = Cosigned By    Initials Name Provider Type     Alis Johnson, PT Physical Therapist          PT Goal Re-Cert Due Date: 06/18/21            Time Calculation: Start Time: 0930  Time Calculation- PT  Start Time: 0930  PT Received On: 04/29/21  PT Goal Re-Cert Due Date: 06/18/21  Untimed Charges  51825-Smrhvqnxyr comp below knee: 25  30411-Tbcqozxwe debridement: 10  Total Minutes  Untimed Charges Total Minutes: 35   Total Minutes: 35  Therapy Charges for Today     Code Description Service Date Service Provider Modifiers Qty    72166139673 HC PT MULTI LAYER COMP SYS BELOW KNEE 4/29/2021 Alis Johnson, PT GP 1    [id number] HC JUAN DEBRIDE OPEN WOUND UP TO 20CM 4/29/2021 Alis Johnson, PT GP 1                  Alis BURGOS  Alex, PT  4/29/2021

## 2021-05-19 NOTE — TELEPHONE ENCOUNTER
Patient's daughter called and states she believes patient has a UTI.  Would like to come by and  specimen container to have urine checked?

## 2021-05-19 NOTE — TELEPHONE ENCOUNTER
Patient daughter called([phone]) about pt may have a uti she would like a returned phone call.

## 2021-05-27 NOTE — PROGRESS NOTES
Chief Complaint   Patient presents with   • Urinary Tract Infection   • Altered Mental Status       History of Present Illness      Lorna Lo is a 86 y.o. female who presents today for UTI. Patient is accompanied by son who is primary historian.       Urinary Tract Infection   This is a recurrent problem. Episode onset: Diagnosed with E. Coli (+) UTI on 5/19/21, started on Macrobid x 7 days, finished yesterday. The problem has been unchanged. Quality: Denies dysuria. The patient is experiencing no pain. There has been no fever. Associated symptoms include urgency. Pertinent negatives include no chills, discharge, flank pain, frequency, hematuria, hesitancy, nausea, sweats or vomiting. Associated symptoms comments: (+) Confusion, urinary incontinence, nocturia. She has tried antibiotics and increased fluids (Pedialyte) for the symptoms. The treatment provided no relief.   Confusion worsened prior to being initially diagnosed with UTI, has not improved since starting antibiotic therapy.         Review of Systems  Review of Systems   Constitutional: Positive for appetite change (decreased) and fatigue. Negative for chills, diaphoresis and fever.   Eyes: Negative for visual disturbance.   Respiratory: Negative for cough and shortness of breath.    Cardiovascular: Negative for chest pain and palpitations.   Gastrointestinal: Negative for abdominal pain, nausea and vomiting.   Genitourinary: Positive for urgency and urinary incontinence. Negative for decreased urine volume, difficulty urinating, dysuria, flank pain, frequency, hematuria, hesitancy, pelvic pain and pelvic pressure.   Musculoskeletal: Negative for back pain.   Neurological: Positive for confusion. Negative for dizziness, weakness and headache.   Psychiatric/Behavioral: Positive for sleep disturbance.         [facility]  The following portions of the patient's history were reviewed and updated as appropriate: allergies, current medications, past family  history, past medical history, past social history, past surgical history and problem list.     Past Medical History:   Diagnosis Date   • Acute blood loss anemia 9/10/2019   • Chronic anemia     Chronic anemia, secondary to (CKD).    • CKD (chronic kidney disease)    • Controlled type 2 diabetes mellitus with kidney complication, without long-term current use of insulin (CMS/HCC) 10/14/2019   • Coronary artery disease    • Delirium, acute 2019   • Dependent edema    • Dermatitis    • DVT (deep venous thrombosis) (CMS/HCC) 2009    History of DVT in .    • Dyslipidemia    • GERD (gastroesophageal reflux disease)    • Hematoma of groin, right 2019   • Hyperlipidemia    • Hypertension    • Osteopenia    • Osteoporosis 2020   • Polymyositis (CMS/HCC)    • Renal disorder    • Thyroid disease    • Venous stasis      Social History     Tobacco Use   • Smoking status: Former Smoker     Types: Cigarettes     Quit date: 1960     Years since quittin.4   • Smokeless tobacco: Never Used   • Tobacco comment: tried it in highschool   Substance Use Topics   • Alcohol use: No     Past Surgical History:   Procedure Laterality Date   • ARTERIOGRAM Bilateral 2019    Procedure: ARTERIOGRAM;  Surgeon: Pablito Cade MD;  Location:  [facility];  Service: Cardiology   • CARDIAC CATHETERIZATION     • CARDIAC CATHETERIZATION N/A 2019    Procedure: Left Heart Cath;  Surgeon: Pablito Cade MD;  Location:  [facility];  Service: Cardiology   • CARDIAC SURGERY      Cardiac Stents    • CORONARY ARTERY BYPASS GRAFT     • GASTRIC BYPASS       Family History   Problem Relation Age of Onset   • Heart attack Father      Allergies   Allergen Reactions   • Haldol [Haloperidol] Rash and Mental Status Change   • Lortab [Hydrocodone-Acetaminophen] Shortness Of Breath   • Methotrexate Derivatives      Multisystem toxicity, reported per PCP office notes   • Amoxicillin      Pt does not  recall reaction, reported by daughter   • Azathioprine      Pt does not recall reaction, allergy reported per PCP office notes   • Cellcept [Mycophenolate]      Pt does not recall reaction, allergy reported per PCP office notes   • Contrast Dye Hives     INTRAVENOUS CONTRAST DYE.    • Fosamax [Alendronate]      Pt does not recall reaction, allergy reported per PCP office notes   • Hydrochlorothiazide      Unknown reaction, allergy reported per PCP office notes   • Lisinopril Cough   • Methotrexate Unknown (See Comments)   • Simvastatin Other (See Comments)     Sleep disturbance, reported per PCP office notes   • Bactrim [Sulfamethoxazole-Trimethoprim] Rash   • Chlorthalidone Rash   • Ciprofloxacin Hcl Rash   • Sulfa Antibiotics Rash         Current Outpatient Medications:   •  amLODIPine (NORVASC) 10 MG tablet, Take 1 tablet by mouth once daily, Disp: 90 tablet, Rfl: 0  •  aspirin 81 MG EC tablet, Take 81 mg by mouth Daily., Disp: , Rfl:   •  atenolol (TENORMIN) 100 MG tablet, Take 1 tablet by mouth once daily, Disp: 90 tablet, Rfl: 0  •  cholecalciferol (VITAMIN D3) 1000 UNITS tablet, Take 2,000 Units by mouth Daily., Disp: , Rfl:   •  Euthyrox 88 MCG tablet, Take 1 tablet by mouth once daily, Disp: 90 tablet, Rfl: 0  •  ferrous sulfate 325 (65 FE) MG EC tablet, Take 1 tablet by mouth once daily with breakfast, Disp: 90 tablet, Rfl: 0  •  folic acid (FOLVITE) 1 MG tablet, Take 1 tablet by mouth once daily, Disp: 90 tablet, Rfl: 0  •  furosemide (LASIX) 20 MG tablet, Take 1 tablet by mouth once daily, Disp: 30 tablet, Rfl: 0  •  ibuprofen (ADVIL,MOTRIN) 200 MG tablet, Take 200 mg by mouth Every 6 (Six) Hours As Needed for Mild Pain ., Disp: , Rfl:   •  mupirocin (Bactroban) 2 % ointment, Apply  topically to the appropriate area as directed 3 (Three) Times a Day., Disp: 30 g, Rfl: 0  •  potassium chloride (K-DUR,KLOR-CON) 20 MEQ CR tablet, TAKE 1 TABLET  BY MOUTH ONCE DAILY WITH LASIX, Disp: 30 tablet, Rfl: 0  •   predniSONE (DELTASONE) 5 MG tablet, 5 mg 2 (Two) Times a Day., Disp: , Rfl: 3  •  QUEtiapine (SEROquel) 25 MG tablet, Take 1 tablet by mouth Every Night., Disp: 30 tablet, Rfl: 0  •  triamcinolone (KENALOG) 0.1 % ointment, Apply  topically to the appropriate area as directed 2 (Two) Times a Day., Disp: 453.6 g, Rfl: 1  •  Xarelto 20 MG tablet, Take 1 tablet by mouth once daily, Disp: 90 tablet, Rfl: 0  •  nitrofurantoin, macrocrystal-monohydrate, (MACROBID) 100 MG capsule, Take 1 capsule by mouth Every 12 (Twelve) Hours for 5 days., Disp: 10 capsule, Rfl: 0    Objective   Vitals:  Vitals:    05/27/21 0957   BP: 146/98   Pulse: 64   Temp: 97.6 °F (36.4 °C)   SpO2: 95%   Weight: Comment: pt unable   PainSc: 0-No pain       Physical Exam  Physical Exam  Vitals and nursing note reviewed.   Constitutional:       Appearance: Normal appearance. She is well-developed. She is not ill-appearing or toxic-appearing.   Cardiovascular:      Rate and Rhythm: Normal rate and regular rhythm.      Heart sounds: Normal heart sounds.   Pulmonary:      Effort: Pulmonary effort is normal. No respiratory distress.      Breath sounds: Normal breath sounds. No decreased breath sounds, wheezing, rhonchi or rales.   Abdominal:      General: Bowel sounds are normal.      Palpations: Abdomen is soft.      Tenderness: There is no abdominal tenderness. There is no right CVA tenderness, left CVA tenderness, guarding or rebound.   Skin:     General: Skin is warm and dry.   Neurological:      Mental Status: She is alert.      Comments: Sitting in manual wheelchair in office   Psychiatric:         Attention and Perception: Attention normal.         Mood and Affect: Mood and affect normal.         Behavior: Behavior normal. Behavior is cooperative.         Cognition and Memory: Memory is impaired. She exhibits impaired recent memory and impaired remote memory.         Judgment: Judgment normal.         Result Review :   The following data was reviewed  by: RAFA Silvestre on 05/27/2021:  Recent Results (from the past 336 hour(s))   Urine Culture - Urine, Urine, Clean Catch    Collection Time: 05/19/21 11:48 AM    Specimen: Urine, Clean Catch   Result Value Ref Range    Urine Culture >100,000 CFU/mL Escherichia coli (A)        Susceptibility    Escherichia coli - DELLA     Ampicillin <=2 Susceptible ug/ml     Ampicillin + Sulbactam <=2 Susceptible ug/ml     Cefazolin <=4 Susceptible ug/ml     Cefepime <=1 Susceptible ug/ml     Ceftazidime <=1 Susceptible ug/ml     Ceftriaxone <=1 Susceptible ug/ml     Gentamicin <=1 Susceptible ug/ml     Levofloxacin <=0.12 Susceptible ug/ml     Nitrofurantoin <=16 Susceptible ug/ml     Piperacillin + Tazobactam <=4 Susceptible ug/ml     Tetracycline <=1 Susceptible ug/ml     Trimethoprim + Sulfamethoxazole <=20 Susceptible ug/ml   Urinalysis without microscopic (no culture) - Urine, Clean Catch    Collection Time: 05/19/21 11:48 AM    Specimen: Urine, Clean Catch   Result Value Ref Range    Color, UA Yellow Yellow, Straw    Appearance, UA Clear Clear    pH, UA 6.5 5.0 - 8.0    Specific Gravity, UA <=1.005 1.005 - 1.030    Glucose, UA Negative Negative    Ketones, UA Negative Negative    Bilirubin, UA Negative Negative    Blood, UA Small (1+) (A) Negative    Protein, UA Negative Negative    Leuk Esterase, UA Large (3+) (A) Negative    Nitrite, UA Negative Negative    Urobilinogen, UA 0.2 E.U./dL 0.2 - 1.0 E.U./dL   Urinalysis, Microscopic Only - Urine, Clean Catch    Collection Time: 05/19/21 11:48 AM    Specimen: Urine, Clean Catch   Result Value Ref Range    RBC, UA 3-5 (A) None Seen, 0-2 /HPF    WBC, UA 6-12 (A) None Seen, 0-2 /HPF    Bacteria, UA None Seen None Seen /HPF    Squamous Epithelial Cells, UA 0-2 None Seen, 0-2 /HPF    Hyaline Casts, UA 0-2 None Seen /LPF    Methodology Automated Microscopy    POC Urinalysis Dipstick, Automated    Collection Time: 05/27/21 10:08 AM    Specimen: Urine   Result Value Ref Range     Color Yellow Yellow, Straw, Dark Yellow, Tia    Clarity, UA Clear Clear    Specific Gravity  1.010 1.005 - 1.030    pH, Urine 7.5 5.0 - 8.0    Leukocytes Moderate (2+) (A) Negative    Nitrite, UA Negative Negative    Protein, POC 1+ (A) Negative mg/dL    Glucose, UA Negative Negative, 1000 mg/dL (3+) mg/dL    Ketones, UA Negative Negative    Urobilinogen, UA Normal Normal    Bilirubin Negative Negative    Blood, UA Negative Negative       Assessment and Plan    Diagnoses and all orders for this visit:    1. Abnormal urine finding (Primary)  -     Urine Culture - Urine, Urine, Clean Catch  -     nitrofurantoin, macrocrystal-monohydrate, (MACROBID) 100 MG capsule; Take 1 capsule by mouth Every 12 (Twelve) Hours for 5 days.  Dispense: 10 capsule; Refill: 0    2. Acute cystitis without hematuria  -     POC Urinalysis Dipstick, Automated  -     Urine Culture - Urine, Urine, Clean Catch  -     nitrofurantoin, macrocrystal-monohydrate, (MACROBID) 100 MG capsule; Take 1 capsule by mouth Every 12 (Twelve) Hours for 5 days.  Dispense: 10 capsule; Refill: 0    UA in office similar to previous findings 8 days ago. Will continue antibiotic therapy with Macrobid given recent culture with sensitivity and allergies to sulfa, cipro, and PCN. Will notify of repeat urine culture results when received and make further recommendations as indicated. Patient advised in adequate water intake, avoidance of excessive caffeine, frequent changing of incontinence pads, and genitourinary hygiene measures. Patient was encouraged to call back into the office or seek emergency care if experiencing chills, fever, or back pain.          Follow Up   Return for Follow-up in 1 week for re-evaluation with PCP, sooner as needed if worsening or no improvement. .    Plan of care reviewed with patient at conclusion of today's visit. Patient education was provided regarding diagnosis, management, and prescribed or recommended OTC medications. Patient was  informed to notify office of any new, worsening, or persistent symptoms. Patient verbalized understanding and agreement with plan of care.     Electronically Signed By:  RAFA Silvestre  05/27/2021    EMR Dragon/Transcription Disclaimer:  Please note that portions of this encounter note were completed using electronic transcription/translation of spoken language to printed text.  The electronic transcription/translation of spoken language may permit erroneous, or at times, nonsensical words or phrases to be inadvertently transcribed.  Although I have reviewed the note for such errors, some may still exist in this documentation.

## 2021-06-02 NOTE — TELEPHONE ENCOUNTER
Last Office Visit: 04/26/21  Next Office Visit:06/08/21    Labs completed in past 6 months? yes  Labs completed in past year? yes    Last Refill Date: 03/08/21  Quantity:90  Refills:0    Pharmacy:     Please review pended refill request for any changes needed on refills or quantities. Thank you!

## 2021-06-08 PROBLEM — E03.9 HYPOTHYROIDISM (ACQUIRED): Status: ACTIVE | Noted: 2021-01-01

## 2021-06-15 NOTE — PROGRESS NOTES
"  OFFICE FOLLOW UP     Date of Encounter:06/15/2021     Name: Lorna Lo  : 1934  Address: [address] [place] [address]    PCP: Vanesa Correa DO  [facility] [address]    Lorna Lo is a 86 y.o. female.      Chief Complaint: Follow up of CAD, HTN, PAF, HLD    Problem List:   1.  Coronary artery disease:   a.  Coronary artery bypass grafting, .  b. Left heart catheterization , at [facility], IDB.  Nonobstructive.    c. Increased  shortness of air, summer 2015.   d. [facility] 2015- patent LIMA to LAD and SVG to RCA. No myocardium in jeopardy. Normal LVEF   e. History of statin \"intolerances\".  f. [facility] for [place] 19: 2/2 patent grafts, high degree stenosis in proximal ramus intermedians, treated with PTCA and ZES  1. Post-procedure hematoma with no pseudoaneurysm by duplex  g. Echo 19: EF 80%, mild-mod LVH; trivial gradient across trileaflet and somewhat calcified aortic valve; no AS or AI; calcification of mitral valve, which is functionally normal   2.  Hypertension.   3.  Paroxysmal atrial fibrillation, Winter 2017.  a. Chads-vasc=5, NOAC instituted by PCP.  4.  Dyslipidemia, statin intolerance.   5.  Polymyositis.   6.  Dependent edema.    7.  CKD.   8.  Chronic anemia, secondary to (CKD).   9.  Osteopenia.    10. GERD.   11. History of DVT in .   12. Cognitive impairment with acute delirium during ICU admission for hematoma 2019  13. Chronic microaspiration, per Dr. Lafleur   14. Hand nodules     Allergies:  Allergies   Allergen Reactions   • Haldol [Haloperidol] Rash and Mental Status Change   • Lortab [Hydrocodone-Acetaminophen] Shortness Of Breath   • Methotrexate Derivatives      Multisystem toxicity, reported per PCP office notes   • Amoxicillin      Pt does not recall reaction, reported by daughter   • Azathioprine      Pt does not recall reaction, allergy reported per PCP office notes   • Cellcept [Mycophenolate]      Pt does not " recall reaction, allergy reported per PCP office notes   • Contrast Dye Hives     INTRAVENOUS CONTRAST DYE.    • Fosamax [Alendronate]      Pt does not recall reaction, allergy reported per PCP office notes   • Hydrochlorothiazide      Unknown reaction, allergy reported per PCP office notes   • Lisinopril Cough   • Methotrexate Unknown (See Comments)   • Simvastatin Other (See Comments)     Sleep disturbance, reported per PCP office notes   • Bactrim [Sulfamethoxazole-Trimethoprim] Rash   • Chlorthalidone Rash   • Ciprofloxacin Hcl Rash   • Sulfa Antibiotics Rash       Current Medications:  Current Outpatient Medications   Medication Instructions   • amLODIPine (NORVASC) 10 MG tablet Take 1 tablet by mouth once daily   • aspirin 81 mg, Oral, Daily   • atenolol (TENORMIN) 100 MG tablet Take 1 tablet by mouth once daily   • cholecalciferol (VITAMIN D3) 2,000 Units, Oral, Daily   • Euthyrox 88 MCG tablet Take 1 tablet by mouth once daily   • folic acid (FOLVITE) 1 MG tablet Take 1 tablet by mouth once daily   • ibuprofen (ADVIL,MOTRIN) 200 mg, Oral, Every 6 Hours PRN   • predniSONE (DELTASONE) 5 mg, 2 Times Daily   • raNITIdine HCl (RANITIDINE 150 MAX STRENGTH PO) Oral   • triamcinolone (KENALOG) 0.1 % ointment Topical, 2 Times Daily   • Xarelto 20 MG tablet Take 1 tablet by mouth once daily        History of Present Illness:       Lorna Lo returns for scheduled follow up today. She is in a wheelchair today but is able to walk at home without assistance. She lives alone and has help with meals. She denies chest pain, unusual shortness of breath, tachy palpitations or syncope. She reports she has some blisters on her right lower leg that is being treated with wraps. She has no orthopnea or PND. She follows regularly with Dr. Correa. She has received both COVID vaccines. She denies unusual bleeding with aspirin and Xarelto.     The following portions of the patient's history were reviewed and updated as  "appropriate: allergies, current medications and problem list.    ROS: Pertinent positives as listed in the HPI.  All other systems reviewed and negative.    Objective:    Vitals:    06/15/21 1115   BP: 145/58   BP Location: Left arm   Patient Position: Sitting   Pulse: (!) 49   SpO2: 96%   Weight: 78.9 kg (174 lb)   Height: 162.6 cm (64\")     Body mass index is 29.87 kg/m².    Physical Exam:  GENERAL: Alert, cooperative, in no acute distress.   HEENT: Normocephalic, no adenopathy, no jugular venous distention  HEART: No discrete PMI is noted. Regular rhythm, normal rate, and no gallops, or rubs. Grade 2 basilar systolic murmur.    LUNGS: Clear to auscultation bilaterally. No wheezing, rales or ronchi.  ABDOMEN: Soft, bowel sounds present, non-tender   NEUROLOGIC: No focal abnormalities involving strength or sensation are noted.   EXTREMITIES: No clubbing, cyanosis noted.     Diagnostic Data:    Lab Results   Component Value Date    CHOL 230 (H) 06/08/2021    CHLPL 213 (H) 11/04/2015    TRIG 117 06/08/2021    HDL 80 (H) 06/08/2021     (H) 06/08/2021      Lab Results   Component Value Date    GLUCOSE 78 06/08/2021    BUN 13 06/08/2021    CREATININE 1.10 (H) 06/08/2021    EGFRIFNONA 47 (L) 06/08/2021    BCR 11.8 06/08/2021    K 4.1 06/08/2021    CO2 25.7 06/08/2021    CALCIUM 9.2 06/08/2021    ALBUMIN 3.60 06/08/2021    AST 19 06/08/2021    ALT 16 06/08/2021      Lab Results   Component Value Date    HGBA1C 5.60 06/08/2021      Lab Results   Component Value Date    WBC 8.77 06/08/2021    HGB 10.4 (L) 06/08/2021    HCT 32.0 (L) 06/08/2021    MCV 83.8 06/08/2021     06/08/2021      Procedures    Advance Care Planning   ACP discussion was held with the patient during this visit. Patient does not have an advance directive, information provided.    Assessment and Plan:   1.  CAD:  No angina, mostly sedentary. Continue aspirin.  2.  HTN:  Mildly elevated today. Would recommend close follow up with PCP.  3.  " HLD:  , history of statin intolerance. We will not rechallenge at this time.   4.  PAF:  Denies tachy palpitations, normal rate and rhythm today. Continue atenolol and Xarelto.     I will see Lorna Lo back in one year or sooner on an as needed basis.    Scribed for Pablito Cade MD by Mary Benson RN. 06/15/2021 12:02 EDT.     EMR Dragon/Transcription Disclaimer:  Much of this encounter note is an electronic transcription/translation of spoken language to printed text.  The electronic translation of spoken language may permit erroneous, or at times, nonsensical words or phrases to be inadvertently transcribed.  Although I have reviewed the note for such errors, some may still exist.

## 2021-06-16 NOTE — THERAPY DISCHARGE NOTE
Outpatient Rehabilitation - Wound/Debridement D/C Summary        Patient Name: Lorna Lo  : 1934  MRN: [id number]  Today's Date: 2021                  Admit Date: (Not on file)    Visit Dx:  No diagnosis found.    Patient Active Problem List   Diagnosis   • Coronary artery disease   • Essential hypertension   • Dyslipidemia   • Polymyositis (CMS/[facility])   • Stage 3 chronic kidney disease (CMS/[facility])   • Chronic anemia   • Osteopenia   • GERD (gastroesophageal reflux disease)   • PAF (paroxysmal atrial fibrillation) (CMS/[facility])   • Morbidly obese (CMS/[facility])   • Coronary artery disease involving native coronary artery of native heart without angina pectoris   • Unstable angina (CMS/[facility])   • Memory loss   • 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    • Dyslipidemia    • GERD (gastroesophageal reflux disease)    • Hematoma of groin, right 2019   • Hyperlipidemia    • Hypertension    • Osteopenia    • Osteoporosis 2020   • Polymyositis (CMS/[facility])    • Renal disorder    • Thyroid disease    • Venous stasis         Past Surgical History:   Procedure Laterality Date   • ARTERIOGRAM Bilateral 2019    Procedure: ARTERIOGRAM;  Surgeon: Pablito Cade MD;  Location: [facility] INVASIVE LOCATION;  Service: Cardiology   • CARDIAC CATHETERIZATION     • CARDIAC CATHETERIZATION N/A 2019    Procedure: Left Heart Cath;   Surgeon: Pablito Cade MD;  Location: [facility] CATH INVASIVE LOCATION;  Service: Cardiology   • CARDIAC SURGERY      Cardiac Stents    • CORONARY ARTERY BYPASS GRAFT     • GASTRIC BYPASS           EVALUATION                WOUND DEBRIDEMENT                            Recommendation and Plan      Goals  PT OP Goals     Row Name 06/16/21 1146          PT Short Term Goals    STG Date to Achieve  04/15/21  -     STG 1  Pt / caregiver able to verbalize s/s of infx and when to seek urgent care.   -     STG 1 Progress  Met  -     STG 2  Pt/ caregiver independent with clean home dressing changs to manage drainage and promote wound closure.   -     STG 2 Progress  Met  -     STG 3  RLE wound area to decrease at least 50% to demonstrate wound healing.   -     STG 3 Progress  Met  -     STG 4  BLE circumferential measurements to decrease 1-2 cm to promote improved skin integrity.   -     STG 4 Progress  Met  -        Long Term Goals    LTG 1  RLE wound area to decrease at least 90% to demonstrate wound healing.   -     LTG 1 Progress  Met  -     LTG 2  Pt to report obtaining her own supply of compressogrips or well fitting compression socks for long term management of LE swelling.   -     LTG 2 Progress  Not Met  -       User Key  (r) = Recorded By, (t) = Taken By, (c) = Cosigned By    Initials Name Provider Type    Alis Skelton, PT Physical Therapist          Time Calculation:              OP Discharge Summary     Row Name 06/16/21 1146             OP PT Discharge Summary    Date of Discharge  06/16/21  -      Reason for Discharge  other (comment) tent d/c  -      Outcomes Achieved  Patient able to partially acheive established goals  -      Discharge Destination  Home with home program;Home with caregiver assist  -      Discharge Instructions/Additional Comments  tentatively d/c over 30 days ago, will now require a new MD order if further OP PT services required.  -        User  Key  (r) = Recorded By, (t) = Taken By, (c) = Cosigned By    Initials Name Provider Type    Alis Skelton, PT Physical Therapist          Alis Johnson, VANESA  6/16/2021

## 2021-06-23 NOTE — TELEPHONE ENCOUNTER
Caller: Chalree Ortiz    Relationship: Emergency Contact    Best call back number: [phone]    What orders are you requesting (i.e. lab or imaging): UTI ORDER     DAUGHTER SAYS MOTHER IS CONFUSION; BURNING WHEN URINATING; FEELS PRESSURE TO URINATE BUT CAN'T FOR ABOUT     In what timeframe would the patient need to come in: ASAP    Where will you receive your lab/imaging services: PLEASE CALL CHARLEE ORTIZ ([facility]) AND ADVISE     Additional notes: DAUGHTER SAYS SHE HAS ALL OF THE NECESSARY SUPPLIES FOR UTI SCREENING; JUST NEEDS THE ORDER TO BE INPUT, SO SHE CAN BRING HER MOTHER AND GET IT DONE.     CONFUSION - ADVISED ER - WARM TRANSFERRED TO OFFICE

## 2021-06-24 NOTE — PROGRESS NOTES
Lorna Lo is a 86 y.o. female who presents for urinary symptoms.    Chief Complaint   Patient presents with   • Urinary Tract Infection       Urinary Tract Infection   This is a new problem. The current episode started in the past 7 days. The problem occurs every urination. The problem has been waxing and waning. The patient is experiencing no pain. There has been no fever. Associated symptoms include frequency. Pertinent negatives include no chills, flank pain, hematuria, nausea, urgency or vomiting. She has tried increased fluids and NSAIDs for the symptoms. Her past medical history is significant for recurrent UTIs.         Past Medical History:   Diagnosis Date   • Acute blood loss anemia 9/10/2019   • Chronic anemia     Chronic anemia, secondary to (CKD).    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    • Dyslipidemia    • GERD (gastroesophageal reflux disease)    • Hematoma of groin, right 9/9/2019   • Hyperlipidemia    • Hypertension    • Osteopenia    • Osteoporosis 5/14/2020   • Polymyositis (CMS/[facility])    • Renal disorder    • Thyroid disease    • Venous stasis        Past Surgical History:   Procedure Laterality Date   • ARTERIOGRAM Bilateral 9/9/2019    Procedure: ARTERIOGRAM;  Surgeon: Pablito Cade MD;  Location:  [place] [facility]IVE [facility];  Service: Cardiology   • CARDIAC CATHETERIZATION     • CARDIAC CATHETERIZATION N/A 9/9/2019    Procedure: Left Heart Cath;  Surgeon: Pablito Cade MD;  Location:  [facility] CATH INVASIVE LOCATION;  Service: Cardiology   • CARDIAC SURGERY      Cardiac Stents    • CORONARY ARTERY BYPASS GRAFT     • GASTRIC BYPASS         Family History   Problem Relation Age of Onset    • Heart attack Father        Social History     Socioeconomic History   • Marital status:      Spouse name: Not on file   • Number of children: Not on file   • Years of education: Not on file   • Highest education level: Not on file   Tobacco Use   • Smoking status: Former Smoker     Types: Cigarettes     Quit date: 1960     Years since quittin.5   • Smokeless tobacco: Never Used   • Tobacco comment: tried it in highschool   Substance and Sexual Activity   • Alcohol use: No   • Drug use: No   • Sexual activity: Defer       Allergies   Allergen Reactions   • Haldol [Haloperidol] Rash and Mental Status Change   • Lortab [Hydrocodone-Acetaminophen] Shortness Of Breath   • Methotrexate Derivatives      Multisystem toxicity, reported per PCP office notes   • Amoxicillin      Pt does not recall reaction, reported by daughter   • Azathioprine      Pt does not recall reaction, allergy reported per PCP office notes   • Cellcept [Mycophenolate]      Pt does not recall reaction, allergy reported per PCP office notes   • Contrast Dye Hives     INTRAVENOUS CONTRAST DYE.    • Fosamax [Alendronate]      Pt does not recall reaction, allergy reported per PCP office notes   • Hydrochlorothiazide      Unknown reaction, allergy reported per PCP office notes   • Lisinopril Cough   • Methotrexate Unknown (See Comments)   • Simvastatin Other (See Comments)     Sleep disturbance, reported per PCP office notes   • Bactrim [Sulfamethoxazole-Trimethoprim] Rash   • Chlorthalidone Rash   • Ciprofloxacin Hcl Rash   • Sulfa Antibiotics Rash       ROS    Review of Systems   Constitutional: Negative for chills and fever.   Respiratory: Negative for cough.    Cardiovascular: Negative for chest pain.   Gastrointestinal: Negative for abdominal pain, nausea and vomiting.   Genitourinary: Positive for frequency and urinary incontinence. Negative for dysuria, flank pain, hematuria, pelvic pain, pelvic pressure and urgency.   Skin: Negative  "for rash.   Allergic/Immunologic: Negative for environmental allergies and immunocompromised state.   Neurological: Negative for dizziness.   Hematological: Negative for adenopathy.   Psychiatric/Behavioral: The patient is not nervous/anxious.        Vitals:    06/24/21 0957   BP: 124/64   Pulse: 60   Resp: 16   Temp: 96.8 °F (36 °C)   SpO2: 97%   Weight: Comment: unable   Height: 162.6 cm (64\")   PainSc: 0-No pain         Current Outpatient Medications:   •  amLODIPine (NORVASC) 10 MG tablet, Take 1 tablet by mouth once daily, Disp: 90 tablet, Rfl: 0  •  aspirin 81 MG EC tablet, Take 81 mg by mouth Daily., Disp: , Rfl:   •  atenolol (TENORMIN) 100 MG tablet, Take 1 tablet by mouth once daily, Disp: 90 tablet, Rfl: 0  •  cholecalciferol (VITAMIN D3) 1000 UNITS tablet, Take 2,000 Units by mouth Daily., Disp: , Rfl:   •  Euthyrox 88 MCG tablet, Take 1 tablet by mouth once daily, Disp: 90 tablet, Rfl: 0  •  folic acid (FOLVITE) 1 MG tablet, Take 1 tablet by mouth once daily, Disp: 90 tablet, Rfl: 0  •  ibuprofen (ADVIL,MOTRIN) 200 MG tablet, Take 200 mg by mouth Every 6 (Six) Hours As Needed for Mild Pain ., Disp: , Rfl:   •  predniSONE (DELTASONE) 5 MG tablet, 5 mg 2 (Two) Times a Day., Disp: , Rfl: 3  •  raNITIdine HCl (RANITIDINE 150 MAX STRENGTH PO), Take  by mouth., Disp: , Rfl:   •  triamcinolone (KENALOG) 0.1 % ointment, Apply  topically to the appropriate area as directed 2 (Two) Times a Day., Disp: 453.6 g, Rfl: 1  •  Xarelto 20 MG tablet, Take 1 tablet by mouth once daily, Disp: 90 tablet, Rfl: 0  •  cephalexin (Keflex) 500 MG capsule, Take 1 capsule by mouth 2 (Two) Times a Day for 7 days., Disp: 14 capsule, Rfl: 0    PE    Physical Exam  Vitals and nursing note reviewed.   Constitutional:       General: She is not in acute distress.     Appearance: Normal appearance. She is well-developed. She is obese. She is not diaphoretic.   HENT:      Head: Normocephalic and atraumatic.      Nose: Nose normal.      " Mouth/Throat:      Mouth: Mucous membranes are moist.      Pharynx: Oropharynx is clear.   Eyes:      Conjunctiva/sclera: Conjunctivae normal.      Pupils: Pupils are equal, round, and reactive to light.   Cardiovascular:      Rate and Rhythm: Normal rate and regular rhythm.      Pulses: Normal pulses.      Heart sounds: Normal heart sounds. No murmur heard.   No friction rub. No gallop.    Pulmonary:      Effort: Pulmonary effort is normal.      Breath sounds: Normal breath sounds.   Abdominal:      Tenderness: There is no abdominal tenderness. There is no right CVA tenderness or left CVA tenderness.   Musculoskeletal:         General: No tenderness. Normal range of motion.      Cervical back: Normal range of motion and neck supple.   Lymphadenopathy:      Cervical: No cervical adenopathy.   Skin:     General: Skin is warm and dry.      Capillary Refill: Capillary refill takes less than 2 seconds.      Findings: No rash.   Neurological:      General: No focal deficit present.      Mental Status: She is alert and oriented to person, place, and time. Mental status is at baseline.   Psychiatric:         Mood and Affect: Mood normal.         Behavior: Behavior normal.         Thought Content: Thought content normal.         Judgment: Judgment normal.       Urine dipstick shows positive for leukocytes, red blood cells, protein, ketones.     A/P    Problem List Items Addressed This Visit     None      Visit Diagnoses     Acute cystitis with hematuria    -  Primary    Take antibiotics as prescribed. Hydrate well, F/U if symptoms worsen.    Relevant Medications    cephalexin (Keflex) 500 MG capsule    Other Relevant Orders    POCT urinalysis dipstick, automated (Completed)    Urine Culture - Urine, Urine, Clean Catch          Assessment      ICD-10-CM ICD-9-CM   1. Acute cystitis with hematuria  N30.01 595.0            Problems Addressed this Visit     None      Visit Diagnoses     Acute cystitis with hematuria    -   Primary    Take antibiotics as prescribed. Hydrate well, F/U if symptoms worsen.    Relevant Medications    cephalexin (Keflex) 500 MG capsule    Other Relevant Orders    POCT urinalysis dipstick, automated (Completed)    Urine Culture - Urine, Urine, Clean Catch      Diagnoses       Codes Comments    Acute cystitis with hematuria    -  Primary ICD-10-CM: N30.01  ICD-9-CM: 595.0 Take antibiotics as prescribed. Hydrate well, F/U if symptoms worsen.           Plan    Orders Placed This Encounter   Procedures   • Urine Culture - Urine, Urine, Clean Catch   • POCT urinalysis dipstick, automated     New Medications Ordered This Visit   Medications   • cephalexin (Keflex) 500 MG capsule     Sig: Take 1 capsule by mouth 2 (Two) Times a Day for 7 days.     Dispense:  14 capsule     Refill:  0                 Plan of care reviewed with patient at the conclusion of today's visit. Education was provided regarding diagnosis, management and any prescribed or recommended OTC medications.  Patient verbalizes understanding of and agreement with management plan.    Return if symptoms worsen or fail to improve.     RAFA Alford

## 2021-07-19 NOTE — TELEPHONE ENCOUNTER
Provider: CHIN     Caller: JACKI RAWLS     Relationship to Patient: DAUGHTER     Phone Number: [phone]    Reason for Call: PATIENT WOULD LIKE TO KNOW IF SHE CAN DROP OFF URINE SAMPLE DUE TO SYMPTOMS:   BURNING WHILE URINATING, ODOR AND FREQUENCY

## 2021-07-19 NOTE — TELEPHONE ENCOUNTER
Called and informed daughter that patient needs to be seen and evaluated for treatment.  Patient has appointment tomorrow.

## 2021-07-19 NOTE — TELEPHONE ENCOUNTER
PT'S DAUGHTER CALLED IN STATING THAT SHE SCHEDULED AN APPOINTMENT FOR PT TO BE SEEN TOMORROW AND IS WONDERING IF SHE CAN GO AHEAD AND BE PRESCRIBED MEDICATION TO TREAT HER UTI SYMPTOMS. PLEASE ADVISE.

## 2021-07-26 NOTE — TELEPHONE ENCOUNTER
Caller: Charlee Ortiz    Relationship to patient: Emergency Contact    Best call back number: [phone]    Patient is needing: CHARLEE DAO STATED THAT SHE WOULD LIKE TO TALK TO DR. NEELY ABOUT PATIENT    PLEASE ADVISE

## 2021-07-27 NOTE — TELEPHONE ENCOUNTER
Spoke with PT daughter Barbara Regalado stated that she is pt Medical power of . Informed her that we will need documentation for pt chart we do not have an MEI on file. Stated that she will bring in during apt on Thursday

## 2021-07-30 NOTE — TELEPHONE ENCOUNTER
Caller: Dwaine Lo    Relationship: Emergency Contact    Best call back number: [phone]    What test was performed: URINE     When was the test performed: 07-29-21    Where was the test performed: IN OFFICE    Additional notes: PLEASE CALL WITH RESULTS

## 2021-08-16 ENCOUNTER — TELEPHONE (OUTPATIENT)
Dept: INTERNAL MEDICINE | Facility: CLINIC | Age: 86
End: 2021-08-16

## 2023-09-05 NOTE — PLAN OF CARE
Problem: Patient Care Overview  Goal: Plan of Care Review  Outcome: Unable to achieve outcome(s) by discharge Date Met: 09/13/19 09/13/19 1220   Coping/Psychosocial   Plan of Care Reviewed With patient   Plan of Care Review   Progress improving   OTHER   Outcome Summary Able to perform sit bal. activ & ther ex all extrem + HEP instruction, but decl mobil. d/t just amb w/ OT, & Preparing for d/c; met HEP goal but not mobil goals; limited by low HGB (11.9), intermitt back pain & fatigue; Will have HHPT f/u & 24/7 care @ home          Azithromycin Pregnancy And Lactation Text: This medication is considered safe during pregnancy and is also secreted in breast milk.

## (undated) DEVICE — CATH DIAG EXPO .056 IM 6F 100CM

## (undated) DEVICE — STARCLOSE SE VASCULAR CLOSURE SYSTEM: Brand: STARCLOSE SE

## (undated) DEVICE — GW J TP FIX CORE .035 150

## (undated) DEVICE — STNT CORNRY RESOLUTE ONYX RX 2X15MM: Type: IMPLANTABLE DEVICE | Status: NON-FUNCTIONAL

## (undated) DEVICE — INTRO SHEATH ENGAGE TR SS/STD .025 6F 12CM

## (undated) DEVICE — CATH DIAG EXPO .056 RCB 6F 100CM

## (undated) DEVICE — DEV COMP RAD PRELUDESYNC 24CM

## (undated) DEVICE — HI-TORQUE VERSACORE MODIFIED J GUIDE WIRE SYSTEM 260 CM: Brand: HI-TORQUE VERSACORE

## (undated) DEVICE — GW PRESSUREWIRE X WIRELESS FFR 175CM

## (undated) DEVICE — CATH DIAG EXPO M/ PK 6FR FL4/FR4 PIG 3PK

## (undated) DEVICE — PK CATH CARD 10

## (undated) DEVICE — USE OF THE SMARTNEEDLE DEVICE IS INDICATED WHEN BLOOD FLOW MUST BE DETECTED FOR PERCUTANEOUS VESSEL CANNULATION. THE VESSEL MUST BE OF A CALIBER WHICH WOULD NORMALLY BE PUNCTURED WITH A NEEDLE AND/OR CATHETER OF THIS SIZE OR LARGER.: Brand: SMARTNEEDLE® VASCULAR ACCESS SYSTEM

## (undated) DEVICE — DEV INFL MONARCH 25W

## (undated) DEVICE — MINI TREK CORONARY DILATATION CATHETER 2.0 MM X 8 MM / RAPID-EXCHANGE: Brand: MINI TREK

## (undated) DEVICE — Device

## (undated) DEVICE — GUIDE CATHETER: Brand: MACH1™

## (undated) DEVICE — KT DYEVERT PLS EZ W/SMART SYR FOR HI VISC CONTRST DISP

## (undated) DEVICE — CATH DIAG EXPO FR4 .056IN 6F 125CM RT

## (undated) DEVICE — CATH DIAG EXPO MPA1 6F .041IN 100CM

## (undated) DEVICE — KT MANIFOLD CATHLAB CUST

## (undated) DEVICE — INTRO SHEATH ART/FEM ENGAGE .038 6F12CM